# Patient Record
Sex: MALE | Race: BLACK OR AFRICAN AMERICAN | Employment: OTHER | URBAN - METROPOLITAN AREA
[De-identification: names, ages, dates, MRNs, and addresses within clinical notes are randomized per-mention and may not be internally consistent; named-entity substitution may affect disease eponyms.]

---

## 2017-07-10 ENCOUNTER — HOSPITAL ENCOUNTER (EMERGENCY)
Age: 77
Discharge: HOME | End: 2017-07-10
Attending: EMERGENCY MEDICINE
Payer: MEDICARE

## 2017-07-10 ENCOUNTER — APPOINTMENT (OUTPATIENT)
Dept: CT IMAGING | Age: 77
End: 2017-07-10
Attending: PHYSICIAN ASSISTANT
Payer: MEDICARE

## 2017-07-10 VITALS
TEMPERATURE: 98.7 F | HEIGHT: 69 IN | WEIGHT: 270 LBS | SYSTOLIC BLOOD PRESSURE: 150 MMHG | BODY MASS INDEX: 39.99 KG/M2 | DIASTOLIC BLOOD PRESSURE: 67 MMHG | OXYGEN SATURATION: 96 % | HEART RATE: 83 BPM | RESPIRATION RATE: 19 BRPM

## 2017-07-10 DIAGNOSIS — S01.01XA SCALP LACERATION, INITIAL ENCOUNTER: Primary | ICD-10-CM

## 2017-07-10 DIAGNOSIS — Z23 NEED FOR TDAP VACCINATION: ICD-10-CM

## 2017-07-10 PROCEDURE — 75810000293 HC SIMP/SUPERF WND  RPR

## 2017-07-10 PROCEDURE — 99283 EMERGENCY DEPT VISIT LOW MDM: CPT

## 2017-07-10 PROCEDURE — 90715 TDAP VACCINE 7 YRS/> IM: CPT | Performed by: PHYSICIAN ASSISTANT

## 2017-07-10 PROCEDURE — 77030008460 HC STPLR SKN PRECIS 3M -A

## 2017-07-10 PROCEDURE — 70450 CT HEAD/BRAIN W/O DYE: CPT

## 2017-07-10 PROCEDURE — 74011250636 HC RX REV CODE- 250/636: Performed by: PHYSICIAN ASSISTANT

## 2017-07-10 PROCEDURE — 90471 IMMUNIZATION ADMIN: CPT

## 2017-07-10 RX ORDER — GUAIFENESIN 100 MG/5ML
81 LIQUID (ML) ORAL DAILY
COMMUNITY
End: 2020-12-14

## 2017-07-10 RX ORDER — HYDROCHLOROTHIAZIDE 12.5 MG/1
12.5 TABLET ORAL DAILY
COMMUNITY
End: 2020-02-14

## 2017-07-10 RX ORDER — LISINOPRIL 40 MG/1
40 TABLET ORAL DAILY
COMMUNITY
End: 2020-02-14

## 2017-07-10 RX ORDER — METFORMIN HYDROCHLORIDE 1000 MG/1
1000 TABLET ORAL 2 TIMES DAILY WITH MEALS
Status: ON HOLD | COMMUNITY
End: 2021-06-11 | Stop reason: CLARIF

## 2017-07-10 RX ORDER — GABAPENTIN 400 MG/1
800 CAPSULE ORAL 2 TIMES DAILY
Status: ON HOLD | COMMUNITY
End: 2020-12-14 | Stop reason: SDUPTHER

## 2017-07-10 RX ORDER — PRAVASTATIN SODIUM 20 MG/1
20 TABLET ORAL
COMMUNITY
End: 2020-02-14

## 2017-07-10 RX ORDER — PIOGLITAZONEHYDROCHLORIDE 30 MG/1
30 TABLET ORAL DAILY
COMMUNITY

## 2017-07-10 RX ADMIN — TETANUS TOXOID, REDUCED DIPHTHERIA TOXOID AND ACELLULAR PERTUSSIS VACCINE, ADSORBED 0.5 ML: 5; 2.5; 8; 8; 2.5 SUSPENSION INTRAMUSCULAR at 12:15

## 2017-07-10 NOTE — ED PROVIDER NOTES
[address]  EMERGENCY DEPARTMENT HISTORY AND PHYSICAL EXAM       Date: 7/10/2017   Patient Name: Pino Blake   YOB: 1940  Medical Record Number: [id number]    History of Presenting Illness     Chief Complaint   Patient presents with    Laceration    Fall        History Provided By:  patient    Additional History: 11:39 AM  Pino Blake is a 78 y.o. male who presents to the emergency department with daughter C/O head laceration with throbbing head pain at a 1/10 s/p mechanical fall as pt bent over to leash dog and kept falling forward after bending and hit head on corner of wall. Pt takes baby ASA daily, but bleeding controlled on arrival. PMHx of DM, HTN, Neuropathy, Cancer, multiple myeloma, and chemotherapy. PSHx of cholecystectomy. Pt not UTD with last tetanus shot. Pt reports allergy to Iodine. Pt denies LOC, dizziness, neck pain, visual disturbance, other lacerations, arthralgias, syncope, numbness, weakness, jaw pain, and any other associated signs and sx. Primary Care Provider: Annie Vazquez MD   Specialist:    Past History     Past Medical History:   Past Medical History:   Diagnosis Date    [facility] [address]     Diabetes ([facility] [address]     FH: chemotherapy     H/O stem cell transplant ([facility] [address]     Hypertension     Multiple myeloma ([facility] [address]     Neuropathy         Past Surgical History:   Past Surgical History:   Procedure Laterality Date    HX CHOLECYSTECTOMY          Family History:   History reviewed. No pertinent family history. Social History:   Social History   Substance Use Topics    Smoking status: Former Smoker    Smokeless tobacco: Never Used    Alcohol use Yes      Comment: rare        Allergies: Allergies   Allergen Reactions    Iodine Hives        Review of Systems   Review of Systems   Eyes: Negative for visual disturbance. Musculoskeletal: Negative for arthralgias and neck pain.         (+) Scalp pain  (-) Jaw pain   Skin: Positive for wound (head). Neurological: Negative for dizziness, syncope, weakness and numbness. (-) LOC   All other systems reviewed and are negative. Physical Exam  Vitals:    07/10/17 1138 07/10/17 1142   BP: 144/89    Pulse: (!) 107    Resp: 16    Temp:  98.7 °F (37.1 °C)   SpO2: 97%    Weight: 122.5 kg (270 lb)    Height: 5' 9\" (1.753 m)        Physical Exam   Nursing note and vitals reviewed. Vital signs and nursing notes reviewed. CONSTITUTIONAL: Alert. Well-appearing; well-nourished; in no apparent distress. HEAD: Normocephalic; 5cm curved laceration to top right parietal scalp, some bleeding, no FB or pulsatile bleeding, no skull depression; neg deleon sign, no raccoon eyes. EYES: PERRL; EOM's intact. No nystagmus. Conjunctiva clear. ENT: TM's normal. External ear normal. Normal nose; no rhinorrhea. Normal pharynx. Moist mucus membranes. Dentures in place, somewhat loose. NECK: Supple; FROM without difficulty, non-tender; no cervical lymphadenopathy. CV: Normal S1, S2; no murmurs, rubs, or gallops. No chest wall tenderness. RESPIRATORY: Normal chest excursion with respiration; breath sounds clear and equal bilaterally; no wheezes, rhonchi, or rales. EXT: Normal ROM in all four extremities; non-tender to palpation. SKIN: Normal for age and race; warm; dry; good turgor; no apparent lesions or exudate. NEURO: A & O x3. Cranial nerves 2-12 intact. Motor 5/5 bilaterally. Sensation intact. Normal speech other than intermittently slurred due to loose dentures. PSYCH:  Mood and affect appropriate. Diagnostic Study Results     Labs -    No results found for this or any previous visit (from the past 12 hour(s)). Radiologic Studies -  The following have been ordered and reviewed:  CT HEAD WO CONT   Final Result   IMPRESSION:     1. No acute intracranial abnormalities are identified.    As read by the radiologist.         Medical Decision Making   I am the first provider for this patient. I reviewed the vital signs, available nursing notes, past medical history, past surgical history, family history and social history. Vital Signs-Reviewed the patient's vital signs. Patient Vitals for the past 12 hrs:   Temp Pulse Resp BP SpO2   07/10/17 1142 98.7 °F (37.1 °C) - - - -   07/10/17 1138 - (!) 107 16 144/89 97 %       Pulse Oximetry Analysis - Normal 97% on room air     Old Medical Records: Nursing notes. Procedures:   Wound Repair  Date/Time: 7/10/2017 1:10 PM  Performed by: [facility] provider: Antonio Garcia MD  Preparation: skin prepped with Shur-Clens  Location details: scalp  Wound length:2.6 - 7.5 cm  Foreign bodies: no foreign bodies  Skin closure: staples  Number of sutures: 6  Technique: simple and interrupted  Approximation: close  Dressing: 4x4  Patient tolerance: Patient tolerated the procedure well with no immediate complications  My total time at bedside, performing this procedure was 1-15 minutes. ED Course:  11:39 AM  Initial assessment performed. The patients presenting problems have been discussed, and they are in agreement with the care plan formulated and outlined with them. I have encouraged them to ask questions as they arise throughout their visit. Medications Given in the ED:  Medications   diph,Pertuss(AC),Tet Vac-PF (BOOSTRIX) suspension 0.5 mL (0.5 mL IntraMUSCular Given 7/10/17 1215)       Discharge Note:  1:39 PM  Pt has been reexamined. Patient has no new complaints, changes, or physical findings. Care plan outlined and precautions discussed. Results were reviewed with the patient. All medications were reviewed with the patient; will d/c home with no new rx. Tylenol for pain. All of pt's questions and concerns were addressed. Patient was instructed and agrees to follow up with PCP in 7 days for staple removal, as well as to return to the ED upon further deterioration. Patient is ready to go home.         Diagnosis   Clinical Impression: 1. Scalp laceration, initial encounter    2. Need for Tdap vaccination         Follow-up Information     Follow up With Details Comments Contact Brenda LAZARO In 1 week For suture removal [phone]    [facility] EMERGENCY DEPT  As needed, If symptoms worsen [address]  [phone]          Current Discharge Medication List          _______________________________   Attestations: This note is prepared by Alize Anne, acting as a Scribe for Leatha Aleman PA-C on 11:37 AM on 7/10/2017. Leatha Aleman PA-C: The scribe's documentation has been prepared under my direction and personally reviewed by me in its entirety.   _______________________________

## 2017-07-10 NOTE — DISCHARGE INSTRUCTIONS
Tetanus and Diphtheria Booster: Care Instructions  Your Care Instructions  A diphtheria and tetanus (Td) vaccine protects people against diphtheria and tetanus (lockjaw). You need a Td shot every 10 years to help prevent these diseases, which can be fatal.  You may also need a Td booster if you get a puncture wound or dirty cut. A booster is another dose of the vaccine. Young teens get a booster at 6to 15years of age. This booster is called Tdap and includes the vaccine against pertussis (whooping cough). All teens and adults who never had Tdap also need one dose of this vaccine. Common side effects of Td vaccination include soreness in the arm where you got the shot and a mild fever. These usually occur within 3 days of the shot and last a short time. Follow-up care is a key part of your treatment and safety. Be sure to make and go to all appointments, and call your doctor if you are having problems. It's also a good idea to know your test results and keep a list of the medicines you take. How can you care for yourself at home? · Take an over-the-counter pain medicine, such as acetaminophen (Tylenol), ibuprofen (Advil, Motrin), or naproxen (Aleve), if your arm is sore after the shot. Be safe with medicines. Read and follow all instructions on the label. Do not give aspirin to anyone younger than 20. It has been linked to Reye syndrome, a serious illness. · Put ice or a cold pack on the sore area for 10 to 20 minutes at a time. Put a thin cloth between the ice and your skin. When should you call for help? Call 911 anytime you think you may need emergency care. For example, call if:  · You have symptoms of a severe allergic reaction. These may include:  ¨ Sudden raised, red areas (hives) all over your body. ¨ Swelling of the throat, mouth, lips, or tongue. ¨ Trouble breathing. ¨ Passing out (losing consciousness). Or you may feel very lightheaded or suddenly feel weak, confused, or restless.   · You have a seizure. Call your doctor now or seek immediate medical care if:  · You have symptoms of an allergic reaction, such as:  ¨ A rash or hives (raised, red areas on the skin). ¨ Itching. ¨ Swelling. ¨ Belly pain, nausea, or vomiting. · You have a high fever. Watch closely for changes in your health, and be sure to contact your doctor if you have any problems. Where can you learn more? Go to http://patrick-avril.info/. Enter V332 in the search box to learn more about \"Tetanus and Diphtheria Booster: Care Instructions. \"  Current as of: November 10, 2016  Content Version: 11.3  © 3767-3085 [employer]. Care instructions adapted under license by [employer] (which disclaims liability or warranty for this information). If you have questions about a medical condition or this instruction, always ask your healthcare professional. [place] [address] any warranty or liability for your use of this information. Cuts Closed With Staples: Care Instructions  Your Care Instructions  A cut can happen anywhere on your body. The doctor used staples to close the cut. Staples easily and quickly close a cut, which helps the cut heal.  Sometimes a cut can injure tendons, blood vessels, or nerves. If the cut went deep and through the skin, the doctor may have put in a layer of stitches below the staples. The deeper layer of stitches brings the deep part of the cut together. These stitches will dissolve and don't need to be removed. The staples in the upper layer are what you see on the cut. You may have a bandage. You will need to have the staples removed, usually in 7 to 14 days. The doctor has checked you carefully, but problems can develop later. If you notice any problems or new symptoms, get medical treatment right away. Follow-up care is a key part of your treatment and safety.  Be sure to make and go to all appointments, and call your doctor if you are having problems. It's also a good idea to know your test results and keep a list of the medicines you take. How can you care for yourself at home? · Keep the cut dry for the first 24 to 48 hours. After this, you can shower if your doctor okays it. Pat the cut dry. · Don't soak the cut, such as in a bathtub. Your doctor will tell you when it's safe to get the cut wet. · If your doctor told you how to care for your cut, follow your doctor's instructions. If you did not get instructions, follow this general advice:  ¨ After the first 24 to 48 hours, wash around the cut with clean water 2 times a day. Don't use hydrogen peroxide or alcohol, which can slow healing. ¨ You may cover the cut with a thin layer of petroleum jelly, such as Vaseline, and a nonstick bandage. ¨ Apply more petroleum jelly and replace the bandage as needed. · Avoid any activity that could cause your cut to reopen. · Do not remove the staples on your own. Your doctor will tell you when to come back to have the staples removed. · Take pain medicines exactly as directed. ¨ If the doctor gave you a prescription medicine for pain, take it as prescribed. ¨ If you are not taking a prescription pain medicine, ask your doctor if you can take an over-the-counter medicine. When should you call for help? Call your doctor now or seek immediate medical care if:  · You have new pain, or your pain gets worse. · The skin near the cut is cold or pale or changes color. · You have tingling, weakness, or numbness near the cut. · The cut starts to bleed, and blood soaks through the bandage. Oozing small amounts of blood is normal.  · You have trouble moving the area near the cut. · You have symptoms of infection, such as:  ¨ Increased pain, swelling, warmth, or redness around the cut. ¨ Red streaks leading from the cut. ¨ Pus draining from the cut. ¨ A fever.   Watch closely for changes in your health, and be sure to contact your doctor if:  · You do not get better as expected. Where can you learn more? Go to http://patrick-avril.info/. Enter O133 in the search box to learn more about \"Cuts Closed With Staples: Care Instructions. \"  Current as of: March 20, 2017  Content Version: 11.3  © 9510-9159 [email or web address]. Care instructions adapted under license by [employer] (which disclaims liability or warranty for this information). If you have questions about a medical condition or this instruction, always ask your healthcare professional. [address] any warranty or liability for your use of this information.

## 2017-07-10 NOTE — ED TRIAGE NOTES
Pt states just finished mowing grass, went to [employer] dog, bent over and kept going, hit head on corner of wall, denies loc, pt is taking asa everyday. Bleeding controlled on arrival. Denies loc  Sepsis Screening completed    (  )Patient meets SIRS criteria.  x(  )Patient does not meet SIRS criteria.       SIRS Criteria is achieved when two or more of the following are present   Temperature < 96.8°F (36°C) or > 100.9°F (38.3°C)   Heart Rate > 90 beats per minute   Respiratory Rate > 20 breaths per minute   WBC count > 12,000 or <4,000 or > 10% bands

## 2017-07-10 NOTE — LETTER
[facility] [place] 
[facility] 
[address] [id number] [phone] Work/School Note Date: 7/10/2017 To Whom It May concern: 
 
Emile Martinez was seen and treated today in the emergency room by the following provider(s): 
Attending Provider: Lourdes Rubinstein, MD 
Physician Assistant: Lauryn Anderson, [facility]. Trudi Leos was in the Emergency Room today. Please excuse from work.  
 
Sincerely, 
 
 
 
 
Latesha Joyner PA-C

## 2018-12-24 ENCOUNTER — APPOINTMENT (OUTPATIENT)
Dept: GENERAL RADIOLOGY | Age: 78
End: 2018-12-24
Attending: EMERGENCY MEDICINE
Payer: MEDICARE

## 2018-12-24 ENCOUNTER — HOSPITAL ENCOUNTER (EMERGENCY)
Age: 78
Discharge: HOME | End: 2018-12-24
Attending: EMERGENCY MEDICINE | Admitting: EMERGENCY MEDICINE
Payer: MEDICARE

## 2018-12-24 VITALS
DIASTOLIC BLOOD PRESSURE: 58 MMHG | RESPIRATION RATE: 16 BRPM | HEIGHT: 69 IN | SYSTOLIC BLOOD PRESSURE: 149 MMHG | BODY MASS INDEX: 38.51 KG/M2 | TEMPERATURE: 98.5 F | OXYGEN SATURATION: 97 % | HEART RATE: 87 BPM | WEIGHT: 260 LBS

## 2018-12-24 DIAGNOSIS — S60.10XA SUBUNGUAL HEMATOMA OF DIGIT OF HAND, INITIAL ENCOUNTER: ICD-10-CM

## 2018-12-24 DIAGNOSIS — S62.631A DISPLACED FRACTURE OF DISTAL PHALANX OF LEFT INDEX FINGER, INITIAL ENCOUNTER FOR CLOSED FRACTURE: ICD-10-CM

## 2018-12-24 DIAGNOSIS — S61.211A LACERATION OF LEFT INDEX FINGER WITHOUT FOREIGN BODY, NAIL DAMAGE STATUS UNSPECIFIED, INITIAL ENCOUNTER: Primary | ICD-10-CM

## 2018-12-24 PROCEDURE — 77030008304 HC SPLNT FNGR ALUM DERY -A

## 2018-12-24 PROCEDURE — 73140 X-RAY EXAM OF FINGER(S): CPT

## 2018-12-24 PROCEDURE — 77030020851 HC CAUT BTRY HI AARM -A

## 2018-12-24 PROCEDURE — 99283 EMERGENCY DEPT VISIT LOW MDM: CPT

## 2018-12-24 PROCEDURE — 75810000106 HC EVAC SUBUNGUAL HEMATOMA

## 2018-12-24 RX ORDER — AMOXICILLIN AND CLAVULANATE POTASSIUM 875; 125 MG/1; MG/1
1 TABLET, FILM COATED ORAL 2 TIMES DAILY
Qty: 14 TAB | Refills: 0 | Status: SHIPPED | OUTPATIENT
Start: 2018-12-24 | End: 2018-12-31

## 2018-12-24 RX ORDER — AMOXICILLIN AND CLAVULANATE POTASSIUM 875; 125 MG/1; MG/1
1 TABLET, FILM COATED ORAL 2 TIMES DAILY
Qty: 14 TAB | Refills: 0 | Status: SHIPPED | OUTPATIENT
Start: 2018-12-24 | End: 2018-12-24

## 2018-12-24 NOTE — DISCHARGE INSTRUCTIONS
Subungual Hematoma: Care Instructions  Your Care Instructions  A subungual hematoma is blood under a fingernail or toenail. It's caused by hitting the nail with an object such as a hammer. Or it can happen if you pinch it in a door or drawer. The hematoma can cause throbbing pain in the hurt finger or toe. Your doctor may have relieved the pain by making a small hole in the nail. This lets the blood drain out. You may have had a shot to prevent a tetanus infection. Follow-up care is a key part of your treatment and safety. Be sure to make and go to all appointments, and call your doctor if you are having problems. It's also a good idea to know your test results and keep a list of the medicines you take. How can you care for yourself at home? If your doctor told you how to care for your wound, follow your doctor's instructions. If you did not get instructions, follow this general advice:  · Wash the wound with clean water 2 times a day. Keep it clean and dry the rest of the time. Don't use hydrogen peroxide or alcohol, which can slow healing. · You may cover the wound with a nonstick bandage. When should you call for help? Call your doctor now or seek immediate medical care if:    · You have symptoms of infection, such as:  ? Increased pain, swelling, warmth, or redness. ? Red streaks leading from the area. ? Pus draining from the area. ? A fever.    Watch closely for changes in your health, and be sure to contact your doctor if:    · You do not get better as expected. Where can you learn more? Go to http://patrick-avril.info/. Enter [phone] in the search box to learn more about \"Subungual Hematoma: Care Instructions. \"  Current as of: April 18, 2018  Content Version: 11.8  © 5087-0361 [employer]. Care instructions adapted under license by [employer] (which disclaims liability or warranty for this information).  If you have questions about a medical condition or this instruction, always ask your healthcare professional. [address] any warranty or liability for your use of this information. Finger Fracture: Care Instructions  Your Care Instructions    Breaks in the bones of the finger usually heal well in about 3 to 4 weeks. The pain and swelling from a broken finger can last for weeks. But it should steadily improve, starting a few days after you break it. It is very important that you wear and take care of the cast or splint exactly as your doctor tells you to so that your finger heals properly and does not end up crooked. Wearing a splint may interfere with your normal activities. Ask for help with daily tasks if you need it. You heal best when you take good care of yourself. Eat a variety of healthy foods, and don't smoke. Follow-up care is a key part of your treatment and safety. Be sure to make and go to all appointments, and call your doctor if you are having problems. It's also a good idea to know your test results and keep a list of the medicines you take. How can you care for yourself at home? · If your doctor put a splint on your finger, wear the splint exactly as directed. Do not remove it until your doctor says that you can. · Keep your hand raised above the level of your heart as much as you can. This will help reduce swelling. · Put ice or a cold pack on your finger for 10 to 20 minutes at a time. Try to do this every 1 to 2 hours for the next 3 days (when you are awake) or until the swelling goes down. Put a thin cloth between the ice and your skin. Keep the splint dry. · Be safe with medicines. Take pain medicines exactly as directed. ? If the doctor gave you a prescription medicine for pain, take it as prescribed. ? If you are not taking a prescription pain medicine, ask your doctor if you can take an over-the-counter medicine. When should you call for help?   Call 911 anytime you think you may need emergency care. For example, call if:    · Your finger is cool or pale or changes color.    Call your doctor now or seek immediate medical care if:    · Your pain gets much worse.     · You have tingling, weakness, or numbness in your finger.     · You have signs of infection, such as:  ? Increased pain, swelling, warmth, or redness. ? Red streaks leading from the area. ? Pus draining from the area. ? Swollen lymph nodes in your neck, armpits, or groin. ? A fever.    Watch closely for changes in your health, and be sure to contact your doctor if:    · Your finger is not steadily improving. Where can you learn more? Go to http://patrick-avril.info/. Enter T923 in the search box to learn more about \"Finger Fracture: Care Instructions. \"  Current as of: November 29, 2017  Content Version: 11.8  © 5806-9761 [employer]. Care instructions adapted under license by [employer] (which disclaims liability or warranty for this information). If you have questions about a medical condition or this instruction, always ask your healthcare professional. [address] any warranty or liability for your use of this information. Cuts Closed With Adhesives: Care Instructions  Your Care Instructions  A cut can happen anywhere on your body. The doctor used an adhesive to close the cut. When the adhesive dries, it forms a film that holds the edges of the cut together. Skin adhesives are sometimes called liquid stitches. If the cut went deep and through the skin, the doctor may have put in a layer of stitches below the adhesive. The deeper layer of stitches brings the deep part of the cut together. These stitches will dissolve and don't need to be removed. You don't see the stitches, only the adhesive. You may have a bandage. The doctor has checked you carefully, but problems can develop later.  If you notice any problems or new symptoms, get medical treatment right away.   Follow-up care is a key part of your treatment and safety. Be sure to make and go to all appointments, and call your doctor if you are having problems. It's also a good idea to know your test results and keep a list of the medicines you take. How can you care for yourself at home? · Keep the cut dry for the first 24 to 48 hours. After this, you can shower if your doctor okays it. Pat the cut dry. · Don't soak the cut, such as in a bathtub. Your doctor will tell you when it's safe to get the cut wet. · If your doctor told you how to care for your cut, follow your doctor's instructions. If you did not get instructions, follow this general advice:  ? Do not put any kind of ointment, cream, or lotion over the area. This can make the adhesive fall off too soon. ? After the first 24 to 48 hours, wash around the cut with clean water 2 times a day. Do not use hydrogen peroxide or alcohol, which can slow healing. ? If the doctor told you to use a bandage, put on a new bandage after cleaning the cut or if the bandage gets wet or dirty. · Prop up the sore area on a pillow anytime you sit or lie down during the next 3 days. Try to keep it above the level of your heart. This will help reduce swelling. · Leave the skin adhesive on your skin until it falls off on its own. This may take 5 to 10 days. · Do not scratch, rub, or pick at the adhesive. · Do not put the sticky part of a bandage directly on the adhesive. · Avoid any activity that could cause your cut to reopen. · Be safe with medicines. Read and follow all instructions on the label. ? If the doctor gave you a prescription medicine for pain, take it as prescribed. ? If you are not taking a prescription pain medicine, ask your doctor if you can take an over-the-counter medicine. When should you call for help?   Call your doctor now or seek immediate medical care if:    · You have new pain, or your pain gets worse.     · The skin near the cut is cold or pale or changes color.     · You have tingling, weakness, or numbness near the cut.     · The cut starts to bleed.     · You have trouble moving the area near the cut.     · You have symptoms of infection, such as:  ? Increased pain, swelling, warmth, or redness around the cut.  ? Red streaks leading from the cut.  ? Pus draining from the cut.  ? A fever.    Watch closely for changes in your health, and be sure to contact your doctor if:    · The cut reopens.     · You do not get better as expected. Where can you learn more? Go to http://patrick-avril.info/. Enter P174 in the search box to learn more about \"Cuts Closed With Adhesives: Care Instructions. \"  Current as of: November 20, 2017  Content Version: 11.8  © 7295-1662 [employer]. Care instructions adapted under license by [employer] (which disclaims liability or warranty for this information). If you have questions about a medical condition or this instruction, always ask your healthcare professional. [place] [address] any warranty or liability for your use of this information.

## 2018-12-24 NOTE — ED PROVIDER NOTES
EMERGENCY DEPARTMENT HISTORY AND PHYSICAL EXAM    Date: 12/24/2018  Patient Name: Keyona Baig    History of Presenting Illness     Chief Complaint   Patient presents with    Laceration         History Provided By: Patient    Chief Complaint: wound  Duration: ~24 Hours  Timing:  Acute  Location: left index finger  Associated Symptoms: swelling to the left index finger    Additional History (Context):     7:12 AM    Keyona Baig is a 70 y.o. male with pertinent PMHx of DM, HTN, neuropathy, CA (multiple myeloma treated with chemotherapy) presenting ambulatory to the ED c/o laceration to his left index finger x ~24 hours ago. Pt states that he cut it by jamming his finger in a door. Pt notes associated symptom of swelling. He became concerned as the wound did not seem to be improving, and due to his hx of DM. Pt had a tetanus vaccination on 7/10/17, during his last ER visit. Pt specifically denies any fever/chills or N/V/D.    PCP: Anabela Ambrosio  Pt does not smoke tobacco, drink ETOH or do illicit drugs. There are no other complaints, changes, or physical findings at this time. Current Outpatient Medications   Medication Sig Dispense Refill    amoxicillin-clavulanate (AUGMENTIN) 875-125 mg per tablet Take 1 Tab by mouth two (2) times a day for 7 days. 14 Tab 0    aspirin 81 mg chewable tablet Take 81 mg by mouth daily.  metFORMIN (GLUCOPHAGE) 1,000 mg tablet Take 1,000 mg by mouth two (2) times daily (with meals).  FELODIPINE (PLENDIL PO) Take  by mouth daily.  gabapentin (NEURONTIN) 400 mg capsule Take 800 mg by mouth two (2) times a day.  lisinopril (PRINIVIL, ZESTRIL) 40 mg tablet Take 40 mg by mouth daily.  SITagliptin (JANUVIA) 100 mg tablet Take 100 mg by mouth daily.  pravastatin (PRAVACHOL) 20 mg tablet Take 20 mg by mouth nightly.  hydroCHLOROthiazide (HYDRODIURIL) 12.5 mg tablet Take 12.5 mg by mouth daily.       vit B Cmplx 3-FA-Vit C-Biotin (NEPHRO LILIANA RX) 1- mg-mg-mcg tablet Take 1 Tab by mouth daily.  calcium-cholecalciferol, d3, (CALCIUM 600 + D) 600-125 mg-unit tab Take  by mouth daily.  pioglitazone (ACTOS) 30 mg tablet Take 30 mg by mouth daily. Past History     Past Medical History:  Past Medical History:   Diagnosis Date    [facility] ([facility]     Diabetes ([facility]     FH: chemotherapy     H/O stem cell transplant ([facility]     Hypertension     Multiple myeloma ([facility]     Neuropathy        Past Surgical History:  Past Surgical History:   Procedure Laterality Date    HX CHOLECYSTECTOMY         Family History:  History reviewed. No pertinent family history. Social History:  Social History     Tobacco Use    Smoking status: Former Smoker    Smokeless tobacco: Never Used   Substance Use Topics    Alcohol use: Yes     Comment: rare    Drug use: No       Allergies: Allergies   Allergen Reactions    Iodine Hives         Review of Systems   Review of Systems   Constitutional: Negative for chills and fever. Gastrointestinal: Negative for diarrhea, nausea and vomiting. Musculoskeletal: Positive for myalgias (left index finger). Skin: Positive for wound (left index finger). All other systems reviewed and are negative. Physical Exam     Vitals:    12/24/18 0715   BP: 149/58   Pulse: 87   Resp: 16   Temp: 98.5 °F (36.9 °C)   SpO2: 97%   Weight: 117.9 kg (260 lb)   Height: 5' 9\" (1.753 m)     Physical Exam   Nursing note and vitals reviewed. Constitutional: Well appearing, no acute distress  Head: Normocephalic, Atraumatic  Eyes: Pupils are equal, round, and reactive to light, EOMI  Neck: Supple, non-tender  Chest: Normal work of breathing and chest excursion bilaterally  Extremities: No evidence of trauma or deformity, no LE edema  Skin: 2.5 cm gapping laceration over the pulp of the left index finger without active bleeding. Subungual hematoma covering ~75% of the nail bed.   Neuro: Alert and appropriate  Psychiatric: Normal mood and affect      Diagnostic Study Results     Labs -   No results found for this or any previous visit (from the past 12 hour(s)). Radiologic Studies -   XR 2ND FINGER LT MIN 2 V    (Results Pending)     7:47 AM  RADIOLOGY FINDINGS  Left finger X-ray shows displaced fracture to the distal phalanx  Pending review by Radiologist  Recorded by SUNSHINE Cavazos, as dictated by Adryan Rachel MD.       Medical Decision Making   I am the first provider for this patient. I reviewed the vital signs, available nursing notes, past medical history, past surgical history, family history and social history. Vital Signs-Reviewed the patient's vital signs. Pulse Oximetry Analysis - 97% on RA     Records Reviewed: Nursing Notes and Old Medical Records   Pt had a tetanus vaccination on 7/10/17, during his last ER visit. PROCEDURES:  Other Procedure  Date/Time: 12/24/2018 7:48 AM  Performed by: Oksana Wesley MD  Authorized by: Oksana Wesley MD     Consent:     Consent obtained:  Verbal    Consent given by:  Patient    Risks discussed:  Bleeding, infection, pain and incomplete drainage    Alternatives discussed:  No treatment, delayed treatment and alternative treatment  Pre-procedure details:     Skin preparation:  Betadine    Preparation: Patient was prepped and draped in the usual sterile fashion    Anesthesia (see MAR for exact dosages): Anesthesia method:  None  Post-procedure details:     Patient tolerance of procedure: Tolerated well, no immediate complications  Comments:      Nail trephination - allowed the nail to dry completely. Use of electrocautery tool in the middle of the nail. Trephinated with good release of blood. Patient tolerated well. Wound Closure by Adhesive  Date/Time: 12/24/2018 7:59 AM  Performed by: Oksana Wesley MD  Authorized by:  Oksana Wesley MD     Consent:     Consent obtained:  Verbal    Consent given by:  Patient    Risks discussed:  Infection and pain Alternatives discussed:  No treatment  Anesthesia (see MAR for exact dosages): Anesthesia method:  None  Laceration details:     Location:  Finger    Finger location:  L index finger    Length (cm):  2.5  Repair type:     Repair type:  Simple  Pre-procedure details:     Preparation:  Patient was prepped and draped in usual sterile fashion  Exploration:     Wound exploration: wound explored through full range of motion    Treatment:     Area cleansed with:  Betadine    Amount of cleaning:  Standard    Irrigation solution:  Sterile saline    Irrigation method:  Tap  Skin repair:     Repair method:  Steri-Strips    Number of Steri-Strips:  2  Approximation:     Approximation:  Loose  Post-procedure details:     Dressing:  Sterile dressing and splint for protection    Patient tolerance of procedure: Tolerated well, no immediate complications  Comments:      Steri stripped the laceration, using thorough irrigation and exploration without bone exposure        MEDICATIONS GIVEN IN THE ED:  Medications - No data to display     ED COURSE:   7:12 AM   Initial assessment performed. PROGRESS NOTE:  8:00 AM  Pt and/or family have been updated on their results. Pt and/or pt's family are aware of the plan of care and are in agreement. Written by Darline Solorzano, SUNSHINE Scribe, as dictated by Ida Barahona MD.      Diagnosis and Disposition       DISCHARGE NOTE:  8:07 AM  The patient is ready for discharge. The patient's signs, symptoms, diagnosis, and discharge instructions have been discussed and the patient and/or family has conveyed their understanding. The patient and/or family is to follow up as recommended or return to the ER should their symptoms worsen. Plan has been discussed and the patient and/or family is in agreement. Written by Darline Solorzano ED Scribe, as dictated by Ida Barahona MD.     CLINICAL IMPRESSION:  1.  Laceration of left index finger without foreign body, nail damage status unspecified, initial encounter    2. Displaced fracture of distal phalanx of left index finger, initial encounter for closed fracture    3. Subungual hematoma of digit of hand, initial encounter        DISCUSSION: 70 y.o. male with 24 hours s/p laceration. XR reveals underlying fx, but no signs of open fracture on exam. Also has subungual hematoma which was drained. Due to his hx of DM and neuropathy will send home with abx. Due to delayed presentation, wound only gently approximated with steri strips. DC with 24 hour wound check with PCP or in the ED. Pt understands need for this follow up and understands and agrees with this plan. PLAN:  1. D/C Home  2. Current Discharge Medication List      START taking these medications    Details   amoxicillin-clavulanate (AUGMENTIN) 875-125 mg per tablet Take 1 Tab by mouth two (2) times a day for 7 days. Qty: 14 Tab, Refills: 0           3. Follow-up Information     Follow up With Specialties Details Why Contact Info    Caesar Kinney MD Plastic Surgery Schedule an appointment as soon as possible for a visit for hand follow up [address] [place] [address] Drive  [address]  Schedule an appointment as soon as possible for a visit for primary care follow up [address] [place] [address] EMERGENCY DEPT Emergency Medicine  As needed, If symptoms worsen [address] [address]  [phone]        _______________________________    Attestations: This note is prepared by Glenny Erazo, acting as Scribe for Shorty Loco MD.    Shorty Loco MD:  The scribe's documentation has been prepared under my direction and personally reviewed by me in its entirety.   I confirm that the note above accurately reflects all work, treatment, procedures, and medical decision making performed by me.  _______________________________

## 2018-12-26 ENCOUNTER — HOSPITAL ENCOUNTER (EMERGENCY)
Age: 78
Discharge: HOME | End: 2018-12-26
Attending: EMERGENCY MEDICINE
Payer: MEDICARE

## 2018-12-26 VITALS
HEIGHT: 69 IN | HEART RATE: 71 BPM | RESPIRATION RATE: 14 BRPM | OXYGEN SATURATION: 100 % | DIASTOLIC BLOOD PRESSURE: 75 MMHG | TEMPERATURE: 98.1 F | SYSTOLIC BLOOD PRESSURE: 156 MMHG | BODY MASS INDEX: 38.51 KG/M2 | WEIGHT: 260 LBS

## 2018-12-26 DIAGNOSIS — Z51.89 ENCOUNTER FOR WOUND RE-CHECK: Primary | ICD-10-CM

## 2018-12-26 PROCEDURE — 75810000275 HC EMERGENCY DEPT VISIT NO LEVEL OF CARE

## 2018-12-26 NOTE — ED TRIAGE NOTES
Patient here for wound check to Left index finger. Pt states he has been cleansing the wound and applying antibiotic ointment as directed. Denies pain, fever.

## 2018-12-26 NOTE — ED NOTES
Cleansed wound with sterile water. Applied antibiotic ointment and telfa gauze. Placed metal splint over wound and secured with paper tape.

## 2018-12-26 NOTE — ED NOTES
I have reviewed discharge instructions with the patient. The patient verbalized understanding. Patient advised to call wound care in 2-3 days if they have not contacted them prior to then. Patient provided with phone number to wound care. Patient verbalized understanding of these instructions.

## 2018-12-26 NOTE — ED PROVIDER NOTES
EMERGENCY DEPARTMENT HISTORY AND PHYSICAL EXAM    Date: 12/26/2018  Patient Name: Candace Chang    History of Presenting Illness     Chief Complaint   Patient presents with    Wound Check         History Provided By: Patient    Chief Complaint: Wound check  Duration: 4 Days  Timing:  Acute  Location: Left second finger  Severity: 0 out of 10  Associated Symptoms: denies any other associated signs or symptoms    Additional History (Context):   7:33 AM  Candace Chang is a 70 y.o. male with PMHx of diabetes, hypertension, and neuropathy who presents to the emergency department C/O left second finger wound check onset 4 days ago. No associated sxs. Patient was seen at this facility 2 days ago for a laceration to his left index finger. He had an XR at that time showing fracture. A subungual hematoma was drained. The wound was left open and he was placed on Augmentin, which he has been compliant with. Pt denies fever, chills, and any other sxs or complaints. PCP: George, MD Annie    Current Outpatient Medications   Medication Sig Dispense Refill    amoxicillin-clavulanate (AUGMENTIN) 875-125 mg per tablet Take 1 Tab by mouth two (2) times a day for 7 days. 14 Tab 0    aspirin 81 mg chewable tablet Take 81 mg by mouth daily.  metFORMIN (GLUCOPHAGE) 1,000 mg tablet Take 1,000 mg by mouth two (2) times daily (with meals).  FELODIPINE (PLENDIL PO) Take  by mouth daily.  gabapentin (NEURONTIN) 400 mg capsule Take 800 mg by mouth two (2) times a day.  lisinopril (PRINIVIL, ZESTRIL) 40 mg tablet Take 40 mg by mouth daily.  SITagliptin (JANUVIA) 100 mg tablet Take 100 mg by mouth daily.  pravastatin (PRAVACHOL) 20 mg tablet Take 20 mg by mouth nightly.  hydroCHLOROthiazide (HYDRODIURIL) 12.5 mg tablet Take 12.5 mg by mouth daily.  vit B Cmplx 3-FA-Vit C-Biotin (NEPHRO LILIANA RX) 1- mg-mg-mcg tablet Take 1 Tab by mouth daily.       calcium-cholecalciferol, d3, (CALCIUM 600 + D) 600-125 mg-unit tab Take  by mouth daily.  pioglitazone (ACTOS) 30 mg tablet Take 30 mg by mouth daily. Past History     Past Medical History:  Past Medical History:   Diagnosis Date    Cancer ([facility]     Diabetes ([facility]     FH: chemotherapy     H/O stem cell transplant ([facility]     Hypertension     Multiple myeloma ([facility]     Neuropathy        Past Surgical History:  Past Surgical History:   Procedure Laterality Date    HX CHOLECYSTECTOMY         Family History:  History reviewed. No pertinent family history. Social History:  Social History     Tobacco Use    Smoking status: Former Smoker    Smokeless tobacco: Never Used   Substance Use Topics    Alcohol use: Yes     Comment: rare    Drug use: No       Allergies: Allergies   Allergen Reactions    Iodine Hives         Review of Systems   Review of Systems   Constitutional: Negative for chills and fever. Skin: Positive for wound (left second finger). All other systems reviewed and are negative. Physical Exam     Vitals:    12/26/18 0730   BP: 156/75   Pulse: 71   Resp: 14   Temp: 98.1 °F (36.7 °C)   SpO2: 100%   Weight: 117.9 kg (260 lb)   Height: 5' 9\" (1.753 m)     Physical Exam   Nursing note and vitals reviewed. Constitutional: Well appearing, no acute distress  Head: Normocephalic, Atraumatic  Eyes: EOMI  Neck: Supple  Chest: Normal work of breathing and chest excursion bilaterally  Back: No evidence of trauma or deformity  Extremities: wound on left pulp of fingertip is still open, steri strips have fallen off, no signs of erythema, drainage or signs of infection, subungual hematoma drainage site has closed, hematoma has re-accumulated to ~50% of the nailbed, patient denies tenderness  Skin: Warm and dry, normal cap refill  Neuro: Alert and appropriate  Psychiatric: Normal mood and affect    Diagnostic Study Results     Labs -   No results found for this or any previous visit (from the past 12 hour(s)).     Radiologic Studies - No orders to display     CT Results  (Last 48 hours)    None        CXR Results  (Last 48 hours)    None          Medications given in the ED-  Medications - No data to display      Medical Decision Making   I am the first provider for this patient. I reviewed the vital signs, available nursing notes, past medical history, past surgical history, family history and social history. Vital Signs-Reviewed the patient's vital signs. Pulse Oximetry Analysis - 100% on RA     Records Reviewed: Nursing Notes and Old Medical Records    Procedures:  Procedures    ED Course:   7:33 AM Initial assessment performed. The patients presenting problems have been discussed, and they are in agreement with the care plan formulated and outlined with them. I have encouraged them to ask questions as they arise throughout their visit. Diagnosis and Disposition     Discussion:  70 y.o male, with diabetes, presenting for wound check. Wound was left open to heal by secondary intention due to presentation 24 hours after injury. Patient has been taking the antibiotics and keeping the wound clean. It is still open and steri strips have fallen off. No signs of infection. Will continue oral antibiotics. Placed consult for wound care. Discharge with PCP follow up and return precautions. Patient understands and agrees with this plan. DISCHARGE NOTE:  7:40 AM  Madison Draper  results have been reviewed with him. He has been counseled regarding his diagnosis, treatment, and plan. He verbally conveys understanding and agreement of the signs, symptoms, diagnosis, treatment and prognosis and additionally agrees to follow up as discussed. He also agrees with the care-plan and conveys that all of his questions have been answered.   I have also provided discharge instructions for him that include: educational information regarding their diagnosis and treatment, and list of reasons why they would want to return to the ED prior to their follow-up appointment, should his condition change. He has been provided with education for proper emergency department utilization. CLINICAL IMPRESSION:    1. Encounter for wound re-check        PLAN:  1. D/C Home  2. Current Discharge Medication List        3. Follow-up Information     Follow up With Specialties Details Why Contact Info Additional Information    [address] Schedule an appointment as soon as possible for a visit in 3 days For wound care follow up. [address]  [address] [address] [phone] Patients scheduled for OP Wound Care visits should enter the [facility], 2nd floor, [address] for check in. [address]  Schedule an appointment as soon as possible for a visit in 3 days For primary care follow up. [address] [place] [address]     [facility] EMERGENCY DEPT Emergency Medicine Go to As needed, If symptoms worsen [address]  [address] [address]  [phone]         _______________________________    Attestations: This note is prepared by Maria Antonia Cody, acting as Scribe for Gino Coker MD.    Gino Coker MD:  The scribe's documentation has been prepared under my direction and personally reviewed by me in its entirety.   I confirm that the note above accurately reflects all work, treatment, procedures, and medical decision making performed by me.  _______________________________

## 2018-12-26 NOTE — DISCHARGE INSTRUCTIONS
Wound Care: After Your Visit  Your Care Instructions  Taking good care of your wound at home will help it heal quickly and reduce your chance of infection. The doctor has checked you carefully, but problems can develop later. If you notice any problems or new symptoms, get medical treatment right away. Follow-up care is a key part of your treatment and safety. Be sure to make and go to all appointments, and call your doctor if you are having problems. It's also a good idea to know your test results and keep a list of the medicines you take. How can you care for yourself at home? · Clean the area with soap and water 2 times a day unless your doctor gives you different instructions. Don't use hydrogen peroxide or alcohol, which can slow healing. ¨ You may cover the wound with a thin layer of antibiotic ointment, such as bacitracin, and a nonstick bandage. ¨ Apply more ointment and replace the bandage as needed. · Take pain medicines exactly as directed. Some pain is normal with a wound, but do not ignore pain that is getting worse instead of better. You could have an infection. ¨ If the doctor gave you a prescription medicine for pain, take it as prescribed. ¨ If you are not taking a prescription pain medicine, ask your doctor if you can take an over-the-counter medicine. · Your doctor may have closed your wound with stitches (sutures), staples, or skin glue. ¨ If you have stitches, your doctor may remove them after several days to 2 weeks. Or you may have stitches that dissolve on their own. ¨ If you have staples, your doctor may remove them after 7 to 10 days. ¨ If your wound was closed with skin glue, the glue will wear off in a few days to 2 weeks. When should you call for help? Call your doctor now or seek immediate medical care if:  · You have signs of infection, such as:  ¨ Increased pain, swelling, warmth, or redness near the wound. ¨ Red streaks leading from the wound.   ¨ Pus draining from the wound. ¨ A fever. · You bleed so much from your incision that you soak one or more bandages over 2 to 4 hours. Watch closely for changes in your health, and be sure to contact your doctor if:  · The wound is not getting better each day. Where can you learn more? Go to [email or web address]  Enter M973 in the search box to learn more about \"Wound Care: After Your Visit. \"   © 0366-7145 [employer]wise, Incorporated. Care instructions adapted under license by [facility] (which disclaims liability or warranty for this information). This care instruction is for use with your licensed healthcare professional. If you have questions about a medical condition or this instruction, always ask your healthcare professional. [address] any warranty or liability for your use of this information. Content Version: 39.6.488529;  Last Revised: April 23, 2012

## 2019-01-03 ENCOUNTER — HOSPITAL ENCOUNTER (OUTPATIENT)
Dept: WOUND CARE | Age: 79
Discharge: HOME | End: 2019-01-03
Payer: MEDICARE

## 2019-01-03 VITALS
OXYGEN SATURATION: 98 % | TEMPERATURE: 98.1 F | DIASTOLIC BLOOD PRESSURE: 72 MMHG | SYSTOLIC BLOOD PRESSURE: 149 MMHG | HEART RATE: 70 BPM | RESPIRATION RATE: 19 BRPM

## 2019-01-03 PROCEDURE — 99201 HC NEW PT LEVEL I: CPT

## 2019-01-03 NOTE — WOUND CARE
01/03/19 1034   Wound Finger (specify in comments) Left   Date First Assessed/Time First Assessed: 01/03/19 1034   Wound Type: Trauma  Location: Finger (specify in comments)  Wound Description (Optional): 1st left finger  Orientation: Left   DRESSING STATUS Clean, dry, and intact   DRESSING TYPE Absorptive   SPLINT TYPE/MATERIAL (Finger splint)   Non-Pressure Injury Full thickness (subcut/muscle)   Wound Length (cm) 1 cm   Wound Width (cm) 2.2 cm   Wound Depth (cm) 0.1   Wound Surface area (cm^2) 2.2 cm^2   Condition of Base Pink;Granulation   Condition of Edges Open   Tissue Type Red;Pink   Drainage Amount  Scant   Drainage Color Serosanguinous   Wound Odor None   Periwound Skin Condition Edematous; Ecchymosis   Cleansing and Cleansing Agents  Other (comment)  (vashe)   Dressing Type Applied Alginate; Other (Comment)  (Mepitel one, 2x2, Blue Conrad)   Procedure Tolerated Well

## 2019-01-08 ENCOUNTER — HOSPITAL ENCOUNTER (OUTPATIENT)
Dept: WOUND CARE | Age: 79
Discharge: HOME | End: 2019-01-08
Payer: MEDICARE

## 2019-01-08 VITALS
OXYGEN SATURATION: 97 % | WEIGHT: 265 LBS | BODY MASS INDEX: 39.13 KG/M2 | SYSTOLIC BLOOD PRESSURE: 143 MMHG | DIASTOLIC BLOOD PRESSURE: 54 MMHG | RESPIRATION RATE: 18 BRPM | HEART RATE: 81 BPM | TEMPERATURE: 98.8 F

## 2019-01-08 PROCEDURE — 99215 OFFICE O/P EST HI 40 MIN: CPT

## 2019-01-08 NOTE — WOUND CARE
01/08/19 1533   Wound Finger (specify in comments) Left   Date First Assessed/Time First Assessed: 01/03/19 1034   Wound Type: Trauma  Location: Finger (specify in comments)  Wound Description (Optional): 1st left finger  Orientation: Left   DRESSING STATUS Clean, dry, and intact   DRESSING TYPE Absorptive   Non-Pressure Injury Full thickness (subcut/muscle)   Condition of Base Granulation;Pink   Condition of Edges Open   Tissue Type Pink;Red   Drainage Amount  None   Wound Odor None   Periwound Skin Condition Erythema, blanchable   Cleansing and Cleansing Agents  Dermal wound cleanser   Dressing Type Applied Collagens/cell matrix  (jami toussaint.)   Procedure Tolerated Well

## 2019-01-09 PROBLEM — S61.200A OPEN WOUND OF RIGHT INDEX FINGER WITHOUT DAMAGE TO NAIL: Status: ACTIVE | Noted: 2019-01-09

## 2019-01-09 NOTE — WOUND CARE
[address] [facility]  Initial Consult note         Chief Complaint:  Rosita Harp is a 70 y.o.  male who presents with left index finger wound present since December 30, 2018. HPI:   He had injury at [facility], he had his finger jammed in the door. He was seen at ER, X-Ray showed Displaced, comminuted transverse fracture of the distal tuft of the left 2nd digit. He was placed on antibiotics, he completed course of antibiotic. Wound caused by: acute injury   Current wound care: kimberlyn  Offloading wound: yes and n/a  Appetite: good  Wound associated pain: mild  Diabetic: yes  Smoker: No      Past Medical History:   Diagnosis Date    [facility]     Diabetes ([facility]     FH: chemotherapy     [facility]     Hypertension     Multiple myeloma ([facility]     Neuropathy       Past Surgical History:   Procedure Laterality Date    HX CHOLECYSTECTOMY       No family history on file. Social History     Tobacco Use    Smoking status: Former Smoker    Smokeless tobacco: Never Used   Substance Use Topics    Alcohol use: Yes     Comment: rare       Prior to Admission medications    Medication Sig Start Date End Date Taking? Authorizing Provider   aspirin 81 mg chewable tablet Take 81 mg by mouth daily. Annie Vazquez MD   metFORMIN (GLUCOPHAGE) 1,000 mg tablet Take 1,000 mg by mouth two (2) times daily (with meals). Annie Vazquez MD   FELODIPINE (PLENDIL PO) Take  by mouth daily. Annie Vazquez MD   gabapentin (NEURONTIN) 400 mg capsule Take 800 mg by mouth two (2) times a day. Annie Vazquez MD   lisinopril (PRINIVIL, ZESTRIL) 40 mg tablet Take 40 mg by mouth daily. Annie Vazquez MD   SITagliptin (JANUVIA) 100 mg tablet Take 100 mg by mouth daily. Annie Vazquez MD   pravastatin (PRAVACHOL) 20 mg tablet Take 20 mg by mouth nightly. Annie Vazquez MD   hydroCHLOROthiazide (HYDRODIURIL) 12.5 mg tablet Take 12.5 mg by mouth daily.     Annie Vazquez MD   vit B Cmplx 3-FA-Vit C-Biotin (NEPHRO LILIANA RX) 1- mg-mg-mcg tablet Take 1 Tab by mouth daily. Annie Vazquez MD   calcium-cholecalciferol, d3, (CALCIUM 600 + D) 600-125 mg-unit tab Take  by mouth daily. Annie Vazquez MD   pioglitazone (ACTOS) 30 mg tablet Take 30 mg by mouth daily. Annie Vazquez MD     Allergies   Allergen Reactions    Iodine Hives        Review of Systems  Gen: No fever, chills, malaise, weight loss/gain. Heent: No headache, rhinorrhea, epistaxis, ear pain, hearing loss, sinus pain, neck pain/stiffness, sore throat. Heart: No chest pain, palpitations, CHARLES, pnd, or orthopnea. Resp: No cough, hemoptysis, wheezing and shortness of breath. GI: No nausea, vomiting, diarrhea, constipation, melena or hematochezia. : No urinary obstruction, dysuria or hematuria. Derm: see above   Musc/skeletal: no bone or joint complains. Vasc: No edema, cyanosis or claudication. Endo: No heat/cold intolerance, no polyuria,polydipsia or polyphagia. Neuro: No unilateral weakness, numbness, tingling. No seizures. Heme: No easy bruising or bleeding. Objective:     Physical Exam:     Visit Vitals  /54 (BP 1 Location: Left arm, BP Patient Position: Sitting)   Pulse 81   Temp 98.8 °F (37.1 °C)   Resp 18   Wt 120.2 kg (265 lb)   SpO2 97%   BMI 39.13 kg/m²     General: well developed, well nourished, pleasant , NAD. Hygiene good  Psych: cooperative. Pleasant. No anxiety or depression. Normal mood and affect. Neuro: alert and oriented to person/place/situation. Otherwise nonfocal.  Derm: Skin turgor for age, dry skin  HEENT: Normocephalic, atraumatic. EOMI. Conjunctiva clear. No scleral icterus. Neck: Normal range of motion. No masses. Chest: Good air entry bilaterally. Respirations nonlabored  Cardio[de-identified] Normal heart sounds,no rubs, murmurs or gallops  Abdomen: Soft, nontender, nondistended, normoactive bowel sounds  Lower extremities: color normal; temperature normal. Calves are supple, nontender.  Capillary refill <3 sec  Left index finger wound    Wound Description:   Wound Length: 0.5 cm    Wound Width :2.2 cm    Wound Depth :0.1    Etiology: trauma  Ulcer bed: Fibrotic slough  Visable Slough    Periwound: Edematous  Exudate: Scant/small amount Serous exudate    Data Review:   No results found for this or any previous visit (from the past 24 hour(s)). Assessment:     Patient Active Problem List   Diagnosis Code    Open wound of right index finger without damage to nail S61.200A     70 y.o. male with left index finger wound secondary to trauma    Needs :    Good local wound care  Nutrition optimization  Good Diabetic control  Plan:     In wound care clinic today:  Cleanse wound with NS or soap and water or commercial wound cleanser  Apply the following topically to wound bed: mesalt  Apply the following to brenda-wound: NA  Apply the following dressings: Absorptive dressing    For Home Care/Self Care:  Cleanse wound with NS or soap and water or commercial wound cleanser  Keep dressing dry and intact when bathing  Apply the following to wound bed:mesalt  Apply the following to skin around wound: NA  Apply the following dressings: Absorptive dressing    Leave dressings in place until next visit    Patient to return for wound care in:   Days  Follow up with Nurse visit as recommended. PLEASE CONTACT OFFICE AS SOON AS POSSIBLE IF UNABLE TO MAKE THIS APPOINTMENT. Inspect your wounds, looking for signs of infection which may include the following:  Increase in redness  Red \"streaks\" from wound  Increase in swelling  Fever  Unusual odor  Change in the amount of wound drainage. Should you experience any significant changes in your wound(s) or have any questions regarding your home care instructions please contact the wound center or your home health company. If after regular business hours, please call your family doctor or local emergency room.       Signed By: Dewey Rhodes MD     January 9, 2019

## 2019-01-18 ENCOUNTER — HOSPITAL ENCOUNTER (OUTPATIENT)
Dept: WOUND CARE | Age: 79
Discharge: HOME | End: 2019-01-18
Payer: MEDICARE

## 2019-01-18 VITALS
OXYGEN SATURATION: 98 % | HEART RATE: 75 BPM | RESPIRATION RATE: 18 BRPM | SYSTOLIC BLOOD PRESSURE: 152 MMHG | TEMPERATURE: 98.2 F | DIASTOLIC BLOOD PRESSURE: 68 MMHG

## 2019-01-18 PROCEDURE — 99211 OFF/OP EST MAY X REQ PHY/QHP: CPT

## 2019-01-18 NOTE — WOUND CARE
01/18/19 1009   Wound Finger (specify in comments) Left   Date First Assessed/Time First Assessed: 01/03/19 1034   Wound Type: Trauma  Location: Finger (specify in comments)  Wound Description (Optional): 1st left finger  Orientation: Left   DRESSING STATUS Clean, dry, and intact   DRESSING TYPE Unna boot   Wound Length (cm) 0.5 cm  (cut has scabbed over)   Wound Width (cm) 0.5 cm   Wound Surface area (cm^2) 0.25 cm^2   Change in Wound Size % 88.64   Condition of Base (area is scabbed over)   Condition of Edges Closed   Tissue Type (scabb)   Drainage Amount  None   Wound Odor None   Periwound Skin Condition Intact   Cleansing and Cleansing Agents  (foam wash, vashe and barrier wipes)   Dressing Type Applied (mesalt, mepilex border, coban)   Procedure Tolerated Well

## 2019-01-23 ENCOUNTER — HOSPITAL ENCOUNTER (OUTPATIENT)
Dept: WOUND CARE | Age: 79
Discharge: HOME | End: 2019-01-23
Payer: MEDICARE

## 2019-01-23 VITALS
OXYGEN SATURATION: 100 % | DIASTOLIC BLOOD PRESSURE: 71 MMHG | TEMPERATURE: 98.2 F | HEART RATE: 77 BPM | RESPIRATION RATE: 18 BRPM | SYSTOLIC BLOOD PRESSURE: 155 MMHG

## 2019-01-23 PROCEDURE — 99211 OFF/OP EST MAY X REQ PHY/QHP: CPT

## 2019-01-23 NOTE — WOUND CARE
01/23/19 1023   Wound Finger (specify in comments) Left   Date First Assessed/Time First Assessed: 01/03/19 1034   Wound Type: Trauma  Location: Finger (specify in comments)  Wound Description (Optional): 1st left finger  Orientation: Left   DRESSING STATUS Reinforced   DRESSING TYPE Other (Comment)  (silk tape, mepilex border, mesalt)   Non-Pressure Injury Full thickness (subcut/muscle)   Wound Length (cm) 0.2 cm   Wound Width (cm) 0.2 cm   Wound Depth (cm) 0.2   Wound Surface area (cm^2) 0.04 cm^2   Change in Wound Size % 98.18   Condition of Base Granulation   Condition of Edges Closed   Tissue Type Pink   Tissue Type Percent Pink 100   Drainage Amount  Scant   Drainage Color Serosanguinous   Wound Odor None   Periwound Skin Condition Macerated  (pt states the dressing got wet )   Cleansing and Cleansing Agents  Other (comment)  (vashe)   Dressing Type Applied Other (Comment)  (mepilex border, mesalt)   Procedure Tolerated Well

## 2019-01-23 NOTE — DISCHARGE INSTRUCTIONS
Leave dressings in place until next visit    Patient to return for wound care in:   1 week for nurse assessment and dressing change  Follow up with Nurse visit as recommended. PLEASE CONTACT OFFICE AS SOON AS POSSIBLE IF UNABLE TO MAKE THIS APPOINTMENT. Inspect your wounds, looking for signs of infection which may include the following:  Increase in redness  Red \"streaks\" from wound  Increase in swelling  Fever  Unusual odor  Change in the amount of wound drainage. Should you experience any significant changes in your wound(s) or have any questions regarding your home care instructions please contact the wound center or your home health company. If after regular business hours, please call your family doctor or local emergency room.

## 2019-01-30 ENCOUNTER — HOSPITAL ENCOUNTER (OUTPATIENT)
Dept: WOUND CARE | Age: 79
Discharge: HOME | End: 2019-01-30
Payer: MEDICARE

## 2019-01-30 VITALS
HEART RATE: 81 BPM | OXYGEN SATURATION: 100 % | SYSTOLIC BLOOD PRESSURE: 169 MMHG | TEMPERATURE: 98.3 F | DIASTOLIC BLOOD PRESSURE: 111 MMHG

## 2019-01-30 PROCEDURE — 99211 OFF/OP EST MAY X REQ PHY/QHP: CPT

## 2019-01-30 NOTE — DISCHARGE INSTRUCTIONS
WOUND CARE, DRESSING CHANGE WEEKLY, Status: Canceled     Comments: In wound care clinic today:   Cleanse wound with NS or soap and water or commercial wound cleanser   Apply the following topically to wound bed: mesalt   Apply the following to brenda-wound: NA   Apply the following dressings: Absorptive dressing     For Home Care/Self Care:   Cleanse wound with NS or soap and water or commercial wound cleanser   Apply the following to wound bed:mesalt   Apply the following to skin around wound: NA   Apply the following dressings: Absorptive dressing     Leave dressings in place until next visit     Patient to return for wound care in:   1 week for nurse assessment and dressing change   Follow up with Nurse visit as recommended. PLEASE CONTACT OFFICE AS SOON AS POSSIBLE IF UNABLE TO MAKE THIS APPOINTMENT. Inspect your wounds, looking for signs of infection which may include the following:  Increase in redness  Red \"streaks\" from wound  Increase in swelling  Fever  Unusual odor  Change in the amount of wound drainage. Should you experience any significant changes in your wound(s) or have any questions regarding your home care instructions please contact the wound center or your home health company. If after regular business hours, please call your family doctor or local emergency room.

## 2019-02-13 ENCOUNTER — HOSPITAL ENCOUNTER (OUTPATIENT)
Dept: WOUND CARE | Age: 79
Discharge: HOME | End: 2019-02-13
Payer: MEDICARE

## 2019-02-13 VITALS
TEMPERATURE: 98.3 F | HEART RATE: 69 BPM | DIASTOLIC BLOOD PRESSURE: 69 MMHG | RESPIRATION RATE: 17 BRPM | SYSTOLIC BLOOD PRESSURE: 157 MMHG | OXYGEN SATURATION: 100 %

## 2019-02-13 PROCEDURE — 99213 OFFICE O/P EST LOW 20 MIN: CPT

## 2019-02-13 NOTE — WOUND CARE
[facility]  Follow up note. Chief Complaint:  Durga Olivera is a 70 y.o.  male who presents for follow up of left index finger wound present since December 30, 2018. He had injury at [facility], he had his finger jammed in the door. He was seen at ER, X-Ray showed Displaced, comminuted transverse fracture of the distal tuft of the left 2nd digit. He was placed on antibiotics, he completed course of antibiotic. Review of systems  General: No fevers or chills. Cardiovascular: No chest pain or pressure. No palpitations. Pulmonary: No shortness of breath. Gastrointestinal: No nausea, vomiting. Derm: See above                Physical Exam:     Visit Vitals  /69 (BP 1 Location: Left arm, BP Patient Position: Sitting; At rest)   Pulse 69   Temp 98.3 °F (36.8 °C)   Resp 17   SpO2 100%     General: well developed, well nourished, pleasant , NAD. Hygiene good  Psych: cooperative. Pleasant. No anxiety or depression. Normal mood and affect. Neuro: alert and oriented to person/place/situation. Otherwise nonfocal.  Derm: Skin turgor normal for age, dry skin  HEENT: Normocephalic, atraumatic. EOMI. Conjunctiva clear. No scleral icterus. Chest: Good air entry bilaterally. Respirations non labored  Cardio[de-identified] Normal heart sounds,no rubs, murmurs or gallops  Abdomen: Soft, nontender, nondistended, normoactive bowel sounds  Lower extremities: color normal; temperature normal. Calves are supple, nontender. Capillary refill <3 sec      Wound Description:   Wound Length: 0 cm    Wound Width :0 cm    Wound Depth :0      Data Review:   No results found for this or any previous visit (from the past 24 hour(s)). Assessment:     Patient Active Problem List   Diagnosis Code    Open wound of right index finger without damage to nail S61.200A     70 y.o. male with left index finger wound now closed. Plan:     Follow up as needed.     Signed By: Urban Campos MD     February 13, 2019

## 2019-02-13 NOTE — WOUND CARE
02/13/19 1337   Wound Finger (specify in comments) Left   Date First Assessed/Time First Assessed: 01/03/19 1034   Wound Type: Trauma  Location: Finger (specify in comments)  Wound Description (Optional): 1st left finger  Orientation: Left   Dressing Status  Other (comment)  (Changed by patient)   Dressing Type  Other (Comment)  (Band-aid, tape)   Non-Pressure Injury Full thickness (subcut/muscle)   Wound Length (cm) 0 cm   Wound Width (cm) 0 cm   Wound Depth (cm) 0   Wound Surface area (cm^2) 0 cm^2   Change in Wound Size % 100   Condition of Base Pink   Condition of Edges Closed   Epithelialization (%) 100   Tissue Type Pink   Drainage Amount  None   Wound Odor None   Periwound Skin Condition Macerated   Cleansing and Cleansing Agents  Other (comment)  (vashe)

## 2019-12-02 ENCOUNTER — APPOINTMENT (OUTPATIENT)
Dept: GENERAL RADIOLOGY | Age: 79
End: 2019-12-02
Attending: PHYSICIAN ASSISTANT
Payer: MEDICARE

## 2019-12-02 ENCOUNTER — HOSPITAL ENCOUNTER (EMERGENCY)
Age: 79
Discharge: HOME | End: 2019-12-02
Attending: EMERGENCY MEDICINE
Payer: MEDICARE

## 2019-12-02 VITALS
SYSTOLIC BLOOD PRESSURE: 159 MMHG | WEIGHT: 262 LBS | HEART RATE: 87 BPM | RESPIRATION RATE: 18 BRPM | DIASTOLIC BLOOD PRESSURE: 69 MMHG | TEMPERATURE: 98.5 F | HEIGHT: 69 IN | BODY MASS INDEX: 38.8 KG/M2 | OXYGEN SATURATION: 100 %

## 2019-12-02 DIAGNOSIS — S39.012A BACK STRAIN, INITIAL ENCOUNTER: ICD-10-CM

## 2019-12-02 DIAGNOSIS — S22.000A THORACIC COMPRESSION FRACTURE, CLOSED, INITIAL ENCOUNTER (HCC): Primary | ICD-10-CM

## 2019-12-02 PROCEDURE — 72110 X-RAY EXAM L-2 SPINE 4/>VWS: CPT

## 2019-12-02 PROCEDURE — 99282 EMERGENCY DEPT VISIT SF MDM: CPT

## 2019-12-02 RX ORDER — HYDROCODONE BITARTRATE AND ACETAMINOPHEN 5; 325 MG/1; MG/1
1 TABLET ORAL
Qty: 12 TAB | Refills: 0 | Status: SHIPPED | OUTPATIENT
Start: 2019-12-02 | End: 2019-12-05

## 2019-12-02 NOTE — ED TRIAGE NOTES
Pt states \" Wednesday I fell going up the steps in my garage and I hit some containers and fell to the cement floor now I am having severe back pain since Friday started on right side and went to the left side. I have a lot of gas and I also am having a hard time bending. \"

## 2019-12-02 NOTE — DISCHARGE INSTRUCTIONS
Patient Education     Patient Education        Back Strain: Care Instructions  Overview    A back strain happens when you overstretch, or pull, a muscle in your back. You may hurt your back in an accident or when you exercise or lift something. Sometimes you may not know how you hurt your back. Most back pain will get better with rest and time. You can take care of yourself at home to help your back heal.  Follow-up care is a key part of your treatment and safety. Be sure to make and go to all appointments, and call your doctor if you are having problems. It's also a good idea to know your test results and keep a list of the medicines you take. How can you care for yourself at home? · Try to stay as active as you can, but stop or reduce any activity that causes pain. · Put ice or a cold pack on the sore muscle for 10 to 20 minutes at a time to stop swelling. Try this every 1 to 2 hours for 3 days (when you are awake) or until the swelling goes down. Put a thin cloth between the ice pack and your skin. · After 2 or 3 days, apply a heating pad on low or a warm cloth to your back. Some doctors suggest that you go back and forth between hot and cold treatments. · Take pain medicines exactly as directed. ? If the doctor gave you a prescription medicine for pain, take it as prescribed. ? If you are not taking a prescription pain medicine, ask your doctor if you can take an over-the-counter medicine. · Try sleeping on your side with a pillow between your legs. Or put a pillow under your knees when you lie on your back. These measures can ease pain in your lower back. · Return to your usual level of activity slowly. When should you call for help? Call 911 anytime you think you may need emergency care. For example, call if:    · You are unable to move a leg at all.   [facility] your doctor now or seek immediate medical care if:    · You have new or worse symptoms in your legs, belly, or buttocks.  Symptoms may include:  ? Numbness or tingling. ? Weakness. ? Pain.     · You lose bladder or bowel control.    Watch closely for changes in your health, and be sure to contact your doctor if:    · You have a fever, lose weight, or don't feel well.     · You are not getting better as expected. Where can you learn more? Go to http://patrick-avril.info/. Enter A808 in the search box to learn more about \"Back Strain: Care Instructions. \"  Current as of: June 26, 2019  Content Version: 12.2  © 0642-8103 [employer]. Care instructions adapted under license by [employer] (which disclaims liability or warranty for this information). If you have questions about a medical condition or this instruction, always ask your healthcare professional. [address] any warranty or liability for your use of this information. Compression Fracture of the Spine: Care Instructions  Your Care Instructions    A compression fracture happens when the front part of a spinal bone breaks and collapses. A fall or other accident can cause it. A minor injury or moving the wrong way can cause a break if you have thin or brittle bones (osteoporosis). These types of breaks will heal in 8 to 10 weeks. You will need rest and pain medicines. Your doctor may recommend physical therapy. Some doctors recommend that certain people with compression fractures wear braces. Your doctor also may treat thin or brittle bones. You may need surgery if you have lasting pain or if the bone presses on the spinal cord or nerves. You heal best when you take good care of yourself. Eat a variety of healthy foods, and don't smoke. Follow-up care is a key part of your treatment and safety. Be sure to make and go to all appointments, and call your doctor if you are having problems. It's also a good idea to know your test results and keep a list of the medicines you take.   How can you care for yourself at home?  · Be safe with medicines. Read and follow all instructions on the label. ? If the doctor gave you a prescription medicine for pain, take it as prescribed. ? If you are not taking a prescription pain medicine, ask your doctor if you can take an over-the-counter medicine. · Talk to your doctor about how to make your bones stronger. You may need medicines or a change in what you eat. · Be active only as directed by your doctor. When should you call for help? Call 911 anytime you think you may need emergency care. For example, call if:    · You are unable to move an arm or a leg at all.   [facility] your doctor now or seek immediate medical care if:    · You have new or worse symptoms in your arms, legs, belly, or buttocks. Symptoms may include:  ? Numbness or tingling. ? Weakness. ? Pain.     · You lose bladder or bowel control.     · You have belly pain, bloating, vomiting, or nausea.    Watch closely for changes in your health, and be sure to contact your doctor if:    · You do not get better as expected. Where can you learn more? Go to http://patrick-avril.info/. Enter P445 in the search box to learn more about \"Compression Fracture of the Spine: Care Instructions. \"  Current as of: June 26, 2019  Content Version: 12.2  © 6917-7774 [employer]. Care instructions adapted under license by [employer] (which disclaims liability or warranty for this information). If you have questions about a medical condition or this instruction, always ask your healthcare professional. [place] [address] any warranty or liability for your use of this information.

## 2019-12-02 NOTE — ED PROVIDER NOTES
EMERGENCY DEPARTMENT HISTORY AND PHYSICAL EXAM    Date: 12/2/2019  Patient Name: Mor Us    History of Presenting Illness     Chief Complaint   Patient presents with    Fall         History Provided By: Patient    9:44 AM  Mor Us is a 70 y.o. male with PMHX of DM, multiple myeloma in remission who presents to the emergency department C/O progressively worsening lower back pain status post fall 5 days ago. Patient states he was carrying heavy grocery bags when he went to step up onto the steps in his garage when he missed and fell back and onto his left side. He states he felt okay at first but has had progressively worsening bilateral lower back pain but now seems worse on the left today. Took unknown muscle relaxer at home last night but has not taken any medication today. Pt denies prior back problems, head injury, loss of consciousness, neck pain, extremity pain/numbness/weakness, abdominal pain, saddle anesthesia, bowel or bladder incontinence, and any other sxs or complaints. PCP: George, MD Annie    Current Outpatient Medications   Medication Sig Dispense Refill    HYDROcodone-acetaminophen (NORCO) 5-325 mg per tablet Take 1 Tab by mouth every four (4) hours as needed for Pain for up to 3 days. Max Daily Amount: 6 Tabs. 12 Tab 0    aspirin 81 mg chewable tablet Take 81 mg by mouth daily.  metFORMIN (GLUCOPHAGE) 1,000 mg tablet Take 1,000 mg by mouth two (2) times daily (with meals).  FELODIPINE (PLENDIL PO) Take  by mouth daily.  gabapentin (NEURONTIN) 400 mg capsule Take 800 mg by mouth two (2) times a day.  lisinopril (PRINIVIL, ZESTRIL) 40 mg tablet Take 40 mg by mouth daily.  SITagliptin (JANUVIA) 100 mg tablet Take 100 mg by mouth daily.  pravastatin (PRAVACHOL) 20 mg tablet Take 20 mg by mouth nightly.  hydroCHLOROthiazide (HYDRODIURIL) 12.5 mg tablet Take 12.5 mg by mouth daily.       vit B Cmplx 3-FA-Vit C-Biotin (NEPHRO LILIANA RX) 6- mg-mg-mcg tablet Take 1 Tab by mouth daily.  calcium-cholecalciferol, d3, (CALCIUM 600 + D) 600-125 mg-unit tab Take  by mouth daily.  pioglitazone (ACTOS) 30 mg tablet Take 30 mg by mouth daily. Past History     Past Medical History:  Past Medical History:   Diagnosis Date    Cancer ([facility]     Diabetes ([facility]     FH: chemotherapy     H/O stem cell transplant ([facility]     Hypertension     Multiple myeloma ([facility]     Neuropathy        Past Surgical History:  Past Surgical History:   Procedure Laterality Date    HX CHOLECYSTECTOMY         Family History:  History reviewed. No pertinent family history. Social History:  Social History     Tobacco Use    Smoking status: Former Smoker    Smokeless tobacco: Never Used   Substance Use Topics    Alcohol use: Yes     Comment: rare    Drug use: No       Allergies: Allergies   Allergen Reactions    Iodine Hives         Review of Systems   Review of Systems   Gastrointestinal: Negative for abdominal pain. Genitourinary: Negative for difficulty urinating. Musculoskeletal: Positive for back pain. Negative for neck pain. Neurological: Negative for syncope, weakness, numbness and headaches. All other systems reviewed and are negative. Physical Exam     Vitals:    12/02/19 0923   BP: 159/69   Pulse: 87   Resp: 18   Temp: 98.5 °F (36.9 °C)   SpO2: 100%   Weight: 118.8 kg (262 lb)   Height: 5' 9\" (1.753 m)     Physical Exam  Vitals signs and nursing note reviewed. Constitutional:       General: He is not in acute distress. Appearance: Normal appearance. He is normal weight. HENT:      Head: Normocephalic and atraumatic. Neck:      Musculoskeletal: Normal range of motion. No muscular tenderness. Cardiovascular:      Rate and Rhythm: Normal rate and regular rhythm. Pulses: Normal pulses. Heart sounds: Normal heart sounds. Pulmonary:      Effort: Pulmonary effort is normal.      Breath sounds: Normal breath sounds. Chest:      Chest wall: No tenderness. Musculoskeletal:        Back:       Comments: BLE: Distal sensation intact. 2+ DP pulses. No foot drop. Normal gait   Skin:     General: Skin is warm and dry. Neurological:      General: No focal deficit present. Mental Status: He is alert. Psychiatric:         Mood and Affect: Mood normal.               Diagnostic Study Results     Labs -   No results found for this or any previous visit (from the past 12 hour(s)). Radiologic Studies -   XR SPINE LUMB MIN 4 V   Final Result   IMPRESSION:    1. Moderate compression deformity of the T11 vertebral body with predominant   involvement of the superior endplate. 2. Mild and diffuse lumbar spondylosis. CT Results  (Last 48 hours)    None        CXR Results  (Last 48 hours)    None          Medications given in the ED-  Medications - No data to display      Medical Decision Making   I am the first provider for this patient. I reviewed the vital signs, available nursing notes, past medical history, past surgical history, family history and social history. Vital Signs-Reviewed the patient's vital signs. Records Reviewed: Nursing Notes      Procedures:  Procedures    ED Course:  9:44 AM   Initial assessment performed. The patients presenting problems have been discussed, and they are in agreement with the care plan formulated and outlined with them. I have encouraged them to ask questions as they arise throughout their visit. Provider Notes (Medical Decision Making): Duy Abad is a 70 y.o. male presents with gradually worsening lower back pain status post mechanical fall 3 days ago. His vitals are stable. Tender mostly left lumbar paraspinal muscles. Neurovascular intact. No signs or symptoms of cauda equina radiculopathy. X-ray of the lumbar spine shows T11 compression fracture, age-indeterminate but does have some pain in this area.   Pain medication as noted and follow-up with orthospine. Return to ED if symptoms worsen. Diagnosis and Disposition       DISCHARGE NOTE:    Shellie Elise  results have been reviewed with him. He has been counseled regarding his diagnosis, treatment, and plan. He verbally conveys understanding and agreement of the signs, symptoms, diagnosis, treatment and prognosis and additionally agrees to follow up as discussed. He also agrees with the care-plan and conveys that all of his questions have been answered. I have also provided discharge instructions for him that include: educational information regarding their diagnosis and treatment, and list of reasons why they would want to return to the ED prior to their follow-up appointment, should his condition change. He has been provided with education for proper emergency department utilization. CLINICAL IMPRESSION:    1. Thoracic compression fracture, closed, initial encounter ([facility]    2. Back strain, initial encounter        PLAN:  1. D/C Home  2. Discharge Medication List as of 12/2/2019 10:52 AM      START taking these medications    Details   HYDROcodone-acetaminophen (NORCO) 5-325 mg per tablet Take 1 Tab by mouth every four (4) hours as needed for Pain for up to 3 days. Max Daily Amount: 6 Tabs., Print, Disp-12 Tab, R-0         CONTINUE these medications which have NOT CHANGED    Details   aspirin 81 mg chewable tablet Take 81 mg by mouth daily. , Historical Med      metFORMIN (GLUCOPHAGE) 1,000 mg tablet Take 1,000 mg by mouth two (2) times daily (with meals). , Historical Med      FELODIPINE (PLENDIL PO) Take  by mouth daily. , Historical Med      gabapentin (NEURONTIN) 400 mg capsule Take 800 mg by mouth two (2) times a day., Historical Med      lisinopril (PRINIVIL, ZESTRIL) 40 mg tablet Take 40 mg by mouth daily. , Historical Med      SITagliptin (JANUVIA) 100 mg tablet Take 100 mg by mouth daily. , Historical Med      pravastatin (PRAVACHOL) 20 mg tablet Take 20 mg by mouth nightly., Historical Med      hydroCHLOROthiazide (HYDRODIURIL) 12.5 mg tablet Take 12.5 mg by mouth daily. , Historical Med      vit B Cmplx 3-FA-Vit C-Biotin (NEPHRO LILIANA RX) 1- mg-mg-mcg tablet Take 1 Tab by mouth daily. , Historical Med      calcium-cholecalciferol, d3, (CALCIUM 600 + D) 600-125 mg-unit tab Take  by mouth daily. , Historical Med      pioglitazone (ACTOS) 30 mg tablet Take 30 mg by mouth daily. , Historical Med           3. Follow-up Information     Follow up With Specialties Details Why Contact Info    Kay Colorado MD Orthopedic Surgery Schedule an appointment as soon as possible for a visit  [place] [id number] [facility]      [facility] EMERGENCY DEPT Emergency Medicine  As needed, If symptoms worsen [address] [id number] [phone]        _______________________________      Please note that this dictation was completed with Leo, the computer voice recognition software. Quite often unanticipated grammatical, syntax, homophones, and other interpretive errors are inadvertently transcribed by the computer software. Please disregard these errors. Please excuse any errors that have escaped final proofreading.

## 2019-12-13 ENCOUNTER — HOSPITAL ENCOUNTER (OUTPATIENT)
Dept: MRI IMAGING | Age: 79
Discharge: HOME | End: 2019-12-13
Attending: ORTHOPAEDIC SURGERY
Payer: MEDICARE

## 2019-12-13 DIAGNOSIS — M54.50 LOW BACK PAIN: ICD-10-CM

## 2019-12-13 PROCEDURE — 72148 MRI LUMBAR SPINE W/O DYE: CPT

## 2019-12-13 PROCEDURE — 72146 MRI CHEST SPINE W/O DYE: CPT

## 2020-02-12 ENCOUNTER — APPOINTMENT (OUTPATIENT)
Dept: GENERAL RADIOLOGY | Age: 80
DRG: 291 | End: 2020-02-12
Attending: EMERGENCY MEDICINE
Payer: MEDICARE

## 2020-02-12 ENCOUNTER — HOSPITAL ENCOUNTER (INPATIENT)
Age: 80
LOS: 1 days | Discharge: HOME | DRG: 291 | End: 2020-02-14
Attending: EMERGENCY MEDICINE | Admitting: HOSPITALIST
Payer: MEDICARE

## 2020-02-12 DIAGNOSIS — R07.9 CHEST PAIN, UNSPECIFIED TYPE: Primary | ICD-10-CM

## 2020-02-12 PROBLEM — R60.0 LOWER EXTREMITY EDEMA: Status: ACTIVE | Noted: 2020-02-12

## 2020-02-12 PROBLEM — E11.9 DM (DIABETES MELLITUS) (HCC): Status: ACTIVE | Noted: 2020-02-12

## 2020-02-12 LAB
ALBUMIN SERPL-MCNC: 3.7 G/DL (ref 3.4–5)
ALBUMIN/GLOB SERPL: 1 {RATIO} (ref 0.8–1.7)
ALP SERPL-CCNC: 61 U/L (ref 45–117)
ALT SERPL-CCNC: 21 U/L (ref 16–61)
ANION GAP SERPL CALC-SCNC: 5 MMOL/L (ref 3–18)
AST SERPL-CCNC: 13 U/L (ref 10–38)
ATRIAL RATE: 80 BPM
BASOPHILS # BLD: 0.1 K/UL (ref 0–0.1)
BASOPHILS NFR BLD: 1 % (ref 0–2)
BILIRUB SERPL-MCNC: 0.5 MG/DL (ref 0.2–1)
BNP SERPL-MCNC: 100 PG/ML (ref 0–1800)
BUN SERPL-MCNC: 18 MG/DL (ref 7–18)
BUN/CREAT SERPL: 19 (ref 12–20)
CALCIUM SERPL-MCNC: 9.3 MG/DL (ref 8.5–10.1)
CALCULATED P AXIS, ECG09: 61 DEGREES
CALCULATED R AXIS, ECG10: 0 DEGREES
CALCULATED T AXIS, ECG11: 102 DEGREES
CHLORIDE SERPL-SCNC: 103 MMOL/L (ref 100–111)
CK MB CFR SERPL CALC: 3 % (ref 0–4)
CK MB SERPL-MCNC: 6.4 NG/ML (ref 5–25)
CK SERPL-CCNC: 210 U/L (ref 39–308)
CO2 SERPL-SCNC: 30 MMOL/L (ref 21–32)
CREAT SERPL-MCNC: 0.93 MG/DL (ref 0.6–1.3)
D DIMER PPP FEU-MCNC: 0.79 UG/ML(FEU)
DIAGNOSIS, 93000: NORMAL
DIFFERENTIAL METHOD BLD: ABNORMAL
EOSINOPHIL # BLD: 0.2 K/UL (ref 0–0.4)
EOSINOPHIL NFR BLD: 3 % (ref 0–5)
ERYTHROCYTE [DISTWIDTH] IN BLOOD BY AUTOMATED COUNT: 14.9 % (ref 11.6–14.5)
GLOBULIN SER CALC-MCNC: 3.8 G/DL (ref 2–4)
GLUCOSE BLD STRIP.AUTO-MCNC: 115 MG/DL (ref 70–110)
GLUCOSE BLD STRIP.AUTO-MCNC: 81 MG/DL (ref 70–110)
GLUCOSE SERPL-MCNC: 90 MG/DL (ref 74–99)
HCT VFR BLD AUTO: 37.1 % (ref 36–48)
HGB BLD-MCNC: 12.2 G/DL (ref 13–16)
LYMPHOCYTES # BLD: 2.4 K/UL (ref 0.9–3.6)
LYMPHOCYTES NFR BLD: 30 % (ref 21–52)
MAGNESIUM SERPL-MCNC: 1.5 MG/DL (ref 1.6–2.6)
MCH RBC QN AUTO: 28.8 PG (ref 24–34)
MCHC RBC AUTO-ENTMCNC: 32.9 G/DL (ref 31–37)
MCV RBC AUTO: 87.7 FL (ref 74–97)
MONOCYTES # BLD: 0.8 K/UL (ref 0.05–1.2)
MONOCYTES NFR BLD: 10 % (ref 3–10)
NEUTS SEG # BLD: 4.5 K/UL (ref 1.8–8)
NEUTS SEG NFR BLD: 56 % (ref 40–73)
P-R INTERVAL, ECG05: 204 MS
PLATELET # BLD AUTO: 142 K/UL (ref 135–420)
PMV BLD AUTO: 11.3 FL (ref 9.2–11.8)
POTASSIUM SERPL-SCNC: 3.7 MMOL/L (ref 3.5–5.5)
PROT SERPL-MCNC: 7.5 G/DL (ref 6.4–8.2)
Q-T INTERVAL, ECG07: 380 MS
QRS DURATION, ECG06: 122 MS
QTC CALCULATION (BEZET), ECG08: 438 MS
RBC # BLD AUTO: 4.23 M/UL (ref 4.7–5.5)
SODIUM SERPL-SCNC: 138 MMOL/L (ref 136–145)
TROPONIN I SERPL-MCNC: <0.02 NG/ML (ref 0–0.04)
VENTRICULAR RATE, ECG03: 80 BPM
WBC # BLD AUTO: 8 K/UL (ref 4.6–13.2)

## 2020-02-12 PROCEDURE — 82962 GLUCOSE BLOOD TEST: CPT

## 2020-02-12 PROCEDURE — 85379 FIBRIN DEGRADATION QUANT: CPT

## 2020-02-12 PROCEDURE — 74011250636 HC RX REV CODE- 250/636: Performed by: FAMILY MEDICINE

## 2020-02-12 PROCEDURE — 99285 EMERGENCY DEPT VISIT HI MDM: CPT

## 2020-02-12 PROCEDURE — 83735 ASSAY OF MAGNESIUM: CPT

## 2020-02-12 PROCEDURE — 96372 THER/PROPH/DIAG INJ SC/IM: CPT

## 2020-02-12 PROCEDURE — 99218 HC RM OBSERVATION: CPT

## 2020-02-12 PROCEDURE — 83880 ASSAY OF NATRIURETIC PEPTIDE: CPT

## 2020-02-12 PROCEDURE — 80061 LIPID PANEL: CPT

## 2020-02-12 PROCEDURE — 80053 COMPREHEN METABOLIC PANEL: CPT

## 2020-02-12 PROCEDURE — 93005 ELECTROCARDIOGRAM TRACING: CPT

## 2020-02-12 PROCEDURE — 85025 COMPLETE CBC W/AUTO DIFF WBC: CPT

## 2020-02-12 PROCEDURE — 74011250637 HC RX REV CODE- 250/637: Performed by: INTERNAL MEDICINE

## 2020-02-12 PROCEDURE — 82550 ASSAY OF CK (CPK): CPT

## 2020-02-12 PROCEDURE — 71045 X-RAY EXAM CHEST 1 VIEW: CPT

## 2020-02-12 RX ORDER — DEXTROSE MONOHYDRATE 100 MG/ML
125-250 INJECTION, SOLUTION INTRAVENOUS AS NEEDED
Status: DISCONTINUED | OUTPATIENT
Start: 2020-02-12 | End: 2020-02-14 | Stop reason: HOSPADM

## 2020-02-12 RX ORDER — INSULIN LISPRO 100 [IU]/ML
INJECTION, SOLUTION INTRAVENOUS; SUBCUTANEOUS
Status: DISCONTINUED | OUTPATIENT
Start: 2020-02-12 | End: 2020-02-14 | Stop reason: HOSPADM

## 2020-02-12 RX ORDER — NALOXONE HYDROCHLORIDE 0.4 MG/ML
0.4 INJECTION, SOLUTION INTRAMUSCULAR; INTRAVENOUS; SUBCUTANEOUS AS NEEDED
Status: DISCONTINUED | OUTPATIENT
Start: 2020-02-12 | End: 2020-02-14 | Stop reason: HOSPADM

## 2020-02-12 RX ORDER — MORPHINE SULFATE 2 MG/ML
2 INJECTION, SOLUTION INTRAMUSCULAR; INTRAVENOUS
Status: DISCONTINUED | OUTPATIENT
Start: 2020-02-12 | End: 2020-02-14 | Stop reason: HOSPADM

## 2020-02-12 RX ORDER — ACETAMINOPHEN 325 MG/1
650 TABLET ORAL
Status: DISCONTINUED | OUTPATIENT
Start: 2020-02-12 | End: 2020-02-14 | Stop reason: HOSPADM

## 2020-02-12 RX ORDER — PRAVASTATIN SODIUM 20 MG/1
20 TABLET ORAL
Status: DISCONTINUED | OUTPATIENT
Start: 2020-02-12 | End: 2020-02-13

## 2020-02-12 RX ORDER — ASPIRIN 81 MG/1
81 TABLET ORAL DAILY
Status: DISCONTINUED | OUTPATIENT
Start: 2020-02-13 | End: 2020-02-14 | Stop reason: HOSPADM

## 2020-02-12 RX ORDER — ONDANSETRON 2 MG/ML
4 INJECTION INTRAMUSCULAR; INTRAVENOUS
Status: DISCONTINUED | OUTPATIENT
Start: 2020-02-12 | End: 2020-02-14 | Stop reason: HOSPADM

## 2020-02-12 RX ORDER — HEPARIN SODIUM 5000 [USP'U]/ML
5000 INJECTION, SOLUTION INTRAVENOUS; SUBCUTANEOUS EVERY 8 HOURS
Status: DISCONTINUED | OUTPATIENT
Start: 2020-02-12 | End: 2020-02-14 | Stop reason: HOSPADM

## 2020-02-12 RX ORDER — HYDROCODONE BITARTRATE AND ACETAMINOPHEN 5; 325 MG/1; MG/1
1 TABLET ORAL
Status: DISCONTINUED | OUTPATIENT
Start: 2020-02-12 | End: 2020-02-14 | Stop reason: HOSPADM

## 2020-02-12 RX ORDER — MAGNESIUM SULFATE 100 %
16 CRYSTALS MISCELLANEOUS AS NEEDED
Status: DISCONTINUED | OUTPATIENT
Start: 2020-02-12 | End: 2020-02-14 | Stop reason: HOSPADM

## 2020-02-12 RX ADMIN — PRAVASTATIN SODIUM 20 MG: 20 TABLET ORAL at 23:08

## 2020-02-12 RX ADMIN — HEPARIN SODIUM 5000 UNITS: 5000 INJECTION INTRAVENOUS; SUBCUTANEOUS at 17:25

## 2020-02-12 RX ADMIN — HEPARIN SODIUM 5000 UNITS: 5000 INJECTION INTRAVENOUS; SUBCUTANEOUS at 23:09

## 2020-02-12 NOTE — ED NOTES
TRANSFER - OUT REPORT:    Verbal report given to Rosanna Alvino (name) on Logan Singleton  being transferred to Tele (unit) for routine progression of care       Report consisted of patients Situation, Background, Assessment and   Recommendations(SBAR). Information from the following report(s) SBAR, ED Summary, Procedure Summary, Intake/Output, MAR, Recent Results and Cardiac Rhythm NSR was reviewed with the receiving nurse. Lines:   Peripheral IV 02/12/20 Left Antecubital (Active)   Site Assessment Clean, dry, & intact 2/12/2020  1:53 PM   Phlebitis Assessment 0 2/12/2020  1:53 PM   Infiltration Assessment 0 2/12/2020  1:53 PM   Dressing Status Clean, dry, & intact 2/12/2020  1:53 PM   Dressing Type 4 X 4 2/12/2020  1:53 PM   Hub Color/Line Status Pink 2/12/2020  1:53 PM   Alcohol Cap Used Yes 2/12/2020  1:53 PM        Opportunity for questions and clarification was provided.       Patient transported with:   Monitor  Tech

## 2020-02-12 NOTE — H&P
History & Physical    Patient: Renae Reddy MRN: [id number]  CSN: [id number]    YOB: 1940  Age: 70 y.o. Sex: male      DOA: 2020  Primary Care Provider:  George, MD Annie      Assessment/Plan   Chest Pain   Presenting cardiac enzymes are unremarkable, d-dimer age-adjusted is within normal limits  Dr. Berta Ruvalcaba consulted and requested that patient be admitted for observation to telemetry to medical service with serial cardiac enzymes  Trend cardiac enzymes  Echocardiogram in a.m. Family history of early death related to MI, father  at age 64  Defer further cardiac testing such as stress test to Dr. Berta Ruvalcaba    Hypertension   Resume home medications  Follow blood pressure  Daily BMP    DM   ADA diet, sliding scale insulin, fingerstick blood glucose q. before meals and nightly  Check hemoglobin A1c    HLD   Fasting lipid profile  Resume statin    Bilateral LE Edema   Doppler study of BLE to eval for DVT    Obesity   Nutritional support    DVT prophylaxis: Heparin      Patient Active Problem List   Diagnosis Code    Open wound of right index finger without damage to nail S61.200A    Chest pain R07.9       Estimated length of stay: Observation, less than 48 hours    CC:    Chest pain     HPI:     Renae Reddy is a 70 y.o. male  with past medical history of hypertension, hyperlipidemia, diabetes and obesity who presents to the emergency department C/O intermittent left-sided chest pain that radiates up to his left shoulder and left upper extremity for the past 2 weeks. Patient reports associated shortness of breath when the pain comes on. States that he has never had this pain before and is not exacerbated by exertion or activity. Patient states that he is never had an MI and has never seen a cardiologist in the past.  Pt denies abdominal pain, nausea or vomiting, and any other sxs or complaints.      Past Medical History:   Diagnosis Date    Cancer ([facility]     Diabetes ([facility]     FH: chemotherapy     [facility]     Hypertension     Multiple myeloma ([facility]     Neuropathy        Past Surgical History:   Procedure Laterality Date    HX CHOLECYSTECTOMY      HX KYPHOPLASTY         History reviewed. No pertinent family history. Social History     Socioeconomic History    Marital status:      Spouse name: Not on file    Number of children: Not on file    Years of education: Not on file    Highest education level: Not on file   Tobacco Use    Smoking status: Former Smoker    Smokeless tobacco: Never Used   Substance and Sexual Activity    Alcohol use: Never     Frequency: Never     Comment: rare    Drug use: No       Prior to Admission medications    Medication Sig Start Date End Date Taking? Authorizing Provider   aspirin 81 mg chewable tablet Take 81 mg by mouth daily. Annie Vazquez MD   metFORMIN (GLUCOPHAGE) 1,000 mg tablet Take 1,000 mg by mouth two (2) times daily (with meals). Annie Vazquez MD   FELODIPINE (PLENDIL PO) Take  by mouth daily. Annie Vazquez MD   gabapentin (NEURONTIN) 400 mg capsule Take 800 mg by mouth two (2) times a day. Annie Vazquez MD   lisinopril (PRINIVIL, ZESTRIL) 40 mg tablet Take 40 mg by mouth daily. Annie Vazquez MD   SITagliptin (JANUVIA) 100 mg tablet Take 100 mg by mouth daily. Annie Vazquez MD   pravastatin (PRAVACHOL) 20 mg tablet Take 20 mg by mouth nightly. Annie Vazquez MD   hydroCHLOROthiazide (HYDRODIURIL) 12.5 mg tablet Take 12.5 mg by mouth daily. Annie Vazquez MD   vit B Cmplx 3-FA-Vit C-Biotin (NEPHRO LILIANA RX) 1- mg-mg-mcg tablet Take 1 Tab by mouth daily. Annie Vazquez MD   calcium-cholecalciferol, d3, (CALCIUM 600 + D) 600-125 mg-unit tab Take  by mouth daily. Annie Vazquez MD   pioglitazone (ACTOS) 30 mg tablet Take 30 mg by mouth daily. Annie Vazquez MD       Allergies   Allergen Reactions    Iodine Hives       Review of Systems  Gen: No fever, chills, malaise, weight loss/gain. Heent: No headache, rhinorrhea, epistaxis, ear pain, hearing loss, sinus pain, neck pain/stiffness, sore throat. Heart: No chest pain, palpitations, CHARLES, pnd, or orthopnea. Resp: No cough, hemoptysis, wheezing and shortness of breath. GI: No nausea, vomiting, diarrhea, constipation, melena or hematochezia. : No urinary obstruction, dysuria or hematuria. Derm: No rash, new skin lesion or pruritis. Musc/skeletal: no bone or joint complains. Vasc: No edema, cyanosis or claudication. Endo: No heat/cold intolerance, no polyuria,polydipsia or polyphagia. Neuro: No unilateral weakness, numbness, tingling. No seizures. Heme: No easy bruising or bleeding. Physical Exam:     Physical Exam:  Visit Vitals  /66   Pulse 70   Temp 97.7 °F (36.5 °C)   Resp 20   Ht 5' 9\" (1.753 m)   Wt 118.8 kg (262 lb)   SpO2 100%   BMI 38.69 kg/m²      O2 Device: Room air    Temp (24hrs), Av.7 °F (36.5 °C), Min:97.7 °F (36.5 °C), Max:97.7 °F (36.5 °C)    No intake/output data recorded. No intake/output data recorded. General:  Awake, cooperative, no distress. Head:  Normocephalic, without obvious abnormality, atraumatic. Eyes:  Conjunctivae/corneas clear, sclera anicteric, PERRL, EOMs intact. Nose: Nares normal. No drainage or sinus tenderness. Throat: Lips, mucosa, and tongue normal.    Neck: Supple, symmetrical, trachea midline, no adenopathy. Lungs:   Clear to auscultation bilaterally. Heart:  Regular rate and rhythm, S1, S2 normal, no murmur, click, rub or gallop. Abdomen: Soft, non-tender. Bowel sounds normal. No masses,  No organomegaly. Extremities: Extremities normal, atraumatic, no cyanosis, 1+plus pitting edema. Capillary refill normal.   Pulses: 2+ and symmetric all extremities. Skin: Skin color appropriate for ethnicity, turgor normal. No rashes or lesions   Neurologic: CNII-XII intact. No focal motor or sensory deficit.        Labs Reviewed:  Recent Results (from the past 24 hour(s))   EKG, 12 LEAD, INITIAL    Collection Time: 02/12/20  1:30 PM   Result Value Ref Range    Ventricular Rate 80 BPM    Atrial Rate 80 BPM    P-R Interval 204 ms    QRS Duration 122 ms    Q-T Interval 380 ms    QTC Calculation (Bezet) 438 ms    Calculated P Axis 61 degrees    Calculated R Axis 0 degrees    Calculated T Axis 102 degrees    Diagnosis       Sinus rhythm with frequent premature ventricular complexes  Anterior infarct , age undetermined  T wave abnormality, consider lateral ischemia  Abnormal ECG  No previous ECGs available     CBC WITH AUTOMATED DIFF    Collection Time: 02/12/20  1:50 PM   Result Value Ref Range    WBC 8.0 4.6 - 13.2 K/uL    RBC 4.23 (L) 4.70 - 5.50 M/uL    HGB 12.2 (L) 13.0 - 16.0 g/dL    HCT 37.1 36.0 - 48.0 %    MCV 87.7 74.0 - 97.0 FL    MCH 28.8 24.0 - 34.0 PG    MCHC 32.9 31.0 - 37.0 g/dL    RDW 14.9 (H) 11.6 - 14.5 %    PLATELET 339 631 - 124 K/uL    MPV 11.3 9.2 - 11.8 FL    NEUTROPHILS 56 40 - 73 %    LYMPHOCYTES 30 21 - 52 %    MONOCYTES 10 3 - 10 %    EOSINOPHILS 3 0 - 5 %    BASOPHILS 1 0 - 2 %    ABS. NEUTROPHILS 4.5 1.8 - 8.0 K/UL    ABS. LYMPHOCYTES 2.4 0.9 - 3.6 K/UL    ABS. MONOCYTES 0.8 0.05 - 1.2 K/UL    ABS. EOSINOPHILS 0.2 0.0 - 0.4 K/UL    ABS. BASOPHILS 0.1 0.0 - 0.1 K/UL    DF AUTOMATED     METABOLIC PANEL, COMPREHENSIVE    Collection Time: 02/12/20  1:50 PM   Result Value Ref Range    Sodium 138 136 - 145 mmol/L    Potassium 3.7 3.5 - 5.5 mmol/L    Chloride 103 100 - 111 mmol/L    CO2 30 21 - 32 mmol/L    Anion gap 5 3.0 - 18 mmol/L    Glucose 90 74 - 99 mg/dL    BUN 18 7.0 - 18 MG/DL    Creatinine 0.93 0.6 - 1.3 MG/DL    BUN/Creatinine ratio 19 12 - 20      GFR est AA >60 >60 ml/min/1.73m2    GFR est non-AA >60 >60 ml/min/1.73m2    Calcium 9.3 8.5 - 10.1 MG/DL    Bilirubin, total 0.5 0.2 - 1.0 MG/DL    ALT (SGPT) 21 16 - 61 U/L    AST (SGOT) 13 10 - 38 U/L    Alk.  phosphatase 61 45 - 117 U/L    Protein, total 7.5 6.4 - 8.2 g/dL Albumin 3.7 3.4 - 5.0 g/dL    Globulin 3.8 2.0 - 4.0 g/dL    A-G Ratio 1.0 0.8 - 1.7     MAGNESIUM    Collection Time: 02/12/20  1:50 PM   Result Value Ref Range    Magnesium 1.5 (L) 1.6 - 2.6 mg/dL   CARDIAC PANEL,(CK, CKMB & TROPONIN)    Collection Time: 02/12/20  1:50 PM   Result Value Ref Range     39 - 308 U/L    CK - MB 6.4 (H) <3.6 ng/ml    CK-MB Index 3.0 0.0 - 4.0 %    Troponin-I, QT <0.02 0.0 - 0.045 NG/ML   NT-PRO BNP    Collection Time: 02/12/20  1:50 PM   Result Value Ref Range    NT pro- 0 - 1,800 PG/ML   D DIMER    Collection Time: 02/12/20  1:50 PM   Result Value Ref Range    D DIMER 0.79 (H) <0.46 ug/ml(FEU)       Procedures/imaging: see electronic medical records for all procedures/Xrays and details which were not copied into this note but were reviewed prior to creation of Plan      CC: George, MD Annie

## 2020-02-12 NOTE — ED PROVIDER NOTES
EMERGENCY DEPARTMENT HISTORY AND PHYSICAL EXAM    Date: 2/12/2020  Patient Name: Fartun Veronica    History of Presenting Illness     Chief Complaint   Patient presents with    Chest Pain         History Provided By: Patient    Additional History (Context):   Fartun Veronica is a 70 y.o. male with PMHX hypertension, hyperlipidemia, diabetes presents to the emergency department C/O intermittent left-sided chest pain that radiates up to his left shoulder and left upper extremity. Patient reports associated shortness of breath when the pain comes on. States that he has never had this pain before and is not exacerbated by exertion or activity. Patient states that he is never had an MI and has never seen a cardiologist in the past.  Pt denies abdominal pain, nausea or vomiting, and any other sxs or complaints. PCP: Annie Vazquez MD    Current Outpatient Medications   Medication Sig Dispense Refill    aspirin 81 mg chewable tablet Take 81 mg by mouth daily.  metFORMIN (GLUCOPHAGE) 1,000 mg tablet Take 1,000 mg by mouth two (2) times daily (with meals).  FELODIPINE (PLENDIL PO) Take  by mouth daily.  gabapentin (NEURONTIN) 400 mg capsule Take 800 mg by mouth two (2) times a day.  lisinopril (PRINIVIL, ZESTRIL) 40 mg tablet Take 40 mg by mouth daily.  SITagliptin (JANUVIA) 100 mg tablet Take 100 mg by mouth daily.  pravastatin (PRAVACHOL) 20 mg tablet Take 20 mg by mouth nightly.  hydroCHLOROthiazide (HYDRODIURIL) 12.5 mg tablet Take 12.5 mg by mouth daily.  vit B Cmplx 3-FA-Vit C-Biotin (NEPHRO LILIANA RX) 1- mg-mg-mcg tablet Take 1 Tab by mouth daily.  calcium-cholecalciferol, d3, (CALCIUM 600 + D) 600-125 mg-unit tab Take  by mouth daily.  pioglitazone (ACTOS) 30 mg tablet Take 30 mg by mouth daily.          Past History     Past Medical History:  Past Medical History:   Diagnosis Date    [facility]     Diabetes [facility]     FH: chemotherapy     [facility] cell transplant ([facility]     Hypertension     Multiple myeloma ([facility]     Neuropathy        Past Surgical History:  Past Surgical History:   Procedure Laterality Date    HX CHOLECYSTECTOMY      HX KYPHOPLASTY         Family History:  History reviewed. No pertinent family history. Social History:  Social History     Tobacco Use    Smoking status: Former Smoker    Smokeless tobacco: Never Used   Substance Use Topics    Alcohol use: Never     Frequency: Never     Comment: rare    Drug use: No       Allergies: Allergies   Allergen Reactions    Iodine Hives         Review of Systems   Review of Systems   Constitutional: Negative for chills and fever. HENT: Negative for congestion, ear pain, sinus pain and sore throat. Eyes: Negative for pain and visual disturbance. Respiratory: Positive for shortness of breath. Negative for cough. Cardiovascular: Positive for chest pain. Negative for leg swelling. Gastrointestinal: Negative for abdominal pain, constipation, diarrhea, nausea and vomiting. Genitourinary: Negative for dysuria and hematuria. Musculoskeletal: Negative for back pain and neck pain. Skin: Negative for pallor and rash. Neurological: Negative for dizziness, tremors, weakness, light-headedness and headaches. All other systems reviewed and are negative.       Physical Exam     Vitals:    02/12/20 1320   BP: 164/75   Pulse: 86   Resp: 18   Temp: 97.7 °F (36.5 °C)   SpO2: 100%   Weight: 118.8 kg (262 lb)   Height: 5' 9\" (1.753 m)     Physical Exam    Nursing note and vitals reviewed    Constitutional: Elderly -American male, no acute distress  Head: Normocephalic, Atraumatic  Eyes: Pupils are equal, round, and reactive to light, EOMI  Neck: Supple, non-tender  Cardiovascular: Regular rate and rhythm, no murmurs, rubs, or gallops  Chest: Normal work of breathing and chest excursion bilaterally  Lungs: Clear to ausculation bilaterally, no wheezes, no rhonchi  Abdomen: Soft, non tender, non distended, normoactive bowel sounds  Back: No evidence of trauma or deformity  Extremities: No evidence of trauma or deformity, no LE edema. No streaking erythema, vesicular lesions, ulcerations or bulla  Skin: Warm and dry, normal cap refill  Neuro: Alert and appropriate, CN intact, normal speech, moving all 4 extremities freely and symmetrically  Psychiatric: Normal mood and affect       Diagnostic Study Results     Labs -     Recent Results (from the past 12 hour(s))   EKG, 12 LEAD, INITIAL    Collection Time: 02/12/20  1:30 PM   Result Value Ref Range    Ventricular Rate 80 BPM    Atrial Rate 80 BPM    P-R Interval 204 ms    QRS Duration 122 ms    Q-T Interval 380 ms    QTC Calculation (Bezet) 438 ms    Calculated P Axis 61 degrees    Calculated R Axis 0 degrees    Calculated T Axis 102 degrees    Diagnosis       Sinus rhythm with frequent premature ventricular complexes  Anterior infarct , age undetermined  T wave abnormality, consider lateral ischemia  Abnormal ECG  No previous ECGs available     CBC WITH AUTOMATED DIFF    Collection Time: 02/12/20  1:50 PM   Result Value Ref Range    WBC 8.0 4.6 - 13.2 K/uL    RBC 4.23 (L) 4.70 - 5.50 M/uL    HGB 12.2 (L) 13.0 - 16.0 g/dL    HCT 37.1 36.0 - 48.0 %    MCV 87.7 74.0 - 97.0 FL    MCH 28.8 24.0 - 34.0 PG    MCHC 32.9 31.0 - 37.0 g/dL    RDW 14.9 (H) 11.6 - 14.5 %    PLATELET 449 339 - 300 K/uL    MPV 11.3 9.2 - 11.8 FL    NEUTROPHILS 56 40 - 73 %    LYMPHOCYTES 30 21 - 52 %    MONOCYTES 10 3 - 10 %    EOSINOPHILS 3 0 - 5 %    BASOPHILS 1 0 - 2 %    ABS. NEUTROPHILS 4.5 1.8 - 8.0 K/UL    ABS. LYMPHOCYTES 2.4 0.9 - 3.6 K/UL    ABS. MONOCYTES 0.8 0.05 - 1.2 K/UL    ABS. EOSINOPHILS 0.2 0.0 - 0.4 K/UL    ABS.  BASOPHILS 0.1 0.0 - 0.1 K/UL    DF AUTOMATED     METABOLIC PANEL, COMPREHENSIVE    Collection Time: 02/12/20  1:50 PM   Result Value Ref Range    Sodium 138 136 - 145 mmol/L    Potassium 3.7 3.5 - 5.5 mmol/L    Chloride 103 100 - 111 mmol/L    CO2 30 21 - 32 mmol/L    Anion gap 5 3.0 - 18 mmol/L    Glucose 90 74 - 99 mg/dL    BUN 18 7.0 - 18 MG/DL    Creatinine 0.93 0.6 - 1.3 MG/DL    BUN/Creatinine ratio 19 12 - 20      GFR est AA >60 >60 ml/min/1.73m2    GFR est non-AA >60 >60 ml/min/1.73m2    Calcium 9.3 8.5 - 10.1 MG/DL    Bilirubin, total 0.5 0.2 - 1.0 MG/DL    ALT (SGPT) 21 16 - 61 U/L    AST (SGOT) 13 10 - 38 U/L    Alk. phosphatase 61 45 - 117 U/L    Protein, total 7.5 6.4 - 8.2 g/dL    Albumin 3.7 3.4 - 5.0 g/dL    Globulin 3.8 2.0 - 4.0 g/dL    A-G Ratio 1.0 0.8 - 1.7     MAGNESIUM    Collection Time: 02/12/20  1:50 PM   Result Value Ref Range    Magnesium 1.5 (L) 1.6 - 2.6 mg/dL   CARDIAC PANEL,(CK, CKMB & TROPONIN)    Collection Time: 02/12/20  1:50 PM   Result Value Ref Range     39 - 308 U/L    CK - MB 6.4 (H) <3.6 ng/ml    CK-MB Index 3.0 0.0 - 4.0 %    Troponin-I, QT <0.02 0.0 - 0.045 NG/ML   NT-PRO BNP    Collection Time: 02/12/20  1:50 PM   Result Value Ref Range    NT pro- 0 - 1,800 PG/ML   D DIMER    Collection Time: 02/12/20  1:50 PM   Result Value Ref Range    D DIMER 0.79 (H) <0.46 ug/ml(FEU)       Radiologic Studies -   XR CHEST PORT   Final Result   IMPRESSION:         1. Right IJ approach Mediport catheter in appropriate position. 2. No superimposed acute radiographic abnormality. CT Results  (Last 48 hours)    None        CXR Results  (Last 48 hours)               02/12/20 1402  XR CHEST PORT Final result    Impression:  IMPRESSION:           1. Right IJ approach Mediport catheter in appropriate position. 2. No superimposed acute radiographic abnormality. Narrative:  EXAM: XR CHEST PORT       CLINICAL INDICATION/HISTORY: CP   -Additional: None       COMPARISON: None       TECHNIQUE: Portable frontal view of the chest       _______________       FINDINGS:       SUPPORT DEVICES: Right IJ approach Mediport catheter with tip projecting over   the superior cavoatrial junction.        HEART AND MEDIASTINUM: Normal cardiac size and mediastinal contours. LUNGS AND PLEURAL SPACES: No focal pneumonic opacity. No evidence of   pneumothorax or pleural effusion. BONY THORAX AND SOFT TISSUES: Unremarkable.       _______________                   Medical Decision Making   I am the first provider for this patient. I reviewed the vital signs, available nursing notes, past medical history, past surgical history, family history and social history. Vital Signs-Reviewed the patient's vital signs. Pulse Oximetry Analysis -100 % on room air    Cardiac Monitor:  Rate: 86 bpm  Rhythm: Regular    EKG interpretation: (Preliminary)  1:27 PM   86 sinus rhythm with PVCs at 80 bpm.  QTc 438 ms. No acute ST elevation    Records Reviewed: Nursing Notes and Old Medical Records    Provider Notes:   70 y.o. male with a history of hypertension, hyperlipidemia, diabetes presenting with intermittent chest pain over the last 2 weeks. Associated shortness of breath. On exam patient does not appear acutely ill or in acute distress. However noted to be mildly hypertensive. Patient with nonspecific EKG changes, no history of MI. HEART score of at least 5 ( 1 for nonspecific ST changes, 2 for age and 2 for risk factors). Patient's history and the RC out due to age. Will evaluate with a d-dimer. Due to his elevated heart score, will consult cardiology    Procedures:  Procedures    ED Course:   1:27 PM   Initial assessment performed. The patients presenting problems have been discussed, and they are in agreement with the care plan formulated and outlined with them. I have encouraged them to ask questions as they arise throughout their visit. 3:18 PM patient's troponin negative. D-dimer 0.79, however ever otherwise within normal limits when age-adjusted. Discussed patient's history, exam, and available diagnostics results with Zak Ambriz MD, cardiology, who agree with admission.          Diagnosis and Disposition       Core Measures:  For Hospitalized Patients:    1. Hospitalization Decision Time:  The decision to hospitalize the patient was made by Jeff Sharif DO at 3:21 PM on 2/12/2020    2. Aspirin: Aspirin was not given because the patient did not present with a stroke at the time of their Emergency Department evaluation    3:21 PM  Patient is being admitted to the hospital by Dr. Ibeth Saravia. The results of their tests and reasons for their admission have been discussed with them and/or available family. They convey agreement and understanding for the need to be admitted and for their admission diagnosis. CONDITIONS ON ADMISSION:  Sepsis is not present at the time of admission. Deep Vein Thrombosis is not present at the time of admission. Thrombosis is not present at the time of admission. Pneumonia is not present at the time of admission. MRSA is not present at the time of admission. Wound infection is not present at the time of admission. Pressure Ulcer is not present at the time of admission. CLINICAL IMPRESSION:    1. Chest pain, unspecified type        ____________________________________     Please note that this dictation was completed with [employer], the computer voice recognition software. Quite often unanticipated grammatical, syntax, homophones, and other interpretive errors are inadvertently transcribed by the computer software. Please disregard these errors. Please excuse any errors that have escaped final proofreading.

## 2020-02-12 NOTE — ED TRIAGE NOTES
Patient ambulate to ED with left intermittent chest pain x 2 weeks, patient speaking in full sentences, Lynette Horowitz

## 2020-02-13 ENCOUNTER — APPOINTMENT (OUTPATIENT)
Dept: NON INVASIVE DIAGNOSTICS | Age: 80
DRG: 291 | End: 2020-02-13
Attending: INTERNAL MEDICINE
Payer: MEDICARE

## 2020-02-13 ENCOUNTER — APPOINTMENT (OUTPATIENT)
Dept: NUCLEAR MEDICINE | Age: 80
DRG: 291 | End: 2020-02-13
Attending: INTERNAL MEDICINE
Payer: MEDICARE

## 2020-02-13 ENCOUNTER — APPOINTMENT (OUTPATIENT)
Dept: VASCULAR SURGERY | Age: 80
DRG: 291 | End: 2020-02-13
Attending: FAMILY MEDICINE
Payer: MEDICARE

## 2020-02-13 ENCOUNTER — APPOINTMENT (OUTPATIENT)
Dept: NON INVASIVE DIAGNOSTICS | Age: 80
DRG: 291 | End: 2020-02-13
Attending: FAMILY MEDICINE
Payer: MEDICARE

## 2020-02-13 LAB
ANION GAP SERPL CALC-SCNC: 7 MMOL/L (ref 3–18)
BUN SERPL-MCNC: 18 MG/DL (ref 7–18)
BUN/CREAT SERPL: 25 (ref 12–20)
CALCIUM SERPL-MCNC: 9.3 MG/DL (ref 8.5–10.1)
CHLORIDE SERPL-SCNC: 102 MMOL/L (ref 100–111)
CHOLEST SERPL-MCNC: 123 MG/DL
CK MB CFR SERPL CALC: 3.4 % (ref 0–4)
CK MB CFR SERPL CALC: 3.5 % (ref 0–4)
CK MB CFR SERPL CALC: 3.8 % (ref 0–4)
CK MB SERPL-MCNC: 5.3 NG/ML (ref 5–25)
CK MB SERPL-MCNC: 5.9 NG/ML (ref 5–25)
CK MB SERPL-MCNC: 6.6 NG/ML (ref 5–25)
CK SERPL-CCNC: 156 U/L (ref 39–308)
CK SERPL-CCNC: 171 U/L (ref 39–308)
CK SERPL-CCNC: 173 U/L (ref 39–308)
CO2 SERPL-SCNC: 28 MMOL/L (ref 21–32)
CREAT SERPL-MCNC: 0.73 MG/DL (ref 0.6–1.3)
ERYTHROCYTE [DISTWIDTH] IN BLOOD BY AUTOMATED COUNT: 14.9 % (ref 11.6–14.5)
EST. AVERAGE GLUCOSE BLD GHB EST-MCNC: 131 MG/DL
GLUCOSE BLD STRIP.AUTO-MCNC: 115 MG/DL (ref 70–110)
GLUCOSE BLD STRIP.AUTO-MCNC: 118 MG/DL (ref 70–110)
GLUCOSE BLD STRIP.AUTO-MCNC: 120 MG/DL (ref 70–110)
GLUCOSE BLD STRIP.AUTO-MCNC: 187 MG/DL (ref 70–110)
GLUCOSE SERPL-MCNC: 94 MG/DL (ref 74–99)
HBA1C MFR BLD: 6.2 % (ref 4.2–5.6)
HCT VFR BLD AUTO: 36 % (ref 36–48)
HDLC SERPL-MCNC: 69 MG/DL (ref 40–60)
HDLC SERPL: 1.8 {RATIO} (ref 0–5)
HGB BLD-MCNC: 11.9 G/DL (ref 13–16)
LDLC SERPL CALC-MCNC: 42.8 MG/DL (ref 0–100)
LIPID PROFILE,FLP: ABNORMAL
MCH RBC QN AUTO: 29 PG (ref 24–34)
MCHC RBC AUTO-ENTMCNC: 33.1 G/DL (ref 31–37)
MCV RBC AUTO: 87.8 FL (ref 74–97)
PLATELET # BLD AUTO: 146 K/UL (ref 135–420)
PMV BLD AUTO: 11.5 FL (ref 9.2–11.8)
POTASSIUM SERPL-SCNC: 3.8 MMOL/L (ref 3.5–5.5)
RBC # BLD AUTO: 4.1 M/UL (ref 4.7–5.5)
SODIUM SERPL-SCNC: 137 MMOL/L (ref 136–145)
TRIGL SERPL-MCNC: 56 MG/DL (ref ?–150)
TROPONIN I SERPL-MCNC: <0.02 NG/ML (ref 0–0.04)
VLDLC SERPL CALC-MCNC: 11.2 MG/DL
WBC # BLD AUTO: 6.9 K/UL (ref 4.6–13.2)

## 2020-02-13 PROCEDURE — 93017 CV STRESS TEST TRACING ONLY: CPT

## 2020-02-13 PROCEDURE — 83036 HEMOGLOBIN GLYCOSYLATED A1C: CPT

## 2020-02-13 PROCEDURE — A9270 NON-COVERED ITEM OR SERVICE: HCPCS | Performed by: INTERNAL MEDICINE

## 2020-02-13 PROCEDURE — 96372 THER/PROPH/DIAG INJ SC/IM: CPT

## 2020-02-13 PROCEDURE — 80048 BASIC METABOLIC PNL TOTAL CA: CPT

## 2020-02-13 PROCEDURE — 85027 COMPLETE CBC AUTOMATED: CPT

## 2020-02-13 PROCEDURE — 74011636637 HC RX REV CODE- 636/637: Performed by: INTERNAL MEDICINE

## 2020-02-13 PROCEDURE — 36415 COLL VENOUS BLD VENIPUNCTURE: CPT

## 2020-02-13 PROCEDURE — 74011636637 HC RX REV CODE- 636/637: Performed by: FAMILY MEDICINE

## 2020-02-13 PROCEDURE — 74011250637 HC RX REV CODE- 250/637: Performed by: INTERNAL MEDICINE

## 2020-02-13 PROCEDURE — C8929 TTE W OR WO FOL WCON,DOPPLER: HCPCS

## 2020-02-13 PROCEDURE — 99218 HC RM OBSERVATION: CPT

## 2020-02-13 PROCEDURE — 74011250636 HC RX REV CODE- 250/636: Performed by: FAMILY MEDICINE

## 2020-02-13 PROCEDURE — A9500 TC99M SESTAMIBI: HCPCS

## 2020-02-13 PROCEDURE — 93970 EXTREMITY STUDY: CPT

## 2020-02-13 PROCEDURE — 82962 GLUCOSE BLOOD TEST: CPT

## 2020-02-13 PROCEDURE — 82550 ASSAY OF CK (CPK): CPT

## 2020-02-13 RX ORDER — FUROSEMIDE 10 MG/ML
40 INJECTION INTRAMUSCULAR; INTRAVENOUS DAILY
Status: DISCONTINUED | OUTPATIENT
Start: 2020-02-14 | End: 2020-02-14 | Stop reason: HOSPADM

## 2020-02-13 RX ORDER — PRAVASTATIN SODIUM 20 MG/1
40 TABLET ORAL
Status: DISCONTINUED | OUTPATIENT
Start: 2020-02-13 | End: 2020-02-14 | Stop reason: HOSPADM

## 2020-02-13 RX ORDER — FUROSEMIDE 10 MG/ML
20 INJECTION INTRAMUSCULAR; INTRAVENOUS DAILY
Status: DISCONTINUED | OUTPATIENT
Start: 2020-02-14 | End: 2020-02-13

## 2020-02-13 RX ORDER — DIPHENHYDRAMINE HCL 25 MG
50 CAPSULE ORAL
Status: DISCONTINUED | OUTPATIENT
Start: 2020-02-14 | End: 2020-02-14 | Stop reason: HOSPADM

## 2020-02-13 RX ORDER — LISINOPRIL 5 MG/1
5 TABLET ORAL DAILY
Status: DISCONTINUED | OUTPATIENT
Start: 2020-02-14 | End: 2020-02-14 | Stop reason: HOSPADM

## 2020-02-13 RX ORDER — METOPROLOL SUCCINATE 25 MG/1
25 TABLET, EXTENDED RELEASE ORAL DAILY
Status: DISCONTINUED | OUTPATIENT
Start: 2020-02-14 | End: 2020-02-14 | Stop reason: HOSPADM

## 2020-02-13 RX ADMIN — PREDNISONE 50 MG: 20 TABLET ORAL at 22:55

## 2020-02-13 RX ADMIN — PERFLUTREN 1 ML: 6.52 INJECTION, SUSPENSION INTRAVENOUS at 10:33

## 2020-02-13 RX ADMIN — PRAVASTATIN SODIUM 40 MG: 20 TABLET ORAL at 22:54

## 2020-02-13 RX ADMIN — INSULIN LISPRO 2 UNITS: 100 INJECTION, SOLUTION INTRAVENOUS; SUBCUTANEOUS at 18:05

## 2020-02-13 RX ADMIN — ASPIRIN 81 MG: 81 TABLET, COATED ORAL at 14:00

## 2020-02-13 RX ADMIN — HEPARIN SODIUM 5000 UNITS: 5000 INJECTION INTRAVENOUS; SUBCUTANEOUS at 18:05

## 2020-02-13 RX ADMIN — REGADENOSON 0.4 MG: 0.08 INJECTION, SOLUTION INTRAVENOUS at 12:25

## 2020-02-13 NOTE — PROGRESS NOTES
Reason for Admission:   Chino Florez is a 70 y.o. male with PMHX hypertension, hyperlipidemia, diabetes presents to the emergency department C/O intermittent left-sided chest pain that radiates up to his left shoulder and left upper extremity. Patient reports associated shortness of breath when the pain comes on. States that he has never had this pain before and is not exacerbated by exertion or activity. Patient states that he is never had an MI and has never seen a cardiologist in the past.  Pt denies abdominal pain, nausea or vomiting, and any other sxs or complaints.                        RUR Score:     11                Plan for utilizing home health:    tbd                      Current Advanced Directive/Advance Care Plan: not on file. Please consider placing a consult to pastoral or palliative care to address acp                         Transition of Care Plan:       Met with patient at bedside. Patient asking for meal. Informed him would inform RN and cm did and she will address with patient. patient states his daughter lives with him. He verified address on EMR. [facility]  And sees Dr. Jodie Hutchins at [place]. vrifed he has mediare. He reports he is IADL's except uses a cane since he kyphoplasty. He reports he is IADL's  D/c plan to return home  No needs per pt.   .Care Management Interventions  Transition of Care Consult (CM Consult): Discharge Planning

## 2020-02-13 NOTE — PROGRESS NOTES
TRANSFER - IN REPORT:    Verbal report received from Sagrario Allen RN(name) on Wale Vega  being received from ED(unit) for routine progression of care      Report consisted of patients Situation, Background, Assessment and   Recommendations(SBAR). Information from the following report(s) SBAR, Kardex, ED Summary and MAR was reviewed with the receiving nurse. Opportunity for questions and clarification was provided. Assessment completed upon patients arrival to unit and care assumed. Bedside shift change report given to Kaveh Krueger (oncoming nurse) by Devora Flores RN (offgoing nurse). Report included the following information SBAR, Kardex and ED Summary.

## 2020-02-13 NOTE — CONSULTS
[facility] Consult Note      Patient: Akash Guzman MRN: [id number]  SSN: [id number]    YOB: 1940  Age: 70 y.o. Sex: male    Date of Consultation: 02/12/2020  Referring Physician: Jacqueline Miller  Reason for Consultation: Chest pain    Chief complain: Chest pain    HPI:  68-year-old gentleman came to the emergency room with complaining of chest pain and LEFT shoulder pain for past few weeks. He is complaining of LEFT shoulder pain with tingling and numbness of LEFT hand for past few weeks. Shoulder pain is not related to exertion. He is also complaining of left-sided chest pain. Chest pain is sharp, no radiation, with and without exertion. He is also complaining of shortness of breath on exertion. Shortness of breath relieved by rest.  He denies any orthopnea or PND. He denies any dizziness, palpitation, presyncope or syncope. He is ex-smoker and quit smoking long time ago. Cardiology consult called for chest pain evaluation. He is cane gated.      Past Medical History:   Diagnosis Date    [facility]     Diabetes ([facility]     FH: chemotherapy     H/O stem cell transplant ([facility]     Hypertension     Multiple myeloma ([facility]     Neuropathy      Past Surgical History:   Procedure Laterality Date    HX CHOLECYSTECTOMY      HX KYPHOPLASTY       Current Facility-Administered Medications   Medication Dose Route Frequency    acetaminophen (TYLENOL) tablet 650 mg  650 mg Oral Q4H PRN    HYDROcodone-acetaminophen (NORCO) 5-325 mg per tablet 1 Tab  1 Tab Oral Q4H PRN    morphine injection 2 mg  2 mg IntraVENous Q4H PRN    naloxone (NARCAN) injection 0.4 mg  0.4 mg IntraVENous PRN    ondansetron (ZOFRAN) injection 4 mg  4 mg IntraVENous Q4H PRN    heparin (porcine) injection 5,000 Units  5,000 Units SubCUTAneous Q8H    insulin lispro (HUMALOG) injection   SubCUTAneous AC&HS    glucose chewable tablet 16 g  16 g Oral PRN    glucagon (GLUCAGEN) injection 1 mg  1 mg IntraMUSCular PRN    dextrose 10% infusion 125-250 mL  125-250 mL IntraVENous PRN       Allergies and Intolerances: Allergies   Allergen Reactions    Iodine Hives       Family History:   History reviewed. No pertinent family history. Social History:   He  reports that he has quit smoking. He has never used smokeless tobacco.  He  reports no history of alcohol use. Review of Systems:     Gen: No fever, chills, malaise, weight loss/gain. Heent: No headache, rhinorrhea, epistaxis, ear pain, hearing loss, sinus pain, neck pain/stiffness, sore throat. Heart: Positive chest pain, No palpitations, Positive shortness of breath on exertion, pnd, or orthopnea. Resp: No cough, hemoptysis, wheezing and dyspnea   GI: No nausea, vomiting, diarrhea, constipation, melena or hematochezia. : No urinary obstruction, dysuria or hematuria. Derm: No rash, new skin lesion or pruritis. Musc/skeletal: Positive bone or joint complains. Vasc: No edema, cyanosis or claudication. Endo: No heat/cold intolerance, no polyuria,polydipsia or polyphagia. Neuro: No unilateral weakness, numbness, tingling. No seizures. Heme: No easy bruising or bleeding. Physical:   Patient Vitals for the past 6 hrs:   Temp Pulse Resp BP SpO2   02/12/20 1804 97.2 °F (36.2 °C) 64 20 161/72 99 %   02/12/20 1730 -- 67 16 153/69 100 %   02/12/20 1630 -- 70 20 161/70 99 %   02/12/20 1600 -- 70 20 147/66 100 %   02/12/20 1545 -- 65 18 136/56 100 %   02/12/20 1533 -- -- -- -- 100 %         Exam:   General Appearance: Comfortable, not using accessory muscles of respiration. HEENT: SHELLY. HEAD: Atraumatic  NECK: No JVD, no thyroidomeglay. CAROTIDS: No bruit  LUNGS: Clear bilaterally. HEART: S1+S2 audible, no murmur, no pericardial rub. ABD: Non-tender, BS Audible    EXT: No edema, and no cysnosis. VASCULAR EXAM: Pulses are intact. PSYCHIATRIC EXAM: Mood is appropriate. MUSCULOSKELETAL: Grossly no joint deformity.   NEUROLOGICAL: AAO times 3, Motor and sensory sytem intact    Review of Data:   LABS:   Lab Results   Component Value Date/Time    WBC 8.0 02/12/2020 01:50 PM    HGB 12.2 (L) 02/12/2020 01:50 PM    HCT 37.1 02/12/2020 01:50 PM    PLATELET 065 93/39/6191 01:50 PM     Lab Results   Component Value Date/Time    Sodium 138 02/12/2020 01:50 PM    Potassium 3.7 02/12/2020 01:50 PM    Chloride 103 02/12/2020 01:50 PM    CO2 30 02/12/2020 01:50 PM    Glucose 90 02/12/2020 01:50 PM    BUN 18 02/12/2020 01:50 PM    Creatinine 0.93 02/12/2020 01:50 PM     No results found for: CHOL, CHOLX, CHLST, CHOLV, HDL, HDLP, LDL, LDLC, DLDLP, TGLX, TRIGL, TRIGP  No results found for: GPT  No results found for: HBA1C, HGBE8, AMI0AEUQ, SDN2EGIV      Cardiology Procedures:   Results for orders placed or performed during the hospital encounter of 02/12/20   EKG, 12 LEAD, INITIAL   Result Value Ref Range    Ventricular Rate 80 BPM    Atrial Rate 80 BPM    P-R Interval 204 ms    QRS Duration 122 ms    Q-T Interval 380 ms    QTC Calculation (Bezet) 438 ms    Calculated P Axis 61 degrees    Calculated R Axis 0 degrees    Calculated T Axis 102 degrees    Diagnosis       Sinus rhythm with frequent premature ventricular complexes  Anterior infarct , age undetermined  LVH  T wave abnormality, consider lateral ischemia  Abnormal ECG  No previous ECGs available             Impression / Plan:    Patient Active Problem List   Diagnosis Code         Chest pain R07.9     Precordial chest pain  Shortness of breath on exertion  Abnormal EKG  Hypertension  Diabetes mellitus  Dyslipidemia  Ex-smoker    53-year-old gentleman came with left-sided chest pain and shoulder pain. Continue aspirin and statin. Serial EKG with cardiac enzymes every 6-8 hours ×3  Echocardiogram  Lexiscan stress test if cardiac enzymes remains negative  Further cardiac workup as clinically indicated. Continue management as per hospital medicine. Plan discussed with patient.       Signed By: Leila Erwin MD     February 12, 2020

## 2020-02-13 NOTE — ROUTINE PROCESS
8320 assumed care of pt after bedside verbal report was given by off going nurse, pt sitting up on the side of the bed awake, no acute distress noted, pt denies c/o pain, call bell within pt's reach, will monitor     0931 pt off unit to nuclear medicine for stress test    1352 pt back from stress test, no distress noted, pt denies c/o pain, call bell with in pt's reach, will monitor     1619 Bedside and Verbal shift change report given to [facility] (oncoming nurse) by JOHN Ellington (offgoing nurse). Report included the following information SBAR, Kardex and MAR.

## 2020-02-13 NOTE — PROGRESS NOTES
Hospitalist Progress Note    Patient: Alen Rdz MRN: [id number]  CSN: [id number]    YOB: 1940  Age: 70 y.o. Sex: male    DOA: 2020 LOS:  LOS: 0 days          Chief Complaint:  Chest pain     Assessment/Plan   Chest Pain    Trend cardiac enzymes -if neg will get stress test  Echocardiogram in a.m. Family history of early death related to MI, father  at age 64  Defer further cardiac testing such as stress test to Dr. Matthias Escobar     Hypertension   Resume home medications  Follow blood pressure  Daily BMP     DM   ADA diet, sliding scale insulin, fingerstick blood glucose q. before meals and nightly  Check hemoglobin A1c     HLD   Fasting lipid profile  Resume statin     Bilateral LE Edema   Doppler study of BLE to eval for DVT     Obesity   Nutritional support     DVT prophylaxis: Heparin    Disposition :  Patient Active Problem List   Diagnosis Code    Open wound of right index finger without damage to nail S61.200A    Chest pain R07.9    Lower extremity edema R60.0    DM (diabetes mellitus) (HCC) E11.9       Subjective:  Was HILARIA initially   Back from testing   Discussed with Dr. Matthias Escobar cardiac cath now vs medical mgmt for a week, then cath if s/sxs persist       Review of systems:    Constitutional: denies fevers, chills, myalgias  Respiratory: denies SOB, cough  Cardiovascular: denies chest pain, palpitations  Gastrointestinal: denies nausea, vomiting, diarrhea      Vital signs/Intake and Output:  Visit Vitals  /66 (BP 1 Location: Right arm, BP Patient Position: At rest)   Pulse 60   Temp 97.8 °F (36.6 °C)   Resp 20   Ht 5' 9\" (1.753 m)   Wt 118.8 kg (262 lb)   SpO2 100%   BMI 38.69 kg/m²     Current Shift:  No intake/output data recorded. Last three shifts:  No intake/output data recorded.     Exam:    General: Well developed, alert, NAD, OX3  Head/Neck: NCAT, supple, No masses, No lymphadenopathy  CVS:Regular rate and rhythm, no M/R/G, S1/S2 heard, no thrill  Lungs:Clear to auscultation bilaterally, no wheezes, rhonchi, or rales  Abdomen: Soft, Nontender, No distention, Normal Bowel sounds, No hepatomegaly  Extremities: No C/C/E, pulses palpable 2+  Skin:normal texture and turgor, no rashes, no lesions  Neuro:grossly normal , follows commands  Psych:appropriate                Labs: Results:       Chemistry Recent Labs     02/13/20 0328 02/12/20  1350   GLU 94 90    138   K 3.8 3.7    103   CO2 28 30   BUN 18 18   CREA 0.73 0.93   CA 9.3 9.3   AGAP 7 5   BUCR 25* 19   AP  --  61   TP  --  7.5   ALB  --  3.7   GLOB  --  3.8   AGRAT  --  1.0      CBC w/Diff Recent Labs     02/13/20 0328 02/12/20  1350   WBC 6.9 8.0   RBC 4.10* 4.23*   HGB 11.9* 12.2*   HCT 36.0 37.1    142   GRANS  --  56   LYMPH  --  30   EOS  --  3      Cardiac Enzymes Recent Labs     02/13/20 0328 02/12/20  1350    210   CKND1 3.4 3.0      Coagulation No results for input(s): PTP, INR, APTT, INREXT in the last 72 hours. Lipid Panel No results found for: CHOL, CHOLPOCT, CHOLX, CHLST, CHOLV, 645307, HDL, HDLP, LDL, LDLC, DLDLP, 085072, VLDLC, VLDL, TGLX, TRIGL, TRIGP, TGLPOCT, CHHD, CHHDX   BNP No results for input(s): BNPP in the last 72 hours.    Liver Enzymes Recent Labs     02/12/20  1350   TP 7.5   ALB 3.7   AP 61   SGOT 13      Thyroid Studies No results found for: T4, T3U, TSH, TSHEXT     Procedures/imaging: see electronic medical records for all procedures/Xrays and details which were not copied into this note but were reviewed prior to creation of Betty Moore MD

## 2020-02-14 VITALS
OXYGEN SATURATION: 100 % | WEIGHT: 263.5 LBS | BODY MASS INDEX: 39.03 KG/M2 | HEART RATE: 83 BPM | SYSTOLIC BLOOD PRESSURE: 130 MMHG | HEIGHT: 69 IN | RESPIRATION RATE: 14 BRPM | TEMPERATURE: 98 F | DIASTOLIC BLOOD PRESSURE: 86 MMHG

## 2020-02-14 PROBLEM — I50.22 SYSTOLIC CHF, CHRONIC (HCC): Status: ACTIVE | Noted: 2020-02-14

## 2020-02-14 LAB
ALBUMIN SERPL-MCNC: 3.4 G/DL (ref 3.4–5)
ALBUMIN/GLOB SERPL: 0.9 {RATIO} (ref 0.8–1.7)
ALP SERPL-CCNC: 55 U/L (ref 45–117)
ALT SERPL-CCNC: 20 U/L (ref 16–61)
ANION GAP SERPL CALC-SCNC: 5 MMOL/L (ref 3–18)
AST SERPL-CCNC: 14 U/L (ref 10–38)
AV VELOCITY RATIO: 0.72
AV VTI RATIO: 0.7
BASOPHILS # BLD: 0 K/UL (ref 0–0.1)
BASOPHILS NFR BLD: 0 % (ref 0–2)
BILIRUB SERPL-MCNC: 0.4 MG/DL (ref 0.2–1)
BUN SERPL-MCNC: 15 MG/DL (ref 7–18)
BUN/CREAT SERPL: 17 (ref 12–20)
CALCIUM SERPL-MCNC: 9.5 MG/DL (ref 8.5–10.1)
CHLORIDE SERPL-SCNC: 103 MMOL/L (ref 100–111)
CO2 SERPL-SCNC: 28 MMOL/L (ref 21–32)
CREAT SERPL-MCNC: 0.86 MG/DL (ref 0.6–1.3)
DIFFERENTIAL METHOD BLD: ABNORMAL
ECHO AV AREA PEAK VELOCITY: 2.5 CM2
ECHO AV AREA VTI: 2.3 CM2
ECHO AV AREA/BSA PEAK VELOCITY: 1 CM2/M2
ECHO AV AREA/BSA VTI: 1 CM2/M2
ECHO AV MEAN GRADIENT: 2.4 MMHG
ECHO AV MEAN VELOCITY: 0.72 M/S
ECHO AV PEAK GRADIENT: 4.4 MMHG
ECHO AV PEAK VELOCITY: 104.46 CM/S
ECHO AV VTI: 25.52 CM
ECHO IVC PROX: 1.92 CM
ECHO LA AREA 2C: 21.56 CM2
ECHO LA AREA 4C: 28.3 CM2
ECHO LA MAJOR AXIS: 4.79 CM
ECHO LA VOL 2C: 66.31 ML (ref 18–58)
ECHO LA VOL 4C: 92.15 ML (ref 18–58)
ECHO LA VOL BP: 96.79 ML (ref 18–58)
ECHO LA VOLUME INDEX A2C: 28.62 ML/M2 (ref 16–28)
ECHO LA VOLUME INDEX A4C: 39.77 ML/M2 (ref 16–28)
ECHO LV E' LATERAL VELOCITY: 8 CM/S
ECHO LV E' SEPTAL VELOCITY: 5 CM/S
ECHO LV EDV A2C: 152.4 ML
ECHO LV EDV A4C: 225.2 ML
ECHO LV EDV BP: 190.8 ML (ref 67–155)
ECHO LV EDV INDEX A4C: 97.2 ML/M2
ECHO LV EDV INDEX BP: 82.4 ML/M2
ECHO LV EDV NDEX A2C: 65.8 ML/M2
ECHO LV EDV TEICHHOLZ: 0.97 ML
ECHO LV EJECTION FRACTION A2C: 37 %
ECHO LV EJECTION FRACTION A4C: 25 %
ECHO LV EJECTION FRACTION BIPLANE: 33.3 % (ref 55–100)
ECHO LV ESV A2C: 95.9 ML
ECHO LV ESV A4C: 169.2 ML
ECHO LV ESV BP: 127.3 ML (ref 22–58)
ECHO LV ESV INDEX A2C: 41.4 ML/M2
ECHO LV ESV INDEX A4C: 73 ML/M2
ECHO LV ESV INDEX BP: 54.9 ML/M2
ECHO LV ESV TEICHHOLZ: 0.65 ML
ECHO LV INTERNAL DIMENSION DIASTOLIC: 5.84 CM (ref 4.2–5.9)
ECHO LV INTERNAL DIMENSION SYSTOLIC: 4.91 CM
ECHO LV IVSD: 0.97 CM (ref 0.6–1)
ECHO LV MASS 2D: 261.7 G (ref 88–224)
ECHO LV MASS INDEX 2D: 113 G/M2 (ref 49–115)
ECHO LV POSTERIOR WALL DIASTOLIC: 0.93 CM (ref 0.6–1)
ECHO LV POSTERIOR WALL SYSTOLIC: 0 CM
ECHO LVOT DIAM: 2.1 CM
ECHO LVOT PEAK GRADIENT: 2.3 MMHG
ECHO LVOT PEAK VELOCITY: 75.02 CM/S
ECHO LVOT VTI: 16.93 CM
ECHO MV A VELOCITY: 82.27 CM/S
ECHO MV AREA PHT: 2.3 CM2
ECHO MV E DECELERATION TIME (DT): 330.6 MS
ECHO MV E VELOCITY: 61.41 CM/S
ECHO MV E/A RATIO: 0.75
ECHO MV E/E' LATERAL: 7.68
ECHO MV E/E' RATIO (AVERAGED): 9.98
ECHO MV E/E' SEPTAL: 12.28
ECHO MV PRESSURE HALF TIME (PHT): 95.9 MS
ECHO RA AREA 4C: 17.06 CM2
ECHO RA VOLUME: 61.5 ML
ECHO RV INTERNAL DIMENSION: 5.64 CM
ECHO TRICUSPID ANNULAR PEAK SYSTOLIC VELOCITY: 2.1 CM/S
EOSINOPHIL # BLD: 0.1 K/UL (ref 0–0.4)
EOSINOPHIL NFR BLD: 1 % (ref 0–5)
ERYTHROCYTE [DISTWIDTH] IN BLOOD BY AUTOMATED COUNT: 14.6 % (ref 11.6–14.5)
GLOBULIN SER CALC-MCNC: 3.8 G/DL (ref 2–4)
GLUCOSE BLD STRIP.AUTO-MCNC: 209 MG/DL (ref 70–110)
GLUCOSE BLD STRIP.AUTO-MCNC: 211 MG/DL (ref 70–110)
GLUCOSE BLD STRIP.AUTO-MCNC: 218 MG/DL (ref 70–110)
GLUCOSE SERPL-MCNC: 160 MG/DL (ref 74–99)
HCT VFR BLD AUTO: 37.6 % (ref 36–48)
HGB BLD-MCNC: 12.5 G/DL (ref 13–16)
INR PPP: 1.1 (ref 0.8–1.2)
LVFS 2D: 16.01 %
LVOT MG: 1.15 MMHG
LVOT MV: 0.5 CM/S
LVSV (MOD BI): 26.38 ML
LVSV (MOD SINGLE 4C): 23.26 ML
LVSV (MOD SINGLE): 23.48 ML
LVSV (TEICH): 23.37 ML
LYMPHOCYTES # BLD: 1.6 K/UL (ref 0.9–3.6)
LYMPHOCYTES NFR BLD: 20 % (ref 21–52)
MCH RBC QN AUTO: 28.9 PG (ref 24–34)
MCHC RBC AUTO-ENTMCNC: 33.2 G/DL (ref 31–37)
MCV RBC AUTO: 86.8 FL (ref 74–97)
MONOCYTES # BLD: 0.3 K/UL (ref 0.05–1.2)
MONOCYTES NFR BLD: 3 % (ref 3–10)
MV DEC SLOPE: 1.86
NEUTS SEG # BLD: 5.9 K/UL (ref 1.8–8)
NEUTS SEG NFR BLD: 76 % (ref 40–73)
PLATELET # BLD AUTO: 150 K/UL (ref 135–420)
PMV BLD AUTO: 11.8 FL (ref 9.2–11.8)
POTASSIUM SERPL-SCNC: 4.1 MMOL/L (ref 3.5–5.5)
PROT SERPL-MCNC: 7.2 G/DL (ref 6.4–8.2)
PROTHROMBIN TIME: 14.3 SEC (ref 11.5–15.2)
RBC # BLD AUTO: 4.33 M/UL (ref 4.7–5.5)
SODIUM SERPL-SCNC: 136 MMOL/L (ref 136–145)
WBC # BLD AUTO: 7.8 K/UL (ref 4.6–13.2)

## 2020-02-14 PROCEDURE — 96372 THER/PROPH/DIAG INJ SC/IM: CPT

## 2020-02-14 PROCEDURE — 85610 PROTHROMBIN TIME: CPT

## 2020-02-14 PROCEDURE — 82962 GLUCOSE BLOOD TEST: CPT

## 2020-02-14 PROCEDURE — 80053 COMPREHEN METABOLIC PANEL: CPT

## 2020-02-14 PROCEDURE — 85025 COMPLETE CBC W/AUTO DIFF WBC: CPT

## 2020-02-14 PROCEDURE — 74011636637 HC RX REV CODE- 636/637: Performed by: FAMILY MEDICINE

## 2020-02-14 PROCEDURE — 65270000029 HC RM PRIVATE

## 2020-02-14 PROCEDURE — 74011250637 HC RX REV CODE- 250/637: Performed by: INTERNAL MEDICINE

## 2020-02-14 PROCEDURE — 36415 COLL VENOUS BLD VENIPUNCTURE: CPT

## 2020-02-14 PROCEDURE — 99218 HC RM OBSERVATION: CPT

## 2020-02-14 PROCEDURE — 74011250636 HC RX REV CODE- 250/636: Performed by: FAMILY MEDICINE

## 2020-02-14 PROCEDURE — 74011636637 HC RX REV CODE- 636/637: Performed by: INTERNAL MEDICINE

## 2020-02-14 PROCEDURE — 74011250636 HC RX REV CODE- 250/636: Performed by: INTERNAL MEDICINE

## 2020-02-14 PROCEDURE — A9270 NON-COVERED ITEM OR SERVICE: HCPCS | Performed by: INTERNAL MEDICINE

## 2020-02-14 RX ORDER — PRAVASTATIN SODIUM 40 MG/1
40 TABLET ORAL
Qty: 30 TAB | Refills: 0 | Status: SHIPPED | OUTPATIENT
Start: 2020-02-14

## 2020-02-14 RX ORDER — ISOSORBIDE MONONITRATE 30 MG/1
30 TABLET, EXTENDED RELEASE ORAL DAILY
Status: DISCONTINUED | OUTPATIENT
Start: 2020-02-14 | End: 2020-02-14 | Stop reason: HOSPADM

## 2020-02-14 RX ORDER — ISOSORBIDE MONONITRATE 30 MG/1
30 TABLET, EXTENDED RELEASE ORAL DAILY
Qty: 30 TAB | Refills: 0 | Status: SHIPPED | OUTPATIENT
Start: 2020-02-15 | End: 2020-02-14 | Stop reason: SDUPTHER

## 2020-02-14 RX ORDER — ISOSORBIDE MONONITRATE 30 MG/1
30 TABLET, EXTENDED RELEASE ORAL DAILY
Qty: 30 TAB | Refills: 0 | Status: SHIPPED | OUTPATIENT
Start: 2020-02-15

## 2020-02-14 RX ORDER — METOPROLOL SUCCINATE 25 MG/1
25 TABLET, EXTENDED RELEASE ORAL DAILY
Qty: 30 TAB | Refills: 0 | Status: SHIPPED | OUTPATIENT
Start: 2020-02-15

## 2020-02-14 RX ORDER — NITROGLYCERIN 0.4 MG/1
0.4 TABLET SUBLINGUAL AS NEEDED
Status: DISCONTINUED | OUTPATIENT
Start: 2020-02-14 | End: 2020-02-14 | Stop reason: HOSPADM

## 2020-02-14 RX ORDER — PRAVASTATIN SODIUM 40 MG/1
40 TABLET ORAL
Qty: 30 TAB | Refills: 0 | Status: SHIPPED | OUTPATIENT
Start: 2020-02-14 | End: 2020-02-14 | Stop reason: SDUPTHER

## 2020-02-14 RX ORDER — LISINOPRIL 5 MG/1
5 TABLET ORAL DAILY
Qty: 30 TAB | Refills: 0 | Status: SHIPPED | OUTPATIENT
Start: 2020-02-14

## 2020-02-14 RX ORDER — FUROSEMIDE 40 MG/1
40 TABLET ORAL DAILY
Qty: 30 TAB | Refills: 0 | Status: SHIPPED | OUTPATIENT
Start: 2020-02-14 | End: 2020-02-14 | Stop reason: SDUPTHER

## 2020-02-14 RX ORDER — FUROSEMIDE 40 MG/1
40 TABLET ORAL DAILY
Qty: 30 TAB | Refills: 0 | Status: SHIPPED | OUTPATIENT
Start: 2020-02-14 | End: 2020-12-14

## 2020-02-14 RX ORDER — LISINOPRIL 5 MG/1
5 TABLET ORAL DAILY
Qty: 30 TAB | Refills: 0 | Status: SHIPPED | OUTPATIENT
Start: 2020-02-15 | End: 2020-02-14 | Stop reason: SDUPTHER

## 2020-02-14 RX ORDER — LISINOPRIL 5 MG/1
5 TABLET ORAL DAILY
Qty: 30 TAB | Refills: 0 | Status: SHIPPED | OUTPATIENT
Start: 2020-02-15 | End: 2020-02-19

## 2020-02-14 RX ORDER — METOPROLOL SUCCINATE 25 MG/1
25 TABLET, EXTENDED RELEASE ORAL DAILY
Qty: 30 TAB | Refills: 0 | Status: SHIPPED | OUTPATIENT
Start: 2020-02-15 | End: 2020-02-14 | Stop reason: SDUPTHER

## 2020-02-14 RX ADMIN — INSULIN LISPRO 4 UNITS: 100 INJECTION, SOLUTION INTRAVENOUS; SUBCUTANEOUS at 06:40

## 2020-02-14 RX ADMIN — PREDNISONE 50 MG: 20 TABLET ORAL at 06:06

## 2020-02-14 RX ADMIN — HEPARIN SODIUM 5000 UNITS: 5000 INJECTION INTRAVENOUS; SUBCUTANEOUS at 00:44

## 2020-02-14 RX ADMIN — INSULIN LISPRO 4 UNITS: 100 INJECTION, SOLUTION INTRAVENOUS; SUBCUTANEOUS at 11:17

## 2020-02-14 RX ADMIN — FUROSEMIDE 40 MG: 10 INJECTION, SOLUTION INTRAMUSCULAR; INTRAVENOUS at 09:02

## 2020-02-14 RX ADMIN — METOPROLOL SUCCINATE 25 MG: 25 TABLET, EXTENDED RELEASE ORAL at 09:01

## 2020-02-14 RX ADMIN — ISOSORBIDE MONONITRATE 30 MG: 30 TABLET, EXTENDED RELEASE ORAL at 14:32

## 2020-02-14 RX ADMIN — LISINOPRIL 5 MG: 5 TABLET ORAL at 09:01

## 2020-02-14 NOTE — PROGRESS NOTES
Cardiology Progress Note        Patient: Darion Dong        Sex: male          DOA: 2/12/2020  YOB: 1940      Age:  70 y.o.        LOS:  LOS: 0 days    Patient seen and examined, chart reviewed. Assessment/Plan     Patient Active Problem List   Diagnosis Code         Chest pain R07.9    Lower extremity edema R60.0    DM (diabetes mellitus) ([facility] [address] E11.9      Newly diagnosed acute systolic heart failure LVEF 35%  Wall motion abnormality on echocardigram    Plan:    Continue aspirin, statin. Start Metoprolol succinate 25 mg once a day, Lisinopril 5 mg once a day and Lasix 40 mg IV once a day. In view of newly diagnosed systolic heart failure advise about LEFT heart catheterization, coronary angiogram plus or minus PCI. All risks, benefits and alternatives explained to patient and family member at bedside. Patient is not sure about cardiac catheterization. Patient has contrast allergy. He had hives after contrast study was done long time ago. Will premedicate for now. Continue management as per hospital medicine. Subjective:    cc:  Denies any chest pain. C/o shortness of breath on exertion       REVIEW OF SYSTEMS:     General: No fevers or chills. Cardiovascular: No chest pain,No palpitations, No orthopnea, No PND, positive leg swelling, No claudication, Positive shortness of breath on exertion  Pulmonary: No dyspnea  Gastrointestinal: No nausea, vomiting, bleeding  Neurology: No Dizziness    Objective:      Visit Vitals  /59 (BP 1 Location: Right arm, BP Patient Position: At rest)   Pulse 77   Temp 98.1 °F (36.7 °C)   Resp 16   Ht 5' 9\" (1.753 m)   Wt 118.8 kg (262 lb)   SpO2 100%   BMI 38.69 kg/m²     Body mass index is 38.69 kg/m². Physical Exam:  General Appearance: Comfortable, not using accessory muscles of respiration. HEENT: SHELLY. HEAD: Atraumatic  NECK: No JVD, no thyroidomeglay.  CAROTIDS: No bruit  LUNGS: Clear bilaterally. HEART: S1+S2 audible, no murmur, no pericardial rub. ABD: Non-tender, BS Audible    EXT: + lower extremity edema, and no cyanosis. VASCULAR EXAM: Pulses are intact. PSYCHIATRIC EXAM: Mood is appropriate. MUSCULOSKELETAL: Grossly no joint deformity. NEUROLOGICAL: AAO times 3, No motor and sensory deficit.   Medication:  Current Facility-Administered Medications   Medication Dose Route Frequency    [START ON 2/14/2020] metoprolol succinate (TOPROL-XL) XL tablet 25 mg  25 mg Oral DAILY    [START ON 2/14/2020] lisinopril (PRINIVIL, ZESTRIL) tablet 5 mg  5 mg Oral DAILY    pravastatin (PRAVACHOL) tablet 40 mg  40 mg Oral QHS    acetaminophen (TYLENOL) tablet 650 mg  650 mg Oral Q4H PRN    HYDROcodone-acetaminophen (NORCO) 5-325 mg per tablet 1 Tab  1 Tab Oral Q4H PRN    morphine injection 2 mg  2 mg IntraVENous Q4H PRN    naloxone (NARCAN) injection 0.4 mg  0.4 mg IntraVENous PRN    ondansetron (ZOFRAN) injection 4 mg  4 mg IntraVENous Q4H PRN    heparin (porcine) injection 5,000 Units  5,000 Units SubCUTAneous Q8H    insulin lispro (HUMALOG) injection   SubCUTAneous AC&HS    glucose chewable tablet 16 g  16 g Oral PRN    glucagon (GLUCAGEN) injection 1 mg  1 mg IntraMUSCular PRN    dextrose 10% infusion 125-250 mL  125-250 mL IntraVENous PRN    dextrose 10% infusion 125-250 mL  125-250 mL IntraVENous PRN    aspirin delayed-release tablet 81 mg  81 mg Oral DAILY               Lab/Data Reviewed:       Recent Labs     02/13/20  0328 02/12/20  1350   WBC 6.9 8.0   HGB 11.9* 12.2*   HCT 36.0 37.1    142     Recent Labs     02/13/20  0328 02/12/20  1350    138   K 3.8 3.7    103   CO2 28 30   GLU 94 90   BUN 18 18   CREA 0.73 0.93   CA 9.3 9.3       Signed By: Renetta Hogue MD     February 13, 2020

## 2020-02-14 NOTE — PROGRESS NOTES
Cardiology Progress Note        Patient: Vamshi Niño        Sex: male          DOA: 2/12/2020  YOB: 1940      Age:  70 y.o.        LOS:  LOS: 0 days    Patient seen and examined, chart reviewed. Assessment/Plan     Patient Active Problem List   Diagnosis Code         Chest pain R07.9    Lower extremity edema R60.0    DM (diabetes mellitus) ([facility] E11.9      Newly diagnosed acute systolic heart failure LVEF 35%  Wall motion abnormality on echocardigram    Plan:    Continue aspirin, statin. Continue Metoprolol succinate 25 mg once a day, Lisinopril 5 mg once a day and Lasix 40 mg once a day. Start isosorbide mononitrate 30 mg by mouth once a day. In view of newly diagnosed systolic heart failure advise about LEFT heart catheterization, coronary angiogram plus or minus PCI. All risks, benefits and alternatives explained to patient and family member at bedside. Patient is not sure about cardiac catheterization. Patient does not want to proceed for procedure this time. All risk, consisquences and dangers explained to patient and he verbally understood. Advise about salt restriction up to 2 g per day and fluid restriction up to 1.2 L per day. Continue management as per hospital medicine. Follow-up in cardiology clinic in one week. Advised to call 911 and come to emergency room if symptoms gets worse. No strenuous exercise or strenuous activity. Plan discussed with hospital medicine. Subjective:    cc:  Denies any chest pain. C/o shortness of breath on exertion       REVIEW OF SYSTEMS:     General: No fevers or chills.   Cardiovascular: No chest pain,No palpitations, No orthopnea, No PND, positive leg swelling, No claudication, Positive shortness of breath on exertion  Pulmonary: No dyspnea  Gastrointestinal: No nausea, vomiting, bleeding  Neurology: No Dizziness    Objective:      Visit Vitals  /76   Pulse 69   Temp 98 °F (36.7 °C)   Resp 14 Ht 5' 9\" (1.753 m)   Wt 119.5 kg (263 lb 8 oz)   SpO2 99%   BMI 38.91 kg/m²     Body mass index is 38.91 kg/m². Physical Exam:  General Appearance: Comfortable, not using accessory muscles of respiration. HEENT: SHELLY. HEAD: Atraumatic  NECK: No JVD, no thyroidomeglay. CAROTIDS: No bruit  LUNGS: Clear bilaterally. HEART: S1+S2 audible, no murmur, no pericardial rub. ABD: Non-tender, BS Audible    EXT: + lower extremity edema, and no cyanosis. VASCULAR EXAM: Pulses are intact. PSYCHIATRIC EXAM: Mood is appropriate. MUSCULOSKELETAL: Grossly no joint deformity. NEUROLOGICAL: AAO times 3, No motor and sensory deficit.   Medication:  Current Facility-Administered Medications   Medication Dose Route Frequency    isosorbide mononitrate ER (IMDUR) tablet 30 mg  30 mg Oral DAILY    metoprolol succinate (TOPROL-XL) XL tablet 25 mg  25 mg Oral DAILY    lisinopril (PRINIVIL, ZESTRIL) tablet 5 mg  5 mg Oral DAILY    pravastatin (PRAVACHOL) tablet 40 mg  40 mg Oral QHS    diphenhydrAMINE (BENADRYL) capsule 50 mg  50 mg Oral CARD ONCE    furosemide (LASIX) injection 40 mg  40 mg IntraVENous DAILY    acetaminophen (TYLENOL) tablet 650 mg  650 mg Oral Q4H PRN    HYDROcodone-acetaminophen (NORCO) 5-325 mg per tablet 1 Tab  1 Tab Oral Q4H PRN    morphine injection 2 mg  2 mg IntraVENous Q4H PRN    naloxone (NARCAN) injection 0.4 mg  0.4 mg IntraVENous PRN    ondansetron (ZOFRAN) injection 4 mg  4 mg IntraVENous Q4H PRN    heparin (porcine) injection 5,000 Units  5,000 Units SubCUTAneous Q8H    insulin lispro (HUMALOG) injection   SubCUTAneous AC&HS    glucose chewable tablet 16 g  16 g Oral PRN    glucagon (GLUCAGEN) injection 1 mg  1 mg IntraMUSCular PRN    dextrose 10% infusion 125-250 mL  125-250 mL IntraVENous PRN    dextrose 10% infusion 125-250 mL  125-250 mL IntraVENous PRN    aspirin delayed-release tablet 81 mg  81 mg Oral DAILY               Lab/Data Reviewed:       Recent Labs 02/14/20  0245 02/13/20  0328 02/12/20  1350   WBC 7.8 6.9 8.0   HGB 12.5* 11.9* 12.2*   HCT 37.6 36.0 37.1    146 142     Recent Labs     02/14/20  0245 02/13/20  0328 02/12/20  1350    137 138   K 4.1 3.8 3.7    102 103   CO2 28 28 30   * 94 90   BUN 15 18 18   CREA 0.86 0.73 0.93   CA 9.5 9.3 9.3       Signed By: Patti Cotto MD     February 14, 2020

## 2020-02-14 NOTE — DISCHARGE SUMMARY
Discharge Summary    Patient: Vamshi Niño MRN: [id number]  CSN: [id number]    YOB: 1940  Age: 70 y.o. Sex: male    DOA: 2/12/2020 LOS:  LOS: 0 days   Discharge Date:      Primary Care Provider:  Leisa Barthel, MD    Admission Diagnoses: Chest pain [R07.9]  Chest pain [R07.9]    Discharge Diagnoses:    Problem List as of 2/14/2020 Never Reviewed          Codes Class Noted - Resolved    Systolic CHF, chronic ([facility] ICD-10-CM: I50.22  ICD-9-CM: 428.22, 428.0  2/14/2020 - Present        * (Principal) Chest pain ICD-10-CM: R07.9  ICD-9-CM: 786.50  2/12/2020 - Present        Lower extremity edema ICD-10-CM: R60.0  ICD-9-CM: 782.3  2/12/2020 - Present        DM (diabetes mellitus) ([facility] ICD-10-CM: E11.9  ICD-9-CM: 250.00  2/12/2020 - Present        Open wound of right index finger without damage to nail ICD-10-CM: S61.200A  ICD-9-CM: 883.0  1/9/2019 - Present              Discharge Medications:     Current Discharge Medication List      START taking these medications    Details   isosorbide mononitrate ER (IMDUR) 30 mg tablet Take 1 Tab by mouth daily. Qty: 30 Tab, Refills: 0      metoprolol succinate (TOPROL-XL) 25 mg XL tablet Take 1 Tab by mouth daily. Qty: 30 Tab, Refills: 0      furosemide (LASIX) 40 mg tablet Take 1 Tab by mouth daily. Qty: 30 Tab, Refills: 0         CONTINUE these medications which have CHANGED    Details   !! lisinopril (PRINIVIL, ZESTRIL) 5 mg tablet Take 1 Tab by mouth daily. Qty: 30 Tab, Refills: 0      pravastatin (PRAVACHOL) 40 mg tablet Take 1 Tab by mouth nightly. Qty: 30 Tab, Refills: 0      !! lisinopril (PRINIVIL, ZESTRIL) 5 mg tablet Take 1 Tab by mouth daily. Qty: 30 Tab, Refills: 0       !! - Potential duplicate medications found. Please discuss with provider. CONTINUE these medications which have NOT CHANGED    Details   aspirin 81 mg chewable tablet Take 81 mg by mouth daily.       metFORMIN (GLUCOPHAGE) 1,000 mg tablet Take 1,000 mg by mouth two (2) times daily (with meals). gabapentin (NEURONTIN) 400 mg capsule Take 800 mg by mouth two (2) times a day. SITagliptin (JANUVIA) 100 mg tablet Take 100 mg by mouth daily. vit B Cmplx 3-FA-Vit C-Biotin (NEPHRO LILIANA RX) 1- mg-mg-mcg tablet Take 1 Tab by mouth daily. calcium-cholecalciferol, d3, (CALCIUM 600 + D) 600-125 mg-unit tab Take  by mouth daily. pioglitazone (ACTOS) 30 mg tablet Take 30 mg by mouth daily. STOP taking these medications       FELODIPINE (PLENDIL PO) Comments:   Reason for Stopping:         hydroCHLOROthiazide (HYDRODIURIL) 12.5 mg tablet Comments:   Reason for Stopping:               Discharge Condition: Good    Procedures : none    Consults: Cardiology      PHYSICAL EXAM   Visit Vitals  /86   Pulse 83   Temp 98 °F (36.7 °C)   Resp 14   Ht 5' 9\" (1.753 m)   Wt 119.5 kg (263 lb 8 oz)   SpO2 100%   BMI 38.91 kg/m²     General: Awake, cooperative, no acute distress    HEENT: NC, Atraumatic. PERRLA, EOMI. Anicteric sclerae. Lungs:  CTA Bilaterally. No Wheezing/Rhonchi/Rales. Heart:  Regular  rhythm,  No murmur, No Rubs, No Gallops  Abdomen: Soft, Non distended, Non tender. +Bowel sounds,   Extremities: No c/c/e  Psych:   Not anxious or agitated. Neurologic:  No acute neurological deficits. Admission HPI :   Doroteo Shahnaz is a 70 y.o. male  with past medical history of hypertension, hyperlipidemia, diabetes and obesity who presents to the emergency department C/O intermittent left-sided chest pain that radiates up to his left shoulder and left upper extremity for the past 2 weeks.  Patient reports associated shortness of breath when the pain comes on.  States that he has never had this pain before and is not exacerbated by exertion or activity.  Patient states that he is never had an MI and has never seen a cardiologist in the past.  Pt denies abdominal pain, nausea or vomiting, and any other sxs or complaints. Hospital Course :   Mr. [facility] was admitted to telemetry, he was seen and followed by cardiology. He was started on aspirin. His cardiac enzymes in normal range x3. His echocardiogram showed left ventricular ejection fraction of 35% with wall motion abnormality. Cardiology recommended left heart catheterization and coronary angiogram.  Risks and benefits discussed with the patient. Patient does not want to proceed with cardiac catheterization at this time. Cardiology recommended medical management for now. He will be discharged on metoprolol succinate, lisinopril, Lasix. He will follow-up with cardiology in 1 week. Also advised salt restriction up to 2 g/day and fluid restriction 1.2 L/day.     Activity: Activity as tolerated    Diet: Cardiac Diet    Follow-up: PCP, cardiology    Disposition: home    Minutes spent on discharge: 45       Labs: Results:       Chemistry Recent Labs     02/14/20 0245 02/13/20 0328 02/12/20  1350   * 94 90    137 138   K 4.1 3.8 3.7    102 103   CO2 28 28 30   BUN 15 18 18   CREA 0.86 0.73 0.93   CA 9.5 9.3 9.3   AGAP 5 7 5   BUCR 17 25* 19   AP 55  --  61   TP 7.2  --  7.5   ALB 3.4  --  3.7   GLOB 3.8  --  3.8   AGRAT 0.9  --  1.0      CBC w/Diff Recent Labs     02/14/20 0245 02/13/20 0328 02/12/20  1350   WBC 7.8 6.9 8.0   RBC 4.33* 4.10* 4.23*   HGB 12.5* 11.9* 12.2*   HCT 37.6 36.0 37.1    146 142   GRANS 76*  --  56   LYMPH 20*  --  30   EOS 1  --  3      Cardiac Enzymes Recent Labs     02/13/20  2253 02/13/20  1621    171   CKND1 3.8 3.5      Coagulation Recent Labs     02/14/20 0245   PTP 14.3   INR 1.1       Lipid Panel Lab Results   Component Value Date/Time    Cholesterol, total 123 02/12/2020 01:50 PM    HDL Cholesterol 69 (H) 02/12/2020 01:50 PM    LDL, calculated 42.8 02/12/2020 01:50 PM    VLDL, calculated 11.2 02/12/2020 01:50 PM    Triglyceride 56 02/12/2020 01:50 PM    CHOL/HDL Ratio 1.8 02/12/2020 01:50 PM      BNP No results for input(s): BNPP in the last 72 hours. Liver Enzymes Recent Labs     02/14/20  0245   TP 7.2   ALB 3.4   AP 55   SGOT 14      Thyroid Studies No results found for: T4, T3U, TSH, TSHEXT, TSHEXT         Significant Diagnostic Studies: Xr Chest Port    Result Date: 2/12/2020  EXAM: XR CHEST PORT CLINICAL INDICATION/HISTORY: CP -Additional: None COMPARISON: None TECHNIQUE: Portable frontal view of the chest _______________ FINDINGS: SUPPORT DEVICES: Right IJ approach Mediport catheter with tip projecting over the superior cavoatrial junction. HEART AND MEDIASTINUM: Normal cardiac size and mediastinal contours. LUNGS AND PLEURAL SPACES: No focal pneumonic opacity. No evidence of pneumothorax or pleural effusion. BONY THORAX AND SOFT TISSUES: Unremarkable. _______________     IMPRESSION: 1. Right IJ approach Mediport catheter in appropriate position. 2. No superimposed acute radiographic abnormality. No results found for this or any previous visit. Please note that this dictation was completed with [email or web address], the computer voice recognition software. Quite often unanticipated grammatical, syntax, homophones, and other interpretive errors are inadvertently transcribed by the computer software. Please disregard these errors. Please excuse any errors that have escaped final proofreading.      CC: Priyank Wright MD

## 2020-02-14 NOTE — PHYSICIAN ADVISORY
Letter of Admission Status Determination: Upgrade to Inpatient       Taylor Caputo was hospitalized as observation status on 2/12/2020. His hospital stay has already crossed 2 medically necessary midnights; ongoing hospitalization was warranted for this patient with chest pain and new CHF. Since Taylor Caputo required medically necessary hospital care spanning at least two midnights, he has met the benchmark for Inpatient Admission status. Based on the documented clinical condition and care plan, we recommend upgrading patient's hospitalization status to INPATIENT. The final decision regarding the patient's hospitalization status depends on the attending physician's clinical judgment.        Cameron Blevins MD, [place], [address], [address] [place] [address]  [phone]

## 2020-02-14 NOTE — DISCHARGE INSTRUCTIONS
Patient Education        Musculoskeletal Chest Pain: Care Instructions  Your Care Instructions    Chest pain is not always a sign that something is wrong with your heart or that you have another serious problem. The doctor thinks your chest pain is caused by strained muscles or ligaments, inflamed chest cartilage, or another problem in your chest, rather than by your heart. You may need more tests to find the cause of your chest pain. Follow-up care is a key part of your treatment and safety. Be sure to make and go to all appointments, and call your doctor if you are having problems. It's also a good idea to know your test results and keep a list of the medicines you take. How can you care for yourself at home? · Take pain medicines exactly as directed. ? If the doctor gave you a prescription medicine for pain, take it as prescribed. ? If you are not taking a prescription pain medicine, ask your doctor if you can take an over-the-counter medicine. · Rest and protect the sore area. · Stop, change, or take a break from any activity that may be causing your pain or soreness. · Put ice or a cold pack on the sore area for 10 to 20 minutes at a time. Try to do this every 1 to 2 hours for the next 3 days (when you are awake) or until the swelling goes down. Put a thin cloth between the ice and your skin. · After 2 or 3 days, apply a heating pad set on low or a warm cloth to the area that hurts. Some doctors suggest that you go back and forth between hot and cold. · Do not wrap or tape your ribs for support. This may cause you to take smaller breaths, which could increase your risk of lung problems. · Mentholated creams such as Bengay or Icy Hot may soothe sore muscles. Follow the instructions on the package. · Follow your doctor's instructions for exercising. · Gentle stretching and massage may help you get better faster.  Stretch slowly to the point just before pain begins, and hold the stretch for at least 15 to 30 seconds. Do this 3 or 4 times a day. Stretch just after you have applied heat. · As your pain gets better, slowly return to your normal activities. Any increased pain may be a sign that you need to rest a while longer. When should you call for help? Call 911 anytime you think you may need emergency care. For example, call if:    · You have chest pain or pressure. This may occur with:  ? Sweating. ? Shortness of breath. ? Nausea or vomiting. ? Pain that spreads from the chest to the neck, jaw, or one or both shoulders or arms. ? Dizziness or lightheadedness. ? A fast or uneven pulse. After calling 911, chew 1 adult-strength aspirin. Wait for an ambulance. Do not try to drive yourself.     · You have sudden chest pain and shortness of breath, or you cough up blood.    Call your doctor now or seek immediate medical care if:    · You have any trouble breathing.     · Your chest pain gets worse.     · Your chest pain occurs consistently with exercise and is relieved by rest.    Watch closely for changes in your health, and be sure to contact your doctor if:    · Your chest pain does not get better after 1 week. Where can you learn more? Go to http://patrick-avril.info/. Enter V293 in the search box to learn more about \"Musculoskeletal Chest Pain: Care Instructions. \"  Current as of: June 26, 2019  Content Version: 12.2  © 3573-8596 [email or web address]. Care instructions adapted under license by [employer] (which disclaims liability or warranty for this information). If you have questions about a medical condition or this instruction, always ask your healthcare professional. [place] [address] any warranty or liability for your use of this information. Patient armband removed and shredded  [employer] Activation    Thank you for requesting access to [employer].  Please follow the instructions below to securely access and download your online medical record. [employer] allows you to send messages to your doctor, view your test results, renew your prescriptions, schedule appointments, and more. How Do I Sign Up? 1. In your internet browser, go to [email or web address]  2. Click on the First Time User? Click Here link in the Sign In box. You will be redirect to the New Member Sign Up page. 3. Enter your [employer] Access Code exactly as it appears below. You will not need to use this code after youve completed the sign-up process. If you do not sign up before the expiration date, you must request a new code. [employer] Access Code: [email or web address]  Expires: 3/28/2020  1:21 PM (This is the date your [employer] access code will )    4. Enter the last four digits of your Social Security Number (xxxx) and Date of Birth (mm/dd/yyyy) as indicated and click Submit. You will be taken to the next sign-up page. 5. Create a [employer] ID. This will be your [employer] login ID and cannot be changed, so think of one that is secure and easy to remember. 6. Create a [employer] password. You can change your password at any time. 7. Enter your Password Reset Question and Answer. This can be used at a later time if you forget your password. 8. Enter your e-mail address. You will receive e-mail notification when new information is available in [address] 9. Click Sign Up. You can now view and download portions of your medical record. 10. Click the Download Summary menu link to download a portable copy of your medical information. Additional Information    If you have questions, please visit the Frequently Asked Questions section of the [employer] website at [email or web address] Remember, [employer] is NOT to be used for urgent needs. For medical emergencies, dial 911. Dual AVS reviewed with Shlomo Verma RN. All medications reviewed individually with patient. Opportunities for questions and concerns provided.   Patient discharged via (mode of transport ie. Car, ambulance or air transport) car  Patient's arm band appropriately discarded. DISCHARGE SUMMARY from Nurse    PATIENT INSTRUCTIONS:    After general anesthesia or intravenous sedation, for 24 hours or while taking prescription Narcotics:  · Limit your activities  · Do not drive and operate hazardous machinery  · Do not make important personal or business decisions  · Do  not drink alcoholic beverages  · If you have not urinated within 8 hours after discharge, please contact your surgeon on call. Report the following to your surgeon:  · Excessive pain, swelling, redness or odor of or around the surgical area  · Temperature over 100.5  · Nausea and vomiting lasting longer than 4 hours or if unable to take medications  · Any signs of decreased circulation or nerve impairment to extremity: change in color, persistent  numbness, tingling, coldness or increase pain  · Any questions    What to do at Home:  Recommended activity: Activity as tolerated    If you experience any of the following symptoms chest pain or shortness of breath, please follow up with cardiologist or ED    *  Please give a list of your current medications to your Primary Care Provider. *  Please update this list whenever your medications are discontinued, doses are      changed, or new medications (including over-the-counter products) are added. *  Please carry medication information at all times in case of emergency situations. These are general instructions for a healthy lifestyle:    No smoking/ No tobacco products/ Avoid exposure to second hand smoke  Surgeon General's Warning:  Quitting smoking now greatly reduces serious risk to your health.     Obesity, smoking, and sedentary lifestyle greatly increases your risk for illness    A healthy diet, regular physical exercise & weight monitoring are important for maintaining a healthy lifestyle    You may be retaining fluid if you have a history of heart failure or if you experience any of the following symptoms:  Weight gain of 3 pounds or more overnight or 5 pounds in a week, increased swelling in our hands or feet or shortness of breath while lying flat in bed. Please call your doctor as soon as you notice any of these symptoms; do not wait until your next office visit. The discharge information has been reviewed with the patient. The patient verbalized understanding. Discharge medications reviewed with the patient and appropriate educational materials and side effects teaching were provided.   ___________________________________________________________________________________________________________________________________

## 2020-02-14 NOTE — ROUTINE PROCESS
Bedside and Verbal shift change report given to S. Loras Rinne, RN (oncoming nurse) by LORELEI Barrios RN (offgoing nurse). Report included the following information SBAR, Kardex, Intake/Output, MAR and Recent Results.

## 2020-02-14 NOTE — PROGRESS NOTES
Transition of care: home with family assistance when medically cleared  Met with patient he lives with his daughter  He has medicare for insurance. He has pcp at [address]  He reports he is IADL  patien address mary on chart. Reports his d/c plan is to return home when medically cleared. He denies other needs. Reports he goes to out patient for Pt and uses a cane  Care Management Interventions  PCP Verified by CM:  Yes  Transition of Care Consult (CM Consult): Discharge Planning  Confirm Follow Up Transport: Family  The Plan for Transition of Care is Related to the Following Treatment Goals : home with family  The Patient and/or Patient Representative was Provided with a Choice of Provider and Agrees with the Discharge Plan?: Yes  Freedom of Choice List was Provided with Basic Dialogue that Supports the Patient's Individualized Plan of Care/Goals, Treatment Preferences and Shares the Quality Data Associated with the Providers?: Yes  Discharge Location  Discharge Placement: Home with family assistance

## 2020-02-14 NOTE — PROGRESS NOTES
2330 -  Head to toe assessment performed at this time. Pt denies any unusual chest pain or SOB. Pt denies any numbness or tingling to extremities. Pt educated on the side effects of medications taken. Pt left with call light within reach and bed in low position. Will continue to monitor.

## 2020-02-14 NOTE — PROGRESS NOTES
Send lasix 40 , isosorbide 30  , lisinopirl  5, metoprolol 25 , provastain 40 sent to [facility] pharm

## 2020-02-14 NOTE — ROUTINE PROCESS
Bedside and Verbal shift change report given to JOHN Liriano (oncoming nurse) by Cheli Whitfield (offgoing nurse). Report included the following information SBAR, Kardex and MAR.

## 2020-02-16 ENCOUNTER — APPOINTMENT (OUTPATIENT)
Dept: GENERAL RADIOLOGY | Age: 80
DRG: 511 | End: 2020-02-16
Attending: EMERGENCY MEDICINE
Payer: MEDICARE

## 2020-02-16 ENCOUNTER — APPOINTMENT (OUTPATIENT)
Dept: CT IMAGING | Age: 80
DRG: 511 | End: 2020-02-16
Attending: EMERGENCY MEDICINE
Payer: MEDICARE

## 2020-02-16 ENCOUNTER — HOSPITAL ENCOUNTER (INPATIENT)
Age: 80
LOS: 3 days | Discharge: NURSING, REHAB OR LONG-TERM CARE | DRG: 511 | End: 2020-02-19
Attending: EMERGENCY MEDICINE | Admitting: HOSPITALIST
Payer: MEDICARE

## 2020-02-16 ENCOUNTER — APPOINTMENT (OUTPATIENT)
Dept: MRI IMAGING | Age: 80
DRG: 511 | End: 2020-02-16
Attending: EMERGENCY MEDICINE
Payer: MEDICARE

## 2020-02-16 DIAGNOSIS — N17.9 AKI (ACUTE KIDNEY INJURY) (HCC): ICD-10-CM

## 2020-02-16 DIAGNOSIS — S52.021A OLECRANON FRACTURE, RIGHT, CLOSED, INITIAL ENCOUNTER: ICD-10-CM

## 2020-02-16 DIAGNOSIS — E86.0 DEHYDRATION: ICD-10-CM

## 2020-02-16 DIAGNOSIS — L03.113 CELLULITIS OF RIGHT UPPER EXTREMITY: ICD-10-CM

## 2020-02-16 DIAGNOSIS — G93.41 METABOLIC ENCEPHALOPATHY: ICD-10-CM

## 2020-02-16 DIAGNOSIS — R47.1 DYSARTHRIA: Primary | ICD-10-CM

## 2020-02-16 DIAGNOSIS — R27.0 ATAXIA: ICD-10-CM

## 2020-02-16 PROBLEM — I10 HYPERTENSION: Status: ACTIVE | Noted: 2020-02-16

## 2020-02-16 PROBLEM — L03.90 CELLULITIS: Status: ACTIVE | Noted: 2020-02-16

## 2020-02-16 PROBLEM — S52.023A OLECRANON FRACTURE: Status: ACTIVE | Noted: 2020-02-16

## 2020-02-16 PROBLEM — C90.00 MULTIPLE MYELOMA (HCC): Status: ACTIVE | Noted: 2020-02-16

## 2020-02-16 LAB
ALBUMIN SERPL-MCNC: 3.4 G/DL (ref 3.4–5)
ALBUMIN/GLOB SERPL: 1 {RATIO} (ref 0.8–1.7)
ALP SERPL-CCNC: 49 U/L (ref 45–117)
ALT SERPL-CCNC: 19 U/L (ref 16–61)
ANION GAP SERPL CALC-SCNC: 7 MMOL/L (ref 3–18)
ANION GAP SERPL CALC-SCNC: 9 MMOL/L (ref 3–18)
APPEARANCE UR: CLEAR
APTT PPP: 21.2 SEC (ref 23–36.4)
AST SERPL-CCNC: 15 U/L (ref 10–38)
ATRIAL RATE: 84 BPM
BASOPHILS # BLD: 0 K/UL (ref 0–0.1)
BASOPHILS NFR BLD: 0 % (ref 0–2)
BILIRUB SERPL-MCNC: 0.6 MG/DL (ref 0.2–1)
BILIRUB UR QL: NEGATIVE
BNP SERPL-MCNC: 36 PG/ML (ref 0–1800)
BUN SERPL-MCNC: 36 MG/DL (ref 7–18)
BUN SERPL-MCNC: 44 MG/DL (ref 7–18)
BUN/CREAT SERPL: 24 (ref 12–20)
BUN/CREAT SERPL: 28 (ref 12–20)
CALCIUM SERPL-MCNC: 9.1 MG/DL (ref 8.5–10.1)
CALCIUM SERPL-MCNC: 9.1 MG/DL (ref 8.5–10.1)
CALCULATED P AXIS, ECG09: 67 DEGREES
CALCULATED R AXIS, ECG10: -14 DEGREES
CALCULATED T AXIS, ECG11: 131 DEGREES
CHLORIDE SERPL-SCNC: 103 MMOL/L (ref 100–111)
CHLORIDE SERPL-SCNC: 105 MMOL/L (ref 100–111)
CK MB CFR SERPL CALC: 2.2 % (ref 0–4)
CK MB SERPL-MCNC: 3.3 NG/ML (ref 5–25)
CK SERPL-CCNC: 149 U/L (ref 39–308)
CO2 SERPL-SCNC: 25 MMOL/L (ref 21–32)
CO2 SERPL-SCNC: 27 MMOL/L (ref 21–32)
COLOR UR: YELLOW
CREAT SERPL-MCNC: 1.27 MG/DL (ref 0.6–1.3)
CREAT SERPL-MCNC: 1.82 MG/DL (ref 0.6–1.3)
DIAGNOSIS, 93000: NORMAL
DIFFERENTIAL METHOD BLD: ABNORMAL
EOSINOPHIL # BLD: 0.1 K/UL (ref 0–0.4)
EOSINOPHIL NFR BLD: 1 % (ref 0–5)
ERYTHROCYTE [DISTWIDTH] IN BLOOD BY AUTOMATED COUNT: 15.1 % (ref 11.6–14.5)
GLOBULIN SER CALC-MCNC: 3.5 G/DL (ref 2–4)
GLUCOSE BLD STRIP.AUTO-MCNC: 141 MG/DL (ref 70–110)
GLUCOSE SERPL-MCNC: 110 MG/DL (ref 74–99)
GLUCOSE SERPL-MCNC: 163 MG/DL (ref 74–99)
GLUCOSE UR STRIP.AUTO-MCNC: NEGATIVE MG/DL
HCT VFR BLD AUTO: 36.3 % (ref 36–48)
HGB BLD-MCNC: 12.3 G/DL (ref 13–16)
HGB UR QL STRIP: NEGATIVE
INR PPP: 1.1 (ref 0.8–1.2)
KETONES UR QL STRIP.AUTO: NEGATIVE MG/DL
LEUKOCYTE ESTERASE UR QL STRIP.AUTO: NEGATIVE
LYMPHOCYTES # BLD: 2.9 K/UL (ref 0.9–3.6)
LYMPHOCYTES NFR BLD: 24 % (ref 21–52)
MAGNESIUM SERPL-MCNC: 1.6 MG/DL (ref 1.6–2.6)
MCH RBC QN AUTO: 29.3 PG (ref 24–34)
MCHC RBC AUTO-ENTMCNC: 33.9 G/DL (ref 31–37)
MCV RBC AUTO: 86.4 FL (ref 74–97)
MONOCYTES # BLD: 1.6 K/UL (ref 0.05–1.2)
MONOCYTES NFR BLD: 13 % (ref 3–10)
NEUTS SEG # BLD: 7.8 K/UL (ref 1.8–8)
NEUTS SEG NFR BLD: 62 % (ref 40–73)
NITRITE UR QL STRIP.AUTO: NEGATIVE
P-R INTERVAL, ECG05: 196 MS
PH UR STRIP: 5 [PH] (ref 5–8)
PLATELET # BLD AUTO: 147 K/UL (ref 135–420)
PMV BLD AUTO: 11.5 FL (ref 9.2–11.8)
POTASSIUM SERPL-SCNC: 3.7 MMOL/L (ref 3.5–5.5)
POTASSIUM SERPL-SCNC: 3.8 MMOL/L (ref 3.5–5.5)
PROT SERPL-MCNC: 6.9 G/DL (ref 6.4–8.2)
PROT UR STRIP-MCNC: NEGATIVE MG/DL
PROTHROMBIN TIME: 14.2 SEC (ref 11.5–15.2)
Q-T INTERVAL, ECG07: 370 MS
QRS DURATION, ECG06: 114 MS
QTC CALCULATION (BEZET), ECG08: 437 MS
RBC # BLD AUTO: 4.2 M/UL (ref 4.7–5.5)
SODIUM SERPL-SCNC: 137 MMOL/L (ref 136–145)
SODIUM SERPL-SCNC: 139 MMOL/L (ref 136–145)
SP GR UR REFRACTOMETRY: 1.02 (ref 1–1.03)
TROPONIN I SERPL-MCNC: <0.02 NG/ML (ref 0–0.04)
UROBILINOGEN UR QL STRIP.AUTO: 0.2 EU/DL (ref 0.2–1)
VENTRICULAR RATE, ECG03: 84 BPM
WBC # BLD AUTO: 12.4 K/UL (ref 4.6–13.2)

## 2020-02-16 PROCEDURE — 83880 ASSAY OF NATRIURETIC PEPTIDE: CPT

## 2020-02-16 PROCEDURE — 82550 ASSAY OF CK (CPK): CPT

## 2020-02-16 PROCEDURE — 85025 COMPLETE CBC W/AUTO DIFF WBC: CPT

## 2020-02-16 PROCEDURE — 93005 ELECTROCARDIOGRAM TRACING: CPT

## 2020-02-16 PROCEDURE — 73070 X-RAY EXAM OF ELBOW: CPT

## 2020-02-16 PROCEDURE — 70551 MRI BRAIN STEM W/O DYE: CPT

## 2020-02-16 PROCEDURE — 80053 COMPREHEN METABOLIC PANEL: CPT

## 2020-02-16 PROCEDURE — 70450 CT HEAD/BRAIN W/O DYE: CPT

## 2020-02-16 PROCEDURE — 36415 COLL VENOUS BLD VENIPUNCTURE: CPT

## 2020-02-16 PROCEDURE — 65270000029 HC RM PRIVATE

## 2020-02-16 PROCEDURE — 85730 THROMBOPLASTIN TIME PARTIAL: CPT

## 2020-02-16 PROCEDURE — 99285 EMERGENCY DEPT VISIT HI MDM: CPT

## 2020-02-16 PROCEDURE — 83735 ASSAY OF MAGNESIUM: CPT

## 2020-02-16 PROCEDURE — 71045 X-RAY EXAM CHEST 1 VIEW: CPT

## 2020-02-16 PROCEDURE — 96361 HYDRATE IV INFUSION ADD-ON: CPT

## 2020-02-16 PROCEDURE — 74011250636 HC RX REV CODE- 250/636: Performed by: HOSPITALIST

## 2020-02-16 PROCEDURE — 85610 PROTHROMBIN TIME: CPT

## 2020-02-16 PROCEDURE — 82962 GLUCOSE BLOOD TEST: CPT

## 2020-02-16 PROCEDURE — 74011636637 HC RX REV CODE- 636/637: Performed by: HOSPITALIST

## 2020-02-16 PROCEDURE — 74011250636 HC RX REV CODE- 250/636: Performed by: EMERGENCY MEDICINE

## 2020-02-16 PROCEDURE — 75810000053 HC SPLINT APPLICATION

## 2020-02-16 PROCEDURE — 81003 URINALYSIS AUTO W/O SCOPE: CPT

## 2020-02-16 PROCEDURE — 96374 THER/PROPH/DIAG INJ IV PUSH: CPT

## 2020-02-16 RX ORDER — CLINDAMYCIN PHOSPHATE 900 MG/50ML
900 INJECTION, SOLUTION INTRAVENOUS
Status: COMPLETED | OUTPATIENT
Start: 2020-02-16 | End: 2020-02-16

## 2020-02-16 RX ORDER — SODIUM CHLORIDE 9 MG/ML
50 INJECTION, SOLUTION INTRAVENOUS CONTINUOUS
Status: DISCONTINUED | OUTPATIENT
Start: 2020-02-16 | End: 2020-02-16

## 2020-02-16 RX ORDER — SODIUM CHLORIDE 9 MG/ML
50 INJECTION, SOLUTION INTRAVENOUS CONTINUOUS
Status: DISPENSED | OUTPATIENT
Start: 2020-02-16 | End: 2020-02-17

## 2020-02-16 RX ORDER — DIPHENHYDRAMINE HYDROCHLORIDE 50 MG/ML
12.5 INJECTION, SOLUTION INTRAMUSCULAR; INTRAVENOUS
Status: DISCONTINUED | OUTPATIENT
Start: 2020-02-16 | End: 2020-02-19 | Stop reason: HOSPADM

## 2020-02-16 RX ORDER — MAGNESIUM SULFATE 100 %
4 CRYSTALS MISCELLANEOUS AS NEEDED
Status: DISCONTINUED | OUTPATIENT
Start: 2020-02-16 | End: 2020-02-19 | Stop reason: HOSPADM

## 2020-02-16 RX ORDER — NALOXONE HYDROCHLORIDE 0.4 MG/ML
0.4 INJECTION, SOLUTION INTRAMUSCULAR; INTRAVENOUS; SUBCUTANEOUS AS NEEDED
Status: DISCONTINUED | OUTPATIENT
Start: 2020-02-16 | End: 2020-02-19 | Stop reason: HOSPADM

## 2020-02-16 RX ORDER — METOPROLOL SUCCINATE 25 MG/1
25 TABLET, EXTENDED RELEASE ORAL DAILY
Status: DISCONTINUED | OUTPATIENT
Start: 2020-02-17 | End: 2020-02-19 | Stop reason: HOSPADM

## 2020-02-16 RX ORDER — ONDANSETRON 2 MG/ML
4 INJECTION INTRAMUSCULAR; INTRAVENOUS
Status: DISCONTINUED | OUTPATIENT
Start: 2020-02-16 | End: 2020-02-19 | Stop reason: HOSPADM

## 2020-02-16 RX ORDER — DEXTROSE MONOHYDRATE AND SODIUM CHLORIDE 5; .9 G/100ML; G/100ML
175 INJECTION, SOLUTION INTRAVENOUS CONTINUOUS
Status: DISCONTINUED | OUTPATIENT
Start: 2020-02-16 | End: 2020-02-16

## 2020-02-16 RX ORDER — ISOSORBIDE MONONITRATE 30 MG/1
30 TABLET, EXTENDED RELEASE ORAL DAILY
Status: DISCONTINUED | OUTPATIENT
Start: 2020-02-17 | End: 2020-02-19 | Stop reason: HOSPADM

## 2020-02-16 RX ORDER — INSULIN GLARGINE 100 [IU]/ML
5 INJECTION, SOLUTION SUBCUTANEOUS
Status: DISCONTINUED | OUTPATIENT
Start: 2020-02-16 | End: 2020-02-19 | Stop reason: HOSPADM

## 2020-02-16 RX ORDER — ACETAMINOPHEN 325 MG/1
650 TABLET ORAL
Status: DISCONTINUED | OUTPATIENT
Start: 2020-02-16 | End: 2020-02-19 | Stop reason: HOSPADM

## 2020-02-16 RX ORDER — DOCUSATE SODIUM 100 MG/1
100 CAPSULE, LIQUID FILLED ORAL 2 TIMES DAILY
Status: DISCONTINUED | OUTPATIENT
Start: 2020-02-16 | End: 2020-02-19 | Stop reason: HOSPADM

## 2020-02-16 RX ORDER — SODIUM CHLORIDE 9 MG/ML
175 INJECTION, SOLUTION INTRAVENOUS CONTINUOUS
Status: DISCONTINUED | OUTPATIENT
Start: 2020-02-16 | End: 2020-02-16

## 2020-02-16 RX ORDER — INSULIN LISPRO 100 [IU]/ML
INJECTION, SOLUTION INTRAVENOUS; SUBCUTANEOUS
Status: DISCONTINUED | OUTPATIENT
Start: 2020-02-16 | End: 2020-02-19 | Stop reason: HOSPADM

## 2020-02-16 RX ORDER — GUAIFENESIN 100 MG/5ML
81 LIQUID (ML) ORAL DAILY
Status: DISCONTINUED | OUTPATIENT
Start: 2020-02-17 | End: 2020-02-19 | Stop reason: HOSPADM

## 2020-02-16 RX ORDER — HEPARIN SODIUM 5000 [USP'U]/ML
5000 INJECTION, SOLUTION INTRAVENOUS; SUBCUTANEOUS EVERY 8 HOURS
Status: DISCONTINUED | OUTPATIENT
Start: 2020-02-16 | End: 2020-02-19 | Stop reason: HOSPADM

## 2020-02-16 RX ORDER — OXYCODONE AND ACETAMINOPHEN 5; 325 MG/1; MG/1
1 TABLET ORAL
Status: DISCONTINUED | OUTPATIENT
Start: 2020-02-16 | End: 2020-02-19 | Stop reason: HOSPADM

## 2020-02-16 RX ADMIN — CLINDAMYCIN PHOSPHATE 900 MG: 900 INJECTION, SOLUTION INTRAVENOUS at 07:43

## 2020-02-16 RX ADMIN — INSULIN GLARGINE 5 UNITS: 100 INJECTION, SOLUTION SUBCUTANEOUS at 22:44

## 2020-02-16 RX ADMIN — SODIUM CHLORIDE 175 ML/HR: 900 INJECTION, SOLUTION INTRAVENOUS at 08:17

## 2020-02-16 RX ADMIN — HEPARIN SODIUM 5000 UNITS: 5000 INJECTION INTRAVENOUS; SUBCUTANEOUS at 22:44

## 2020-02-16 RX ADMIN — SODIUM CHLORIDE 50 ML/HR: 900 INJECTION, SOLUTION INTRAVENOUS at 14:23

## 2020-02-16 RX ADMIN — SODIUM CHLORIDE 500 ML: 900 INJECTION, SOLUTION INTRAVENOUS at 07:31

## 2020-02-16 NOTE — ED TRIAGE NOTES
Arrives via ems. Pt had spine surgery at this facility last week and once he got home \"just hasnt felt right. \"  Pt reports having had multiple falls since going home. Pt presents today with slurred speech which the pt states is not his norm.   Pt right arm also swollen and warm

## 2020-02-16 NOTE — PROGRESS NOTES
1155 - Patient arrives to unit at this time. Admission completed at this time. Patient is A/O x 4. IV to left AC intact and patent. Sling to right arm due to fracture. Patient denies numbness/tingling. Radial pulses palpable. Lungs clear. Bowel sounds active. Pain 2/10. Patient was oriented to the room to include use of call bell, meal ordering, and use of incentive spirometer patient able to get up to 1500 on IS. Patient was given explanation of \" up for dinner\" program and has verbalized understanding. Phone and call bell left within reach. Plan of care for the day addressed with patient. Educated on pain medication availability and possible side effects. 1200-Patient reports he was an inpatient on [facility] last week from Wednesday-Friday due to chest pain, went home Friday on new medication Friday. 1215-Cardiac monitor applied. Box [address].     1220-Per monitor tech, patient NSR, HR 77.    1300-Per Evelyne in OR, please call when patient is cleared for surgery so she can update Dr. Cheron Cowden. 1530-OR called, surgery moved to 6 PM, patient to leave floor around 5 PM.    1620-Called patient's daughter Loren Grace, daughter of time of surgery. Shift Summary: Patient's pain well controlled this shift. IV remains intact. Do Not Roll form and preop checklist mostly complete, given to oncoming nurse to complete. 1633-Informed daughter that surgery is cancelled at this time due to abnormal lab values. Surgery will be reconsidered tomorrow after reviewing labs again tomorrow.

## 2020-02-16 NOTE — PROGRESS NOTES
Speech Pathology:     Pt currently NPO for surgery. Will complete evaluation at a later date/time or as medical condition permits.      Thank you for this referral.    Cresencio Mackey M.S. CCC-SLP/L  Speech-Language Pathologist

## 2020-02-16 NOTE — ROUTINE PROCESS
TRANSFER - IN REPORT:    Verbal report received from [facility] on Renae Reddy  being received from PACU for routine post - op. Report consisted of patients Situation, Background, Assessment and   Recommendations(SBAR). Information from the following report(s) SBAR, Kardex, OR Summary, Intake/Output and MAR was reviewed with the receiving nurse. Opportunity for questions and clarification was provided. Assessment to be completed upon patients arrival to unit and care assumed.

## 2020-02-16 NOTE — ED NOTES
EMS gave pt ASA dose PTA for CP complaints;   Pt able to swallow and hold secretions; no coughing or symptoms of aspiration noted

## 2020-02-16 NOTE — PROGRESS NOTES
Patient has a history of LV dysfunction with an EF of 35-40%. His nuclear stress test done recently shows no ischemia, a dilated LV and reduced EF (34%). He is not having angina, CHF symptoms or arrhythmia. Surgery is at increased risk but in my opinion the risk is not prohibitive. Full consult note to follow.      Simba Butt MD

## 2020-02-16 NOTE — ED NOTES
TRANSFER - OUT REPORT:    Verbal report given to [facility] RN on Mar Tanisha  being transferred to [facility] for routine progression of care       Report consisted of patients Situation, Background, Assessment and   Recommendations(SBAR). Information from the following report(s) SBAR and ED Summary was reviewed with the receiving nurse. Lines:   Peripheral IV 02/16/20 Left Antecubital (Active)   Site Assessment Clean, dry, & intact 2/16/2020 10:51 AM   Phlebitis Assessment 0 2/16/2020 10:51 AM   Infiltration Assessment 0 2/16/2020 10:51 AM   Dressing Status Clean, dry, & intact 2/16/2020 10:51 AM   Dressing Type Transparent 2/16/2020 10:51 AM   Hub Color/Line Status Flushed;Patent 2/16/2020 10:51 AM   Action Taken Blood drawn 2/16/2020 10:51 AM   Alcohol Cap Used Yes 2/16/2020 10:51 AM        Opportunity for questions and clarification was provided.       Patient transported with:   [email or web address]

## 2020-02-16 NOTE — PROGRESS NOTES
7730 report received from [facility]. Patient in bed resting. No distress noted. Dentures removed and placed in denture cup at side of bed. Patient informed that he is scheduled to be picked up at 5pm for 6pm surgery. RN [place] called daughter to inform her of scheduled time for patient  and sx    [phone]- Per Charge nurse, surgeon has cancelled surgery due to renal labs not being stable. Will re-evaluate for sx in morning. Patient will be NPO after midnight. Informed patient of changes and JOHN Gill called daughter back to inform her. Patient educated to call and order dinner.  Patient states understanding and agrees to plan    0000 report given to [address] [place] [address]   Patient in bed resting no distress noted Patient updated on plan of care. Will have patient order dinner and if tolerates well can be discharged home in evening. Patient understands plan of care. HEME/LYMPH No palpable cervical lymphadenopathy and no hepatosplenomegaly. No petechiae or ecchymoses. MSK Spontaneous movement of all extremities. No gross joint deformities. SKIN Normal coloration, warm, dry. NEURO Cranial nerves appear grossly intact, normal speech, no lateralizing weakness. PSYCH Awake, alert, oriented x 4. Affect appropriate. INTAKE: In: 676.3 [I.V.:676.3]  Out: -   OUTPUT: In: 676.3   Out: -     LABS  Recent Labs     01/12/20  1305 01/13/20  0412   WBC 4.7 4.4   HGB 10.9* 9.0*   HCT 36.1* 30.4*    194      Recent Labs     01/12/20  1305 01/13/20  0412    145   K 3.5 3.6    107   CO2 27 28   BUN 19 16   CREATININE 1.0 1.1     Recent Labs     01/12/20  1305 01/13/20  0412   AST 18 16   ALT 10 8*   BILITOT 0.5 0.4   ALKPHOS 148* 112     No results for input(s): INR in the last 72 hours. No results for input(s): CKTOTAL, CKMB, CKMBINDEX, TROPONINT in the last 72 hours. Abnormal labs for today noted      Imaging:  CT and US ab- noted    ECHO:    Microbiology:  Blood culture:    Urine culture:    Sputum culture:    Procedures done this admission:    MEDS  Scheduled Meds:   sodium chloride flush  10 mL Intravenous 2 times per day    famotidine (PEPCID) injection  20 mg Intravenous BID    diltiazem  120 mg Oral Daily    apixaban  2.5 mg Oral BID    sertraline  50 mg Oral Daily    tamsulosin  0.4 mg Oral Daily     Continuous Infusions:  PRN Meds:sodium chloride flush, magnesium hydroxide, ondansetron, acetaminophen, promethazine, potassium chloride **OR** potassium alternative oral replacement **OR** potassium chloride, magnesium sulfate        ASSESSMENT and PLAN  Hospital Day: 2    1-Probable gastroenteritis- symptomatic with N/V - improved.  CT non-acute but with cholelithiasis- also has recent abdominal wall seroma for which still has drain in place- advance diet as tolerated and consult surgery for input.  -stop IVF given hx of chronic systolic HF    Other

## 2020-02-16 NOTE — CONSULTS
Cardiolology  Inpatient Consult      Patient: Logan Singleton               Sex: male          DOA: 2/16/2020       YOB: 1940      Age:  70 y.o.        LOS:  LOS: 0 days      Logan Singleton is a 70 y.o. male admitted for Olecranon fracture [S52.023A]  LEANDRA (acute kidney injury) ([facility] [G61.0]  Metabolic encephalopathy [O43.61]  Cellulitis [L03.90]     Recommendations:  · Surgery is judged not to be at extraordinary risk from the cardiac standpoint though risk is increased. Would proceed with surgery as indicated. Impression:  · Fracture olecranon  · Reduced systolic LV function presumably secondary to cardiomyopathy  · History of multiple myeloma so amyloid cardiomyopathy is possible  · Other problems as enumerated below    Patient Active Problem List    Diagnosis Date Noted    Metabolic encephalopathy 34/51/3011    Cellulitis 02/16/2020    LEANDRA (acute kidney injury) ([facility] 02/16/2020    Olecranon fracture 02/16/2020    Multiple myeloma ([facility] 02/16/2020    Hypertension 27/26/1991    Systolic CHF, chronic ([address] 02/14/2020    Chest pain 02/12/2020    Lower extremity edema 02/12/2020    Type 2 diabetes mellitus ([address] 02/12/2020    Open wound of right index finger without damage to nail 01/09/2019      Donnell Ruiz MD  Past Medical History:   Diagnosis Date    Cancer ([facility]     Diabetes ([facility]     FH: chemotherapy     H/O stem cell transplant ([facility]     Hypertension     Multiple myeloma ([facility]     Neuropathy       Past Surgical History:   Procedure Laterality Date    HX CHOLECYSTECTOMY      HX KYPHOPLASTY       Allergies   Allergen Reactions    Iodine Hives      History reviewed. No pertinent family history.    Current Facility-Administered Medications   Medication Dose Route Frequency    acetaminophen (TYLENOL) tablet 650 mg  650 mg Oral Q4H PRN    naloxone (NARCAN) injection 0.4 mg  0.4 mg IntraVENous PRN    diphenhydrAMINE (BENADRYL) injection 12.5 mg  12.5 mg IntraVENous Q4H PRN    ondansetron (ZOFRAN) injection 4 mg  4 mg IntraVENous Q4H PRN    docusate sodium (COLACE) capsule 100 mg  100 mg Oral BID    heparin (porcine) injection 5,000 Units  5,000 Units SubCUTAneous Q8H    [START ON 2/17/2020] isosorbide mononitrate ER (IMDUR) tablet 30 mg  30 mg Oral DAILY    [START ON 2/17/2020] aspirin chewable tablet 81 mg  81 mg Oral DAILY    [START ON 2/17/2020] metoprolol succinate (TOPROL-XL) XL tablet 25 mg  25 mg Oral DAILY    0.9% sodium chloride infusion  50 mL/hr IntraVENous CONTINUOUS    oxyCODONE-acetaminophen (PERCOCET) 5-325 mg per tablet 1 Tab  1 Tab Oral Q4H PRN    insulin glargine (LANTUS) injection 5 Units  5 Units SubCUTAneous QHS    insulin lispro (HUMALOG) injection   SubCUTAneous AC&HS    glucose chewable tablet 16 g  4 Tab Oral PRN    glucagon (GLUCAGEN) injection 1 mg  1 mg IntraMUSCular PRN         Review of Symptoms:    Review of Systems  Gen: No fever, chills, malaise, weight loss/gain. Heent: No headache, rhinorrhea, epistaxis, ear pain, hearing loss, sinus pain, neck pain/stiffness, sore throat. Heart: No chest pain, palpitations, CHARLES, pnd, or orthopnea. Resp: No cough, hemoptysis, wheezing and shortness of breath. GI: No nausea, vomiting, diarrhea, constipation, melena or hematochezia. : No urinary obstruction, dysuria or hematuria. Derm: No rash, new skin lesion or pruritis. Musc/skeletal: pan in rt elbow, and swelling, left thumb contracted-not new  Vasc: No edema, cyanosis or claudication. Endo: No heat/cold intolerance, no polyuria,polydipsia or polyphagia. Neuro: No unilateral weakness, numbness, tingling. No seizures. +dizziness  Heme: No easy bruising or bleeding. Subjective:    Hyacinth Simental is a 70 y.o. male with PMHX of diabetes, hypertension, CHF, recent d/c home due chest pain came to ER due to weakness and fell. Patient reported that he had some dizziness with slurred speech on Saturday and resolved.  He had kyphoplasty in Jan, 2014, he is received physical therapist at home. He was d/c on Feb 14,2019 due to chest pain. He refused to have card cath and was dc home with medical treatment. When he stood up from a chair, he fell at home after arrived home. Did not lost his consciousness after the fall, but he felt pain in the elbow. MRI brain in ER which is negative for stroke and he was cleared to be d/c per Dr. Nick Gunter. However,   X-ray of his elbow indicated: displayed Olecrona  fracture . Dr. Gerald Solano, orthopedics was called recommended to admit to medicinal team for medical clearance. Patient has recent hospitalization for CHF exacerbation. He denies any shortness of breath and chest pain during her interview. He had a stress test done last week which qas abnormal but negative for ischemia changes. He also was found to habe creatinine elevated at this point. Recent echo showed LV dysfunction with an EF of 35-40%. He has not had angina or current heart failure symptoms. His BNP is not elevated. .  Cardiac risk factors: extant.       Physical Exam    Visit Vitals  /74 (BP 1 Location: Left arm)   Pulse 70   Temp 98.1 °F (36.7 °C)   Resp 18   Wt 137.3 kg (302 lb 9.6 oz)   SpO2 100%   BMI 44.69 kg/m²                                       Cardiographics    Telemetry: normal sinus rhythm  ECG: LVH  Echocardiogram: done recently, see report    Recent radiology, intake/output and wt reviewed    Labs:   Recent Results (from the past 48 hour(s))   CBC WITH AUTOMATED DIFF    Collection Time: 02/16/20  6:12 AM   Result Value Ref Range    WBC 12.4 4.6 - 13.2 K/uL    RBC 4.20 (L) 4.70 - 5.50 M/uL    HGB 12.3 (L) 13.0 - 16.0 g/dL    HCT 36.3 36.0 - 48.0 %    MCV 86.4 74.0 - 97.0 FL    MCH 29.3 24.0 - 34.0 PG    MCHC 33.9 31.0 - 37.0 g/dL    RDW 15.1 (H) 11.6 - 14.5 %    PLATELET 686 135 - 947 K/uL    MPV 11.5 9.2 - 11.8 FL    NEUTROPHILS 62 40 - 73 %    LYMPHOCYTES 24 21 - 52 %    MONOCYTES 13 (H) 3 - 10 % EOSINOPHILS 1 0 - 5 %    BASOPHILS 0 0 - 2 %    ABS. NEUTROPHILS 7.8 1.8 - 8.0 K/UL    ABS. LYMPHOCYTES 2.9 0.9 - 3.6 K/UL    ABS. MONOCYTES 1.6 (H) 0.05 - 1.2 K/UL    ABS. EOSINOPHILS 0.1 0.0 - 0.4 K/UL    ABS. BASOPHILS 0.0 0.0 - 0.1 K/UL    DF AUTOMATED     CARDIAC PANEL,(CK, CKMB & TROPONIN)    Collection Time: 02/16/20  6:12 AM   Result Value Ref Range     39 - 308 U/L    CK - MB 3.3 <3.6 ng/ml    CK-MB Index 2.2 0.0 - 4.0 %    Troponin-I, QT <0.02 0.0 - 6.508 NG/ML   METABOLIC PANEL, COMPREHENSIVE    Collection Time: 02/16/20  6:12 AM   Result Value Ref Range    Sodium 137 136 - 145 mmol/L    Potassium 3.7 3.5 - 5.5 mmol/L    Chloride 103 100 - 111 mmol/L    CO2 25 21 - 32 mmol/L    Anion gap 9 3.0 - 18 mmol/L    Glucose 163 (H) 74 - 99 mg/dL    BUN 44 (H) 7.0 - 18 MG/DL    Creatinine 1.82 (H) 0.6 - 1.3 MG/DL    BUN/Creatinine ratio 24 (H) 12 - 20      GFR est AA 44 (L) >60 ml/min/1.73m2    GFR est non-AA 36 (L) >60 ml/min/1.73m2    Calcium 9.1 8.5 - 10.1 MG/DL    Bilirubin, total 0.6 0.2 - 1.0 MG/DL    ALT (SGPT) 19 16 - 61 U/L    AST (SGOT) 15 10 - 38 U/L    Alk.  phosphatase 49 45 - 117 U/L    Protein, total 6.9 6.4 - 8.2 g/dL    Albumin 3.4 3.4 - 5.0 g/dL    Globulin 3.5 2.0 - 4.0 g/dL    A-G Ratio 1.0 0.8 - 1.7     MAGNESIUM    Collection Time: 02/16/20  6:12 AM   Result Value Ref Range    Magnesium 1.6 1.6 - 2.6 mg/dL   NT-PRO BNP    Collection Time: 02/16/20  6:12 AM   Result Value Ref Range    NT pro-BNP 36 0 - 1,800 PG/ML   EKG, 12 LEAD, INITIAL    Collection Time: 02/16/20  6:15 AM   Result Value Ref Range    Ventricular Rate 84 BPM    Atrial Rate 84 BPM    P-R Interval 196 ms    QRS Duration 114 ms    Q-T Interval 370 ms    QTC Calculation (Bezet) 437 ms    Calculated P Axis 67 degrees    Calculated R Axis -14 degrees    Calculated T Axis 131 degrees    Diagnosis       Normal sinus rhythm  Left ventricular hypertrophy with repolarization abnormality  Cannot rule out Septal infarct (cited on or before 12-FEB-2020)  Abnormal ECG  When compared with ECG of 12-FEB-2020 13:30,  premature ventricular complexes are no longer present  Serial changes of Septal infarct present  Confirmed by Talia Amezquita MD, -- ([id number]) on 2/16/2020 8:59:11 AM     URINALYSIS W/ RFLX MICROSCOPIC    Collection Time: 02/16/20 10:40 AM   Result Value Ref Range    Color YELLOW      Appearance CLEAR      Specific gravity 1.017 1.005 - 1.030      pH (UA) 5.0 5.0 - 8.0      Protein NEGATIVE  NEG mg/dL    Glucose NEGATIVE  NEG mg/dL    Ketone NEGATIVE  NEG mg/dL    Bilirubin NEGATIVE  NEG      Blood NEGATIVE  NEG      Urobilinogen 0.2 0.2 - 1.0 EU/dL    Nitrites NEGATIVE  NEG      Leukocyte Esterase NEGATIVE  NEG     PTT    Collection Time: 02/16/20  4:45 PM   Result Value Ref Range    aPTT 21.2 (L) 23.0 - 36.4 SEC   PROTHROMBIN TIME + INR    Collection Time: 02/16/20  4:45 PM   Result Value Ref Range    Prothrombin time 14.2 11.5 - 15.2 sec    INR 1.1 0.8 - 1.2             Anthony Hlae MD

## 2020-02-16 NOTE — H&P
History & Physical    Patient: Fartun Veronica MRN: [id number]  CSN: [id number]    YOB: 1940  Age: 70 y.o. Sex: male      DOA: 2/16/2020  Primary Care Provider:  Christiano Márquez MD      Assessment/Plan     Hospital Problems  Never Reviewed          Codes Class Noted POA    Metabolic encephalopathy JFM-39-IS: G93.41  ICD-9-CM: 348.31  2/16/2020 Unknown        LEANDRA (acute kidney injury) ([facility] ICD-10-CM: N17.9  ICD-9-CM: 584.9  2/16/2020 Unknown        Olecranon fracture ICD-10-CM: Q85.074B  ICD-9-CM: 813.01  2/16/2020 Unknown        Multiple myeloma ([facility] ICD-10-CM: C90.00  ICD-9-CM: 203.00  2/16/2020 Unknown        Hypertension ICD-10-CM: I10  ICD-9-CM: 401.9  2/16/2020 Unknown        Type 2 diabetes mellitus ([facility] ICD-10-CM: E11.9  ICD-9-CM: 250.00  2/12/2020 Yes              Admit to remote tele     Metabolic encephalopathy   Resolved, mri no acute stroke       Olecranon fracture   Continue pain control  Immobilized per er  Pt stated, elbow is swelling after the fall   No broken  Dr. Chana Doss postpone the surgery, to make sure optimize medical condition   Case discussed with Dr. Robert Mehta for cardiac clearance   he f/u with dr. Rodriguez Covert  -working on medical treatment -he refused to have cath last admission. DM type II , with complication,  -hold po meds   -on lantus, ssi, diabetic diet , hypoglycemia protocol       HTN, accelerated  Continue home medication. LEANDRA  Low ns infusion overnight     Mm   F/u oncologist as out-pt. Estimate  length of stay : 2-3 day    DVT : heparin ppi proph  CC: fall       HPI:     Fartun Veronica is a 70 y.o. male with PMHX of diabetes, hypertension, CHF, recent d/c home due chest pain came to ER due to weakness and fell. Patient reported that he had some dizziness with slurred speech on Saturday and resolved. He had kyphoplasty in Jan, 2014, he is received physical therapist at home. He was d/c on Feb 14,2019 due to chest pain.  He refused to have card cath and was dc home with medical treatment. When he stood up from a chair, he fell at home after arrived home. Did not lost his consciousness after the fall, but he felt pain in the elbow. MRI brain in ER which is negative for stroke and he was cleared to be d/c per Dr. Abigail Mcclendon. However,   X-ray of his elbow indicated: displayed Olecrona  fracture . Dr. Arya Francis, orthopedics was called recommended to admit to medicinal team for medical clearance. Patient has recent hospitalization for CHF exacerbation. He denies any shortness of breath and chest pain during her interview. He had a stress test done last week which qas abnormal but negative for ischemia changes. He also was found creatinine elevated at this point. He denies any chest pain/sob now.    Admission and treatment discussed with patient and family, all agree and indicated a verbal understanding   Visit Vitals  /74 (BP 1 Location: Left arm)   Pulse 70   Temp 98.1 °F (36.7 °C)   Resp 18   Wt 137.3 kg (302 lb 9.6 oz)   SpO2 100%   BMI 44.69 kg/m²      O2 Device: Room air      Past Medical History:   Diagnosis Date    [facility]     Diabetes ([facility]     FH: chemotherapy     H/O stem cell transplant ([facility]     Hypertension     Multiple myeloma ([facility]     Neuropathy        Past Surgical History:   Procedure Laterality Date    HX CHOLECYSTECTOMY      HX KYPHOPLASTY       Family History    Family Member Diagnosis Age At Onset     Family history of Cardiovascular disease       Family history of Diabetes mellitus type 2          Social History     Socioeconomic History    Marital status:      Spouse name: Not on file    Number of children: Not on file    Years of education: Not on file    Highest education level: Not on file   Tobacco Use    Smoking status: Former Smoker    Smokeless tobacco: Never Used   Substance and Sexual Activity    Alcohol use: Never     Frequency: Never     Comment: rare    Drug use: No       Prior to Admission medications    Medication Sig Start Date End Date Taking? Authorizing Provider   lisinopril (PRINIVIL, ZESTRIL) 5 mg tablet Take 1 Tab by mouth daily. 2/14/20   Florin Blum MD   furosemide (LASIX) 40 mg tablet Take 1 Tab by mouth daily. 2/14/20   Alyssia Avendano MD   lisinopril (PRINIVIL, ZESTRIL) 5 mg tablet Take 1 Tab by mouth daily. 2/15/20   Alyssia Avendano MD   isosorbide mononitrate ER (IMDUR) 30 mg tablet Take 1 Tab by mouth daily. 2/15/20   Alyssia Avendano MD   metoprolol succinate (TOPROL-XL) 25 mg XL tablet Take 1 Tab by mouth daily. 2/15/20   Alyssia Avendano MD   pravastatin (PRAVACHOL) 40 mg tablet Take 1 Tab by mouth nightly. 2/14/20   Alyssia Avendano MD   aspirin 81 mg chewable tablet Take 81 mg by mouth daily. Annie Vazquez MD   metFORMIN (GLUCOPHAGE) 1,000 mg tablet Take 1,000 mg by mouth two (2) times daily (with meals). Annie Vazquez MD   gabapentin (NEURONTIN) 400 mg capsule Take 800 mg by mouth two (2) times a day. Annie Vazquez MD   SITagliptin (JANUVIA) 100 mg tablet Take 100 mg by mouth daily. Annie Vazquez MD   vit B Cmplx 3-FA-Vit C-Biotin (NEPHRO LILIANA RX) 1- mg-mg-mcg tablet Take 1 Tab by mouth daily. Annie Vazquez MD   calcium-cholecalciferol, d3, (CALCIUM 600 + D) 600-125 mg-unit tab Take  by mouth daily. Annie Vazquez MD   pioglitazone (ACTOS) 30 mg tablet Take 30 mg by mouth daily. Annie Vazquez MD       Allergies   Allergen Reactions    Iodine Hives       Review of Systems  Gen: No fever, chills, malaise, weight loss/gain. Heent: No headache, rhinorrhea, epistaxis, ear pain, hearing loss, sinus pain, neck pain/stiffness, sore throat. Heart: No chest pain, palpitations, CHARLES, pnd, or orthopnea. Resp: No cough, hemoptysis, wheezing and shortness of breath. GI: No nausea, vomiting, diarrhea, constipation, melena or hematochezia. : No urinary obstruction, dysuria or hematuria. Derm: No rash, new skin lesion or pruritis.    Musc/skeletal: pan in rt elbow, and swelling, left thumb contracted-not new  Vasc: No edema, cyanosis or claudication. Endo: No heat/cold intolerance, no polyuria,polydipsia or polyphagia. Neuro: No unilateral weakness, numbness, tingling. No seizures. +dizziness  Heme: No easy bruising or bleeding. Physical Exam:     Physical Exam:  Visit Vitals  /74 (BP 1 Location: Left arm)   Pulse 70   Temp 98.1 °F (36.7 °C)   Resp 18   Wt 137.3 kg (302 lb 9.6 oz)   SpO2 100%   BMI 44.69 kg/m²      O2 Device: Room air    Temp (24hrs), Av.2 °F (36.8 °C), Min:97.9 °F (36.6 °C), Max:98.7 °F (37.1 °C)    701 -  1900  In: 500 [I.V.:500]  Out: -    No intake/output data recorded. General:  Awake, cooperative, no distress. Head:  Normocephalic, without obvious abnormality, atraumatic. Eyes:  Conjunctivae/corneas clear, sclera anicteric, PERRL, EOMs intact. Nose: Nares normal. No drainage or sinus tenderness. Throat: Lips, mucosa, and tongue normal. .   Neck: Supple, symmetrical, trachea midline, no adenopathy. Lungs:   Clear to auscultation bilaterally. Heart:  Regular rate and rhythm, S1, S2 normal, + murmur, click, rub or gallop. Abdomen: Soft, non-tender. Bowel sounds normal. No masses,  No organomegaly. Extremities: Extremities normal,rt arm immobilized , sling noted , left thumb contracted   Pulses: 2+ and symmetric all extremities. Skin: Skin color-pink, texture, turgor normal. No rashes or lesions. Capillary refill normal    Neurologic: CNII-XII intact. No focal motor or sensory deficit.        Labs Reviewed:    BMP:   Lab Results   Component Value Date/Time     2020 06:12 AM    K 3.7 2020 06:12 AM     2020 06:12 AM    CO2 25 2020 06:12 AM    AGAP 9 2020 06:12 AM     (H) 2020 06:12 AM    BUN 44 (H) 2020 06:12 AM    CREA 1.82 (H) 2020 06:12 AM    GFRAA 44 (L) 2020 06:12 AM    GFRNA 36 (L) 2020 06:12 AM     CMP:   Lab Results   Component Value Date/Time     02/16/2020 06:12 AM    K 3.7 02/16/2020 06:12 AM     02/16/2020 06:12 AM    CO2 25 02/16/2020 06:12 AM    AGAP 9 02/16/2020 06:12 AM     (H) 02/16/2020 06:12 AM    BUN 44 (H) 02/16/2020 06:12 AM    CREA 1.82 (H) 02/16/2020 06:12 AM    GFRAA 44 (L) 02/16/2020 06:12 AM    GFRNA 36 (L) 02/16/2020 06:12 AM    CA 9.1 02/16/2020 06:12 AM    MG 1.6 02/16/2020 06:12 AM    ALB 3.4 02/16/2020 06:12 AM    TP 6.9 02/16/2020 06:12 AM    GLOB 3.5 02/16/2020 06:12 AM    AGRAT 1.0 02/16/2020 06:12 AM    SGOT 15 02/16/2020 06:12 AM    ALT 19 02/16/2020 06:12 AM     CBC:   Lab Results   Component Value Date/Time    WBC 12.4 02/16/2020 06:12 AM    HGB 12.3 (L) 02/16/2020 06:12 AM    HCT 36.3 02/16/2020 06:12 AM     02/16/2020 06:12 AM     All Cardiac Markers in the last 24 hours:   Lab Results   Component Value Date/Time     02/16/2020 06:12 AM    CKMB 3.3 02/16/2020 06:12 AM    CKND1 2.2 02/16/2020 06:12 AM    TROIQ <0.02 02/16/2020 06:12 AM     Recent Glucose Results:   Lab Results   Component Value Date/Time     (H) 02/16/2020 06:12 AM     ABG: No results found for: PH, PHI, PCO2, PCO2I, PO2, PO2I, HCO3, HCO3I, FIO2, FIO2I  COAGS: No results found for: APTT, PTP, INR, INREXT, INREXT  Liver Panel:   Lab Results   Component Value Date/Time    ALB 3.4 02/16/2020 06:12 AM    TP 6.9 02/16/2020 06:12 AM    GLOB 3.5 02/16/2020 06:12 AM    AGRAT 1.0 02/16/2020 06:12 AM    SGOT 15 02/16/2020 06:12 AM    ALT 19 02/16/2020 06:12 AM    AP 49 02/16/2020 06:12 AM     Pancreatic Markers: No results found for: AMYLPOCT, AML, LIPPOCT, LPSE    Procedures/imaging: see electronic medical records for all procedures/Xrays and details which were not copied into this note but were reviewed prior to creation of Haroon Lockhart MD, Internal Medicine     CC: Sukhdeep Curtis MD

## 2020-02-16 NOTE — PROGRESS NOTES
Radiology  Note      Preliminary report for MR Brain: No acute CVA or other acute changes.     Joyce Steele MD  [address] [id number]  Vascular & Interventional Radiology  2/16/2020

## 2020-02-16 NOTE — ED PROVIDER NOTES
EMERGENCY DEPARTMENT HISTORY AND PHYSICAL EXAM    Date: 2/16/2020  Patient Name: Ira Zepeda    History of Presenting Illness     Chief Complaint   Patient presents with    Extremity Weakness    Dizziness         History Provided By: Patient and EMS    Additional History (Context):   6:10 AM   Ira Zepeda is a 70 y.o. male with PMHX of DM, HTN, who presents to the emergency department via EMS C/O extremity weakness x 2/14/2020. Pt has associated sxs of slurred speech, dizziness and chest pain. EMS gave pt Aspirin en route for CP and noted a RACE assessment of 0. Per EMS, pt had kyphoplasty last week performed at this facility and has not \"felt right\" since being discharged on 2/14/2020 and experienced a fall on his R arm as well. Pt's arm is swollen and warm. Pt also notes blurriness. The pt's slurred speech and dizziness developed Saturday evening. He was last seen normal on Friday. Pt denies loss of vision and headache. Reports drug allergy to Iodine. Social History  No tobacco use. Social ETOH use; wine on 1st Sundays for navigayaion. No illicit drug use. Family History  No pertinent history    PCP: Romana Nieves MD    Current Facility-Administered Medications   Medication Dose Route Frequency Provider Last Rate Last Dose    0.9% sodium chloride infusion  175 mL/hr IntraVENous CONTINUOUS Sergey Mendez  mL/hr at 02/16/20 0817 175 mL/hr at 02/16/20 0817     Current Outpatient Medications   Medication Sig Dispense Refill    lisinopril (PRINIVIL, ZESTRIL) 5 mg tablet Take 1 Tab by mouth daily. 30 Tab 0    furosemide (LASIX) 40 mg tablet Take 1 Tab by mouth daily. 30 Tab 0    lisinopril (PRINIVIL, ZESTRIL) 5 mg tablet Take 1 Tab by mouth daily. 30 Tab 0    isosorbide mononitrate ER (IMDUR) 30 mg tablet Take 1 Tab by mouth daily. 30 Tab 0    metoprolol succinate (TOPROL-XL) 25 mg XL tablet Take 1 Tab by mouth daily.  30 Tab 0    pravastatin (PRAVACHOL) 40 mg tablet Take 1 Tab by mouth nightly. 30 Tab 0    aspirin 81 mg chewable tablet Take 81 mg by mouth daily.  metFORMIN (GLUCOPHAGE) 1,000 mg tablet Take 1,000 mg by mouth two (2) times daily (with meals).  gabapentin (NEURONTIN) 400 mg capsule Take 800 mg by mouth two (2) times a day.  SITagliptin (JANUVIA) 100 mg tablet Take 100 mg by mouth daily.  vit B Cmplx 3-FA-Vit C-Biotin (NEPHRO LILIANA RX) 1- mg-mg-mcg tablet Take 1 Tab by mouth daily.  calcium-cholecalciferol, d3, (CALCIUM 600 + D) 600-125 mg-unit tab Take  by mouth daily.  pioglitazone (ACTOS) 30 mg tablet Take 30 mg by mouth daily. Past History     Past Medical History:  Past Medical History:   Diagnosis Date    [facility] ([facility]     Diabetes ([facility]     FH: chemotherapy     [facility]     Hypertension     [facility] myeloma [facility]     Neuropathy        Past Surgical History:  Past Surgical History:   Procedure Laterality Date    HX CHOLECYSTECTOMY      HX KYPHOPLASTY         Family History:  History reviewed. No pertinent family history. Social History:  Social History     Tobacco Use    Smoking status: Former Smoker    Smokeless tobacco: Never Used   Substance Use Topics    Alcohol use: Never     Frequency: Never     Comment: rare    Drug use: No       Allergies: Allergies   Allergen Reactions    Iodine Hives         Review of Systems   Review of Systems   Eyes: Negative for visual disturbance (loss of vision). Cardiovascular: Positive for chest pain. Musculoskeletal:        (+) R arm swelling   Skin: Positive for color change (R arm erythema). Neurological: Positive for dizziness, speech difficulty (slurred speech) and weakness. Negative for headaches.      Physical Exam     Vitals:    02/16/20 0800 02/16/20 0911 02/16/20 0930 02/16/20 1000   BP: 141/65 131/65 141/66 152/69   Pulse: 69 68 67 71   Resp: 17 15 18 18   Temp:       SpO2: 96% 96% 98% 98%   Weight:         Physical Exam  Vitals signs and nursing note reviewed. Constitutional:       General: He is not in acute distress. Appearance: He is well-developed. He is not diaphoretic. HENT:      Head: Normocephalic and atraumatic. Right Ear: External ear normal.      Left Ear: External ear normal.      Mouth/Throat:      Pharynx: No oropharyngeal exudate. Eyes:      General: No scleral icterus. Conjunctiva/sclera: Conjunctivae normal.      Pupils: Pupils are equal, round, and reactive to light. Comments: No pallor   Neck:      Musculoskeletal: Normal range of motion and neck supple. Thyroid: No thyromegaly. Vascular: No JVD. Trachea: No tracheal deviation. Cardiovascular:      Rate and Rhythm: Normal rate and regular rhythm. Heart sounds: Normal heart sounds. Pulmonary:      Effort: Pulmonary effort is normal. No respiratory distress. Breath sounds: Normal breath sounds. No stridor. Abdominal:      General: Bowel sounds are normal. There is no distension. Palpations: Abdomen is soft. Tenderness: There is no abdominal tenderness. There is no guarding or rebound. Musculoskeletal: Normal range of motion. General: No tenderness. Comments: No soft tissue injuries   Lymphadenopathy:      Cervical: No cervical adenopathy. Skin:     General: Skin is warm and dry. Findings: No erythema or rash. Neurological:      Mental Status: He is alert and oriented to person, place, and time. Cranial Nerves: No cranial nerve deficit. Coordination: Coordination normal.      Deep Tendon Reflexes: Reflexes are normal and symmetric. Reflexes normal.   Psychiatric:         Behavior: Behavior normal.         Thought Content:  Thought content normal.         Judgment: Judgment normal.           Diagnostic Study Results     Labs -     Recent Results (from the past 12 hour(s))   CBC WITH AUTOMATED DIFF    Collection Time: 02/16/20  6:12 AM   Result Value Ref Range    WBC 12.4 4.6 - 13.2 K/uL    RBC 4.20 (L) 4.70 - 5.50 M/uL    HGB 12.3 (L) 13.0 - 16.0 g/dL    HCT 36.3 36.0 - 48.0 %    MCV 86.4 74.0 - 97.0 FL    MCH 29.3 24.0 - 34.0 PG    MCHC 33.9 31.0 - 37.0 g/dL    RDW 15.1 (H) 11.6 - 14.5 %    PLATELET 905 318 - 002 K/uL    MPV 11.5 9.2 - 11.8 FL    NEUTROPHILS 62 40 - 73 %    LYMPHOCYTES 24 21 - 52 %    MONOCYTES 13 (H) 3 - 10 %    EOSINOPHILS 1 0 - 5 %    BASOPHILS 0 0 - 2 %    ABS. NEUTROPHILS 7.8 1.8 - 8.0 K/UL    ABS. LYMPHOCYTES 2.9 0.9 - 3.6 K/UL    ABS. MONOCYTES 1.6 (H) 0.05 - 1.2 K/UL    ABS. EOSINOPHILS 0.1 0.0 - 0.4 K/UL    ABS. BASOPHILS 0.0 0.0 - 0.1 K/UL    DF AUTOMATED     CARDIAC PANEL,(CK, CKMB & TROPONIN)    Collection Time: 02/16/20  6:12 AM   Result Value Ref Range     39 - 308 U/L    CK - MB 3.3 <3.6 ng/ml    CK-MB Index 2.2 0.0 - 4.0 %    Troponin-I, QT <0.02 0.0 - 0.454 NG/ML   METABOLIC PANEL, COMPREHENSIVE    Collection Time: 02/16/20  6:12 AM   Result Value Ref Range    Sodium 137 136 - 145 mmol/L    Potassium 3.7 3.5 - 5.5 mmol/L    Chloride 103 100 - 111 mmol/L    CO2 25 21 - 32 mmol/L    Anion gap 9 3.0 - 18 mmol/L    Glucose 163 (H) 74 - 99 mg/dL    BUN 44 (H) 7.0 - 18 MG/DL    Creatinine 1.82 (H) 0.6 - 1.3 MG/DL    BUN/Creatinine ratio 24 (H) 12 - 20      GFR est AA 44 (L) >60 ml/min/1.73m2    GFR est non-AA 36 (L) >60 ml/min/1.73m2    Calcium 9.1 8.5 - 10.1 MG/DL    Bilirubin, total 0.6 0.2 - 1.0 MG/DL    ALT (SGPT) 19 16 - 61 U/L    AST (SGOT) 15 10 - 38 U/L    Alk.  phosphatase 49 45 - 117 U/L    Protein, total 6.9 6.4 - 8.2 g/dL    Albumin 3.4 3.4 - 5.0 g/dL    Globulin 3.5 2.0 - 4.0 g/dL    A-G Ratio 1.0 0.8 - 1.7     MAGNESIUM    Collection Time: 02/16/20  6:12 AM   Result Value Ref Range    Magnesium 1.6 1.6 - 2.6 mg/dL   NT-PRO BNP    Collection Time: 02/16/20  6:12 AM   Result Value Ref Range    NT pro-BNP 36 0 - 1,800 PG/ML   EKG, 12 LEAD, INITIAL    Collection Time: 02/16/20  6:15 AM   Result Value Ref Range    Ventricular Rate 84 BPM    Atrial Rate 84 BPM P-R Interval 196 ms    QRS Duration 114 ms    Q-T Interval 370 ms    QTC Calculation (Bezet) 437 ms    Calculated P Axis 67 degrees    Calculated R Axis -14 degrees    Calculated T Axis 131 degrees    Diagnosis       Normal sinus rhythm  Left ventricular hypertrophy with repolarization abnormality  Cannot rule out Septal infarct (cited on or before 12-FEB-2020)  Abnormal ECG  When compared with ECG of 12-FEB-2020 13:30,  premature ventricular complexes are no longer present  Serial changes of Septal infarct present  Confirmed by Nir Ashraf MD, -- ([id number]) on 2/16/2020 8:59:11 AM         Radiologic Studies -  XR CHEST PORT   Final Result   IMPRESSION:      No active cardiopulmonary disease. CT HEAD WO CONT   Final Result   IMPRESSION:      Negative CT scan of the head without intracranial hemorrhage, infarct or mass   effect. CT Results  (Last 48 hours)               02/16/20 0650  CT HEAD WO CONT Final result    Impression:  IMPRESSION:       Negative CT scan of the head without intracranial hemorrhage, infarct or mass   effect. Narrative:  EXAM: CT head       INDICATION: Multiple falls since last week. Slurred speech. COMPARISON: 7/10/2017       TECHNIQUE: Axial scanning was performed through the head without intravenous   contrast.  Sagittal and coronal reconstructions were created from the axial   data. One or more dose reduction techniques were used on this CT: automated   exposure control, adjustment of the mAs and/or kVp according to patient size,   and iterative reconstruction techniques. The specific techniques used on this   CT exam have been documented in the patient's electronic medical record. Digital Imaging and Communications in Medicine (DICOM) format image data are   available to nonaffiliated external healthcare facilities or entities on a   secure, media free, reciprocally searchable basis with patient authorization for   at least a 12-month period after this study. _______________       FINDINGS:       BRAIN AND POSTERIOR FOSSA: Ventricles, cisterns and sulci are within normal   range. No intracranial hemorrhage, mass effect, or midline shift. No areas of   abnormal parenchymal attenuation. EXTRA-AXIAL SPACES AND MENINGES: No extracerebral collections. CALVARIUM: Intact. SINUSES: Visualized portions are clear. OTHER: None. No change.       _______________               CXR Results  (Last 48 hours)               02/16/20 0708  XR CHEST PORT Final result    Impression:  IMPRESSION:       No active cardiopulmonary disease. Narrative:  EXAM: CHEST RADIOGRAPH, SINGLE VIEW       CLINICAL INDICATION/HISTORY: stroke eval        <Additional:  Multiple falls since last week. Slurred speech. COMPARISON: 2/12/2020       TECHNIQUE: Portable frontal view of the chest was obtained.        _______________       FINDINGS:       SUPPORT DEVICES: Right jugular Mediport catheter is present, unchanged, with its   tip projecting at the SVC. HEART AND MEDIASTINUM: Cardiac silhouette is within normal range in size. Thoracic aorta is mildly tortuous. LUNGS AND PLEURAL SPACES: Pulmonary vessels are normal.  Lungs are clear. Costophrenic angles are sharp. No pneumothorax. BONY THORAX AND SOFT TISSUES: No acute osseous abnormality. No acute change.       _______________                 Medications given in the ED-  Medications   0.9% sodium chloride infusion (175 mL/hr IntraVENous New Bag 2/16/20 0817)   sodium chloride 0.9 % bolus infusion 500 mL (0 mL IntraVENous IV Completed 2/16/20 0817)   clindamycin (CLEOCIN) 900mg NS 50mL IVPB (premix) (900 mg IntraVENous Given 2/16/20 0743)         Medical Decision Making   I am the first provider for this patient. I reviewed the vital signs, available nursing notes, past medical history, past surgical history, family history and social history.     Vital Signs-Reviewed the patient's vital signs. Pulse Oximetry Analysis - 93% on RA     Records Reviewed: NURSING NOTES AND PREVIOUS MEDICAL RECORDS    Provider Notes (Medical Decision Making):     Procedures:  Procedures    ED Course:   6:58 AM: Initial assessment performed. The patients presenting problems have been discussed, and they are in agreement with the care plan formulated and outlined with them. I have encouraged them to ask questions as they arise throughout their visit. 7:19 AM Discussed patient's history, exam, and available diagnostics results with Sylvester Hogan MD, neurology, who states to obtain a MRI and hydrate the pt. If the MRI is positive for stroke the pt will be admitted but if the MRI is negative for stroke the pt will be dispo'd home. 7:27 AM  Patient's presentation, labs/imaging and plan of care was reviewed with Andie Claros MD as part of sign out. They will review the pt's MRI and dispo as part of the plan discussed with the patient. Andie Claros MD's assistance in completion of this plan is greatly appreciated but it should be noted that I will be the provider of record for this patient. Written by Jt Martin ED Scribe, as dictated by Sara Estrella MD.     9:35 AM discussed with Radiology on call, no acute infarction on MRI. 9:47 AM discussed with Dr. Jaime Fraser, no acute infarction seen on MRI, pt may be discharged from the ED.    9:58 AM  RADIOLOGY FINDINGS  Right Elbow X-ray shows displaced olecranon fracture. Pending review by Radiologist  Recorded by Andie Claros MD.    10:19 AM Discussed with Dr. Jaime Fraser who understands that patient needs medical admission, he will sign off. 10:44 AM Discussed with justo Almonte on call who requests medical admission and pre-op clearance. He can do the operation tonight if pt is medically cleared.      10:53 AM  Discussed with Dr. Robbie Cummings for admission, she requests cardiology consult for clearance fiven recent CHF exacerbation. 11:00 AM Discussed with Dr. Narcisa Mason, who will evaluate pt today. Diagnosis and Disposition     Critical Care Time: 0 minutes    Core Measures:  For Hospitalized Patients:    1. Hospitalization Decision Time:  The decision to hospitalize the patient was made by Tenisha Talbot MD, MD at 9:58 AM on 2/16/2020    2. Aspirin: Aspirin was given by EMS    10:53 AM  Patient is being admitted to the hospital by Dr. Samson Medina. The results of their tests and reasons for their admission have been discussed with them and/or available family. They convey agreement and understanding for the need to be admitted and for their admission diagnosis. CONDITIONS ON ADMISSION:  Sepsis is not present at the time of admission. Deep Vein Thrombosis is not present at the time of admission. Thrombosis is not present at the time of admission. Urinary Tract Infection is not present at the time of admission. Pneumonia is not present at the time of admission. MRSA is not present at the time of admission. Wound infection is not present at the time of admission. Pressure Ulcer is not present at the time of admission. CLINICAL IMPRESSION:    1. Dysarthria    2. Dehydration    3. Metabolic encephalopathy    4. Ataxia    5. Olecranon fracture, right, closed, initial encounter    6. LEANDRA (acute kidney injury) ([facility]    7. Cellulitis of right upper extremity      _______________________________    This note was partially transcribed via voice recognition software. Although efforts have been made to catch any discrepancies, it may contain sound alike words, grammatical errors, or nonsensical words. Attestations: This note is prepared by Amisha Rosales and [address] [place] [address], acting as Scribe for Annette Love MD .    Vinay. Maria Del Carmen Love MD :  The scribe's documentation has been prepared under my direction and personally reviewed by me in its entirety.   I confirm that the note above accurately reflects all work, treatment, procedures, and medical decision making performed by me.

## 2020-02-16 NOTE — PROGRESS NOTES
Problem: Falls - Risk of  Goal: *Absence of Falls  Description  Document Adriana Latin Fall Risk and appropriate interventions in the flowsheet.   Outcome: Progressing Towards Goal  Note: Fall Risk Interventions:  Mobility Interventions: Patient to call before getting OOB, PT Consult for assist device competence, PT Consult for mobility concerns    Mentation Interventions: Adequate sleep, hydration, pain control         Elimination Interventions: Call light in reach, Stay With Me (per policy), Patient to call for help with toileting needs, Toilet paper/wipes in reach, Toileting schedule/hourly rounds, Urinal in reach              Problem: Pain  Goal: *Control of Pain  Outcome: Progressing Towards Goal

## 2020-02-17 ENCOUNTER — ANESTHESIA (OUTPATIENT)
Dept: SURGERY | Age: 80
DRG: 511 | End: 2020-02-17
Payer: MEDICARE

## 2020-02-17 ENCOUNTER — ANESTHESIA EVENT (OUTPATIENT)
Dept: SURGERY | Age: 80
DRG: 511 | End: 2020-02-17
Payer: MEDICARE

## 2020-02-17 ENCOUNTER — APPOINTMENT (OUTPATIENT)
Dept: GENERAL RADIOLOGY | Age: 80
DRG: 511 | End: 2020-02-17
Attending: ORTHOPAEDIC SURGERY
Payer: MEDICARE

## 2020-02-17 PROBLEM — E66.01 MORBID OBESITY (HCC): Status: ACTIVE | Noted: 2020-02-17

## 2020-02-17 LAB
ANION GAP SERPL CALC-SCNC: 7 MMOL/L (ref 3–18)
BASOPHILS # BLD: 0 K/UL (ref 0–0.1)
BASOPHILS NFR BLD: 0 % (ref 0–2)
BUN SERPL-MCNC: 30 MG/DL (ref 7–18)
BUN/CREAT SERPL: 34 (ref 12–20)
CALCIUM SERPL-MCNC: 8.5 MG/DL (ref 8.5–10.1)
CHLORIDE SERPL-SCNC: 104 MMOL/L (ref 100–111)
CO2 SERPL-SCNC: 26 MMOL/L (ref 21–32)
CREAT SERPL-MCNC: 0.89 MG/DL (ref 0.6–1.3)
DIFFERENTIAL METHOD BLD: ABNORMAL
EOSINOPHIL # BLD: 0.2 K/UL (ref 0–0.4)
EOSINOPHIL NFR BLD: 2 % (ref 0–5)
ERYTHROCYTE [DISTWIDTH] IN BLOOD BY AUTOMATED COUNT: 15.2 % (ref 11.6–14.5)
GLUCOSE BLD STRIP.AUTO-MCNC: 115 MG/DL (ref 70–110)
GLUCOSE BLD STRIP.AUTO-MCNC: 126 MG/DL (ref 70–110)
GLUCOSE BLD STRIP.AUTO-MCNC: 144 MG/DL (ref 70–110)
GLUCOSE BLD STRIP.AUTO-MCNC: 147 MG/DL (ref 70–110)
GLUCOSE BLD STRIP.AUTO-MCNC: 98 MG/DL (ref 70–110)
GLUCOSE SERPL-MCNC: 129 MG/DL (ref 74–99)
HCT VFR BLD AUTO: 32.6 % (ref 36–48)
HGB BLD-MCNC: 10.7 G/DL (ref 13–16)
LYMPHOCYTES # BLD: 2.1 K/UL (ref 0.9–3.6)
LYMPHOCYTES NFR BLD: 27 % (ref 21–52)
MAGNESIUM SERPL-MCNC: 1.6 MG/DL (ref 1.6–2.6)
MCH RBC QN AUTO: 28.5 PG (ref 24–34)
MCHC RBC AUTO-ENTMCNC: 32.8 G/DL (ref 31–37)
MCV RBC AUTO: 86.9 FL (ref 74–97)
MONOCYTES # BLD: 1 K/UL (ref 0.05–1.2)
MONOCYTES NFR BLD: 13 % (ref 3–10)
NEUTS SEG # BLD: 4.5 K/UL (ref 1.8–8)
NEUTS SEG NFR BLD: 58 % (ref 40–73)
PLATELET # BLD AUTO: 139 K/UL (ref 135–420)
PMV BLD AUTO: 11.4 FL (ref 9.2–11.8)
POTASSIUM SERPL-SCNC: 3.6 MMOL/L (ref 3.5–5.5)
RBC # BLD AUTO: 3.75 M/UL (ref 4.7–5.5)
SODIUM SERPL-SCNC: 137 MMOL/L (ref 136–145)
WBC # BLD AUTO: 7.9 K/UL (ref 4.6–13.2)

## 2020-02-17 PROCEDURE — 74011250636 HC RX REV CODE- 250/636: Performed by: ORTHOPAEDIC SURGERY

## 2020-02-17 PROCEDURE — 77030031140 HC SUT VCRL3 J&J -A: Performed by: ORTHOPAEDIC SURGERY

## 2020-02-17 PROCEDURE — 85025 COMPLETE CBC W/AUTO DIFF WBC: CPT

## 2020-02-17 PROCEDURE — 77030013480 HC CUF TRNQT J&J -B: Performed by: ORTHOPAEDIC SURGERY

## 2020-02-17 PROCEDURE — C1713 ANCHOR/SCREW BN/BN,TIS/BN: HCPCS | Performed by: ORTHOPAEDIC SURGERY

## 2020-02-17 PROCEDURE — 0PSK04Z REPOSITION RIGHT ULNA WITH INTERNAL FIXATION DEVICE, OPEN APPROACH: ICD-10-PCS | Performed by: ORTHOPAEDIC SURGERY

## 2020-02-17 PROCEDURE — 65270000029 HC RM PRIVATE

## 2020-02-17 PROCEDURE — 74011250636 HC RX REV CODE- 250/636: Performed by: NURSE ANESTHETIST, CERTIFIED REGISTERED

## 2020-02-17 PROCEDURE — 76210000016 HC OR PH I REC 1 TO 1.5 HR: Performed by: ORTHOPAEDIC SURGERY

## 2020-02-17 PROCEDURE — 74011250636 HC RX REV CODE- 250/636: Performed by: HOSPITALIST

## 2020-02-17 PROCEDURE — 76010000149 HC OR TIME 1 TO 1.5 HR: Performed by: ORTHOPAEDIC SURGERY

## 2020-02-17 PROCEDURE — 80048 BASIC METABOLIC PNL TOTAL CA: CPT

## 2020-02-17 PROCEDURE — 77030020268 HC MISC GENERAL SUPPLY: Performed by: ORTHOPAEDIC SURGERY

## 2020-02-17 PROCEDURE — 77030031139 HC SUT VCRL2 J&J -A: Performed by: ORTHOPAEDIC SURGERY

## 2020-02-17 PROCEDURE — 74011636637 HC RX REV CODE- 636/637: Performed by: HOSPITALIST

## 2020-02-17 PROCEDURE — 74011250636 HC RX REV CODE- 250/636: Performed by: ANESTHESIOLOGY

## 2020-02-17 PROCEDURE — 77030002916 HC SUT ETHLN J&J -A: Performed by: ORTHOPAEDIC SURGERY

## 2020-02-17 PROCEDURE — 82962 GLUCOSE BLOOD TEST: CPT

## 2020-02-17 PROCEDURE — 74011250637 HC RX REV CODE- 250/637: Performed by: HOSPITALIST

## 2020-02-17 PROCEDURE — 77030008462 HC STPLR SKN PROX J&J -A: Performed by: ORTHOPAEDIC SURGERY

## 2020-02-17 PROCEDURE — 36415 COLL VENOUS BLD VENIPUNCTURE: CPT

## 2020-02-17 PROCEDURE — 77030018673: Performed by: ORTHOPAEDIC SURGERY

## 2020-02-17 PROCEDURE — 77030013708 HC HNDPC SUC IRR PULS STRY –B: Performed by: ORTHOPAEDIC SURGERY

## 2020-02-17 PROCEDURE — 74011000250 HC RX REV CODE- 250: Performed by: ORTHOPAEDIC SURGERY

## 2020-02-17 PROCEDURE — 77030023002: Performed by: ORTHOPAEDIC SURGERY

## 2020-02-17 PROCEDURE — 77030040361 HC SLV COMPR DVT MDII -B: Performed by: ORTHOPAEDIC SURGERY

## 2020-02-17 PROCEDURE — 74011000250 HC RX REV CODE- 250: Performed by: NURSE ANESTHETIST, CERTIFIED REGISTERED

## 2020-02-17 PROCEDURE — 76060000033 HC ANESTHESIA 1 TO 1.5 HR: Performed by: ORTHOPAEDIC SURGERY

## 2020-02-17 PROCEDURE — 83735 ASSAY OF MAGNESIUM: CPT

## 2020-02-17 DEVICE — IMPLANTABLE DEVICE: Type: IMPLANTABLE DEVICE | Site: OLECRANON | Status: FUNCTIONAL

## 2020-02-17 DEVICE — SCREW BNE L10MM DIA3.2MM CORT EL FA NONLOCKING: Type: IMPLANTABLE DEVICE | Site: OLECRANON | Status: FUNCTIONAL

## 2020-02-17 RX ORDER — SODIUM CHLORIDE 0.9 % (FLUSH) 0.9 %
5-40 SYRINGE (ML) INJECTION EVERY 8 HOURS
Status: DISCONTINUED | OUTPATIENT
Start: 2020-02-17 | End: 2020-02-17 | Stop reason: HOSPADM

## 2020-02-17 RX ORDER — LIDOCAINE HYDROCHLORIDE 20 MG/ML
INJECTION, SOLUTION EPIDURAL; INFILTRATION; INTRACAUDAL; PERINEURAL AS NEEDED
Status: DISCONTINUED | OUTPATIENT
Start: 2020-02-17 | End: 2020-02-17 | Stop reason: HOSPADM

## 2020-02-17 RX ORDER — MIDAZOLAM HYDROCHLORIDE 1 MG/ML
INJECTION, SOLUTION INTRAMUSCULAR; INTRAVENOUS
Status: COMPLETED | OUTPATIENT
Start: 2020-02-17 | End: 2020-02-17

## 2020-02-17 RX ORDER — PRAVASTATIN SODIUM 20 MG/1
40 TABLET ORAL
Status: DISCONTINUED | OUTPATIENT
Start: 2020-02-17 | End: 2020-02-19 | Stop reason: HOSPADM

## 2020-02-17 RX ORDER — SODIUM CHLORIDE, SODIUM LACTATE, POTASSIUM CHLORIDE, CALCIUM CHLORIDE 600; 310; 30; 20 MG/100ML; MG/100ML; MG/100ML; MG/100ML
125 INJECTION, SOLUTION INTRAVENOUS CONTINUOUS
Status: DISCONTINUED | OUTPATIENT
Start: 2020-02-17 | End: 2020-02-18

## 2020-02-17 RX ORDER — ROPIVACAINE HYDROCHLORIDE 5 MG/ML
INJECTION, SOLUTION EPIDURAL; INFILTRATION; PERINEURAL
Status: COMPLETED | OUTPATIENT
Start: 2020-02-17 | End: 2020-02-17

## 2020-02-17 RX ORDER — HYDROMORPHONE HYDROCHLORIDE 2 MG/ML
0.5 INJECTION, SOLUTION INTRAMUSCULAR; INTRAVENOUS; SUBCUTANEOUS
Status: DISCONTINUED | OUTPATIENT
Start: 2020-02-17 | End: 2020-02-17 | Stop reason: HOSPADM

## 2020-02-17 RX ORDER — FLUMAZENIL 0.1 MG/ML
0.2 INJECTION INTRAVENOUS
Status: DISCONTINUED | OUTPATIENT
Start: 2020-02-17 | End: 2020-02-17 | Stop reason: HOSPADM

## 2020-02-17 RX ORDER — PROPOFOL 10 MG/ML
INJECTION, EMULSION INTRAVENOUS
Status: DISCONTINUED | OUTPATIENT
Start: 2020-02-17 | End: 2020-02-17 | Stop reason: HOSPADM

## 2020-02-17 RX ORDER — NALOXONE HYDROCHLORIDE 0.4 MG/ML
0.1 INJECTION, SOLUTION INTRAMUSCULAR; INTRAVENOUS; SUBCUTANEOUS AS NEEDED
Status: DISCONTINUED | OUTPATIENT
Start: 2020-02-17 | End: 2020-02-17 | Stop reason: HOSPADM

## 2020-02-17 RX ORDER — MAGNESIUM SULFATE HEPTAHYDRATE 40 MG/ML
2 INJECTION, SOLUTION INTRAVENOUS
Status: COMPLETED | OUTPATIENT
Start: 2020-02-17 | End: 2020-02-17

## 2020-02-17 RX ORDER — POTASSIUM CHLORIDE 20 MEQ/1
40 TABLET, EXTENDED RELEASE ORAL
Status: COMPLETED | OUTPATIENT
Start: 2020-02-17 | End: 2020-02-17

## 2020-02-17 RX ORDER — CEFAZOLIN SODIUM 2 G/50ML
2 SOLUTION INTRAVENOUS ONCE
Status: DISCONTINUED | OUTPATIENT
Start: 2020-02-17 | End: 2020-02-17 | Stop reason: DRUGHIGH

## 2020-02-17 RX ORDER — FUROSEMIDE 40 MG/1
40 TABLET ORAL DAILY
Status: DISCONTINUED | OUTPATIENT
Start: 2020-02-18 | End: 2020-02-19 | Stop reason: HOSPADM

## 2020-02-17 RX ORDER — MAGNESIUM SULFATE 100 %
4 CRYSTALS MISCELLANEOUS AS NEEDED
Status: DISCONTINUED | OUTPATIENT
Start: 2020-02-17 | End: 2020-02-17 | Stop reason: HOSPADM

## 2020-02-17 RX ORDER — FENTANYL CITRATE 50 UG/ML
50 INJECTION, SOLUTION INTRAMUSCULAR; INTRAVENOUS AS NEEDED
Status: DISCONTINUED | OUTPATIENT
Start: 2020-02-17 | End: 2020-02-17 | Stop reason: HOSPADM

## 2020-02-17 RX ORDER — LISINOPRIL 5 MG/1
5 TABLET ORAL DAILY
Status: DISCONTINUED | OUTPATIENT
Start: 2020-02-17 | End: 2020-02-19 | Stop reason: HOSPADM

## 2020-02-17 RX ORDER — PROCHLORPERAZINE EDISYLATE 5 MG/ML
5 INJECTION INTRAMUSCULAR; INTRAVENOUS ONCE
Status: DISCONTINUED | OUTPATIENT
Start: 2020-02-17 | End: 2020-02-17 | Stop reason: HOSPADM

## 2020-02-17 RX ORDER — DEXTROSE MONOHYDRATE 100 MG/ML
125-250 INJECTION, SOLUTION INTRAVENOUS AS NEEDED
Status: DISCONTINUED | OUTPATIENT
Start: 2020-02-17 | End: 2020-02-17 | Stop reason: HOSPADM

## 2020-02-17 RX ORDER — MIDAZOLAM HYDROCHLORIDE 1 MG/ML
INJECTION, SOLUTION INTRAMUSCULAR; INTRAVENOUS
Status: DISPENSED
Start: 2020-02-17 | End: 2020-02-18

## 2020-02-17 RX ORDER — SODIUM CHLORIDE 0.9 % (FLUSH) 0.9 %
5-40 SYRINGE (ML) INJECTION AS NEEDED
Status: DISCONTINUED | OUTPATIENT
Start: 2020-02-17 | End: 2020-02-17 | Stop reason: HOSPADM

## 2020-02-17 RX ADMIN — LIDOCAINE HYDROCHLORIDE 60 MG: 20 INJECTION, SOLUTION EPIDURAL; INFILTRATION; INTRACAUDAL; PERINEURAL at 16:24

## 2020-02-17 RX ADMIN — SODIUM CHLORIDE, SODIUM LACTATE, POTASSIUM CHLORIDE, AND CALCIUM CHLORIDE: 600; 310; 30; 20 INJECTION, SOLUTION INTRAVENOUS at 16:17

## 2020-02-17 RX ADMIN — MIDAZOLAM 2 MG: 1 INJECTION INTRAMUSCULAR; INTRAVENOUS at 16:05

## 2020-02-17 RX ADMIN — LISINOPRIL 5 MG: 5 TABLET ORAL at 11:12

## 2020-02-17 RX ADMIN — SODIUM CHLORIDE, SODIUM LACTATE, POTASSIUM CHLORIDE, AND CALCIUM CHLORIDE 125 ML/HR: 600; 310; 30; 20 INJECTION, SOLUTION INTRAVENOUS at 21:35

## 2020-02-17 RX ADMIN — POTASSIUM CHLORIDE 40 MEQ: 1500 TABLET, EXTENDED RELEASE ORAL at 11:12

## 2020-02-17 RX ADMIN — ROPIVACAINE HYDROCHLORIDE 20 ML: 5 INJECTION, SOLUTION EPIDURAL; INFILTRATION; PERINEURAL at 16:05

## 2020-02-17 RX ADMIN — HEPARIN SODIUM 5000 UNITS: 5000 INJECTION INTRAVENOUS; SUBCUTANEOUS at 21:12

## 2020-02-17 RX ADMIN — PROPOFOL 100 MCG/KG/MIN: 10 INJECTION, EMULSION INTRAVENOUS at 16:24

## 2020-02-17 RX ADMIN — MEPIVACAINE HYDROCHLORIDE 10 ML: 20 INJECTION, SOLUTION EPIDURAL; INFILTRATION at 16:05

## 2020-02-17 RX ADMIN — CEFAZOLIN SODIUM 3 G: 10 INJECTION, POWDER, FOR SOLUTION INTRAVENOUS at 16:24

## 2020-02-17 RX ADMIN — PRAVASTATIN SODIUM 40 MG: 20 TABLET ORAL at 21:12

## 2020-02-17 RX ADMIN — DOCUSATE SODIUM 100 MG: 100 CAPSULE, LIQUID FILLED ORAL at 21:12

## 2020-02-17 RX ADMIN — OXYCODONE HYDROCHLORIDE AND ACETAMINOPHEN 1 TABLET: 5; 325 TABLET ORAL at 23:18

## 2020-02-17 RX ADMIN — MAGNESIUM SULFATE HEPTAHYDRATE 2 G: 40 INJECTION, SOLUTION INTRAVENOUS at 11:12

## 2020-02-17 RX ADMIN — METOPROLOL SUCCINATE 25 MG: 25 TABLET, EXTENDED RELEASE ORAL at 08:42

## 2020-02-17 RX ADMIN — MAGNESIUM SULFATE HEPTAHYDRATE 2 G: 40 INJECTION, SOLUTION INTRAVENOUS at 12:22

## 2020-02-17 RX ADMIN — INSULIN GLARGINE 5 UNITS: 100 INJECTION, SOLUTION SUBCUTANEOUS at 21:26

## 2020-02-17 RX ADMIN — DOCUSATE SODIUM 100 MG: 100 CAPSULE, LIQUID FILLED ORAL at 08:42

## 2020-02-17 RX ADMIN — ISOSORBIDE MONONITRATE 30 MG: 30 TABLET, EXTENDED RELEASE ORAL at 08:42

## 2020-02-17 RX ADMIN — ASPIRIN 81 MG 81 MG: 81 TABLET ORAL at 08:42

## 2020-02-17 NOTE — CDMP QUERY
Patient admitted with BMI 44.69. If possible, please document in progress notes and discharge summary if you are evaluating and /or treating any of the following: 
 
? Obesity ? Morbid obesity ? Overweight 
? Other, please specify ? BMI not clinically significant The medical record reflects the following: 
  Risk Factors: BMI Clinical Indicators:  Height 5'9, weight 302 LB's, BMI 44.69 Treatment: one person assist 
 
REFERENCE: 
The [address] [place] [address] has issued a statement indicating that, \"Individuals who are obese or morbidly obese are at an increased risk for certain medical conditions when compared to persons of normal weight. Therefore, these conditions are always clinically significant and reportable when documented by the provider. \" Morbidly Obese:  BMI >/=40 or BMI >35 with obesity-related health condition  
      (e.g. Type 2 DM, HTN, OTILIO, GERD, depression, OA weight bearing joints, urinary  
              stress incontinence, etc.) Obese/Obesity:  BMI 30 - 39.9 Overweight:  BMI 25 - 29. Thank- you Rosalia Corrales RN [address] [place] [address] [phone]

## 2020-02-17 NOTE — PROGRESS NOTES
Pt transported to room 315. Tennille RN is at bedside. Pt skin assessment performed and nothing new noted at this time. Pt dressings CDI at this time. Pt NAD at this time. No further questions from receiving nurse. Current vital signs noted below. Pt family is at bedside.        Visit Vitals  BP (P) 161/69   Pulse (P) 72   Temp (P) 98.4 °F (36.9 °C)   Resp (P) 17   Wt 137.3 kg (302 lb 9.6 oz)   SpO2 (P) 97%   BMI 44.69 kg/m²     Date 02/16/20 0700 - 02/17/20 0659 02/17/20 0700 - 02/18/20 0659   Shift 4579-2552 7902-6747 24 Hour Total 9569-7587 6175-0811 24 Hour Total   INTAKE   I.V.(mL/kg/hr) 500(0.3) 400(0.2) 900(0.3) 650  650     Volume (sodium chloride 0.9 % bolus infusion 500 mL) 500  500        Volume (0.9% sodium chloride infusion)  400 400        Volume (lactated Ringers infusion)    650  650   Shift Total(mL/kg) 500(3.6) 400(2.9) 900(6.6) 650(4.7)  650(4.7)   OUTPUT   Urine(mL/kg/hr) 350(0.2) 800(0.5) 1150(0.3) 400  400     Urine Voided  400  400     Urine Occurrence(s) 3 x  3 x      Stool           Stool Occurrence(s)    1 x  1 x   Shift Total(mL/kg) 350(2.5) 800(5.8) 1150(8.4) 400(2.9)  400(2.9)    -400 -250 250  250   Weight (kg) 137.3 137.3 137.3 137.3 137.3 137.3

## 2020-02-17 NOTE — PROGRESS NOTES
Reason for Readmission:     Fall         RUR Score/Risk Level:  21%       Is a Care Conference indicated:   Not at this time      Did you attend your follow up appointment (s): If not, why not:         Resources/supports as identified by patient/family:          Top Challenges facing patient (as identified by patient/family and CM): Finances/Medication cost?       Transportation        Support system or lack thereof? Living arrangements? Self-care/ADLs/Cognition? Current Advanced Directive/Advance Care Plan:  Not on file           Plan for utilizing home health:   [facility] vs SNF             Transition of Care Plan:    Based on readmission, the patient's previous Plan of Care   has been evaluated and/or modified. The current Transition of Care Plan is:      [facility] and physician follow up vs SNf     Chart reviewed. Per H&P Lizandro Killian is a 70 y.o. male with PMHX of diabetes, hypertension, CHF, recent d/c home due chest pain came to ER due to weakness and fell. Patient reported that he had some dizziness with slurred speech on Saturday and resolved. He had kyphoplasty in Jan, 2014, he is received physical therapist at home. He was d/c on Feb 14,2019 due to chest pain. He refused to have card cath and was dc home with medical treatment. When he stood up from a chair, he fell at home after arrived home. Did not lost his consciousness after the fall, but he felt pain in the elbow. MRI brain in ER which is negative for stroke and he was cleared to be d/c per Dr. Jailene Campbell. However,   X-ray of his elbow indicated: displayed Olecrona  fracture . Dr. Maddie Chamorro, orthopedics was called recommended to admit to medicinal team for medical clearance. Patient has recent hospitalization for CHF exacerbation. He denies any shortness of breath and chest pain during her interview. He had a stress test done last week which qas abnormal but negative for ischemia changes.   He also was found creatinine elevated at this point. \"      1315:  CM met with pt to discuss transition of care. Pt's daughter lives with him and he has a cane that he uses as needed. CM discuss transition of care options. Pt indicated he prefers to return home with [facility] services if possible. CM provided pt with a list of area [facility] agencies to review with his daughter. Pt is open to rehab if needed but would like to have that discussion with his daughter present. Noted plan for surgical intervention. Please order  PT and OT w/ weight bearing status s/p surgery when appropriate to assist with identifying a transition of care.        CM to continue to follow and assist with transition of car needs. Care Management Interventions  PCP Verified by CM:  Yes  Transition of Care Consult (CM Consult): Discharge Planning  Health Maintenance Reviewed: Yes  Physical Therapy Consult: Yes  Speech Therapy Consult: Yes  Current Support Network: Relative's Home  Confirm Follow Up Transport: Family  Discharge Location  Discharge Placement: Home with home health(vs SNF)

## 2020-02-17 NOTE — PROGRESS NOTES
Occupational Therapy Screening:  Services are indicated. Evaluate and Treat Order is requested. An InBasket screening referral was triggered for occupational therapy based on results obtained during the nursing admission assessment. The patients chart was reviewed and the patient is appropriate for a skilled therapy evaluation. Patient was admitted with olecranon fx and metabolic encephalopathy. MD/Stephanie planning for surgical fixation. Please order OT w/ weight bearing status s/p surgery. Thank you.   Vicki Melchor, OTR/L, CSRS

## 2020-02-17 NOTE — PROGRESS NOTES
Hospitalist Progress Note-critical care note     Patient: Darryle Harrison MRN: [id number]  CSN: [id number]    YOB: 1940  Age: 70 y.o. Sex: male    DOA: 2/16/2020 LOS:  LOS: 1 day            Chief complaint:LEANDRA, olecranial fracture, hypertension diabetes    Assessment/Plan         Hospital Problems  Never Reviewed          Codes Class Noted POA    Morbid obesity ([facility] ICD-10-CM: E66.01  ICD-9-CM: 278.01  2/17/2020 Unknown        Metabolic encephalopathy JLG-[id number]: G93.41  ICD-9-CM: 348.31  2/16/2020 Unknown        LEANDRA (acute kidney injury) ([facility] ICD-10-CM: N17.9  ICD-9-CM: 584.9  2/16/2020 Unknown        * (Principal) Olecranon fracture ICD-10-CM: S52.023A  ICD-9-CM: 813.01  2/16/2020 Unknown        Multiple myeloma ([facility] ICD-10-CM: C90.00  ICD-9-CM: 203.00  2/16/2020 Unknown        Hypertension ICD-10-CM: I10  ICD-9-CM: 401.9  2/16/2020 Unknown        Type 2 diabetes mellitus ([facility] ICD-10-CM: E11.9  ICD-9-CM: 250.00  2/12/2020 Yes              Metabolic encephalopathy   Resolved, mri no acute stroke         Olecranon fracture   Continue pain control  Immobilized per er  He is cleared per cardiologist, will give K and mg to optimize electrolytes for surgery  The risk discussed with pt, he would like to take the risk         DM type II , with complication,  -hold po meds   -on lantus, ssi, diabetic diet , hypoglycemia protocol        HTN, accelerated  Continue home medication.      LEANDRA  Resolved      Mm   F/u oncologist as out-pt. Morbid obesity     Subjective: I would like to have my elbow fixed, I understand the risk    Patient will have a scheduled procedure today. Please do not give too much fluid after surgery to prevent fluid overloaded. Disposition :tbd,   Review of systems:    General: No fevers or chills. Cardiovascular: No chest pain or pressure. No palpitations. Pulmonary: No shortness of breath. Gastrointestinal: No nausea, vomiting.      Vital signs/Intake and Output:  Visit Vitals  /56 (BP 1 Location: Left arm, BP Patient Position: Sitting)   Pulse 80   Temp 98.3 °F (36.8 °C)   Resp 18   Wt 137.3 kg (302 lb 9.6 oz)   SpO2 98%   BMI 44.69 kg/m²     Current Shift:  02/17 0701 - 02/17 1900  In: -   Out: 400 [Urine:400]  Last three shifts:  02/15 1901 - 02/17 0700  In: 900 [I.V.:900]  Out: 1150 [Urine:1150]    Physical Exam:  General: WD, WN. Alert, cooperative, no acute distress    HEENT: NC, Atraumatic. PERRLA, anicteric sclerae. Lungs: CTA Bilaterally. No Wheezing/Rhonchi/Rales. Heart:  Regular  rhythm,  + murmur, No Rubs, No Gallops  Abdomen: Soft, Non distended, Non tender.  +Bowel sounds,   Extremities: No c/c. Rt arm wrapped arm sling noted   Psych:   Not anxious or agitated. Neurologic:  No acute neurological deficit. Labs: Results:       Chemistry Recent Labs     02/17/20  0350 02/16/20  1645 02/16/20  0612   * 110* 163*    139 137   K 3.6 3.8 3.7    105 103   CO2 26 27 25   BUN 30* 36* 44*   CREA 0.89 1.27 1.82*   CA 8.5 9.1 9.1   AGAP 7 7 9   BUCR 34* 28* 24*   AP  --   --  49   TP  --   --  6.9   ALB  --   --  3.4   GLOB  --   --  3.5   AGRAT  --   --  1.0      CBC w/Diff Recent Labs     02/17/20  0350 02/16/20  0612   WBC 7.9 12.4   RBC 3.75* 4.20*   HGB 10.7* 12.3*   HCT 32.6* 36.3    147   GRANS 58 62   LYMPH 27 24   EOS 2 1      Cardiac Enzymes Recent Labs     02/16/20  0612      CKND1 2.2      Coagulation Recent Labs     02/16/20  1645   PTP 14.2   INR 1.1   APTT 21.2*       Lipid Panel Lab Results   Component Value Date/Time    Cholesterol, total 123 02/12/2020 01:50 PM    HDL Cholesterol 69 (H) 02/12/2020 01:50 PM    LDL, calculated 42.8 02/12/2020 01:50 PM    VLDL, calculated 11.2 02/12/2020 01:50 PM    Triglyceride 56 02/12/2020 01:50 PM    CHOL/HDL Ratio 1.8 02/12/2020 01:50 PM      BNP No results for input(s): BNPP in the last 72 hours.    Liver Enzymes Recent Labs     02/16/20  0612   TP 6.9   ALB 3.4   AP 49 SGOT 15      Thyroid Studies No results found for: T4, T3U, TSH, TSHEXT, TSHEXT     Procedures/imaging: see electronic medical records for all procedures/Xrays and details which were not copied into this note but were reviewed prior to creation of Elis Martin MD

## 2020-02-17 NOTE — ANESTHESIA POSTPROCEDURE EVALUATION
Post-Anesthesia Evaluation and Assessment    Patient: Rivka Conroy MRN: [id number]  SSN: [id number]   YOB: 1940  Age: 70 y.o. Sex: male      Cardiovascular Function/Vital Signs  /60   Pulse 65   Temp 36.9 °C (98.5 °F)   Resp 21   Wt 137.3 kg (302 lb 9.6 oz)   SpO2 100%   BMI 44.69 kg/m²     Patient is status post Procedure(s):  RIGHT OLECRANON OPEN REDUCTION INTERNAL FIXATION WITH C-ARM. Nausea/Vomiting: Controlled. Postoperative hydration reviewed and adequate. Pain:  Pain Scale 1: Numeric (0 - 10) (02/17/20 1739)  Pain Intensity 1: 0 (02/17/20 1739)   Managed. Neurological Status:   Neuro (WDL): Exceptions to WDL (02/17/20 1739)   At baseline. Mental Status and Level of Consciousness: Arousable. Pulmonary Status:   O2 Device: (P) Nasal cannula (02/17/20 1745)   Adequate oxygenation and airway patent. Complications related to anesthesia: None    Post-anesthesia assessment completed. No concerns. Patient has met all discharge requirements. Signed By: Wendall Phalen, CRNA    February 17, 2020             Procedure(s):  RIGHT OLECRANON OPEN REDUCTION INTERNAL FIXATION WITH C-ARM. regional    <BSHSIANPOST>    Vitals Value Taken Time   /65 2/17/2020  6:05 PM   Temp 36.9 °C (98.5 °F) 2/17/2020  5:39 PM   Pulse 65 2/17/2020  6:07 PM   Resp 19 2/17/2020  6:07 PM   SpO2 100 % 2/17/2020  6:07 PM   Vitals shown include unvalidated device data.

## 2020-02-17 NOTE — PROGRESS NOTES
Speech Therapy Note:    SLP orders received and attempted however, patient:      []  Lethargic, unable to be alerted enough for safe PO intake  []  Refused participation  []  Off the unit  [x]  NPO for procedure  []  Other:     SLP will f/u later this day or as medically indicated.  Thank you for this referral.     Jose E Blackwell M.S., [id number] [facility]  Speech Language Pathologist

## 2020-02-17 NOTE — PROGRESS NOTES
Bedside and Verbal shift change report given to Ever Mixon RN (oncoming nurse) by Jonathan Butterfield RN (offgoing nurse). Report included the following information SBAR, Kardex, Intake/Output and MAR. Patient A/OX4. Patient NPO for possible surgery today, unaware of time. Patient in bed resting quietly, No complaints of pain or nausea at this time. OR called to say they will be picking the patient up around 1630.     1430-Second set of CHG wipes and MRSA swabs completed by this RN. Clean sheets and gown. 1530-Report called to Cristi Clark RN. Patient taken down to OR. 7309- Verbal shift change report given to Ever Mixon RN (oncoming nurse) by Allison Cortez RN (offgoing nurse). Report included the following information SBAR, Kardex, Intake/Output and MAR.   1848-Patient arrived back on the unit. No complaints of pain or nausea at this time. Gave patient TV dinner. SHIFT SUMMARY:  No complaints of pain or nausea throughout shift. Surgery on right elbow preformed. Bedside and Verbal shift change report given to Delfin Emmanuel RN (oncoming nurse) by Ever Mixon RN (offgoing nurse). Report included the following information SBAR, Kardex, Intake/Output and MAR.

## 2020-02-17 NOTE — PERIOP NOTES
TRANSFER - OUT REPORT:    Verbal report given to Wilber Holguin on Lara Barry  being transferred to [address] [place] [address] for routine post - op       Report consisted of patients Situation, Background, Assessment and   Recommendations(SBAR). Information from the following report(s) SBAR and MAR was reviewed with the receiving nurse. Opportunity for questions and clarification was provided.       Patient transported with:   O2 @ 2 liters  Registered Nurse  [facility]

## 2020-02-17 NOTE — ROUTINE PROCESS
TRANSFER - OUT REPORT:    Verbal report given to Ramy Dunbar RN(name) on Rivka Conroy  being transferred to OR(unit) for ordered procedure       Report consisted of patients Situation, Background, Assessment and   Recommendations(SBAR). Information from the following report(s) SBAR, Kardex, Intake/Output and MAR was reviewed with the receiving nurse. Lines:   Peripheral IV 02/16/20 Left Antecubital (Active)   Site Assessment Clean, dry, & intact 2/17/2020  8:30 AM   Phlebitis Assessment 0 2/17/2020  8:30 AM   Infiltration Assessment 0 2/17/2020  8:30 AM   Dressing Status Clean, dry, & intact 2/17/2020  8:30 AM   Dressing Type Transparent;Tape 2/17/2020  8:30 AM   Hub Color/Line Status Patent; Flushed 2/17/2020  8:30 AM   Action Taken Open ports on tubing capped 2/17/2020  8:30 AM   Alcohol Cap Used Yes 2/17/2020  8:30 AM        Opportunity for questions and clarification was provided.       Patient transported with:   [employer]

## 2020-02-17 NOTE — ANESTHESIA PREPROCEDURE EVALUATION
Relevant Problems   No relevant active problems       Anesthetic History   No history of anesthetic complications            Review of Systems / Medical History  Patient summary reviewed, nursing notes reviewed and pertinent labs reviewed    Pulmonary  Within defined limits                 Neuro/Psych   Within defined limits           Cardiovascular    Hypertension              Exercise tolerance: <4 METS     GI/Hepatic/Renal  Within defined limits              Endo/Other    Diabetes    Morbid obesity     Other Findings              Physical Exam    Airway  Mallampati: II  TM Distance: 4 - 6 cm  Neck ROM: normal range of motion   Mouth opening: Normal     Cardiovascular               Dental    Dentition: Poor dentition  Comments: Very few remaining teeth, none loose.    Pulmonary                 Abdominal  GI exam deferred       Other Findings            Anesthetic Plan    ASA: 3  Anesthesia type: regional - interscalene block and supraclavicular block      Post-op pain plan if not by surgeon: peripheral nerve block single    Induction: Intravenous  Anesthetic plan and risks discussed with: Patient

## 2020-02-17 NOTE — PROGRESS NOTES
Assisted to bathroom to void. CHG wipes done, bed linens, gown and cardiac monitor electrodes changed. NPO  For possible surgery in am, noted cleared by Cardiologist for surgery. 0720 Bedside and Verbal shift change report given to Darlene Zaman RN (oncoming nurse) by Zoe Manzo RN   (offgoing nurse). Report included the following information SBAR, Kardex, Intake/Output, MAR and Recent Results.

## 2020-02-17 NOTE — CONSULTS
JOINT  CONSULT    Subjective:     Date of Consultation:  February 17, 2020    Referring Physician:  Owens Merlin is a 70 y.o.  male who is being seen for right olecrenon fracture. Workup has revealed right olecrenon fracture. Patient Active Problem List    Diagnosis Date Noted    Morbid obesity ([facility] 02/94/4919    Metabolic encephalopathy 98/19/4979    Cellulitis 02/16/2020    LEANDRA (acute kidney injury) ([facility] 02/16/2020    Olecranon fracture 02/16/2020    Multiple myeloma ([facility] 02/16/2020    Hypertension 16/92/3543    Systolic CHF, chronic ([facility] 02/14/2020    Chest pain 02/12/2020    Lower extremity edema 02/12/2020    Type 2 diabetes mellitus ([facility] 02/12/2020    Open wound of right index finger without damage to nail 01/09/2019     History reviewed. No pertinent family history. Social History     Tobacco Use    Smoking status: Former Smoker    Smokeless tobacco: Never Used   Substance Use Topics    Alcohol use: Never     Frequency: Never     Comment: rare     Past Medical History:   Diagnosis Date    Cancer ([facility]     Diabetes ([facility]     FH: chemotherapy     H/O stem cell transplant ([facility]     Hypertension     Multiple myeloma ([facility]     Neuropathy       Past Surgical History:   Procedure Laterality Date    HX CHOLECYSTECTOMY      HX KYPHOPLASTY        Prior to Admission medications    Medication Sig Start Date End Date Taking? Authorizing Provider   furosemide (LASIX) 40 mg tablet Take 1 Tab by mouth daily. 2/14/20  Yes Russ Esparza MD   lisinopril (PRINIVIL, ZESTRIL) 5 mg tablet Take 1 Tab by mouth daily. 2/15/20  Yes Russ Esparza MD   isosorbide mononitrate ER (IMDUR) 30 mg tablet Take 1 Tab by mouth daily. 2/15/20  Yes Russ Esparza MD   metoprolol succinate (TOPROL-XL) 25 mg XL tablet Take 1 Tab by mouth daily. 2/15/20  Yes Russ Esparza MD   pravastatin (PRAVACHOL) 40 mg tablet Take 1 Tab by mouth nightly.  2/14/20  Yes Russ Esparza MD   aspirin 81 mg chewable tablet Take 81 mg by mouth daily. Yes George, MD Annie   metFORMIN (GLUCOPHAGE) 1,000 mg tablet Take 1,000 mg by mouth two (2) times daily (with meals). Yes George, MD Annie   gabapentin (NEURONTIN) 400 mg capsule Take 800 mg by mouth two (2) times a day. Yes George, MD Annie   SITagliptin (JANUVIA) 100 mg tablet Take 100 mg by mouth daily. Yes George, MD Annie   vit B Cmplx 3-FA-Vit C-Biotin (NEPHRO LILIANA RX) 1- mg-mg-mcg tablet Take 1 Tab by mouth daily. Yes George, MD Annie   pioglitazone (ACTOS) 30 mg tablet Take 30 mg by mouth daily. Yes Annie Vazquez MD   lisinopril (PRINIVIL, ZESTRIL) 5 mg tablet Take 1 Tab by mouth daily. 2/14/20   Best Bernardo MD   calcium-cholecalciferol, d3, (CALCIUM 600 + D) 600-125 mg-unit tab Take  by mouth daily.     George, MD Annie     Current Facility-Administered Medications   Medication Dose Route Frequency    lisinopril (PRINIVIL, ZESTRIL) tablet 5 mg  5 mg Oral DAILY    [START ON 2/18/2020] furosemide (LASIX) tablet 40 mg  40 mg Oral DAILY    pravastatin (PRAVACHOL) tablet 40 mg  40 mg Oral QHS    lactated Ringers infusion  125 mL/hr IntraVENous CONTINUOUS    ceFAZolin (ANCEF) 2g IVPB in 50 mL D5W  2 g IntraVENous ONCE    acetaminophen (TYLENOL) tablet 650 mg  650 mg Oral Q4H PRN    naloxone (NARCAN) injection 0.4 mg  0.4 mg IntraVENous PRN    diphenhydrAMINE (BENADRYL) injection 12.5 mg  12.5 mg IntraVENous Q4H PRN    ondansetron (ZOFRAN) injection 4 mg  4 mg IntraVENous Q4H PRN    docusate sodium (COLACE) capsule 100 mg  100 mg Oral BID    heparin (porcine) injection 5,000 Units  5,000 Units SubCUTAneous Q8H    isosorbide mononitrate ER (IMDUR) tablet 30 mg  30 mg Oral DAILY    aspirin chewable tablet 81 mg  81 mg Oral DAILY    metoprolol succinate (TOPROL-XL) XL tablet 25 mg  25 mg Oral DAILY    oxyCODONE-acetaminophen (PERCOCET) 5-325 mg per tablet 1 Tab  1 Tab Oral Q4H PRN    insulin glargine (LANTUS) injection 5 Units  5 Units SubCUTAneous QHS    insulin lispro (HUMALOG) injection   SubCUTAneous AC&HS    glucose chewable tablet 16 g  4 Tab Oral PRN    glucagon (GLUCAGEN) injection 1 mg  1 mg IntraMUSCular PRN     Allergies   Allergen Reactions    Iodine Hives        Review of Systems:  A comprehensive review of systems was negative except for that written in the HPI. Objective:     Patient Vitals for the past 8 hrs:   BP Temp Pulse Resp SpO2   20 1200 -- -- 80 -- --   20 1119 113/56 98.3 °F (36.8 °C) 73 18 98 %     Temp (24hrs), Av.5 °F (36.9 °C), Min:97.8 °F (36.6 °C), Max:99.3 °F (37.4 °C)        EXAM:   General:  Alert, oriented and mood affect appropriate   Lungs:   Clear to auscultation bilaterally. Heart:  Regular rate and rhythm, S1, S2 stable, no murmur, click, rub or gallop. Abdomen:   Soft, non-tender. Bowel sounds present. No masses,  No organomegaly. Genitourinary: Continent and PVR is stable   Neuro Muscular: Stable. Right elbow with tenderness and swelling, pain at olcrenon   Skin:  No rashes, lesions, or signs/symptoms or infection.        Data Review   Recent Results (from the past 24 hour(s))   METABOLIC PANEL, BASIC    Collection Time: 20  4:45 PM   Result Value Ref Range    Sodium 139 136 - 145 mmol/L    Potassium 3.8 3.5 - 5.5 mmol/L    Chloride 105 100 - 111 mmol/L    CO2 27 21 - 32 mmol/L    Anion gap 7 3.0 - 18 mmol/L    Glucose 110 (H) 74 - 99 mg/dL    BUN 36 (H) 7.0 - 18 MG/DL    Creatinine 1.27 0.6 - 1.3 MG/DL    BUN/Creatinine ratio 28 (H) 12 - 20      GFR est AA >60 >60 ml/min/1.73m2    GFR est non-AA 55 (L) >60 ml/min/1.73m2    Calcium 9.1 8.5 - 10.1 MG/DL   PTT    Collection Time: 20  4:45 PM   Result Value Ref Range    aPTT 21.2 (L) 23.0 - 36.4 SEC   PROTHROMBIN TIME + INR    Collection Time: 20  4:45 PM   Result Value Ref Range    Prothrombin time 14.2 11.5 - 15.2 sec    INR 1.1 0.8 - 1.2     GLUCOSE, POC    Collection Time: 20 10:39 PM   Result Value Ref Range    Glucose (POC) 141 (H) 70 - 907 mg/dL   METABOLIC PANEL, BASIC    Collection Time: 02/17/20  3:50 AM   Result Value Ref Range    Sodium 137 136 - 145 mmol/L    Potassium 3.6 3.5 - 5.5 mmol/L    Chloride 104 100 - 111 mmol/L    CO2 26 21 - 32 mmol/L    Anion gap 7 3.0 - 18 mmol/L    Glucose 129 (H) 74 - 99 mg/dL    BUN 30 (H) 7.0 - 18 MG/DL    Creatinine 0.89 0.6 - 1.3 MG/DL    BUN/Creatinine ratio 34 (H) 12 - 20      GFR est AA >60 >60 ml/min/1.73m2    GFR est non-AA >60 >60 ml/min/1.73m2    Calcium 8.5 8.5 - 10.1 MG/DL   MAGNESIUM    Collection Time: 02/17/20  3:50 AM   Result Value Ref Range    Magnesium 1.6 1.6 - 2.6 mg/dL   CBC WITH AUTOMATED DIFF    Collection Time: 02/17/20  3:50 AM   Result Value Ref Range    WBC 7.9 4.6 - 13.2 K/uL    RBC 3.75 (L) 4.70 - 5.50 M/uL    HGB 10.7 (L) 13.0 - 16.0 g/dL    HCT 32.6 (L) 36.0 - 48.0 %    MCV 86.9 74.0 - 97.0 FL    MCH 28.5 24.0 - 34.0 PG    MCHC 32.8 31.0 - 37.0 g/dL    RDW 15.2 (H) 11.6 - 14.5 %    PLATELET 937 704 - 042 K/uL    MPV 11.4 9.2 - 11.8 FL    NEUTROPHILS 58 40 - 73 %    LYMPHOCYTES 27 21 - 52 %    MONOCYTES 13 (H) 3 - 10 %    EOSINOPHILS 2 0 - 5 %    BASOPHILS 0 0 - 2 %    ABS. NEUTROPHILS 4.5 1.8 - 8.0 K/UL    ABS. LYMPHOCYTES 2.1 0.9 - 3.6 K/UL    ABS. MONOCYTES 1.0 0.05 - 1.2 K/UL    ABS. EOSINOPHILS 0.2 0.0 - 0.4 K/UL    ABS.  BASOPHILS 0.0 0.0 - 0.1 K/UL    DF AUTOMATED     GLUCOSE, POC    Collection Time: 02/17/20  7:14 AM   Result Value Ref Range    Glucose (POC) 147 (H) 70 - 110 mg/dL   GLUCOSE, POC    Collection Time: 02/17/20 11:24 AM   Result Value Ref Range    Glucose (POC) 144 (H) 70 - 110 mg/dL   GLUCOSE, POC    Collection Time: 02/17/20  3:28 PM   Result Value Ref Range    Glucose (POC) 115 (H) 70 - 110 mg/dL         Assessment/Plan:     Right olecrenon fracture- will set up for ORIF once cleared      Veena Portillo MD

## 2020-02-17 NOTE — PROGRESS NOTES
Orders received. Chart reviewed. PT on hold until schedule surgery. Will need WB and PT clearance from ortho. Will f/u when pt's schedule allows and pt is medically appropriate to participate. Candance Huger

## 2020-02-17 NOTE — PROGRESS NOTES
Problem: Falls - Risk of  Goal: *Absence of Falls  Description  Document Chloe Luong Fall Risk and appropriate interventions in the flowsheet.   Outcome: Progressing Towards Goal  Note: Fall Risk Interventions:  Mobility Interventions: Communicate number of staff needed for ambulation/transfer, Patient to call before getting OOB    Mentation Interventions: Adequate sleep, hydration, pain control, Room close to nurse's station         Elimination Interventions: Call light in reach    History of Falls Interventions: Consult care management for discharge planning, Investigate reason for fall, Room close to nurse's station

## 2020-02-17 NOTE — ROUTINE PROCESS
TRANSFER - IN REPORT:    Verbal report received from STEPAN Alicea RN(name) on Krystle Martinez  being received from [employer] [facility]) for routine post - op      Report consisted of patients Situation, Background, Assessment and   Recommendations(SBAR). Information from the following report(s) SBAR, Kardex, Intake/Output and MAR was reviewed with the receiving nurse. Opportunity for questions and clarification was provided. Assessment completed upon patients arrival to unit and care assumed.

## 2020-02-17 NOTE — BRIEF OP NOTE
BRIEF OPERATIVE NOTE    Date of Procedure: 2/17/2020   Preoperative Diagnosis: OLECRANON FRATURE, RIGHT  Postoperative Diagnosis: OLECRANON FRATURE, RIGHT    Procedure(s):  RIGHT OLECRANON OPEN REDUCTION INTERNAL FIXATION WITH C-ARM  Surgeon(s) and Role:     Frederick De Jesus MD - Primary         Surgical Assistant: elisa    Surgical Staff:  Circ-1: Angela Dougherty RN  Circ-Relief: Desiree Pichardo  Radiology Technician: Antonia Tariq  Scrub Tech-1: Rita Rivera  Surg Asst-1: Christopher Shah  Surg Asst-Relief: Mónica FERNÁNDEZ  Event Time In Time Out   Incision Start 02/17/2020 1650    Incision Close       Anesthesia: General   Estimated Blood Loss: minimal  Specimens: * No specimens in log *   Findings: fracture   Complications: none  Implants: * No implants in log *

## 2020-02-18 PROBLEM — E66.01 MORBID OBESITY WITH BMI OF 40.0-44.9, ADULT (HCC): Status: ACTIVE | Noted: 2020-02-18

## 2020-02-18 LAB
ANION GAP SERPL CALC-SCNC: 6 MMOL/L (ref 3–18)
BASOPHILS # BLD: 0 K/UL (ref 0–0.1)
BASOPHILS NFR BLD: 0 % (ref 0–2)
BUN SERPL-MCNC: 18 MG/DL (ref 7–18)
BUN/CREAT SERPL: 26 (ref 12–20)
CALCIUM SERPL-MCNC: 8.6 MG/DL (ref 8.5–10.1)
CHLORIDE SERPL-SCNC: 106 MMOL/L (ref 100–111)
CO2 SERPL-SCNC: 26 MMOL/L (ref 21–32)
CREAT SERPL-MCNC: 0.7 MG/DL (ref 0.6–1.3)
DIFFERENTIAL METHOD BLD: ABNORMAL
EOSINOPHIL # BLD: 0.2 K/UL (ref 0–0.4)
EOSINOPHIL NFR BLD: 3 % (ref 0–5)
ERYTHROCYTE [DISTWIDTH] IN BLOOD BY AUTOMATED COUNT: 15 % (ref 11.6–14.5)
GLUCOSE BLD STRIP.AUTO-MCNC: 151 MG/DL (ref 70–110)
GLUCOSE BLD STRIP.AUTO-MCNC: 162 MG/DL (ref 70–110)
GLUCOSE BLD STRIP.AUTO-MCNC: 169 MG/DL (ref 70–110)
GLUCOSE BLD STRIP.AUTO-MCNC: 170 MG/DL (ref 70–110)
GLUCOSE SERPL-MCNC: 112 MG/DL (ref 74–99)
HCT VFR BLD AUTO: 30.2 % (ref 36–48)
HGB BLD-MCNC: 10 G/DL (ref 13–16)
LYMPHOCYTES # BLD: 2.1 K/UL (ref 0.9–3.6)
LYMPHOCYTES NFR BLD: 28 % (ref 21–52)
MAGNESIUM SERPL-MCNC: 1.9 MG/DL (ref 1.6–2.6)
MCH RBC QN AUTO: 28.9 PG (ref 24–34)
MCHC RBC AUTO-ENTMCNC: 33.1 G/DL (ref 31–37)
MCV RBC AUTO: 87.3 FL (ref 74–97)
MONOCYTES # BLD: 1 K/UL (ref 0.05–1.2)
MONOCYTES NFR BLD: 14 % (ref 3–10)
NEUTS SEG # BLD: 4.2 K/UL (ref 1.8–8)
NEUTS SEG NFR BLD: 55 % (ref 40–73)
PLATELET # BLD AUTO: 128 K/UL (ref 135–420)
PMV BLD AUTO: 10.9 FL (ref 9.2–11.8)
POTASSIUM SERPL-SCNC: 3.9 MMOL/L (ref 3.5–5.5)
RBC # BLD AUTO: 3.46 M/UL (ref 4.7–5.5)
SODIUM SERPL-SCNC: 138 MMOL/L (ref 136–145)
WBC # BLD AUTO: 7.5 K/UL (ref 4.6–13.2)

## 2020-02-18 PROCEDURE — 97116 GAIT TRAINING THERAPY: CPT

## 2020-02-18 PROCEDURE — 77030027138 HC INCENT SPIROMETER -A

## 2020-02-18 PROCEDURE — 97161 PT EVAL LOW COMPLEX 20 MIN: CPT

## 2020-02-18 PROCEDURE — 74011636637 HC RX REV CODE- 636/637: Performed by: HOSPITALIST

## 2020-02-18 PROCEDURE — 97535 SELF CARE MNGMENT TRAINING: CPT

## 2020-02-18 PROCEDURE — 97165 OT EVAL LOW COMPLEX 30 MIN: CPT

## 2020-02-18 PROCEDURE — 82962 GLUCOSE BLOOD TEST: CPT

## 2020-02-18 PROCEDURE — 36415 COLL VENOUS BLD VENIPUNCTURE: CPT

## 2020-02-18 PROCEDURE — 74011250637 HC RX REV CODE- 250/637: Performed by: HOSPITALIST

## 2020-02-18 PROCEDURE — 85025 COMPLETE CBC W/AUTO DIFF WBC: CPT

## 2020-02-18 PROCEDURE — 74011250636 HC RX REV CODE- 250/636: Performed by: HOSPITALIST

## 2020-02-18 PROCEDURE — 92610 EVALUATE SWALLOWING FUNCTION: CPT

## 2020-02-18 PROCEDURE — 77010033678 HC OXYGEN DAILY

## 2020-02-18 PROCEDURE — 65270000029 HC RM PRIVATE

## 2020-02-18 PROCEDURE — 80048 BASIC METABOLIC PNL TOTAL CA: CPT

## 2020-02-18 PROCEDURE — 83735 ASSAY OF MAGNESIUM: CPT

## 2020-02-18 RX ORDER — GABAPENTIN 400 MG/1
800 CAPSULE ORAL 2 TIMES DAILY
Status: DISCONTINUED | OUTPATIENT
Start: 2020-02-18 | End: 2020-02-19 | Stop reason: HOSPADM

## 2020-02-18 RX ORDER — LISINOPRIL 5 MG/1
5 TABLET ORAL DAILY
Status: DISCONTINUED | OUTPATIENT
Start: 2020-02-19 | End: 2020-02-18

## 2020-02-18 RX ADMIN — LISINOPRIL 5 MG: 5 TABLET ORAL at 08:49

## 2020-02-18 RX ADMIN — INSULIN LISPRO 2 UNITS: 100 INJECTION, SOLUTION INTRAVENOUS; SUBCUTANEOUS at 22:26

## 2020-02-18 RX ADMIN — FUROSEMIDE 40 MG: 40 TABLET ORAL at 08:53

## 2020-02-18 RX ADMIN — ISOSORBIDE MONONITRATE 30 MG: 30 TABLET, EXTENDED RELEASE ORAL at 08:50

## 2020-02-18 RX ADMIN — ASPIRIN 81 MG 81 MG: 81 TABLET ORAL at 08:49

## 2020-02-18 RX ADMIN — GABAPENTIN 800 MG: 400 CAPSULE ORAL at 20:38

## 2020-02-18 RX ADMIN — PRAVASTATIN SODIUM 40 MG: 20 TABLET ORAL at 22:25

## 2020-02-18 RX ADMIN — DOCUSATE SODIUM 100 MG: 100 CAPSULE, LIQUID FILLED ORAL at 20:38

## 2020-02-18 RX ADMIN — INSULIN GLARGINE 5 UNITS: 100 INJECTION, SOLUTION SUBCUTANEOUS at 22:25

## 2020-02-18 RX ADMIN — HEPARIN SODIUM 5000 UNITS: 5000 INJECTION INTRAVENOUS; SUBCUTANEOUS at 20:38

## 2020-02-18 RX ADMIN — OXYCODONE HYDROCHLORIDE AND ACETAMINOPHEN 1 TABLET: 5; 325 TABLET ORAL at 06:44

## 2020-02-18 RX ADMIN — INSULIN LISPRO 2 UNITS: 100 INJECTION, SOLUTION INTRAVENOUS; SUBCUTANEOUS at 13:03

## 2020-02-18 RX ADMIN — ACETAMINOPHEN 650 MG: 325 TABLET ORAL at 17:14

## 2020-02-18 RX ADMIN — OXYCODONE HYDROCHLORIDE AND ACETAMINOPHEN 1 TABLET: 5; 325 TABLET ORAL at 03:16

## 2020-02-18 RX ADMIN — HEPARIN SODIUM 5000 UNITS: 5000 INJECTION INTRAVENOUS; SUBCUTANEOUS at 05:40

## 2020-02-18 RX ADMIN — HEPARIN SODIUM 5000 UNITS: 5000 INJECTION INTRAVENOUS; SUBCUTANEOUS at 13:04

## 2020-02-18 RX ADMIN — METOPROLOL SUCCINATE 25 MG: 25 TABLET, EXTENDED RELEASE ORAL at 08:50

## 2020-02-18 RX ADMIN — DOCUSATE SODIUM 100 MG: 100 CAPSULE, LIQUID FILLED ORAL at 08:50

## 2020-02-18 RX ADMIN — INSULIN LISPRO 2 UNITS: 100 INJECTION, SOLUTION INTRAVENOUS; SUBCUTANEOUS at 17:19

## 2020-02-18 NOTE — OP NOTES
[facility]    Name:  Sebastian Bowie  MR#:   [id number]  :  1940  ACCOUNT #:  [de-identified]  DATE OF SERVICE:  2020    PREOPERATIVE DIAGNOSIS:  Right olecranon fracture. POSTOPERATIVE DIAGNOSIS:  Right olecranon fracture. PROCEDURE PERFORMED:  Open reduction and internal fixation of right olecranon fracture. SURGEON:  Stephanie    ASSISTANT: n/a    ANESTHESIA:  Regional.    COMPLICATIONS:  None. SPECIMENS REMOVED:  None. IMPLANTS:  TriMed sled olecranon type short two 20 mm screws and 10 mm screws. ESTIMATED BLOOD LOSS:  Minimal.    TOURNIQUET TIME:  Approximately 30 minutes at 270 mmHg. INDICATIONS:  The patient is a 79-year-old gentleman with right olecranon fracture, opted for open reduction and internal fixation after risks and benefits were explained to him. PROCEDURE:  The patient in the OR, given regional anesthetic, preoperative antibiotics, positioned, prepped, and draped in the usual sterile fashion with the right lower extremity draped free, lateral position, well padded. Surgical time-out was performed confirming the right side as the proper side. After all team members agreed, tourniquet was inflated up to 270 mmHg for approximately 30 minutes. Incision was made posteriorly. Dissection was carried down to the fracture. The fracture was identified. Periosteum was elevated above the fracture and both proximal and distal fragments were comminuted. We were able to reduce anatomically and held with two K-wires. We then used the guide for the sled, drilled the  holes down the proximal and distal olecranon. We then used the guidewires to pass the sled over. We removed the guidewires. We sled past this down the olecranon and the ulna. We then secured it with the corresponding screws within the washer getting a good compression across the wire.     Images, AP and lateral, showed anatomic position and good reduction of the fracture. Once satisfied with this, the would was irrigated with antibiotic ointment. Skin was closed with 2-0 Monocryl deep dermal and staples on the skin. Dry sterile dressing, placed in posterior splint. Tourniquet was let down. The patient was then taken to recovery room without incident. Postoperative plan is ice and elevation, follow up in the office in 10 days.       Mercy Mann MD      [email or web address]  D:  02/17/2020 17:09  T:  02/17/2020 23:33  JOB #:  [id number]

## 2020-02-18 NOTE — PROGRESS NOTES
19:35 Assessment completed. O2 is @ 2 LPM per NC. Lungs are clear bilat but are slightly decreased in the bases. Splint & ace wrap on RUE remains C/D/I. + CMS. Able to wiggle fingers  Denies pain or discomfort. Resting quietly in bed with TV on. Family is @ bedside. Voiding per urinal with assist of 1.    22:45 Shift assessment completed. See [facility] flow sheet for details. 02:45 Reassessed with 0 changes noted. Splint & ace wrap on RUE remains C/D/I with CMS intact. Hand grasp remains = & strong. Resting quietly in bed with eyes closed between cares x for voiding per urinal w/o difficulty. 07:25 Bedside and Verbal shift change report given to Tadeo Sommers RN (oncoming nurse) by Julián Whitfield RN (offgoing nurse). Report included the following information SBAR.

## 2020-02-18 NOTE — PROGRESS NOTES
Hospitalist Progress Note-critical care note     Patient: Hyacinth Simental MRN: [id number]  CSN: [id number]    YOB: 1940  Age: 70 y.o. Sex: male    DOA: 2/16/2020 LOS:  LOS: 2 days            Chief complaint:LEANDRA, olecranial fracture, hypertension diabetes    Assessment/Plan         Hospital Problems  Date Reviewed: 2/17/2020          Codes Class Noted POA    Morbid obesity with BMI of 40.0-44.9, adult ([facility] ICD-10-CM: E66.01, Z68.41  ICD-9-CM: 278.01, V85.41  2/18/2020 Unknown        Morbid obesity ([facility] ICD-10-CM: E66.01  ICD-9-CM: 278.01  2/17/2020 Unknown        Metabolic encephalopathy QMZ-03-HO: G93.41  ICD-9-CM: 348.31  2/16/2020 Unknown        LEANDRA (acute kidney injury) ([facility] ICD-10-CM: N17.9  ICD-9-CM: 584.9  2/16/2020 Unknown        * (Principal) Olecranon fracture ICD-10-CM: G05.530T  ICD-9-CM: 813.01  2/16/2020 Unknown        Multiple myeloma ([facility] ICD-10-CM: C90.00  ICD-9-CM: 203.00  2/16/2020 Unknown        Hypertension ICD-10-CM: I10  ICD-9-CM: 401.9  2/16/2020 Unknown        Type 2 diabetes mellitus ([facility] ICD-10-CM: E11.9  ICD-9-CM: 250.00  2/12/2020 Yes              Metabolic encephalopathy   Resolved, mri no acute stroke         Olecranon fracture   Continue pain control  Open reduction and internal fixation of right olecranon fracture-post day 1   So far tolerate well   Will d.c fluid       DM type II , with complication,  -hold po meds   -on lantus, ssi, diabetic diet , hypoglycemia protocol    Continue current regimen       HTN, accelerated  Continue home medication.      LEANDRA  Resolved      Mm   F/u oncologist as out-pt. Morbid obesity     Subjective: feel fine to go to rehab , no chest pain, no sob     Will work with pt/ot. D./c planning, likely need rehab     Disposition :1-2 days   Review of systems:    General: No fevers or chills. Cardiovascular: No chest pain or pressure. No palpitations. Pulmonary: No shortness of breath. Gastrointestinal: No nausea, vomiting. Vital signs/Intake and Output:  Visit Vitals  /70   Pulse 81   Temp 99.6 °F (37.6 °C)   Resp 16   Wt 127 kg (279 lb 14.4 oz)   SpO2 96%   BMI 41.33 kg/m²     Current Shift:  02/18 0701 - 02/18 1900  In: -   Out: 4387 [TITTX:0065]  Last three shifts:  02/16 1901 - 02/18 0700  In: 1550 [I.V.:1550]  Out: 1800 [Urine:1800]    Physical Exam:  General: WD, WN. Alert, cooperative, no acute distress    HEENT: NC, Atraumatic. PERRLA, anicteric sclerae. Lungs: CTA Bilaterally. No Wheezing/Rhonchi/Rales. Heart:  Regular  rhythm,  + murmur, No Rubs, No Gallops  Abdomen: Soft, Non distended, Non tender.  +Bowel sounds,   Extremities: No c/c. Rt arm wrapped with ace bandage, arm sling noted   Psych:   Not anxious or agitated. Neurologic:  No acute neurological deficit.              Labs: Results:       Chemistry Recent Labs     02/18/20 0235 02/17/20 0350 02/16/20  1645 02/16/20  0612   * 129* 110* 163*    137 139 137   K 3.9 3.6 3.8 3.7    104 105 103   CO2 26 26 27 25   BUN 18 30* 36* 44*   CREA 0.70 0.89 1.27 1.82*   CA 8.6 8.5 9.1 9.1   AGAP 6 7 7 9   BUCR 26* 34* 28* 24*   AP  --   --   --  49   TP  --   --   --  6.9   ALB  --   --   --  3.4   GLOB  --   --   --  3.5   AGRAT  --   --   --  1.0      CBC w/Diff Recent Labs     02/18/20 0235 02/17/20  0350 02/16/20  0612   WBC 7.5 7.9 12.4   RBC 3.46* 3.75* 4.20*   HGB 10.0* 10.7* 12.3*   HCT 30.2* 32.6* 36.3   * 139 147   GRANS 55 58 62   LYMPH 28 27 24   EOS 3 2 1      Cardiac Enzymes Recent Labs     02/16/20  0612      CKND1 2.2      Coagulation Recent Labs     02/16/20  1645   PTP 14.2   INR 1.1   APTT 21.2*       Lipid Panel Lab Results   Component Value Date/Time    Cholesterol, total 123 02/12/2020 01:50 PM    HDL Cholesterol 69 (H) 02/12/2020 01:50 PM    LDL, calculated 42.8 02/12/2020 01:50 PM    VLDL, calculated 11.2 02/12/2020 01:50 PM    Triglyceride 56 02/12/2020 01:50 PM    CHOL/HDL Ratio 1.8 02/12/2020 01:50 PM BNP No results for input(s): BNPP in the last 72 hours.    Liver Enzymes Recent Labs     02/16/20  0612   TP 6.9   ALB 3.4   AP 49   SGOT 15      Thyroid Studies No results found for: T4, T3U, TSH, TSHEXT, TSHEXT     Procedures/imaging: see electronic medical records for all procedures/Xrays and details which were not copied into this note but were reviewed prior to creation of Amber Fernandez MD

## 2020-02-18 NOTE — PROGRESS NOTES
D/C Plan: SNF    CM met with pt at bedside to discuss transition of care. Pt has requested CM contact his daughter, Monique Waddell ([phone]), to discuss transition of care. CM contacted pt's daughter and she would like SNF placement. CM offered [facility] and pt/family would like to have clinical information sent to [facility] and [facility]. CMS has been notified to assist.  Anticipate pt will transition to SNF with in the next 24-48 hours. CM discussed transport to this facility. Pt/family would like to have medical transport arranged. CM notified pt's daughter that insurance coverage is not guranteed. Pt's daughter verbalized an understanding of this and would like to move forward with medical transport. CM discussed/offered a LTSS/UAI screening. Pt/family would like to have a screening completed. CM to continue to follow and assist.    Care Management Interventions  PCP Verified by CM:  Yes  Transition of Care Consult (CM Consult): Discharge Planning, SNF  Health Maintenance Reviewed: Yes  Physical Therapy Consult: Yes  Occupational Therapy Consult: Yes  Speech Therapy Consult: Yes  Current Support Network: Relative's Home  Confirm Follow Up Transport: Family  The Plan for Transition of Care is Related to the Following Treatment Goals : SNF  The Patient and/or Patient Representative was Provided with a Choice of Provider and Agrees with the Discharge Plan?: Yes  Name of the Patient Representative Who was Provided with a Choice of Provider and Agrees with the Discharge Plan: Amy Bower/simran  Freedom of Choice List was Provided with Basic Dialogue that Supports the Patient's Individualized Plan of Care/Goals, Treatment Preferences and Shares the Quality Data Associated with the Providers?: Yes  Discharge Location  Discharge Placement: Skilled nursing facility

## 2020-02-18 NOTE — PROGRESS NOTES
Problem: Self Care Deficits Care Plan (Adult)  Goal: *Acute Goals and Plan of Care (Insert Text)  Description  Acute Goals and Plan of Care   Initial OT STGs (2/18/2020) Within 7 days:    1. Patient will perform toilet transfer with mod I in preparation for bowel and bladder management. 2. Patient will perform bowel and bladder management with mod I for increased independence with ADLs. 3. Patient will perform UB dressing with supervision while utilizing jori-techniques for increased independence with ADLs. 4. Patient will perform LB dressing with SBA while utilizing jori-techniques for increased independence with ADLs. 5. Patient will perform bathing w/ SBA w/ A/E for increased independence with ADLs. 6. Patient will utilize energy conservation techniques with 1 verbal cue for increased independence with ADLs. Outcome: Progressing Towards Goal  OCCUPATIONAL THERAPY EVALUATION    Patient: Renae Reddy (25 y.o. male)  Date: 2/18/2020  Primary Diagnosis: Olecranon fracture [S52.023A]  LEANDRA (acute kidney injury) ([facility] [Y80.6]  Metabolic encephalopathy [K07.56]  Cellulitis [L03.90]  Procedure(s) (LRB):  RIGHT OLECRANON OPEN REDUCTION INTERNAL FIXATION WITH C-ARM (Right) 1 Day Post-Op   Precautions: Fall, NWB(R UE)  PLOF: Pt was grossly mod I for ADLs    ASSESSMENT AND RECOMMENDATIONS:  Based on the objective data described below, the patient presents with RUE decreased ROM and strength affecting UE ADLs. Pt seen with PT for additional set of skilled hands. Patient requiring assist for B sock donning; pt able to use bending forward method to doff B socks. Pt able to manage bowel hygiene with LUE, but has some difficulty and requires additional time. Pt able to complete grooming tasks with non-dominant LUE with increased time/effort. Pt completed bathroom mobility with SPC and CGA/min A for steadying. Patient requires mod A to daniel/doff sling in seated position.  Patient reports daughter will be available to assist with ADLs and sling management. Recommend rehab at discharge to maximize independence/safety with ADLs before returning home. Pt left seated in chair, call bell/phone within reach. Education: Reviewed home safety, body mechanics, importance of moving every hour to prevent joint stiffness,roll of ice for edema/pain control, one-handed techniques, proper positioning of sling, and adaptive dressing techniques with patient verbalizing understanding at this time     Patient will benefit from skilled intervention to address the above impairments. Patient's rehabilitation potential is considered to be Good  Factors which may influence rehabilitation potential include:   []             None noted  []             Mental ability/status  [x]             Medical condition  []             Home/family situation and support systems  []             Safety awareness  []             Pain tolerance/management  []             Other:      PLAN :  Recommendations and Planned Interventions:   [x]               Self Care Training                  [x]      Therapeutic Activities  [x]               Functional Mobility Training   []      Cognitive Retraining  [x]               Therapeutic Exercises           [x]      Endurance Activities  [x]               Balance Training                    [x]      Neuromuscular Re-Education  []               Visual/Perceptual Training     [x]      Home Safety Training  [x]               Patient Education                   [x]      Family Training/Education  []               Other (comment):    Frequency/Duration: Patient will be followed by Occupational Therapy 1-2 times per day/4-7 days per week to address goals. Discharge Recommendations: Rehab  Further Equipment Recommendations for Discharge: To Be Determined (TBD) at next level of care     SUBJECTIVE:   Patient stated It's just one thing after another.     OBJECTIVE DATA SUMMARY:     Past Medical History:   Diagnosis Date    Cancer ([facility]  Diabetes ([facility]     FH: chemotherapy     [facility]     Hypertension     Multiple myeloma ([facility]     Neuropathy      Past Surgical History:   Procedure Laterality Date    HX CHOLECYSTECTOMY      HX KYPHOPLASTY       Barriers to Learning/Limitations: yes;  physical  Compensate with: visual, verbal, tactile, kinesthetic cues/model    Home Situation/Prior Level of Function: pt was grossly mod I for ADLs; pt home alone for most parts of the day, PTA using RW for ambulation  Home Situation  Home Environment: Private residence  # Steps to Enter: 2  One/Two Story Residence: Two story  # of Interior Steps: 15  Interior Rails: Left  Living Alone: No  Support Systems: Child(desiree), Family member(s)  Patient Expects to be Discharged to[de-identified] Private residence  Current DME Used/Available at Home: Cane, straight, Walker, rolling  Tub or Shower Type: Tub/Shower combination  [x]  Right hand dominant   []  Left hand dominant    Cognitive/Behavioral Status:  Neurologic State: Alert  Orientation Level: Oriented X4  Cognition: Appropriate decision making; Appropriate for age attention/concentration; Appropriate safety awareness; Follows commands  Safety/Judgement: Awareness of environment; Fall prevention;Good awareness of safety precautions; Insight into deficits    Coordination: BUE  Coordination: Within functional limits  Fine Motor Skills-Upper: Left Impaired;Right Intact    Gross Motor Skills-Upper: Left Impaired;Right Intact    Balance:  Sitting: Intact  Standing: Intact; With support    Strength: BUE  RUE NWB  Strength: Generally decreased, functional     Tone & Sensation:BUE  Tone: Normal  Sensation: Intact     Range of Motion: BUE  RUE   AROM: Generally decreased, functional    Functional Mobility and Transfers for ADLs:  Bed Mobility:  Scooting: Contact guard assistance     Transfers:  Sit to Stand: Contact guard assistance;Minimum assistance    Toilet Transfer : Contact guard assistance;Minimum assistance(grab bar)   Bathroom Mobility: Contact guard assistance;Minimum assistance    ADL Assessment:  Feeding: Minimum assistance    Oral Facial Hygiene/Grooming: Minimum assistance    Bathing: Moderate assistance    Upper Body Dressing: Moderate assistance    Lower Body Dressing: Moderate assistance    Toileting: Contact guard assistance     ADL Intervention:  Upper Body Dressing Assistance  Dressing Assistance: Moderate assistance  Orthotics(Brace): Moderate assistance  Hospital Gown: Moderate assistance    Lower Body Dressing Assistance  Dressing Assistance: Moderate assistance  Pants With Elastic Waist: Moderate assistance  Socks: Moderate assistance  Position Performed: Seated in chair    Cognitive Retraining  Orientation Retraining: Awareness of environment  Problem Solving: Awareness of environment;Deductive reason;General alternative solution  Executive Functions: Executing cognitive plans  Organizing/Sequencing: Breaking task down;Prioritizing; Two step sequence  Attention to Task: Single task  Maintains Attention For (Time): Greater than 10 minutes  Following Commands: Follows one step commands/directions  Safety/Judgement: Awareness of environment; Fall prevention;Good awareness of safety precautions; Insight into deficits    Pain:  Pain level pre-treatment: 2/10  Pain level post-treatment: 2/10  Pain Intervention(s): Medication administer by Nursing (see MAR); Rest, Repositioning   Response to intervention: Nurse notified, see doc flow sheet    Activity Tolerance:   Fair-. Patient able to stand ~5 minute(s). Patient able to complete ADLs with intermittent rest breaks. Patient limited by ROM, strength, balance. Patient unsteady. Please refer to the flowsheet for vital signs taken during this treatment.   After treatment:   [x]  Patient left in no apparent distress sitting up in chair  []  Patient sitting on EOB  []  Patient left in no apparent distress in bed  [x]  Call bell left within reach  [x] Nursing notified  []  Caregiver present  [x]  Ice applied  []  SCD's on while back in bed  [] Bed alarm activated    COMMUNICATION/EDUCATION:   Communication/Collaboration:  [x]       Role of Occupational Therapy in the acute care setting. [x]      Home safety education was provided and the patient/caregiver indicated understanding. [x]      Patient/family have participated as able in goal setting and plan of care. [x]      Patient/family agree to work toward stated goals and plan of care. []      Patient understands intent and goals of therapy, but is neutral about his/her participation. []      Patient is unable to participate in plan of care at this time. Thank you for this referral.  Tone Ryan, OTR/L  Time Calculation: 24 mins    Eval Complexity: History: MEDIUM Complexity : Expanded review of history including physical, cognitive and psychosocial  history ; Examination: LOW Complexity : 1-3 performance deficits relating to physical, cognitive , or psychosocial skils that result in activity limitations and / or participation restrictions ;    Decision Making:LOW Complexity : No comorbidities that affect functional and no verbal or physical assistance needed to complete eval tasks

## 2020-02-18 NOTE — PROGRESS NOTES
[address] Medicine    Orthopaedics  POD #1  Patient without new complaints status post Right elbow olecranon fracture ORIF. No new changes. His pain is well controlled. Feels well, eating breakfast. Will need HH care arranged if d/c home and discuss with his daughter since he does not have anyone at home. Denies chest pain, calf pain, or SOB. Visit Vitals  /82   Pulse 90   Temp 99.7 °F (37.6 °C)   Resp 16   Wt 127 kg (279 lb 14.4 oz)   SpO2 96%   BMI 41.33 kg/m²        Hemogram  Lab Results   Component Value Date/Time    WBC 7.5 02/18/2020 02:35 AM    RBC 3.46 (L) 02/18/2020 02:35 AM    HCT 30.2 (L) 02/18/2020 02:35 AM      Basic Metabolic Profile  Lab Results   Component Value Date     02/18/2020    CO2 26 02/18/2020    BUN 18 02/18/2020       PT/INR  Lab Results   Component Value Date    INR 1.1 02/16/2020       Physical exam:   Splint intact and clean, no signs of excessive drainage  Sling in appropriate position  Sensation to light touch intact BUE  NVI Bilateral upper Extremities. Calves soft and nontender. Assessment:  Status post Right elbow olecranon fracture ORIF,  progressing. PLAN:    Mobilize with P.T. - no use of right arm  Pain mgmt  Discharge Planning: per primary team, ok for d/c from surgical standpoint - Pt should f/u in our office outpatient in approx 2 weeks from DOS for post-op follow-up with Dr Priscila Lawrence; splint should remain on until post-op f/u; Patient can call our office at [phone] to schedule follow-up appt. We have offices located in [facility].

## 2020-02-18 NOTE — PROGRESS NOTES
Problem: Falls - Risk of  Goal: *Absence of Falls  Description  Document Tia Manus Fall Risk and appropriate interventions in the flowsheet.   Outcome: Progressing Towards Goal  Note: Fall Risk Interventions:  Mobility Interventions: Communicate number of staff needed for ambulation/transfer, Patient to call before getting OOB    Mentation Interventions: Adequate sleep, hydration, pain control    Medication Interventions: Assess postural VS orthostatic hypotension, Patient to call before getting OOB, Teach patient to arise slowly    Elimination Interventions: Call light in reach, Patient to call for help with toileting needs, Urinal in reach    History of Falls Interventions: Consult care management for discharge planning, Investigate reason for fall, Room close to nurse's station

## 2020-02-18 NOTE — PROGRESS NOTES
Problem: Falls - Risk of  Goal: *Absence of Falls  Description  Document Chloe Luong Fall Risk and appropriate interventions in the flowsheet. Outcome: Progressing Towards Goal  Note: Fall Risk Interventions:  Mobility Interventions: Assess mobility with egress test    Mentation Interventions: Adequate sleep, hydration, pain control    Medication Interventions: Teach patient to arise slowly    Elimination Interventions: Call light in reach    History of Falls Interventions:  Investigate reason for fall

## 2020-02-18 NOTE — PROGRESS NOTES
8461 -  Bedside and verbal shift change report given to Nan Ann RN (on coming nurse) by MADISON Lopez RN (off going nurse). Report included the following information SBAR, Kardex, OR Summary, Intake/Output and MAR.    1915-  Bedside and verbal shift change report given by JOHN Patrick (off going nurse) to KAYLA Bond RN(on coming nurse). Report included the following information SBAR, Kardex, OR Summary, Intake/Output and MAR.

## 2020-02-18 NOTE — PROGRESS NOTES
Problem: Mobility Impaired (Adult and Pediatric)  Goal: *Acute Goals and Plan of Care (Insert Text)  Description  Physical Therapy Goals   Initiated 2/18/2020 and to be accomplished within 5-7 day(s)  1. Patient will move from supine <> sit with S in prep for out of bed activity and change of position. 2.  Patient will perform sit<> stand with S with LRAD in prep for transfers/ambulation. 3.  Patient will transfer from bed <> chair with S with LRAD for time up in chair for completion of ADL activity. 4.  Patient will ambulate 150 feet with LRAD/S for improved functional mobility/safe discharge. 5.  Patient will ascend/descend 3-5 stairs with handrail with minimal assistance/contact guard assist for home re-entry as needed. Outcome: Progressing Towards Goal   PHYSICAL THERAPY EVALUATION    Patient: Alen Rdz (84 y.o. male)  Date: 2/18/2020  Primary Diagnosis: Olecranon fracture [S52.023A]  LEANDRA (acute kidney injury) ([facility] [I28.2]  Metabolic encephalopathy [S55.79]  Cellulitis [L03.90]  Procedure(s) (LRB):  RIGHT OLECRANON OPEN REDUCTION INTERNAL FIXATION WITH C-ARM (Right) 1 Day Post-Op   Precautions:  Fall, NWB(R UE)    ASSESSMENT :  Based on the objective data described below, the patient presents with decrease independence w/ bed mobility, transfers, gait, and step negotiation. Pt seen w/ nursing staff prior to session about to transfer into a chair. Pt has on a R arm splint. Pt reports PTA that he was using a RW to assist w/ mobility. Pt reports he is mostly at home alone as pt's dtr works during the day and granddaughter goes to school. Pt reports he is R hand dominant. Pt seen w/ OT for an additional set of skilled hands. Pt able to ambulate in the [facility] C[facility] for stability. Pt able to ambulate in the [facility] w/o any LOB noted, pt given VCs to stay midline in the hallway as pt tends to shift to the R side.  Pt transferred back to room where pt was left sitting in upright chair after session, OT present upon exiting, call bell and tray in reach, nurse notified after session. Patient will benefit from skilled intervention to address the above impairments. Patients rehabilitation potential is considered to be Good  Factors which may influence rehabilitation potential include:   []         None noted  []         Mental ability/status  []         Medical condition  []         Home/family situation and support systems  []         Safety awareness  []         Pain tolerance/management  []         Other:      PLAN :  Recommendations and Planned Interventions:  []           Bed Mobility Training             []    Neuromuscular Re-Education  []           Transfer Training                   []    Orthotic/Prosthetic Training  []           Gait Training                          []    Modalities  []           Therapeutic Exercises          []    Edema Management/Control  []           Therapeutic Activities            []    Patient and Family Training/Education  []           Other (comment):    Frequency/Duration: Patient will be followed by physical therapy 1-2 times per day to address goals. Discharge Recommendations: Rehab  Further Equipment Recommendations for Discharge: N/A     SUBJECTIVE:   Patient stated I feel pretty okay, not too much pain in the arm.     OBJECTIVE DATA SUMMARY:     Past Medical History:   Diagnosis Date    Cancer ([facility]     Diabetes ([facility]     FH: chemotherapy     H/O stem cell transplant ([facility]     Hypertension     Multiple myeloma ([facility]     Neuropathy      Past Surgical History:   Procedure Laterality Date    HX CHOLECYSTECTOMY      HX KYPHOPLASTY       Barriers to Learning/Limitations: yes;  physical  Compensate with: Verbal Cues and Tactile Cues  Prior Level of Function/Home Situation:   Home Situation  Home Environment: Private residence  # Steps to Enter: 2  One/Two Story Residence: Two story  # of Interior Steps: 14  Interior Rails: Left  Living Alone: No  Support Systems: Child(desiree), Family member(s)  Patient Expects to be Discharged to[de-identified] Private residence  Current DME Used/Available at Home: Maurice Highman, straight, Walker, rolling  Critical Behavior:  Neurologic State: Alert  Orientation Level: Oriented X4  Cognition: Appropriate decision making; Appropriate for age attention/concentration; Appropriate safety awareness; Follows commands  Safety/Judgement: Awareness of environment;Good awareness of safety precautions  Strength:    Strength: Generally decreased, functional  Tone & Sensation:   Tone: Normal  Sensation: Intact  Range Of Motion:  AROM: Generally decreased, functional  Functional Mobility:  Bed Mobility:  Scooting: Contact guard assistance  Transfers:  Sit to Stand: Contact guard assistance;Minimum assistance  Stand to Sit: Contact guard assistance;Minimum assistance  Balance:   Sitting: Intact  Standing: Intact; With support  Ambulation/Gait Training:  Distance (ft): 80 Feet (ft)  Assistive Device: Brace/Splint;Cane, straight;Gait belt  Ambulation - Level of Assistance: Contact guard assistance  Gait Description (WDL): Exceptions to WDL  Gait Abnormalities: Altered arm swing;Decreased step clearance; Step to gait  Right Side Weight Bearing: Non-weight bearing(R UE)  Base of Support: Widened  Speed/[place]: Slow  Step Length: Left shortened;Right shortened  Pain:  Pain Scale 1: Numeric (0 - 10)  Pain Intensity 1: 3  Pain Location 1: Arm  Pain Orientation 1: Right  Pain Description 1: Aching  Pain Intervention(s) 1: Rest  Activity Tolerance:   Fair  Please refer to the flowsheet for vital signs taken during this treatment.   After treatment:   [x]         Patient left in no apparent distress sitting up in chair  []         Patient left in no apparent distress in bed  [x]         Call bell left within reach  [x]         Nursing notified  []         Caregiver present  []         Bed alarm activated    COMMUNICATION/EDUCATION:   [x]         Fall prevention education was provided and the patient/caregiver indicated understanding. [x]         Patient/family have participated as able in goal setting and plan of care. [x]         Patient/family agree to work toward stated goals and plan of care. []         Patient understands intent and goals of therapy, but is neutral about his/her participation. []         Patient is unable to participate in goal setting and plan of care.     Thank you for this referral.  Roque Plaza, PT   Time Calculation: 23 mins   Eval Complexity: History: HIGH Complexity :3+ comorbidities / personal factors will impact the outcome/ POC Exam:LOW Complexity : 1-2 Standardized tests and measures addressing body structure, function, activity limitation and / or participation in recreation  Presentation: LOW Complexity : Stable, uncomplicated  Clinical Decision Making:Low Complexity ambulate >30ft  Overall Complexity:LOW

## 2020-02-18 NOTE — PROGRESS NOTES
Problem: Dysphagia (Adult)  Goal: *Acute Goals and Plan of Care (Insert Text)  Description  Dysphagia Present:   No    Recommendations:  Diet: Regular/thin  Meds: Per patient preference    Patient will:  1. Participate in training and education related to continued aspiration risk, diet recs and compensatory strategies (goal met). Outcome: Resolved/Met    SPEECH LANGUAGE PATHOLOGY BEDSIDE SWALLOW EVALUATION AND DISCHARGE    Patient: Rena Zheng (73 y.o. male)  Date: 2/18/2020  Primary Diagnosis: Olecranon fracture [S52.023A]  LEANDRA (acute kidney injury) ([facility] [E74.0]  Metabolic encephalopathy [V05.69]  Cellulitis [L03.90]  Procedure(s) (LRB):  RIGHT OLECRANON OPEN REDUCTION INTERNAL FIXATION WITH C-ARM (Right) 1 Day Post-Op   Precautions: None     PLOF: Independent    ASSESSMENT :  Clinical beside swallow eval completed per MD orders. Pt A&Ox4. Functional communication. Intelligibility >90%. Cognitive-linguistic function appears intact. Oral-motor exam revealed pt with incomplete dentition; however, all other structures grossly intact for mastication and deglutition. Presented with thin liquid, puree, and solid trials. Exhibited + bolus cohesion, manipulation and A-P transit. Further exhibited + swallow timing/reflex and hyolaryngeal excursion. Pt able to manipulate and clear with 0 clinical s/s aspiration and/or oropharyngeal dysphagia. Pt safe to continue regular solid, thin liquid diet. 0 formal ST needs for dysphagia indicated at this time. SLP educated pt on role of speech therapist in current setting with re: speech/swallow; verbalized comprehension. SLP available for re-evaluation if indicated by MD.     Thank you for this referral.   Emmy Shaw, SLP     PLAN :  Recommendations and Planned Interventions:  No formal ST needs ID'd for dysphagia. Eval only. Discharge Recommendations: None     SUBJECTIVE:   Patient stated I don't have any trouble eating.     OBJECTIVE:     Past Medical History: Diagnosis Date    Cancer ([facility]     Diabetes ([facility]     FH: chemotherapy     H/O stem cell transplant ([facility]     Hypertension     Multiple myeloma ([facility]     Neuropathy      Past Surgical History:   Procedure Laterality Date    HX CHOLECYSTECTOMY      HX KYPHOPLASTY       Home Situation:   Home Situation  Home Environment: Private residence  One/Two Story Residence: Two story  Living Alone: No  Support Systems: Family member(s)  Patient Expects to be Discharged to[de-identified] Private residence  Current DME Used/Available at Home: Glucometer, Durwood Mends, straight, Walker    Diet prior to admission: Regular/thin  Current Diet:  Regular/thin     Cognitive and Communication Status:  Neurologic State: Alert  Orientation Level: Oriented X4  Cognition: Appropriate decision making, Appropriate for age attention/concentration, Appropriate safety awareness, Follows commands  Perception: Appears intact  Perseveration: No perseveration noted  Safety/Judgement: Awareness of environment, Good awareness of safety precautions  Oral Assessment:  Oral Assessment  Labial: No impairment  Dentition: Natural;Extractions;Partials (comment)  Oral Hygiene: Fair  Lingual: No impairment  Velum: No impairment  Mandible: No impairment  P.O. Trials:  Patient Position: [facility]  Vocal quality prior to P.O.: No impairment  Consistency Presented: Thin liquid;Puree; Solid  How Presented: Self-fed/presented;Straw;Successive swallows;Spoon     Bolus Acceptance: No impairment  Bolus Formation/Control: No impairment     Propulsion: No impairment  Oral Residue: None  Initiation of Swallow: No impairment  Laryngeal Elevation: Functional  Aspiration Signs/Symptoms: None  Pharyngeal Phase Characteristics: No impairment, issues, or problems   Effective Modifications: Small sips and bites  Cues for Modifications: None       Oral Phase Severity: No impairment  Pharyngeal Phase Severity : No impairment    PAIN:  Pain level pre-treatment: 0/10   Pain level post-treatment: 0/10 After evaluation:   []            Patient left in no apparent distress sitting up in chair  [x]            Patient left in no apparent distress in bed  [x]            Call bell left within reach  []            Nursing notified  []            Family present  []            Caregiver present  []            Bed alarm activated      COMMUNICATION/EDUCATION:   [x]            Aspiration precautions; swallow safety; compensatory techniques. [x]            Patient/family have participated as able in goal setting and plan of care. [x]            Patient/family agree to work toward stated goals and plan of care. []            Patient understands intent and goals of therapy; neutral about participation. []            Patient unable to participate in goal setting/plan of care; educ ongoing with interdisciplinary staff  []         Posted safety precautions in patient's room.     Thank you for this referral.  Dru Ernst, SLP  Time Calculation: 10 mins

## 2020-02-19 VITALS
BODY MASS INDEX: 41.33 KG/M2 | HEART RATE: 79 BPM | WEIGHT: 279.9 LBS | OXYGEN SATURATION: 100 % | DIASTOLIC BLOOD PRESSURE: 54 MMHG | SYSTOLIC BLOOD PRESSURE: 101 MMHG | TEMPERATURE: 99.6 F | RESPIRATION RATE: 16 BRPM

## 2020-02-19 LAB
ANION GAP SERPL CALC-SCNC: 7 MMOL/L (ref 3–18)
BASOPHILS # BLD: 0 K/UL (ref 0–0.1)
BASOPHILS NFR BLD: 0 % (ref 0–2)
BUN SERPL-MCNC: 16 MG/DL (ref 7–18)
BUN/CREAT SERPL: 21 (ref 12–20)
CALCIUM SERPL-MCNC: 8.5 MG/DL (ref 8.5–10.1)
CHLORIDE SERPL-SCNC: 102 MMOL/L (ref 100–111)
CO2 SERPL-SCNC: 28 MMOL/L (ref 21–32)
CREAT SERPL-MCNC: 0.75 MG/DL (ref 0.6–1.3)
DIFFERENTIAL METHOD BLD: ABNORMAL
EOSINOPHIL # BLD: 0.3 K/UL (ref 0–0.4)
EOSINOPHIL NFR BLD: 3 % (ref 0–5)
ERYTHROCYTE [DISTWIDTH] IN BLOOD BY AUTOMATED COUNT: 14.9 % (ref 11.6–14.5)
GLUCOSE BLD STRIP.AUTO-MCNC: 165 MG/DL (ref 70–110)
GLUCOSE BLD STRIP.AUTO-MCNC: 171 MG/DL (ref 70–110)
GLUCOSE SERPL-MCNC: 124 MG/DL (ref 74–99)
HCT VFR BLD AUTO: 30.1 % (ref 36–48)
HGB BLD-MCNC: 10 G/DL (ref 13–16)
LYMPHOCYTES # BLD: 2.2 K/UL (ref 0.9–3.6)
LYMPHOCYTES NFR BLD: 28 % (ref 21–52)
MAGNESIUM SERPL-MCNC: 1.5 MG/DL (ref 1.6–2.6)
MCH RBC QN AUTO: 28.9 PG (ref 24–34)
MCHC RBC AUTO-ENTMCNC: 33.2 G/DL (ref 31–37)
MCV RBC AUTO: 87 FL (ref 74–97)
MONOCYTES # BLD: 1.2 K/UL (ref 0.05–1.2)
MONOCYTES NFR BLD: 15 % (ref 3–10)
NEUTS SEG # BLD: 4.2 K/UL (ref 1.8–8)
NEUTS SEG NFR BLD: 54 % (ref 40–73)
PLATELET # BLD AUTO: 139 K/UL (ref 135–420)
PMV BLD AUTO: 11 FL (ref 9.2–11.8)
POTASSIUM SERPL-SCNC: 3.7 MMOL/L (ref 3.5–5.5)
RBC # BLD AUTO: 3.46 M/UL (ref 4.7–5.5)
SODIUM SERPL-SCNC: 137 MMOL/L (ref 136–145)
WBC # BLD AUTO: 7.9 K/UL (ref 4.6–13.2)

## 2020-02-19 PROCEDURE — 82962 GLUCOSE BLOOD TEST: CPT

## 2020-02-19 PROCEDURE — 74011636637 HC RX REV CODE- 636/637: Performed by: HOSPITALIST

## 2020-02-19 PROCEDURE — 97116 GAIT TRAINING THERAPY: CPT

## 2020-02-19 PROCEDURE — 85025 COMPLETE CBC W/AUTO DIFF WBC: CPT

## 2020-02-19 PROCEDURE — 83735 ASSAY OF MAGNESIUM: CPT

## 2020-02-19 PROCEDURE — 36415 COLL VENOUS BLD VENIPUNCTURE: CPT

## 2020-02-19 PROCEDURE — 74011250636 HC RX REV CODE- 250/636: Performed by: HOSPITALIST

## 2020-02-19 PROCEDURE — 74011250637 HC RX REV CODE- 250/637: Performed by: HOSPITALIST

## 2020-02-19 PROCEDURE — 80048 BASIC METABOLIC PNL TOTAL CA: CPT

## 2020-02-19 PROCEDURE — 97110 THERAPEUTIC EXERCISES: CPT

## 2020-02-19 RX ORDER — OXYCODONE AND ACETAMINOPHEN 5; 325 MG/1; MG/1
1 TABLET ORAL
Qty: 6 TAB | Refills: 0 | Status: SHIPPED | OUTPATIENT
Start: 2020-02-19 | End: 2020-02-20

## 2020-02-19 RX ADMIN — GABAPENTIN 800 MG: 400 CAPSULE ORAL at 10:18

## 2020-02-19 RX ADMIN — LISINOPRIL 5 MG: 5 TABLET ORAL at 10:18

## 2020-02-19 RX ADMIN — ISOSORBIDE MONONITRATE 30 MG: 30 TABLET, EXTENDED RELEASE ORAL at 10:18

## 2020-02-19 RX ADMIN — HEPARIN SODIUM 5000 UNITS: 5000 INJECTION INTRAVENOUS; SUBCUTANEOUS at 05:11

## 2020-02-19 RX ADMIN — DOCUSATE SODIUM 100 MG: 100 CAPSULE, LIQUID FILLED ORAL at 10:18

## 2020-02-19 RX ADMIN — ASPIRIN 81 MG 81 MG: 81 TABLET ORAL at 10:18

## 2020-02-19 RX ADMIN — METOPROLOL SUCCINATE 25 MG: 25 TABLET, EXTENDED RELEASE ORAL at 10:18

## 2020-02-19 RX ADMIN — INSULIN LISPRO 2 UNITS: 100 INJECTION, SOLUTION INTRAVENOUS; SUBCUTANEOUS at 10:19

## 2020-02-19 RX ADMIN — FUROSEMIDE 40 MG: 40 TABLET ORAL at 10:18

## 2020-02-19 NOTE — PROGRESS NOTES
Received bedside report from [address] [place] [address]. Pt Aox4, Tele box #42, RA, Ace bandage/sling to Rt. arm, pt resting in bed/bed in low position, wheels locked/white board updated.

## 2020-02-19 NOTE — DISCHARGE INSTRUCTIONS
Patient Education        Broken Arm: Care Instructions  Your Care Instructions  Fractures can range from a small, hairline crack, to a bone or bones broken into two or more pieces. Your treatment depends on how bad the break is. Your doctor may have put your arm in a splint or cast to allow it to heal or to keep it stable until you see another doctor. It may take weeks or months for your arm to heal. You can help your arm heal with some care at home. You heal best when you take good care of yourself. Eat a variety of healthy foods, and don't smoke. You may have had a sedative to help you relax. You may be unsteady after having sedation. It can take a few hours for the medicine's effects to wear off. Common side effects of sedation include nausea, vomiting, and feeling sleepy or tired. The doctor has checked you carefully, but problems can develop later. If you notice any problems or new symptoms, get medical treatment right away. Follow-up care is a key part of your treatment and safety. Be sure to make and go to all appointments, and call your doctor if you are having problems. It's also a good idea to know your test results and keep a list of the medicines you take. How can you care for yourself at home? · If the doctor gave you a sedative:  ? For 24 hours, don't do anything that requires attention to detail, such as going to work, making important decisions, or signing any legal documents. It takes time for the medicine's effects to completely wear off.  ? For your safety, do not drive or operate any machinery that could be dangerous. Wait until the medicine wears off and you can think clearly and react easily. · Put ice or a cold pack on your arm for 10 to 20 minutes at a time. Try to do this every 1 to 2 hours for the next 3 days (when you are awake). Put a thin cloth between the ice and your cast or splint. Keep the cast or splint dry. · Follow the cast care instructions your doctor gives you.  If you have a splint, do not take it off unless your doctor tells you to. · Be safe with medicines. Take pain medicines exactly as directed. ? If the doctor gave you a prescription medicine for pain, take it as prescribed. ? If you are not taking a prescription pain medicine, ask your doctor if you can take an over-the-counter medicine. · Prop up your arm on pillows when you sit or lie down in the first few days after the injury. Keep the arm higher than the level of your heart. This will help reduce swelling. · Follow instructions for exercises to keep your arm strong. · Wiggle your fingers and wrist often to reduce swelling and stiffness. When should you call for help? Call 911 anytime you think you may need emergency care. For example, call if:    · You are very sleepy and you have trouble waking up.    Call your doctor now or seek immediate medical care if:    · You have new or worse nausea or vomiting.     · You have new or worse pain.     · Your hand or fingers are cool or pale or change color.     · Your cast or splint feels too tight.     · You have tingling, weakness, or numbness in your hand or fingers.    Watch closely for changes in your health, and be sure to contact your doctor if:    · You do not get better as expected.     · You have problems with your cast or splint. Where can you learn more? Go to http://patrick-avril.info/. Enter U678 in the search box to learn more about \"Broken Arm: Care Instructions. \"  Current as of: June 26, 2019  Content Version: 12.2  © 4998-8596 [employer]. Care instructions adapted under license by [employer] (which disclaims liability or warranty for this information). If you have questions about a medical condition or this instruction, always ask your healthcare professional. [address] any warranty or liability for your use of this information.

## 2020-02-19 NOTE — PROGRESS NOTES
1915 - Assumed care at this time. 2037 - Patient A&Ox4, RA. Denies chest pain and SOB. SCD compression device bilaterally. Denies numbness/tingling/calf pain. Pain 0/10. ACE dressing to right arm CDI, sling in place. Pt educated on IS use, and pain management. Pt verbalized understanding, no concerns voiced. Call bell within reach, bed in lowest position. Pt encouraged to call for assistance.

## 2020-02-19 NOTE — PROGRESS NOTES
Pt has been accepted to [facility] for admission today. CM contacted pt's daughter, Leatha Mercado ([phone]), to discuss transition of care and she is aware and in agreement. Pt's daughter has requested medical transport and is aware of potential bill. An LTSS/UAI screen has been completed and a copy has been given to the pt. [facility] care to SNF: [facility]     Communication to Patient/Family:  Met with patient and family, and they are agreeable to the transition plan. The Plan for Transition of Care is related to the following treatment goals: SNF    The Patient and/or patient representative Leatha Mercado was provided with a choice of provider and agrees   with the discharge plan. Yes [x] No []    Freedom of choice list was provided with basic dialogue that supports the patient's individualized plan of care/goals and shares the quality data associated with the providers. Yes [x] No []    SNF/Rehab Transition:  Patient has been accepted to [address] [place] [address],[address]. and meets criteria for admission. Patient will transported by medical transport and expected to leave at 2:00pm.    Communication to SNF/Rehab:  Bedside RN,  has been notified to update the transition plan to the facility and call report [phone]    Discharge information has been updated on the AVS and sent via [facility] and/or CC link. Discharge instructions to be fax'd to facility at [phone]     Please include all hard scripts for controlled substances, med rec and dc summary, and AVS in packet. Please medicate for pain prior to dc if possible and needed to help offset delay when patient first arrives to facility.     Reviewed and confirmed with facility, [facility], can manage the patient care needs for the following:     Shea Lunch with (X) only those applicable:  Medication:  []Medications are available at the facility  []IV Antibiotics    []Controlled Substance - hard copies available sent. []Weekly Labs    Equipment:  []CPAP/BiPAP  []Wound Vacuum  []Roberts or Urinary Device  []PICC/Central Line  []Nebulizer  []Ventilator    Treatment:  []Isolation (for MRSA, VRE, etc.)  []Surgical Drain Management  []Tracheostomy Care  []Dressing Changes  []Dialysis with transportation  []PEG Care  []Oxygen  []Daily Weights for Heart Failure    Dietary:  []Any diet limitations  []Tube Feedings   []Total Parenteral Management (TPN)    Financial Resources:  []Medicaid Application Completed    [x]UAI Completed and copy given to pt/family. []A screening has previously been completed. []Level II Completed    [] Private pay individual who will not become financially eligible for Medicaid within 6 months from admission to a [address] [place] [address]. [] Individual refused to have screening conducted. []Medicaid Application Completed    []The screening denied because it was determined individual did not need/did not qualify for nursing facility level of care. [] Out of state residents seeking direct admission to a [address] facility. [] Individuals who are inpatients of an out of state hospital, or in state or out of state veterans/ hospital and seek direct admission to a [address] facility  [] Individuals who are pateints or residents of a state owned/operated facility that is licensed by Department of [employer] ([employer]) and seek direct admission to [address] [place] [address]  [] A screening not required for enrollment in [address] [address] [address] services as set out in [address] [place] [address] 54-  [] [facility]) staff shall perform screenings of the [facility] clients. Advanced Care Plan:  []Surrogate Decision Maker of Care  []POA  []Communicated Code Status and copy sent. Other:         Care Management Interventions  PCP Verified by CM:  Yes  Transition of Care Consult (CM Consult): Discharge Planning, SNF  Partner SNF: Yes  Health Maintenance Reviewed: Yes  Physical Therapy Consult: Yes  Occupational Therapy Consult: Yes  Speech Therapy Consult: Yes  Current Support Network: Relative's Home  Confirm Follow Up Transport: Family  The Plan for Transition of Care is Related to the Following Treatment Goals :  [facility]  The Patient and/or Patient Representative was Provided with a Choice of Provider and Agrees with the Discharge Plan?: Yes  Name of the Patient Representative Who was Provided with a Choice of Provider and Agrees with the Discharge Plan: Cristian Light  [place] of Choice List was Provided with Basic Dialogue that Supports the Patient's Individualized Plan of Care/Goals, Treatment Preferences and Shares the Quality Data Associated with the Providers?: Yes  Discharge Location  Discharge Placement: Skilled nursing facility([facility])

## 2020-02-19 NOTE — DISCHARGE SUMMARY
Discharge Summary    Patient: Iraida Finn MRN: [id number]  CSN: [id number]    YOB: 1940  Age: 70 y.o. Sex: male    DOA: 2/16/2020 LOS:  LOS: 3 days   Discharge Date:      Primary Care Provider:  Issa Mahan MD    Admission Diagnoses: Olecranon fracture [S52.023A]  LEANDRA (acute kidney injury) ([facility] [E47.4]  Metabolic encephalopathy [D99.12]  Cellulitis [O36.31]    Discharge Diagnoses:    Hospital Problems  Date Reviewed: 2/17/2020          Codes Class Noted POA    Morbid obesity with BMI of 40.0-44.9, adult ([facility] ICD-10-CM: E66.01, Z68.41  ICD-9-CM: 278.01, V85.41  2/18/2020 Unknown        Morbid obesity ([facility] ICD-10-CM: E66.01  ICD-9-CM: 278.01  2/17/2020 Unknown        Metabolic encephalopathy OJ-67-GP: G93.41  ICD-9-CM: 348.31  2/16/2020 Unknown        LEANDRA (acute kidney injury) ([facility] ICD-10-CM: N17.9  ICD-9-CM: 584.9  2/16/2020 Unknown        * (Principal) Olecranon fracture ICD-10-CM: L25.330Q  ICD-9-CM: 813.01  2/16/2020 Unknown        Multiple myeloma ([facility] ICD-10-CM: C90.00  ICD-9-CM: 203.00  2/16/2020 Unknown        Hypertension ICD-10-CM: I10  ICD-9-CM: 401.9  2/16/2020 Unknown        Type 2 diabetes mellitus ([facility] ICD-10-CM: E11.9  ICD-9-CM: 250.00  2/12/2020 Yes              Discharge Condition: stable     Discharge Medications:     Current Discharge Medication List      START taking these medications    Details   oxyCODONE-acetaminophen (PERCOCET) 5-325 mg per tablet Take 1 Tab by mouth every four (4) hours as needed for Pain for up to 1 day. Max Daily Amount: 6 Tabs. Qty: 6 Tab, Refills: 0    Associated Diagnoses: Olecranon fracture, right, closed, initial encounter         CONTINUE these medications which have NOT CHANGED    Details   furosemide (LASIX) 40 mg tablet Take 1 Tab by mouth daily. Qty: 30 Tab, Refills: 0      isosorbide mononitrate ER (IMDUR) 30 mg tablet Take 1 Tab by mouth daily.   Qty: 30 Tab, Refills: 0      metoprolol succinate (TOPROL-XL) 25 mg XL tablet Take 1 Tab by mouth daily. Qty: 30 Tab, Refills: 0      pravastatin (PRAVACHOL) 40 mg tablet Take 1 Tab by mouth nightly. Qty: 30 Tab, Refills: 0      aspirin 81 mg chewable tablet Take 81 mg by mouth daily. metFORMIN (GLUCOPHAGE) 1,000 mg tablet Take 1,000 mg by mouth two (2) times daily (with meals). gabapentin (NEURONTIN) 400 mg capsule Take 800 mg by mouth two (2) times a day. SITagliptin (JANUVIA) 100 mg tablet Take 100 mg by mouth daily. vit B Cmplx 3-FA-Vit C-Biotin (NEPHRO LILIANA RX) 1- mg-mg-mcg tablet Take 1 Tab by mouth daily. pioglitazone (ACTOS) 30 mg tablet Take 30 mg by mouth daily. lisinopril (PRINIVIL, ZESTRIL) 5 mg tablet Take 1 Tab by mouth daily. Qty: 30 Tab, Refills: 0      calcium-cholecalciferol, d3, (CALCIUM 600 + D) 600-125 mg-unit tab Take  by mouth daily. Procedures : Open reduction and internal fixation of right olecranon     Consults: Cardiology and Orthopedics      PHYSICAL EXAM   Visit Vitals  /58 (BP 1 Location: Left arm, BP Patient Position: At rest;Sitting)   Pulse 73   Temp 100 °F (37.8 °C)   Resp 17   Wt 127 kg (279 lb 14.4 oz)   SpO2 98%   BMI 41.33 kg/m²     General: Awake, cooperative, no acute distress    HEENT: NC, Atraumatic. PERRLA, EOMI. Anicteric sclerae. Lungs:  CTA Bilaterally. No Wheezing/Rhonchi/Rales. Heart:  Regular  rhythm,  No murmur, No Rubs, No Gallops  Abdomen: Soft, Non distended, Non tender. +Bowel sounds,   Extremities: No c/c.rt arm wrapped with gauze, sling noted   Psych:   Not anxious or agitated. Neurologic:  No acute neurological deficits. Admission HPI :  Tirso Dominguez is a 70 y.o. male with PMHX of diabetes, hypertension, CHF, recent d/c home due chest pain came to ER due to weakness and fell. Patient reported that he had some dizziness with slurred speech on Saturday and resolved. He had kyphoplasty in Jan, 2014, he is received physical therapist at home.  He was d/c on Feb 14,2019 due to chest pain. He refused to have card cath and was dc home with medical treatment. When he stood up from a chair, he fell at home after arrived home. Did not lost his consciousness after the fall, but he felt pain in the elbow. MRI brain in ER which is negative for stroke and he was cleared to be d/c per Dr. Lauren Baptiste. However,   X-ray of his elbow indicated: displayed Olecrona  fracture . Dr. Darcy Daugherty, orthopedics was called recommended to admit to medicinal team for medical clearance. Patient has recent hospitalization for CHF exacerbation. He denies any shortness of breath and chest pain during her interview. He had a stress test done last week which qas abnormal but negative for ischemia changes. He also was found creatinine elevated at this point. Hospital Course :   Vamshi Niño is a 70 y.o. male with PMHX of diabetes, hypertension, CHF was admitted due to [facility]  fracture and jimy. Orthopedics was on board. Cardiologist was on board for clearance. Open reduction and internal fixation of right olecranon per Dr. Cheron Cowden and he tolerated the procedure very well. Ortho recommended to keep arm sling till f/u appointment, not using left arm. He also received iv hydration for his jimy and jimy resolved before surgery. His chf is stable during his stay. His diabetics was controlled per insulin     Discharge planning discussed with patient and family, agree with the plan and no questions and concerns at this point. Activity: Activity as tolerated    Diet: Cardiac Diet and Diabetic Diet    Follow-up: PCP .  Dr. Cheron Cowden     Disposition: rehab     Minutes spent on discharge: 45 min       Labs: Results:       Chemistry Recent Labs     02/19/20  0306 02/18/20  0235 02/17/20  0350   * 112* 129*    138 137   K 3.7 3.9 3.6    106 104   CO2 28 26 26   BUN 16 18 30*   CREA 0.75 0.70 0.89   CA 8.5 8.6 8.5   AGAP 7 6 7   BUCR 21* 26* 34*      CBC w/Diff Recent Labs 02/19/20  0306 02/18/20  0235 02/17/20  0350   WBC 7.9 7.5 7.9   RBC 3.46* 3.46* 3.75*   HGB 10.0* 10.0* 10.7*   HCT 30.1* 30.2* 32.6*    128* 139   GRANS 54 55 58   LYMPH 28 28 27   EOS 3 3 2      Cardiac Enzymes No results for input(s): CPK, CKND1, YEFRI in the last 72 hours. No lab exists for component: CKRMB, TROIP   Coagulation Recent Labs     02/16/20  1645   PTP 14.2   INR 1.1   APTT 21.2*       Lipid Panel Lab Results   Component Value Date/Time    Cholesterol, total 123 02/12/2020 01:50 PM    HDL Cholesterol 69 (H) 02/12/2020 01:50 PM    LDL, calculated 42.8 02/12/2020 01:50 PM    VLDL, calculated 11.2 02/12/2020 01:50 PM    Triglyceride 56 02/12/2020 01:50 PM    CHOL/HDL Ratio 1.8 02/12/2020 01:50 PM      BNP No results for input(s): BNPP in the last 72 hours. Liver Enzymes No results for input(s): TP, ALB, TBIL, AP, SGOT, GPT in the last 72 hours. No lab exists for component: DBIL   Thyroid Studies No results found for: T4, T3U, TSH, TSHEXT         Significant Diagnostic Studies: Xr Elbow Rt Ap/lat    Result Date: 2/16/2020  EXAM: Right elbow INDICATION: Fall with right elbow pain COMPARISON: None. _______________ FINDINGS: AP and lateral views were performed. There is a comminuted widely displaced fracture of the olecranon process of the ulna. No other definite fractures are seen on this limited exam. Prominent soft tissue swelling and displaced fat pads. _______________     IMPRESSION: 1. Comminuted widely displaced fracture of the olecranon process of the ulna. No other definite fractures. Mri Brain Wo Cont    Result Date: 2/16/2020  EXAM: MRI BRAIN W/O CONTRAST INDICATION: Slurred speech COMPARISON: No prior study TECHNIQUE: Multiplanar multi sequence MR imaging of the brain was performed utilizing axial T2, FLAIR, diffusion, T2 gradient echo, and multiplanar T1 pre contrast imaging.   No gadolinium was administered due to specific clinician request. _______________________ FINDINGS: Motion artifact is present which limits evaluation. VENTRICLES/EXTRA-AXIAL SPACES: The ventricles and sulci are within normal limits for patient's stated age in their size and configuration. BRAIN PARENCHYMA: Very mild amount of T2/FLAIR hyperintense white matter lesions are seen within the periventricular and subcortical white matter , which are nonspecific, but likely represent chronic small vessel changes. No evidence of intracranial mass effect, midline shift, or herniation. No abnormal restricted diffusion to suggest acute infarct. No susceptibility artifact to suggest intraparenchymal hemorrhage. VASCULATURE:  The major intracranial vascular flow voids are grossly normal. ORBITS: The bilateral lenses are absent, likely due to prior lens replacement. PARANASAL SINUSES: Visualized paranasal sinuses are clear. MASTOID AIR CELLS: Visualized mastoid air cells are clear. OSSEOUS STRUCTURES: Degenerative changes are seen in visualized upper cervical spine. Small T1 hypointense lesion is present in the C2 vertebral body measuring approximately 7 mm in diameter. This finding may abut the inferior endplate. Finding is not currently seen on coronal T2 imaging and is not in field-of-view on recent CT head study. No similar marrow lesions are seen elsewhere in the calvarium or skull base. OTHER: Metallic artifact is present in the axial fascial region, likely from dental hardware.  ________________________     IMPRESSION: Mildly motion degraded study. 1.  No acute infarct, hemorrhage, mass effect, or herniation. 2.  Very mild nonspecific white matter disease likely representing chronic small vessel changes. 3. Small subcentimeter C2 vertebral body lesion, which is nonspecific. Finding could represent a Schmorl's node given possible continuity with the inferior endplate. Atypical vertebral hemangioma is possible.  Metastatic lesion cannot excluded if there is known primary carcinoma (such history is not provided). If there is known primary carcinoma, consider whole body bone scan to evaluate for potential additional lesions. Preliminary report provided by the radiologist, Dr. Wilda Gracia, 9:05 AM 2/16/2020. Ct Head Wo Cont    Result Date: 2/16/2020  EXAM: CT head INDICATION: Multiple falls since last week. Slurred speech. COMPARISON: 7/10/2017 TECHNIQUE: Axial scanning was performed through the head without intravenous contrast.  Sagittal and coronal reconstructions were created from the axial data. One or more dose reduction techniques were used on this CT: automated exposure control, adjustment of the mAs and/or kVp according to patient size, and iterative reconstruction techniques. The specific techniques used on this CT exam have been documented in the patient's electronic medical record. Digital Imaging and Communications in Medicine (DICOM) format image data are available to nonaffiliated external healthcare facilities or entities on a secure, media free, reciprocally searchable basis with patient authorization for at least a 12-month period after this study. _______________ FINDINGS: BRAIN AND POSTERIOR FOSSA: Ventricles, cisterns and sulci are within normal range. No intracranial hemorrhage, mass effect, or midline shift. No areas of abnormal parenchymal attenuation. EXTRA-AXIAL SPACES AND MENINGES: No extracerebral collections. CALVARIUM: Intact. SINUSES: Visualized portions are clear. OTHER: None. No change. _______________     IMPRESSION: Negative CT scan of the head without intracranial hemorrhage, infarct or mass effect. Xr Chest Port    Result Date: 2/16/2020  EXAM: CHEST RADIOGRAPH, SINGLE VIEW CLINICAL INDICATION/HISTORY: stroke eval      <Additional:  Multiple falls since last week. Slurred speech.  COMPARISON: 2/12/2020 TECHNIQUE: Portable frontal view of the chest was obtained. _______________ FINDINGS: SUPPORT DEVICES: Right jugular Mediport catheter is present, unchanged, with its tip projecting at the [facility]. HEART AND MEDIASTINUM: Cardiac silhouette is within normal range in size. Thoracic aorta is mildly tortuous. LUNGS AND PLEURAL SPACES: Pulmonary vessels are normal.  Lungs are clear. Costophrenic angles are sharp. No pneumothorax. BONY THORAX AND SOFT TISSUES: No acute osseous abnormality. No acute change. _______________     IMPRESSION: No active cardiopulmonary disease. Xr Chest Port    Result Date: 2/12/2020  EXAM: XR CHEST PORT CLINICAL INDICATION/HISTORY: CP -Additional: None COMPARISON: None TECHNIQUE: Portable frontal view of the chest _______________ FINDINGS: SUPPORT DEVICES: Right IJ approach Mediport catheter with tip projecting over the superior cavoatrial junction. HEART AND MEDIASTINUM: Normal cardiac size and mediastinal contours. LUNGS AND PLEURAL SPACES: No focal pneumonic opacity. No evidence of pneumothorax or pleural effusion. BONY THORAX AND SOFT TISSUES: Unremarkable. _______________     IMPRESSION: 1. Right IJ approach Mediport catheter in appropriate position. 2. No superimposed acute radiographic abnormality. Alessandra Broch[employer] Technologist Service    Result Date: 2/18/2020  See impression. Impression:  Fluoroscopy was provided for this procedure under the supervision and/or direction of the attending provider. ? For further information regarding this procedure, see patient medical record.              [facility]     CC: Royer Foreman MD

## 2020-02-19 NOTE — PROGRESS NOTES
TRANSFER - OUT REPORT:    Verbal report given to Gerald Howard LPN(name) on Nubia Mcbride  being transferred to [facility](unit) for routine progression of care       Report consisted of patients Situation, Background, Assessment and   Recommendations(SBAR). Information from the following report(s) SBAR, Kardex, [facility] and Cardiac Rhythm SR was reviewed with the receiving nurse. Lines:   Peripheral IV 02/16/20 Left Antecubital (Active)   Site Assessment Clean, dry, & intact 2/18/2020  8:37 PM   Phlebitis Assessment 0 2/18/2020  8:37 PM   Infiltration Assessment 0 2/18/2020  8:37 PM   Dressing Status Clean, dry, & intact 2/18/2020  8:37 PM   Dressing Type Tape;Transparent 2/18/2020  8:37 PM   Hub Color/Line Status Pink;Capped 2/18/2020  8:37 PM   Action Taken Open ports on tubing capped 2/18/2020  8:37 PM   Alcohol Cap Used Yes 2/18/2020  8:37 PM        Opportunity for questions and clarification was provided.

## 2020-02-19 NOTE — ROUTINE PROCESS
Bedside and Verbal shift change report given to KAYLA Cisse RN by Guera Hernandez RN. Report included the following information SBAR, Kardex, OR Summary, Intake/Output and MAR.

## 2020-02-19 NOTE — PROGRESS NOTES
Problem: Mobility Impaired (Adult and Pediatric)  Goal: *Acute Goals and Plan of Care (Insert Text)  Description  Physical Therapy Goals   Initiated 2/18/2020 and to be accomplished within 5-7 day(s)  1. Patient will move from supine <> sit with S in prep for out of bed activity and change of position. 2.  Patient will perform sit<> stand with S with LRAD in prep for transfers/ambulation. 3.  Patient will transfer from bed <> chair with S with LRAD for time up in chair for completion of ADL activity. 4.  Patient will ambulate 150 feet with LRAD/S for improved functional mobility/safe discharge. 5.  Patient will ascend/descend 3-5 stairs with handrail with minimal assistance/contact guard assist for home re-entry as needed. Outcome: Progressing Towards Goal   PHYSICAL THERAPY TREATMENT    Patient: Chino Florez (46 y.o. male)  Date: 2/19/2020  Diagnosis: Olecranon fracture [S52.023A]  LEANDRA (acute kidney injury) ([facility] [B52.1]  Metabolic encephalopathy [G67.05]  Cellulitis [L03.90]   Olecranon fracture  Procedure(s) (LRB):  RIGHT OLECRANON OPEN REDUCTION INTERNAL FIXATION WITH C-ARM (Right) 2 Days Post-Op  Precautions: Fall, NWB(R UE)  Chart, physical therapy assessment, plan of care and goals were reviewed. ASSESSMENT:  Pt seen sitting in upright chair prior to session w/ R shoulder sling donned. Pt reported no pain at this time as pt has already received pain meds. Pt able to increase in gait distance w/ SPC, pt reported today his L knee has been buckling while up with nursing but no knee buckling noted during session. Pt transferred back to room where pt was able to perform therex of the BL LEs, min VCs needed. Pt left in upright chair after session, call bell and tray in reach, notified after session.    Progression toward goals:  [x]      Improving appropriately and progressing toward goals  []      Improving slowly and progressing toward goals  []      Not making progress toward goals and plan of care will be adjusted     PLAN:  Patient continues to benefit from skilled intervention to address the above impairments. Continue treatment per established plan of care. Discharge Recommendations:  Rehab  Further Equipment Recommendations for Discharge:  N/A     SUBJECTIVE:   Patient stated I might be a little dizzy when I get up cause I just took some meds.     OBJECTIVE DATA SUMMARY:   Critical Behavior:  Neurologic State: Alert, Appropriate for age  Orientation Level: Oriented X4  Cognition: Appropriate decision making  Safety/Judgement: Awareness of environment, Fall prevention, Good awareness of safety precautions, Insight into deficits  Functional Mobility Training:  Transfers:  Sit to Stand: Minimum assistance  Stand to Sit: Contact guard assistance  Balance:  Sitting: Intact  Standing: Intact; With support  Ambulation/Gait Training:  Distance (ft): 120 Feet (ft)  Assistive Device: Brace/Splint;Cane, straight;Gait belt  Ambulation - Level of Assistance: Contact guard assistance  Gait Abnormalities: Altered arm swing;Decreased step clearance; Step to gait  Right Side Weight Bearing: Non-weight bearing(R UE)  Base of Support: Widened  Speed/[place]: Slow  Step Length: Left shortened;Right shortened  Therapeutic Exercises:       EXERCISE   Sets   Reps   Active Active Assist   Passive Self ROM   Comments   Ankle Pumps    [] [] [] []    Quad Sets/Glut Sets   [] [] [] []    Hamstring Sets   [] [] [] []    Short Arc Quads   [] [] [] []    Heel Slides   [] [] [] []    Straight Leg Raises   [] [] [] []    Hip Abd/Add 2 10 [x] [] [] []    Long Arc Quads 2 10 [x] [] [] []    Seated Marching 1 20 [x] [] [] []    Standing Marching   [] [] [] []       [] [] [] []       Pain:  Pain Scale 1: Numeric (0 - 10)  Pain Intensity 1: 0  Activity Tolerance:   Fair+  Please refer to the flowsheet for vital signs taken during this treatment.   After treatment:   [x] Patient left in no apparent distress sitting up in chair  [] Patient left in no apparent distress in bed  [x] Call bell left within reach  [x] Nursing notified  [] Caregiver present  [] Bed alarm activated      Darren Og, PT   Time Calculation: 25 mins

## 2020-02-24 NOTE — CDMP QUERY
Patient admitted with Olecranon fracture. Noted documentation of metabolic encephalopathy  in H&P and discharge summary . Please provide clinical indicators/treatment to support this diagnosis. The medical record reflects the following:       Risk Factors: surgery     Clinical Indicators: The patient is noted to  be alert and oriented x3 on all exams during this admission and GCS scores are  all 15. Treatment: none.        Thank- You   Brooklyn Barrientos RN [address] [place], [place] [id number]

## 2020-02-26 ENCOUNTER — PATIENT OUTREACH (OUTPATIENT)
Dept: CASE MANAGEMENT | Age: 80
End: 2020-02-26

## 2020-02-26 NOTE — PROGRESS NOTES
Community Care Team Documentation for Patient in [facility]     Patient discharged from [address] to [facility], on 2/19/20. Hospital Discharge diagnosis:       Morbid Obesity   Metabolic Encephalopathy   LEANDRA   Olecranon Fracture   Multiple Myeloma   HTN   Type II DM    SNF Attending Provider:      Anticipated discharge date from SNF:  TBD    PCP : Zoila Bruno MD    [facility] Team rounds completed, updates provided by facility. Brief Summary of Care:    Patient receiving PT/OT. NWB on Right Upper Extremity. Progress slow due to this. Dispo:  Patient's daughter lives with patient. Goal is for him to return home. RRAT:  Low Risk            12       Total Score        4 IP Visits Last 12 Months (1-3=4, 4=9, >4=11)    8 Charlson Comorbidity Score (Age + Comorbid Conditions)        Criteria that do not apply:    Has Seen PCP in Last 6 Months (Yes=3, No=0)    . Living with Significant Other. Assisted Living. LTAC. SNF. or   Rehab    Patient Length of Stay (>5 days = 3)    Pt.  Coverage (Medicare=5 , Medicaid, or Self-Pay=4)        Active Ambulatory Problems     Diagnosis Date Noted    Open wound of right index finger without damage to nail 01/09/2019    Chest pain 02/12/2020    Lower extremity edema 02/12/2020    Type 2 diabetes mellitus (HCC) 57/18/6762    Systolic CHF, chronic ([address] 46/22/4454    Metabolic encephalopathy 51/90/3594    Cellulitis 02/16/2020    LEANDRA (acute kidney injury) ([address] 02/16/2020    Olecranon fracture 02/16/2020    Multiple myeloma ([address] 02/16/2020    Hypertension 02/16/2020    Morbid obesity ([address] 02/17/2020    Morbid obesity with BMI of 40.0-44.9, adult ([address] 02/18/2020     Resolved Ambulatory Problems     Diagnosis Date Noted    No Resolved Ambulatory Problems     Past Medical History:   Diagnosis Date    Cancer ([address]     Diabetes ([address]     FH: chemotherapy  H/O stem cell transplant ([facility]     Neuropathy

## 2020-03-04 ENCOUNTER — PATIENT OUTREACH (OUTPATIENT)
Dept: CASE MANAGEMENT | Age: 80
End: 2020-03-04

## 2020-03-04 NOTE — PROGRESS NOTES
March 4, 2020      Brandi Hubbard      Dear Patient:     The [facility] [address] [place] [address] requires that any individual seeking admission to a nursing facility, assisted living facility, or a home- and community-based waiver be evaluated to determine whether the individual requires that level of services, if the individual is financially Medicaid eligible, or expects to become Medicaid eligible within 180 days of the beginning date of services. If the individual is Medicaid eligible at the time of application or expects to become Medicaid eligible within 180 days of the beginning date of services, the screening must be completed. Medicaid contracts with several different agencies to perform pre-admission screening using the level-of-care criteria, assessment tool, and procedures established by Medicaid. The Pre-Admission Screening Team, in accordance with Medicaid policy and procedures, has determined that you do meet the criteria requirements for Medicaid-funded long-term care. This determination is based on the screening teams assessment of your functioning abilities, medical needs, and overall risk of needing institutional care. If you have been denied services, you may appeal this decision by writing to the Recipient Appeals Unit, [address] [place] [address], [address], [address] [address], [facility], [address] [place] [address], of your desire to appeal within thirty (30) days of receipt of this decision letter.  You have the right to represent yourself or use , a relative, a friend, or other spokesperson in the appeal. If you choose to appeal, please include a copy of the letter with your appeal request.     Sincerely,     Yusra Camacho, MSN, RN  [address]

## 2020-03-04 NOTE — PROGRESS NOTES
Community Care Team Documentation for Patient in [facility]     Patient discharged from [facility]  to [address], on 2/19/20. Hospital Discharge diagnosis:       Morbid Obesity   Metabolic Encephalopathy   LEANDRA   Olecranon Fracture   Multiple Myeloma   HTN   Type II DM    SNF Attending Provider:      Anticipated discharge date from SNF:  TBD    PCP : Eric Kinsey MD    [facility] Team rounds completed, updates provided by facility. Brief Summary of Care:    Patient receiving PT/OT. NWB on Right Upper Extremity. Progress slow due to this. Able to complete transfers using quad cane. Dispo:  Patient's daughter lives with patient. Goal is for him to return home. RRAT:  Low Risk            12       Total Score        4 IP Visits Last 12 Months (1-3=4, 4=9, >4=11)    8 Charlson Comorbidity Score (Age + Comorbid Conditions)        Criteria that do not apply:    Has Seen PCP in Last 6 Months (Yes=3, No=0)    . Living with Significant Other. Assisted Living. LTAC. SNF. or   Rehab    Patient Length of Stay (>5 days = 3)    Pt.  Coverage (Medicare=5 , Medicaid, or Self-Pay=4)        Active Ambulatory Problems     Diagnosis Date Noted    Open wound of right index finger without damage to nail 01/09/2019    Chest pain 02/12/2020    Lower extremity edema 02/12/2020    Type 2 diabetes mellitus ([address] 91/79/5903    Systolic CHF, chronic ([address] 90/06/6826    Metabolic encephalopathy 98/25/0511    Cellulitis 02/16/2020    LEANDRA (acute kidney injury) ([address] 02/16/2020    Olecranon fracture 02/16/2020    Multiple myeloma ([address] 02/16/2020    Hypertension 02/16/2020    Morbid obesity ([address] 02/17/2020    Morbid obesity with BMI of 40.0-44.9, adult ([address] 02/18/2020     Resolved Ambulatory Problems     Diagnosis Date Noted    No Resolved Ambulatory Problems     Past Medical History:   Diagnosis Date    Cancer ([address]  Diabetes ([facility]     FH: chemotherapy     H/O stem cell transplant ([facility]     Neuropathy

## 2020-03-11 ENCOUNTER — PATIENT OUTREACH (OUTPATIENT)
Dept: CASE MANAGEMENT | Age: 80
End: 2020-03-11

## 2020-03-11 NOTE — PROGRESS NOTES
Community Care Team Documentation for Patient in [facility]     Patient discharged from [facility] to [address], on 2/19/20. Hospital Discharge diagnosis:       Morbid Obesity   Metabolic Encephalopathy   LEANDRA   Olecranon Fracture   Multiple Myeloma   HTN   Type II DM    SNF Attending Provider:      Anticipated discharge date from SNF: 3/20/20    PCP : Oliverio Islas MD    [facility] Team rounds completed, updates provided by facility. Brief Summary of Care:    Patient receiving PT/OT. NWB on Right Upper Extremity. Progress slow due to this. Able to complete transfers using quad cane. Dispo:  Patient's daughter lives with patient. Goal is for him to return home. RRAT:  Low Risk            12       Total Score        4 IP Visits Last 12 Months (1-3=4, 4=9, >4=11)    8 Charlson Comorbidity Score (Age + Comorbid Conditions)        Criteria that do not apply:    Has Seen PCP in Last 6 Months (Yes=3, No=0)    . Living with Significant Other. Assisted Living. LTAC. SNF. or   Rehab    Patient Length of Stay (>5 days = 3)    Pt.  Coverage (Medicare=5 , Medicaid, or Self-Pay=4)        Active Ambulatory Problems     Diagnosis Date Noted    Open wound of right index finger without damage to nail 01/09/2019    Chest pain 02/12/2020    Lower extremity edema 02/12/2020    Type 2 diabetes mellitus ([address] 10/92/7836    Systolic CHF, chronic ([address] 83/48/0882    Metabolic encephalopathy 79/49/6420    Cellulitis 02/16/2020    LEANDRA (acute kidney injury) ([address] 02/16/2020    Olecranon fracture 02/16/2020    Multiple myeloma ([address] 02/16/2020    Hypertension 02/16/2020    Morbid obesity ([address] 02/17/2020    Morbid obesity with BMI of 40.0-44.9, adult ([address] 02/18/2020     Resolved Ambulatory Problems     Diagnosis Date Noted    No Resolved Ambulatory Problems     Past Medical History:   Diagnosis Date    Cancer ([facility]     Diabetes ([facility]     FH: chemotherapy     H/O stem cell transplant ([facility]     Neuropathy

## 2020-03-18 ENCOUNTER — PATIENT OUTREACH (OUTPATIENT)
Dept: CASE MANAGEMENT | Age: 80
End: 2020-03-18

## 2020-03-18 NOTE — PROGRESS NOTES
Community Care Team Documentation for Patient in [facility]     Patient discharged from [address] to [address], on 2/19/20. Hospital Discharge diagnosis:       Morbid Obesity   Metabolic Encephalopathy   LEANDRA   Olecranon Fracture   Multiple Myeloma   HTN   Type II DM    SNF Attending Provider:      Anticipated discharge date from SNF: 3/20/20    PCP : Jc Myers MD    [facility] Team rounds completed, updates provided by facility. Brief Summary of Care:    Patient receiving PT/OT. NWB on Right Upper Extremity. Progress slow due to this. Able to complete transfers using quad cane. Dispo: Will be d/c home on 3/20 with [facility]. Going home with daughter. RRAT:  Low Risk            12       Total Score        4 IP Visits Last 12 Months (1-3=4, 4=9, >4=11)    8 Charlson Comorbidity Score (Age + Comorbid Conditions)        Criteria that do not apply:    Has Seen PCP in Last 6 Months (Yes=3, No=0)    . Living with Significant Other. Assisted Living. LTAC. SNF. or   Rehab    Patient Length of Stay (>5 days = 3)    Pt.  Coverage (Medicare=5 , Medicaid, or Self-Pay=4)        Active Ambulatory Problems     Diagnosis Date Noted    Open wound of right index finger without damage to nail 01/09/2019    Chest pain 02/12/2020    Lower extremity edema 02/12/2020    Type 2 diabetes mellitus ([address] 94/00/7374    Systolic CHF, chronic ([address] 91/16/9950    Metabolic encephalopathy 68/10/7785    Cellulitis 02/16/2020    LEANDRA (acute kidney injury) ([address] 02/16/2020    Olecranon fracture 02/16/2020    Multiple myeloma ([address] 02/16/2020    Hypertension 02/16/2020    Morbid obesity ([address] 02/17/2020    Morbid obesity with BMI of 40.0-44.9, adult ([address] 02/18/2020     Resolved Ambulatory Problems     Diagnosis Date Noted    No Resolved Ambulatory Problems     Past Medical History:   Diagnosis Date    Cancer ([address]     Diabetes ([facility]     FH: chemotherapy     H/O stem cell transplant ([facility]     Neuropathy

## 2020-03-21 ENCOUNTER — HOME HEALTH ADMISSION (OUTPATIENT)
Dept: HOME HEALTH SERVICES | Facility: HOME HEALTH | Age: 80
End: 2020-03-21
Payer: MEDICARE

## 2020-03-22 ENCOUNTER — HOME CARE VISIT (OUTPATIENT)
Dept: SCHEDULING | Facility: HOME HEALTH | Age: 80
End: 2020-03-22
Payer: MEDICARE

## 2020-03-22 PROCEDURE — 3331090001 HH PPS REVENUE CREDIT

## 2020-03-22 PROCEDURE — 400013 HH SOC

## 2020-03-22 PROCEDURE — G0151 HHCP-SERV OF PT,EA 15 MIN: HCPCS

## 2020-03-22 PROCEDURE — 3331090002 HH PPS REVENUE DEBIT

## 2020-03-23 PROCEDURE — 3331090001 HH PPS REVENUE CREDIT

## 2020-03-23 PROCEDURE — 3331090002 HH PPS REVENUE DEBIT

## 2020-03-24 ENCOUNTER — HOME CARE VISIT (OUTPATIENT)
Dept: SCHEDULING | Facility: HOME HEALTH | Age: 80
End: 2020-03-24
Payer: MEDICARE

## 2020-03-24 ENCOUNTER — HOME CARE VISIT (OUTPATIENT)
Dept: HOME HEALTH SERVICES | Facility: HOME HEALTH | Age: 80
End: 2020-03-24
Payer: MEDICARE

## 2020-03-24 PROCEDURE — 3331090002 HH PPS REVENUE DEBIT

## 2020-03-24 PROCEDURE — 3331090001 HH PPS REVENUE CREDIT

## 2020-03-24 PROCEDURE — G0157 HHC PT ASSISTANT EA 15: HCPCS

## 2020-03-25 ENCOUNTER — HOME CARE VISIT (OUTPATIENT)
Dept: SCHEDULING | Facility: HOME HEALTH | Age: 80
End: 2020-03-25
Payer: MEDICARE

## 2020-03-25 VITALS
DIASTOLIC BLOOD PRESSURE: 69 MMHG | TEMPERATURE: 98.4 F | SYSTOLIC BLOOD PRESSURE: 146 MMHG | OXYGEN SATURATION: 97 % | HEART RATE: 61 BPM

## 2020-03-25 PROCEDURE — 3331090001 HH PPS REVENUE CREDIT

## 2020-03-25 PROCEDURE — 3331090002 HH PPS REVENUE DEBIT

## 2020-03-25 PROCEDURE — G0152 HHCP-SERV OF OT,EA 15 MIN: HCPCS

## 2020-03-26 ENCOUNTER — HOME CARE VISIT (OUTPATIENT)
Dept: SCHEDULING | Facility: HOME HEALTH | Age: 80
End: 2020-03-26
Payer: MEDICARE

## 2020-03-26 VITALS
OXYGEN SATURATION: 97 % | DIASTOLIC BLOOD PRESSURE: 82 MMHG | SYSTOLIC BLOOD PRESSURE: 144 MMHG | TEMPERATURE: 97.8 F | HEART RATE: 55 BPM

## 2020-03-26 PROCEDURE — G0157 HHC PT ASSISTANT EA 15: HCPCS

## 2020-03-26 PROCEDURE — 3331090002 HH PPS REVENUE DEBIT

## 2020-03-26 PROCEDURE — 3331090001 HH PPS REVENUE CREDIT

## 2020-03-27 ENCOUNTER — HOME CARE VISIT (OUTPATIENT)
Dept: SCHEDULING | Facility: HOME HEALTH | Age: 80
End: 2020-03-27
Payer: MEDICARE

## 2020-03-27 VITALS
SYSTOLIC BLOOD PRESSURE: 140 MMHG | DIASTOLIC BLOOD PRESSURE: 87 MMHG | HEART RATE: 56 BPM | OXYGEN SATURATION: 96 % | TEMPERATURE: 98.2 F

## 2020-03-27 PROCEDURE — G0152 HHCP-SERV OF OT,EA 15 MIN: HCPCS

## 2020-03-27 PROCEDURE — 3331090001 HH PPS REVENUE CREDIT

## 2020-03-27 PROCEDURE — 3331090002 HH PPS REVENUE DEBIT

## 2020-03-28 VITALS
HEART RATE: 68 BPM | DIASTOLIC BLOOD PRESSURE: 75 MMHG | TEMPERATURE: 97.9 F | OXYGEN SATURATION: 98 % | SYSTOLIC BLOOD PRESSURE: 152 MMHG

## 2020-03-28 PROCEDURE — 3331090002 HH PPS REVENUE DEBIT

## 2020-03-28 PROCEDURE — 3331090001 HH PPS REVENUE CREDIT

## 2020-03-29 PROCEDURE — 3331090001 HH PPS REVENUE CREDIT

## 2020-03-29 PROCEDURE — 3331090002 HH PPS REVENUE DEBIT

## 2020-03-30 ENCOUNTER — HOME CARE VISIT (OUTPATIENT)
Dept: SCHEDULING | Facility: HOME HEALTH | Age: 80
End: 2020-03-30
Payer: MEDICARE

## 2020-03-30 PROCEDURE — 3331090002 HH PPS REVENUE DEBIT

## 2020-03-30 PROCEDURE — 3331090001 HH PPS REVENUE CREDIT

## 2020-03-30 PROCEDURE — G0152 HHCP-SERV OF OT,EA 15 MIN: HCPCS

## 2020-03-31 ENCOUNTER — HOME CARE VISIT (OUTPATIENT)
Dept: SCHEDULING | Facility: HOME HEALTH | Age: 80
End: 2020-03-31
Payer: MEDICARE

## 2020-03-31 VITALS
DIASTOLIC BLOOD PRESSURE: 82 MMHG | SYSTOLIC BLOOD PRESSURE: 168 MMHG | OXYGEN SATURATION: 98 % | HEART RATE: 64 BPM | TEMPERATURE: 98.2 F

## 2020-03-31 PROCEDURE — 3331090002 HH PPS REVENUE DEBIT

## 2020-03-31 PROCEDURE — G0157 HHC PT ASSISTANT EA 15: HCPCS

## 2020-03-31 PROCEDURE — 3331090001 HH PPS REVENUE CREDIT

## 2020-04-01 ENCOUNTER — HOME CARE VISIT (OUTPATIENT)
Dept: SCHEDULING | Facility: HOME HEALTH | Age: 80
End: 2020-04-01
Payer: MEDICARE

## 2020-04-01 PROCEDURE — 3331090001 HH PPS REVENUE CREDIT

## 2020-04-01 PROCEDURE — G0158 HHC OT ASSISTANT EA 15: HCPCS

## 2020-04-01 PROCEDURE — 3331090002 HH PPS REVENUE DEBIT

## 2020-04-02 ENCOUNTER — HOME CARE VISIT (OUTPATIENT)
Dept: SCHEDULING | Facility: HOME HEALTH | Age: 80
End: 2020-04-02
Payer: MEDICARE

## 2020-04-02 VITALS
HEART RATE: 61 BPM | SYSTOLIC BLOOD PRESSURE: 156 MMHG | OXYGEN SATURATION: 98 % | TEMPERATURE: 97.9 F | DIASTOLIC BLOOD PRESSURE: 82 MMHG

## 2020-04-02 PROCEDURE — G0157 HHC PT ASSISTANT EA 15: HCPCS

## 2020-04-02 PROCEDURE — 3331090001 HH PPS REVENUE CREDIT

## 2020-04-02 PROCEDURE — 3331090002 HH PPS REVENUE DEBIT

## 2020-04-03 ENCOUNTER — HOME CARE VISIT (OUTPATIENT)
Dept: SCHEDULING | Facility: HOME HEALTH | Age: 80
End: 2020-04-03
Payer: MEDICARE

## 2020-04-03 PROCEDURE — 3331090001 HH PPS REVENUE CREDIT

## 2020-04-03 PROCEDURE — 3331090002 HH PPS REVENUE DEBIT

## 2020-04-03 PROCEDURE — G0158 HHC OT ASSISTANT EA 15: HCPCS

## 2020-04-04 PROCEDURE — 3331090002 HH PPS REVENUE DEBIT

## 2020-04-04 PROCEDURE — 3331090001 HH PPS REVENUE CREDIT

## 2020-04-05 VITALS
TEMPERATURE: 99.1 F | HEART RATE: 72 BPM | SYSTOLIC BLOOD PRESSURE: 189 MMHG | DIASTOLIC BLOOD PRESSURE: 113 MMHG | OXYGEN SATURATION: 97 %

## 2020-04-05 PROCEDURE — 3331090001 HH PPS REVENUE CREDIT

## 2020-04-05 PROCEDURE — 3331090002 HH PPS REVENUE DEBIT

## 2020-04-06 VITALS
HEART RATE: 75 BPM | SYSTOLIC BLOOD PRESSURE: 155 MMHG | TEMPERATURE: 97 F | SYSTOLIC BLOOD PRESSURE: 154 MMHG | DIASTOLIC BLOOD PRESSURE: 86 MMHG | DIASTOLIC BLOOD PRESSURE: 78 MMHG | HEART RATE: 61 BPM | TEMPERATURE: 97.3 F | OXYGEN SATURATION: 99 %

## 2020-04-06 PROCEDURE — 3331090001 HH PPS REVENUE CREDIT

## 2020-04-06 PROCEDURE — 3331090002 HH PPS REVENUE DEBIT

## 2020-04-07 PROCEDURE — 3331090002 HH PPS REVENUE DEBIT

## 2020-04-07 PROCEDURE — 3331090001 HH PPS REVENUE CREDIT

## 2020-04-08 ENCOUNTER — HOME CARE VISIT (OUTPATIENT)
Dept: SCHEDULING | Facility: HOME HEALTH | Age: 80
End: 2020-04-08
Payer: MEDICARE

## 2020-04-08 VITALS
DIASTOLIC BLOOD PRESSURE: 80 MMHG | HEART RATE: 84 BPM | SYSTOLIC BLOOD PRESSURE: 142 MMHG | TEMPERATURE: 97.6 F | OXYGEN SATURATION: 97 %

## 2020-04-08 VITALS
DIASTOLIC BLOOD PRESSURE: 67 MMHG | HEART RATE: 86 BPM | RESPIRATION RATE: 16 BRPM | TEMPERATURE: 98 F | SYSTOLIC BLOOD PRESSURE: 175 MMHG | OXYGEN SATURATION: 98 %

## 2020-04-08 PROCEDURE — G0151 HHCP-SERV OF PT,EA 15 MIN: HCPCS

## 2020-04-08 PROCEDURE — G0158 HHC OT ASSISTANT EA 15: HCPCS

## 2020-04-08 PROCEDURE — 3331090002 HH PPS REVENUE DEBIT

## 2020-04-08 PROCEDURE — 3331090001 HH PPS REVENUE CREDIT

## 2020-04-09 ENCOUNTER — HOME CARE VISIT (OUTPATIENT)
Dept: SCHEDULING | Facility: HOME HEALTH | Age: 80
End: 2020-04-09
Payer: MEDICARE

## 2020-04-09 PROCEDURE — 3331090002 HH PPS REVENUE DEBIT

## 2020-04-09 PROCEDURE — 3331090001 HH PPS REVENUE CREDIT

## 2020-04-09 PROCEDURE — G0152 HHCP-SERV OF OT,EA 15 MIN: HCPCS

## 2020-04-10 ENCOUNTER — HOME CARE VISIT (OUTPATIENT)
Dept: SCHEDULING | Facility: HOME HEALTH | Age: 80
End: 2020-04-10
Payer: MEDICARE

## 2020-04-10 VITALS
HEART RATE: 82 BPM | TEMPERATURE: 98.2 F | DIASTOLIC BLOOD PRESSURE: 78 MMHG | OXYGEN SATURATION: 98 % | SYSTOLIC BLOOD PRESSURE: 178 MMHG

## 2020-04-10 PROCEDURE — G0157 HHC PT ASSISTANT EA 15: HCPCS

## 2020-04-10 PROCEDURE — 3331090002 HH PPS REVENUE DEBIT

## 2020-04-10 PROCEDURE — 3331090001 HH PPS REVENUE CREDIT

## 2020-04-10 PROCEDURE — G0152 HHCP-SERV OF OT,EA 15 MIN: HCPCS

## 2020-04-11 VITALS
HEART RATE: 68 BPM | TEMPERATURE: 98 F | SYSTOLIC BLOOD PRESSURE: 178 MMHG | DIASTOLIC BLOOD PRESSURE: 85 MMHG | OXYGEN SATURATION: 98 %

## 2020-04-11 PROCEDURE — 3331090001 HH PPS REVENUE CREDIT

## 2020-04-11 PROCEDURE — 3331090002 HH PPS REVENUE DEBIT

## 2020-04-12 PROCEDURE — 3331090002 HH PPS REVENUE DEBIT

## 2020-04-12 PROCEDURE — 3331090001 HH PPS REVENUE CREDIT

## 2020-04-13 ENCOUNTER — HOME CARE VISIT (OUTPATIENT)
Dept: SCHEDULING | Facility: HOME HEALTH | Age: 80
End: 2020-04-13
Payer: MEDICARE

## 2020-04-13 PROCEDURE — G0152 HHCP-SERV OF OT,EA 15 MIN: HCPCS

## 2020-04-13 PROCEDURE — 3331090001 HH PPS REVENUE CREDIT

## 2020-04-13 PROCEDURE — 3331090002 HH PPS REVENUE DEBIT

## 2020-04-14 ENCOUNTER — HOME CARE VISIT (OUTPATIENT)
Dept: SCHEDULING | Facility: HOME HEALTH | Age: 80
End: 2020-04-14
Payer: MEDICARE

## 2020-04-14 VITALS
DIASTOLIC BLOOD PRESSURE: 86 MMHG | OXYGEN SATURATION: 98 % | SYSTOLIC BLOOD PRESSURE: 165 MMHG | TEMPERATURE: 97.4 F | HEART RATE: 74 BPM

## 2020-04-14 VITALS
TEMPERATURE: 98.1 F | DIASTOLIC BLOOD PRESSURE: 80 MMHG | HEART RATE: 68 BPM | OXYGEN SATURATION: 98 % | SYSTOLIC BLOOD PRESSURE: 172 MMHG

## 2020-04-14 VITALS
DIASTOLIC BLOOD PRESSURE: 85 MMHG | OXYGEN SATURATION: 98 % | HEART RATE: 70 BPM | SYSTOLIC BLOOD PRESSURE: 164 MMHG | TEMPERATURE: 97.8 F

## 2020-04-14 PROCEDURE — 3331090002 HH PPS REVENUE DEBIT

## 2020-04-14 PROCEDURE — G0157 HHC PT ASSISTANT EA 15: HCPCS

## 2020-04-14 PROCEDURE — 3331090001 HH PPS REVENUE CREDIT

## 2020-04-15 ENCOUNTER — HOME CARE VISIT (OUTPATIENT)
Dept: SCHEDULING | Facility: HOME HEALTH | Age: 80
End: 2020-04-15
Payer: MEDICARE

## 2020-04-15 VITALS
HEART RATE: 68 BPM | OXYGEN SATURATION: 99 % | TEMPERATURE: 97.8 F | SYSTOLIC BLOOD PRESSURE: 174 MMHG | DIASTOLIC BLOOD PRESSURE: 88 MMHG

## 2020-04-15 PROCEDURE — 3331090002 HH PPS REVENUE DEBIT

## 2020-04-15 PROCEDURE — G0152 HHCP-SERV OF OT,EA 15 MIN: HCPCS

## 2020-04-15 PROCEDURE — 3331090001 HH PPS REVENUE CREDIT

## 2020-04-16 ENCOUNTER — HOME CARE VISIT (OUTPATIENT)
Dept: SCHEDULING | Facility: HOME HEALTH | Age: 80
End: 2020-04-16
Payer: MEDICARE

## 2020-04-16 PROCEDURE — 3331090001 HH PPS REVENUE CREDIT

## 2020-04-16 PROCEDURE — G0157 HHC PT ASSISTANT EA 15: HCPCS

## 2020-04-16 PROCEDURE — 3331090002 HH PPS REVENUE DEBIT

## 2020-04-17 ENCOUNTER — HOME CARE VISIT (OUTPATIENT)
Dept: SCHEDULING | Facility: HOME HEALTH | Age: 80
End: 2020-04-17
Payer: MEDICARE

## 2020-04-17 VITALS
HEART RATE: 79 BPM | OXYGEN SATURATION: 98 % | DIASTOLIC BLOOD PRESSURE: 82 MMHG | TEMPERATURE: 98.3 F | SYSTOLIC BLOOD PRESSURE: 151 MMHG

## 2020-04-17 PROCEDURE — G0152 HHCP-SERV OF OT,EA 15 MIN: HCPCS

## 2020-04-17 PROCEDURE — 3331090002 HH PPS REVENUE DEBIT

## 2020-04-17 PROCEDURE — 3331090001 HH PPS REVENUE CREDIT

## 2020-04-18 VITALS
HEART RATE: 64 BPM | DIASTOLIC BLOOD PRESSURE: 75 MMHG | TEMPERATURE: 97.9 F | OXYGEN SATURATION: 99 % | SYSTOLIC BLOOD PRESSURE: 164 MMHG

## 2020-04-18 PROCEDURE — 3331090002 HH PPS REVENUE DEBIT

## 2020-04-18 PROCEDURE — 3331090001 HH PPS REVENUE CREDIT

## 2020-04-19 PROCEDURE — 3331090001 HH PPS REVENUE CREDIT

## 2020-04-19 PROCEDURE — 3331090002 HH PPS REVENUE DEBIT

## 2020-04-20 ENCOUNTER — HOME CARE VISIT (OUTPATIENT)
Dept: SCHEDULING | Facility: HOME HEALTH | Age: 80
End: 2020-04-20
Payer: MEDICARE

## 2020-04-20 PROCEDURE — 3331090001 HH PPS REVENUE CREDIT

## 2020-04-20 PROCEDURE — G0152 HHCP-SERV OF OT,EA 15 MIN: HCPCS

## 2020-04-20 PROCEDURE — 3331090002 HH PPS REVENUE DEBIT

## 2020-04-21 ENCOUNTER — HOME CARE VISIT (OUTPATIENT)
Dept: SCHEDULING | Facility: HOME HEALTH | Age: 80
End: 2020-04-21
Payer: MEDICARE

## 2020-04-21 VITALS
TEMPERATURE: 97.3 F | RESPIRATION RATE: 16 BRPM | SYSTOLIC BLOOD PRESSURE: 150 MMHG | HEART RATE: 69 BPM | DIASTOLIC BLOOD PRESSURE: 85 MMHG | OXYGEN SATURATION: 98 %

## 2020-04-21 VITALS
TEMPERATURE: 98.1 F | HEART RATE: 64 BPM | SYSTOLIC BLOOD PRESSURE: 168 MMHG | OXYGEN SATURATION: 98 % | DIASTOLIC BLOOD PRESSURE: 78 MMHG

## 2020-04-21 PROCEDURE — 400013 HH SOC

## 2020-04-21 PROCEDURE — 3331090001 HH PPS REVENUE CREDIT

## 2020-04-21 PROCEDURE — G0151 HHCP-SERV OF PT,EA 15 MIN: HCPCS

## 2020-04-21 PROCEDURE — 3331090002 HH PPS REVENUE DEBIT

## 2020-04-22 ENCOUNTER — HOME CARE VISIT (OUTPATIENT)
Dept: SCHEDULING | Facility: HOME HEALTH | Age: 80
End: 2020-04-22
Payer: MEDICARE

## 2020-04-22 PROCEDURE — 3331090002 HH PPS REVENUE DEBIT

## 2020-04-22 PROCEDURE — G0152 HHCP-SERV OF OT,EA 15 MIN: HCPCS

## 2020-04-22 PROCEDURE — 3331090001 HH PPS REVENUE CREDIT

## 2020-04-23 VITALS
TEMPERATURE: 97.8 F | HEART RATE: 85 BPM | DIASTOLIC BLOOD PRESSURE: 74 MMHG | SYSTOLIC BLOOD PRESSURE: 155 MMHG | OXYGEN SATURATION: 98 %

## 2020-04-23 PROCEDURE — 3331090001 HH PPS REVENUE CREDIT

## 2020-04-23 PROCEDURE — 3331090002 HH PPS REVENUE DEBIT

## 2020-04-24 ENCOUNTER — HOME CARE VISIT (OUTPATIENT)
Dept: SCHEDULING | Facility: HOME HEALTH | Age: 80
End: 2020-04-24
Payer: MEDICARE

## 2020-04-24 PROCEDURE — G0152 HHCP-SERV OF OT,EA 15 MIN: HCPCS

## 2020-04-24 PROCEDURE — 3331090002 HH PPS REVENUE DEBIT

## 2020-04-24 PROCEDURE — 3331090001 HH PPS REVENUE CREDIT

## 2020-04-25 VITALS
DIASTOLIC BLOOD PRESSURE: 89 MMHG | TEMPERATURE: 98.6 F | OXYGEN SATURATION: 97 % | SYSTOLIC BLOOD PRESSURE: 174 MMHG | HEART RATE: 74 BPM

## 2020-06-01 LAB
STRESS BASELINE HR: 56 BPM
STRESS ESTIMATED WORKLOAD: 1 METS
STRESS EXERCISE DUR MIN: NORMAL
STRESS PEAK DIAS BP: 85 MMHG
STRESS PEAK SYS BP: 171 MMHG
STRESS PERCENT HR ACHIEVED: 60 %
STRESS POST PEAK HR: 86 BPM
STRESS RATE PRESSURE PRODUCT: NORMAL BPM*MMHG
STRESS ST DEPRESSION: 0 MM
STRESS ST ELEVATION: 0 MM
STRESS TARGET HR: 143 BPM

## 2020-07-15 ENCOUNTER — HOSPITAL ENCOUNTER (OUTPATIENT)
Dept: LAB | Age: 80
Discharge: HOME | End: 2020-07-15
Payer: MEDICARE

## 2020-07-15 LAB
ANION GAP SERPL CALC-SCNC: 5 MMOL/L (ref 3–18)
BUN SERPL-MCNC: 23 MG/DL (ref 7–18)
BUN/CREAT SERPL: 21 (ref 12–20)
CALCIUM SERPL-MCNC: 10.2 MG/DL (ref 8.5–10.1)
CHLORIDE SERPL-SCNC: 101 MMOL/L (ref 100–111)
CO2 SERPL-SCNC: 32 MMOL/L (ref 21–32)
CREAT SERPL-MCNC: 1.09 MG/DL (ref 0.6–1.3)
FAX TO INFO,FAXT: NORMAL
FAX TO NUMBER,FAXN: NORMAL
GLUCOSE SERPL-MCNC: 141 MG/DL (ref 74–99)
MAGNESIUM SERPL-MCNC: 1.3 MG/DL (ref 1.6–2.6)
POTASSIUM SERPL-SCNC: 3.9 MMOL/L (ref 3.5–5.5)
SODIUM SERPL-SCNC: 138 MMOL/L (ref 136–145)

## 2020-07-15 PROCEDURE — 83735 ASSAY OF MAGNESIUM: CPT

## 2020-07-15 PROCEDURE — 80048 BASIC METABOLIC PNL TOTAL CA: CPT

## 2020-07-15 PROCEDURE — 36415 COLL VENOUS BLD VENIPUNCTURE: CPT

## 2020-07-23 ENCOUNTER — HOSPITAL ENCOUNTER (OUTPATIENT)
Dept: LAB | Age: 80
Discharge: HOME | End: 2020-07-23
Attending: INTERNAL MEDICINE
Payer: MEDICARE

## 2020-07-23 LAB
ANION GAP SERPL CALC-SCNC: 5 MMOL/L (ref 3–18)
BUN SERPL-MCNC: 16 MG/DL (ref 7–18)
BUN/CREAT SERPL: 16 (ref 12–20)
CALCIUM SERPL-MCNC: 9.2 MG/DL (ref 8.5–10.1)
CHLORIDE SERPL-SCNC: 103 MMOL/L (ref 100–111)
CO2 SERPL-SCNC: 29 MMOL/L (ref 21–32)
CREAT SERPL-MCNC: 0.98 MG/DL (ref 0.6–1.3)
FAX TO INFO,FAXT: NORMAL
FAX TO NUMBER,FAXN: NORMAL
GLUCOSE SERPL-MCNC: 126 MG/DL (ref 74–99)
MAGNESIUM SERPL-MCNC: 1.5 MG/DL (ref 1.6–2.6)
POTASSIUM SERPL-SCNC: 4.1 MMOL/L (ref 3.5–5.5)
SODIUM SERPL-SCNC: 137 MMOL/L (ref 136–145)

## 2020-07-23 PROCEDURE — 83735 ASSAY OF MAGNESIUM: CPT

## 2020-07-23 PROCEDURE — 36415 COLL VENOUS BLD VENIPUNCTURE: CPT

## 2020-07-23 PROCEDURE — 80048 BASIC METABOLIC PNL TOTAL CA: CPT

## 2020-08-12 ENCOUNTER — HOSPITAL ENCOUNTER (OUTPATIENT)
Dept: LAB | Age: 80
Discharge: HOME | End: 2020-08-12
Payer: MEDICARE

## 2020-08-12 DIAGNOSIS — I50.22 CHRONIC SYSTOLIC HEART FAILURE (HCC): ICD-10-CM

## 2020-08-12 LAB
ANION GAP SERPL CALC-SCNC: 6 MMOL/L (ref 3–18)
BUN SERPL-MCNC: 18 MG/DL (ref 7–18)
BUN/CREAT SERPL: 19 (ref 12–20)
CALCIUM SERPL-MCNC: 9.2 MG/DL (ref 8.5–10.1)
CHLORIDE SERPL-SCNC: 103 MMOL/L (ref 100–111)
CO2 SERPL-SCNC: 28 MMOL/L (ref 21–32)
CREAT SERPL-MCNC: 0.94 MG/DL (ref 0.6–1.3)
GLUCOSE SERPL-MCNC: 148 MG/DL (ref 74–99)
MAGNESIUM SERPL-MCNC: 1.6 MG/DL (ref 1.6–2.6)
POTASSIUM SERPL-SCNC: 3.9 MMOL/L (ref 3.5–5.5)
SODIUM SERPL-SCNC: 137 MMOL/L (ref 136–145)

## 2020-08-12 PROCEDURE — 80048 BASIC METABOLIC PNL TOTAL CA: CPT

## 2020-08-12 PROCEDURE — 83735 ASSAY OF MAGNESIUM: CPT

## 2020-08-12 PROCEDURE — 36415 COLL VENOUS BLD VENIPUNCTURE: CPT

## 2020-08-17 LAB
FAX TO INFO,FAXT: NORMAL
FAX TO NUMBER,FAXN: NORMAL

## 2020-10-15 ENCOUNTER — HOSPITAL ENCOUNTER (OUTPATIENT)
Dept: LAB | Age: 80
Discharge: HOME | End: 2020-10-15
Payer: MEDICARE

## 2020-10-15 LAB
ANION GAP SERPL CALC-SCNC: 6 MMOL/L (ref 3–18)
BUN SERPL-MCNC: 12 MG/DL (ref 7–18)
BUN/CREAT SERPL: 14 (ref 12–20)
CALCIUM SERPL-MCNC: 9.5 MG/DL (ref 8.5–10.1)
CHLORIDE SERPL-SCNC: 102 MMOL/L (ref 100–111)
CO2 SERPL-SCNC: 28 MMOL/L (ref 21–32)
CREAT SERPL-MCNC: 0.83 MG/DL (ref 0.6–1.3)
GLUCOSE SERPL-MCNC: 101 MG/DL (ref 74–99)
MAGNESIUM SERPL-MCNC: 1.5 MG/DL (ref 1.6–2.6)
POTASSIUM SERPL-SCNC: 3.6 MMOL/L (ref 3.5–5.5)
SODIUM SERPL-SCNC: 136 MMOL/L (ref 136–145)

## 2020-10-15 PROCEDURE — 80048 BASIC METABOLIC PNL TOTAL CA: CPT

## 2020-10-15 PROCEDURE — 83735 ASSAY OF MAGNESIUM: CPT

## 2020-10-15 PROCEDURE — 36415 COLL VENOUS BLD VENIPUNCTURE: CPT

## 2020-10-20 LAB
FAX TO INFO,FAXT: NORMAL
FAX TO NUMBER,FAXN: NORMAL

## 2020-12-07 ENCOUNTER — HOSPITAL ENCOUNTER (INPATIENT)
Age: 80
LOS: 7 days | Discharge: HOME HEALTH | DRG: 472 | End: 2020-12-14
Attending: EMERGENCY MEDICINE | Admitting: HOSPITALIST
Payer: MEDICARE

## 2020-12-07 ENCOUNTER — APPOINTMENT (OUTPATIENT)
Dept: GENERAL RADIOLOGY | Age: 80
DRG: 472 | End: 2020-12-07
Attending: EMERGENCY MEDICINE
Payer: MEDICARE

## 2020-12-07 ENCOUNTER — APPOINTMENT (OUTPATIENT)
Dept: NON INVASIVE DIAGNOSTICS | Age: 80
DRG: 472 | End: 2020-12-07
Attending: EMERGENCY MEDICINE
Payer: MEDICARE

## 2020-12-07 ENCOUNTER — APPOINTMENT (OUTPATIENT)
Dept: CT IMAGING | Age: 80
DRG: 472 | End: 2020-12-07
Attending: EMERGENCY MEDICINE
Payer: MEDICARE

## 2020-12-07 ENCOUNTER — APPOINTMENT (OUTPATIENT)
Dept: MRI IMAGING | Age: 80
DRG: 472 | End: 2020-12-07
Attending: HOSPITALIST
Payer: MEDICARE

## 2020-12-07 DIAGNOSIS — E83.42 HYPOMAGNESEMIA: ICD-10-CM

## 2020-12-07 DIAGNOSIS — E87.6 HYPOKALEMIA: Primary | ICD-10-CM

## 2020-12-07 DIAGNOSIS — G81.94 LEFT HEMIPARESIS (HCC): ICD-10-CM

## 2020-12-07 DIAGNOSIS — R53.1 LEFT-SIDED WEAKNESS: ICD-10-CM

## 2020-12-07 DIAGNOSIS — R77.8 ELEVATED TROPONIN: ICD-10-CM

## 2020-12-07 DIAGNOSIS — R94.31 ABNORMAL EKG: ICD-10-CM

## 2020-12-07 LAB
ALBUMIN SERPL-MCNC: 3.2 G/DL (ref 3.4–5)
ALBUMIN/GLOB SERPL: 1 {RATIO} (ref 0.8–1.7)
ALP SERPL-CCNC: 74 U/L (ref 45–117)
ALT SERPL-CCNC: 39 U/L (ref 16–61)
ANION GAP SERPL CALC-SCNC: 3 MMOL/L (ref 3–18)
ANION GAP SERPL CALC-SCNC: 7 MMOL/L (ref 3–18)
APPEARANCE UR: CLEAR
APTT PPP: 27.8 SEC (ref 23–36.4)
AST SERPL-CCNC: 22 U/L (ref 10–38)
ATRIAL RATE: 69 BPM
ATRIAL RATE: 78 BPM
ATRIAL RATE: 83 BPM
BACTERIA URNS QL MICRO: ABNORMAL /HPF
BASOPHILS # BLD: 0 K/UL (ref 0–0.1)
BASOPHILS NFR BLD: 0 % (ref 0–2)
BILIRUB SERPL-MCNC: 1 MG/DL (ref 0.2–1)
BILIRUB UR QL: NEGATIVE
BUN SERPL-MCNC: 10 MG/DL (ref 7–18)
BUN SERPL-MCNC: 11 MG/DL (ref 7–18)
BUN/CREAT SERPL: 11 (ref 12–20)
BUN/CREAT SERPL: 12 (ref 12–20)
CALCIUM SERPL-MCNC: 8.7 MG/DL (ref 8.5–10.1)
CALCIUM SERPL-MCNC: 8.9 MG/DL (ref 8.5–10.1)
CALCULATED P AXIS, ECG09: 20 DEGREES
CALCULATED P AXIS, ECG09: 51 DEGREES
CALCULATED P AXIS, ECG09: 53 DEGREES
CALCULATED R AXIS, ECG10: 18 DEGREES
CALCULATED R AXIS, ECG10: 23 DEGREES
CALCULATED R AXIS, ECG10: 5 DEGREES
CALCULATED T AXIS, ECG11: 174 DEGREES
CALCULATED T AXIS, ECG11: 73 DEGREES
CALCULATED T AXIS, ECG11: 89 DEGREES
CHLORIDE SERPL-SCNC: 101 MMOL/L (ref 100–111)
CHLORIDE SERPL-SCNC: 101 MMOL/L (ref 100–111)
CK MB CFR SERPL CALC: 3.3 % (ref 0–4)
CK MB CFR SERPL CALC: 3.8 % (ref 0–4)
CK MB SERPL-MCNC: 6.1 NG/ML (ref 5–25)
CK MB SERPL-MCNC: 6.2 NG/ML (ref 5–25)
CK SERPL-CCNC: 164 U/L (ref 39–308)
CK SERPL-CCNC: 185 U/L (ref 39–308)
CO2 SERPL-SCNC: 30 MMOL/L (ref 21–32)
CO2 SERPL-SCNC: 33 MMOL/L (ref 21–32)
COLOR UR: YELLOW
CREAT SERPL-MCNC: 0.83 MG/DL (ref 0.6–1.3)
CREAT SERPL-MCNC: 0.98 MG/DL (ref 0.6–1.3)
DIAGNOSIS, 93000: NORMAL
DIFFERENTIAL METHOD BLD: ABNORMAL
ECHO IVC PROX: 1.6 CM
EOSINOPHIL # BLD: 0.2 K/UL (ref 0–0.4)
EOSINOPHIL NFR BLD: 3 % (ref 0–5)
EPITH CASTS URNS QL MICRO: ABNORMAL /LPF (ref 0–5)
ERYTHROCYTE [DISTWIDTH] IN BLOOD BY AUTOMATED COUNT: 15.5 % (ref 11.6–14.5)
GLOBULIN SER CALC-MCNC: 3.3 G/DL (ref 2–4)
GLUCOSE BLD STRIP.AUTO-MCNC: 135 MG/DL (ref 70–110)
GLUCOSE SERPL-MCNC: 141 MG/DL (ref 74–99)
GLUCOSE SERPL-MCNC: 163 MG/DL (ref 74–99)
GLUCOSE UR STRIP.AUTO-MCNC: NEGATIVE MG/DL
HCT VFR BLD AUTO: 31.1 % (ref 36–48)
HGB BLD-MCNC: 10.7 G/DL (ref 13–16)
HGB UR QL STRIP: ABNORMAL
INR PPP: 1.3 (ref 0.8–1.2)
KETONES UR QL STRIP.AUTO: ABNORMAL MG/DL
LEUKOCYTE ESTERASE UR QL STRIP.AUTO: ABNORMAL
LYMPHOCYTES # BLD: 1.7 K/UL (ref 0.9–3.6)
LYMPHOCYTES NFR BLD: 28 % (ref 21–52)
MAGNESIUM SERPL-MCNC: 1.2 MG/DL (ref 1.6–2.6)
MCH RBC QN AUTO: 28.9 PG (ref 24–34)
MCHC RBC AUTO-ENTMCNC: 34.4 G/DL (ref 31–37)
MCV RBC AUTO: 84.1 FL (ref 74–97)
MONOCYTES # BLD: 0.9 K/UL (ref 0.05–1.2)
MONOCYTES NFR BLD: 15 % (ref 3–10)
NEUTS SEG # BLD: 3.3 K/UL (ref 1.8–8)
NEUTS SEG NFR BLD: 54 % (ref 40–73)
NITRITE UR QL STRIP.AUTO: NEGATIVE
P-R INTERVAL, ECG05: 180 MS
P-R INTERVAL, ECG05: 184 MS
P-R INTERVAL, ECG05: 184 MS
PH UR STRIP: 6 [PH] (ref 5–8)
PLATELET # BLD AUTO: 183 K/UL (ref 135–420)
PMV BLD AUTO: 10.9 FL (ref 9.2–11.8)
POTASSIUM SERPL-SCNC: 2.9 MMOL/L (ref 3.5–5.5)
POTASSIUM SERPL-SCNC: 3.1 MMOL/L (ref 3.5–5.5)
PROT SERPL-MCNC: 6.5 G/DL (ref 6.4–8.2)
PROT UR STRIP-MCNC: 30 MG/DL
PROTHROMBIN TIME: 16 SEC (ref 11.5–15.2)
Q-T INTERVAL, ECG07: 378 MS
Q-T INTERVAL, ECG07: 412 MS
Q-T INTERVAL, ECG07: 446 MS
QRS DURATION, ECG06: 116 MS
QRS DURATION, ECG06: 126 MS
QRS DURATION, ECG06: 158 MS
QTC CALCULATION (BEZET), ECG08: 441 MS
QTC CALCULATION (BEZET), ECG08: 444 MS
QTC CALCULATION (BEZET), ECG08: 508 MS
RBC # BLD AUTO: 3.7 M/UL (ref 4.7–5.5)
RBC #/AREA URNS HPF: ABNORMAL /HPF (ref 0–5)
SODIUM SERPL-SCNC: 137 MMOL/L (ref 136–145)
SODIUM SERPL-SCNC: 138 MMOL/L (ref 136–145)
SP GR UR REFRACTOMETRY: 1.01 (ref 1–1.03)
TROPONIN I SERPL-MCNC: 0.07 NG/ML (ref 0–0.04)
TROPONIN I SERPL-MCNC: 0.08 NG/ML (ref 0–0.04)
TSH SERPL DL<=0.05 MIU/L-ACNC: 0.74 UIU/ML (ref 0.36–3.74)
TSH SERPL DL<=0.05 MIU/L-ACNC: 1.16 UIU/ML (ref 0.36–3.74)
UROBILINOGEN UR QL STRIP.AUTO: 1 EU/DL (ref 0.2–1)
VENTRICULAR RATE, ECG03: 69 BPM
VENTRICULAR RATE, ECG03: 78 BPM
VENTRICULAR RATE, ECG03: 83 BPM
WBC # BLD AUTO: 6 K/UL (ref 4.6–13.2)
WBC URNS QL MICRO: NEGATIVE /HPF (ref 0–5)

## 2020-12-07 PROCEDURE — 85025 COMPLETE CBC W/AUTO DIFF WBC: CPT

## 2020-12-07 PROCEDURE — 74011250637 HC RX REV CODE- 250/637: Performed by: HOSPITALIST

## 2020-12-07 PROCEDURE — 96365 THER/PROPH/DIAG IV INF INIT: CPT

## 2020-12-07 PROCEDURE — 84443 ASSAY THYROID STIM HORMONE: CPT

## 2020-12-07 PROCEDURE — 82962 GLUCOSE BLOOD TEST: CPT

## 2020-12-07 PROCEDURE — 84207 ASSAY OF VITAMIN B-6: CPT

## 2020-12-07 PROCEDURE — 99285 EMERGENCY DEPT VISIT HI MDM: CPT

## 2020-12-07 PROCEDURE — 85730 THROMBOPLASTIN TIME PARTIAL: CPT

## 2020-12-07 PROCEDURE — 80053 COMPREHEN METABOLIC PANEL: CPT

## 2020-12-07 PROCEDURE — 65660000000 HC RM CCU STEPDOWN

## 2020-12-07 PROCEDURE — 82550 ASSAY OF CK (CPK): CPT

## 2020-12-07 PROCEDURE — 94762 N-INVAS EAR/PLS OXIMTRY CONT: CPT

## 2020-12-07 PROCEDURE — 74011250637 HC RX REV CODE- 250/637: Performed by: EMERGENCY MEDICINE

## 2020-12-07 PROCEDURE — 73030 X-RAY EXAM OF SHOULDER: CPT

## 2020-12-07 PROCEDURE — 70551 MRI BRAIN STEM W/O DYE: CPT

## 2020-12-07 PROCEDURE — 83735 ASSAY OF MAGNESIUM: CPT

## 2020-12-07 PROCEDURE — 82607 VITAMIN B-12: CPT

## 2020-12-07 PROCEDURE — 85610 PROTHROMBIN TIME: CPT

## 2020-12-07 PROCEDURE — 81001 URINALYSIS AUTO W/SCOPE: CPT

## 2020-12-07 PROCEDURE — 74011250636 HC RX REV CODE- 250/636: Performed by: EMERGENCY MEDICINE

## 2020-12-07 PROCEDURE — 93005 ELECTROCARDIOGRAM TRACING: CPT

## 2020-12-07 PROCEDURE — 71045 X-RAY EXAM CHEST 1 VIEW: CPT

## 2020-12-07 PROCEDURE — C8929 TTE W OR WO FOL WCON,DOPPLER: HCPCS

## 2020-12-07 PROCEDURE — 70450 CT HEAD/BRAIN W/O DYE: CPT

## 2020-12-07 PROCEDURE — 83036 HEMOGLOBIN GLYCOSYLATED A1C: CPT

## 2020-12-07 PROCEDURE — 96366 THER/PROPH/DIAG IV INF ADDON: CPT

## 2020-12-07 RX ORDER — SPIRONOLACTONE 25 MG/1
TABLET ORAL DAILY
COMMUNITY

## 2020-12-07 RX ORDER — OMEPRAZOLE 20 MG/1
20 CAPSULE, DELAYED RELEASE ORAL 2 TIMES DAILY
COMMUNITY

## 2020-12-07 RX ORDER — MAGNESIUM SULFATE HEPTAHYDRATE 40 MG/ML
2 INJECTION, SOLUTION INTRAVENOUS
Status: DISPENSED | OUTPATIENT
Start: 2020-12-07 | End: 2020-12-07

## 2020-12-07 RX ORDER — INSULIN LISPRO 100 [IU]/ML
INJECTION, SOLUTION INTRAVENOUS; SUBCUTANEOUS
Status: DISCONTINUED | OUTPATIENT
Start: 2020-12-07 | End: 2020-12-10

## 2020-12-07 RX ORDER — MAGNESIUM SULFATE 100 %
4 CRYSTALS MISCELLANEOUS AS NEEDED
Status: DISCONTINUED | OUTPATIENT
Start: 2020-12-07 | End: 2020-12-14 | Stop reason: HOSPADM

## 2020-12-07 RX ORDER — ISOSORBIDE MONONITRATE 30 MG/1
30 TABLET, EXTENDED RELEASE ORAL DAILY
Status: DISCONTINUED | OUTPATIENT
Start: 2020-12-08 | End: 2020-12-14 | Stop reason: HOSPADM

## 2020-12-07 RX ORDER — METOLAZONE 10 MG/1
5 TABLET ORAL DAILY
Status: ON HOLD | COMMUNITY
End: 2021-06-11 | Stop reason: CLARIF

## 2020-12-07 RX ORDER — LANOLIN ALCOHOL/MO/W.PET/CERES
50 CREAM (GRAM) TOPICAL DAILY
COMMUNITY

## 2020-12-07 RX ORDER — CALCIUM GLUCONATE 94 MG/ML
1 INJECTION, SOLUTION INTRAVENOUS
Status: DISPENSED | OUTPATIENT
Start: 2020-12-07 | End: 2020-12-07

## 2020-12-07 RX ORDER — LENALIDOMIDE 25 MG/1
25 CAPSULE ORAL DAILY
Status: DISCONTINUED | OUTPATIENT
Start: 2020-12-08 | End: 2020-12-14 | Stop reason: HOSPADM

## 2020-12-07 RX ORDER — POTASSIUM CHLORIDE 7.45 MG/ML
10 INJECTION INTRAVENOUS
Status: COMPLETED | OUTPATIENT
Start: 2020-12-07 | End: 2020-12-07

## 2020-12-07 RX ORDER — GABAPENTIN 400 MG/1
800 CAPSULE ORAL 2 TIMES DAILY
Status: DISCONTINUED | OUTPATIENT
Start: 2020-12-07 | End: 2020-12-14 | Stop reason: HOSPADM

## 2020-12-07 RX ORDER — VALACYCLOVIR HYDROCHLORIDE 500 MG/1
500 TABLET, FILM COATED ORAL 2 TIMES DAILY
Status: DISCONTINUED | OUTPATIENT
Start: 2020-12-07 | End: 2020-12-14 | Stop reason: HOSPADM

## 2020-12-07 RX ORDER — LANOLIN ALCOHOL/MO/W.PET/CERES
1000 CREAM (GRAM) TOPICAL DAILY
Status: DISCONTINUED | OUTPATIENT
Start: 2020-12-08 | End: 2020-12-14 | Stop reason: HOSPADM

## 2020-12-07 RX ORDER — MAGNESIUM SULFATE HEPTAHYDRATE 40 MG/ML
2 INJECTION, SOLUTION INTRAVENOUS ONCE
Status: COMPLETED | OUTPATIENT
Start: 2020-12-07 | End: 2020-12-07

## 2020-12-07 RX ORDER — PRAVASTATIN SODIUM 20 MG/1
40 TABLET ORAL
Status: DISCONTINUED | OUTPATIENT
Start: 2020-12-07 | End: 2020-12-14 | Stop reason: HOSPADM

## 2020-12-07 RX ORDER — METOPROLOL SUCCINATE 25 MG/1
25 TABLET, EXTENDED RELEASE ORAL DAILY
Status: DISCONTINUED | OUTPATIENT
Start: 2020-12-08 | End: 2020-12-14 | Stop reason: HOSPADM

## 2020-12-07 RX ORDER — FELODIPINE 10 MG/1
10 TABLET, EXTENDED RELEASE ORAL DAILY
COMMUNITY

## 2020-12-07 RX ORDER — LANOLIN ALCOHOL/MO/W.PET/CERES
50 CREAM (GRAM) TOPICAL DAILY
Status: DISCONTINUED | OUTPATIENT
Start: 2020-12-08 | End: 2020-12-14 | Stop reason: HOSPADM

## 2020-12-07 RX ORDER — GUAIFENESIN 100 MG/5ML
81 LIQUID (ML) ORAL DAILY
Status: DISCONTINUED | OUTPATIENT
Start: 2020-12-08 | End: 2020-12-10

## 2020-12-07 RX ORDER — POTASSIUM CHLORIDE 20 MEQ/1
40 TABLET, EXTENDED RELEASE ORAL 2 TIMES DAILY
Status: COMPLETED | OUTPATIENT
Start: 2020-12-07 | End: 2020-12-08

## 2020-12-07 RX ADMIN — VALACYCLOVIR HYDROCHLORIDE 500 MG: 500 TABLET, FILM COATED ORAL at 21:45

## 2020-12-07 RX ADMIN — GABAPENTIN 800 MG: 400 CAPSULE ORAL at 21:45

## 2020-12-07 RX ADMIN — MAGNESIUM SULFATE HEPTAHYDRATE 2 G: 40 INJECTION, SOLUTION INTRAVENOUS at 10:27

## 2020-12-07 RX ADMIN — POTASSIUM CHLORIDE 40 MEQ: 1500 TABLET, EXTENDED RELEASE ORAL at 21:45

## 2020-12-07 RX ADMIN — POTASSIUM BICARBONATE 40 MEQ: 782 TABLET, EFFERVESCENT ORAL at 09:57

## 2020-12-07 RX ADMIN — POTASSIUM CHLORIDE 10 MEQ: 10 INJECTION, SOLUTION INTRAVENOUS at 09:56

## 2020-12-07 RX ADMIN — PRAVASTATIN SODIUM 40 MG: 20 TABLET ORAL at 21:45

## 2020-12-07 RX ADMIN — POTASSIUM CHLORIDE 10 MEQ: 10 INJECTION, SOLUTION INTRAVENOUS at 13:52

## 2020-12-07 RX ADMIN — PERFLUTREN 1 ML: 6.52 INJECTION, SUSPENSION INTRAVENOUS at 14:34

## 2020-12-07 NOTE — ED NOTES
TRANSFER - OUT REPORT:    Verbal report given to Levern Vince (name) on Corby Arnold  being transferred to Telemetry (unit) for routine progression of care       Report consisted of patients Situation, Background, Assessment and   Recommendations(SBAR). Information from the following report(s) SBAR, ED Summary and MAR was reviewed with the receiving nurse. Lines:   Peripheral IV 12/07/20 Left Antecubital (Active)   Site Assessment Clean, dry, & intact 12/07/20 0933   Phlebitis Assessment 0 12/07/20 0933   Infiltration Assessment 0 12/07/20 0933   Dressing Status Clean, dry, & intact 12/07/20 0933   Dressing Type Transparent;Tape 12/07/20 0933   Hub Color/Line Status Pink 12/07/20 0933   Action Taken Blood drawn 12/07/20 0933   Alcohol Cap Used Yes 12/07/20 0933       Peripheral IV 12/07/20 Right Arm (Active)   Site Assessment Clean, dry, & intact 12/07/20 0950   Phlebitis Assessment 0 12/07/20 0950   Infiltration Assessment 0 12/07/20 0950   Dressing Status Clean, dry, & intact 12/07/20 0950   Hub Color/Line Status Green 12/07/20 0950   Alcohol Cap Used Yes 12/07/20 0950        Opportunity for questions and clarification was provided.       Patient transported with:   Registered Nurse

## 2020-12-07 NOTE — ED PROVIDER NOTES
EMERGENCY DEPARTMENT HISTORY AND PHYSICAL EXAM    Date: 12/7/2020  Patient Name: Virgen Medina    History of Presenting Illness     Chief Complaint   Patient presents with    Extremity Weakness         History Provided By: Patient and EMS    9:17 AM  Virgen Medina is a 68 y.o. male with PMHX of diabetes, CHF, obesity, multiple myeloma who presents to the emergency department C/O left arm and leg weakness and numbness. Patient states his symptoms started yesterday but he is unsure what time. He states he also has pain in his left shoulder and some shortness of breath. He states he has been having difficulty speaking intermittently for about a week. He denies any fever or cough, vomiting, chest pain, bowel or urinary complaints. No relieving or exacerbating factors identified. PCP: Sloan Dorsey MD    Current Facility-Administered Medications   Medication Dose Route Frequency Provider Last Rate Last Dose    calcium gluconate injection 1 g  1 g IntraVENous NOW Anest, Mary Gilbert MD        magnesium sulfate 2 g/50 ml IVPB (premix or compounded)  2 g IntraVENous Q1H Evelyn Peterson MD        potassium chloride (K-DUR, KLOR-CON) SR tablet 40 mEq  40 mEq Oral BID Evelyn Peterson MD         Current Outpatient Medications   Medication Sig Dispense Refill    LENALIDOMIDE PO Take 25 mg by mouth.  felodipine (PLENDIL SR) 10 mg 24 hr tablet Take 10 mg by mouth daily.  spironolactone (ALDACTONE) 25 mg tablet Take  by mouth daily.  pyridoxine, vitamin B6, (VITAMIN B-6) 50 mg tablet Take 50 mg by mouth daily.  metOLazone (ZAROXOLYN) 10 mg tablet Take 5 mg by mouth daily.  rivaroxaban (XARELTO PO) Take 10 mg by mouth.  POTASSIUM CHLORIDE 20 mEq by Does Not Apply route.  valacyclovir HCl (VALTREX PO) Take  by mouth.  omeprazole (PRILOSEC) 20 mg capsule Take 20 mg by mouth daily.  DEXAMETHASONE, BULK, by Does Not Apply route.       SITagliptin-metFORMIN (JANUMET) 50-1,000 mg per tablet Take 1 Tab by mouth two (2) times daily (with meals).  cholecalciferol (Vitamin D3) (1000 Units /25 mcg) tablet Take 1,000 Units by mouth daily.  cyanocobalamin (VITAMIN B12) 500 mcg tablet Take 1,000 mcg by mouth daily.  lisinopril (PRINIVIL, ZESTRIL) 5 mg tablet Take 1 Tab by mouth daily. (Patient taking differently: Take 20 mg by mouth daily.) 30 Tab 0    furosemide (LASIX) 40 mg tablet Take 1 Tab by mouth daily. 30 Tab 0    isosorbide mononitrate ER (IMDUR) 30 mg tablet Take 1 Tab by mouth daily. 30 Tab 0    metoprolol succinate (TOPROL-XL) 25 mg XL tablet Take 1 Tab by mouth daily. 30 Tab 0    pravastatin (PRAVACHOL) 40 mg tablet Take 1 Tab by mouth nightly. 30 Tab 0    aspirin 81 mg chewable tablet Take 81 mg by mouth daily.  metFORMIN (GLUCOPHAGE) 1,000 mg tablet Take 1,000 mg by mouth two (2) times daily (with meals).  gabapentin (NEURONTIN) 400 mg capsule Take 800 mg by mouth two (2) times a day.  SITagliptin (JANUVIA) 100 mg tablet Take 100 mg by mouth daily.  calcium-cholecalciferol, d3, (CALCIUM 600 + D) 600-125 mg-unit tab Take  by mouth daily.  pioglitazone (ACTOS) 30 mg tablet Take 30 mg by mouth daily. Past History     Past Medical History:  Past Medical History:   Diagnosis Date    [facility]     Diabetes ([facility]     FH: chemotherapy     H[facility] stem cell transplant ([facility]     Hypertension     Multiple myeloma ([facility]     Neuropathy        Past Surgical History:  Past Surgical History:   Procedure Laterality Date    HX CHOLECYSTECTOMY      HX KYPHOPLASTY         Family History:  History reviewed. No pertinent family history. Social History:  Social History     Tobacco Use    Smoking status: Former Smoker    Smokeless tobacco: Never Used   Substance Use Topics    Alcohol use: Never     Frequency: Never     Comment: rare    Drug use: No       Allergies:   Allergies   Allergen Reactions    Iodine Hives         Review of Systems   Review of Systems   Constitutional: Negative for fever. Respiratory: Positive for shortness of breath. Negative for cough. Cardiovascular: Negative for chest pain. Gastrointestinal: Negative for abdominal pain. Musculoskeletal: Positive for arthralgias. Neurological: Positive for speech difficulty, weakness and numbness. Negative for headaches. All other systems reviewed and are negative.         Physical Exam     Vitals:    12/07/20 0925 12/07/20 1435 12/07/20 1500   BP: (!) 151/62 (!) 151/62 (!) 160/61   Pulse: 85  71   Resp: 18  20   Temp: 99.8 °F (37.7 °C)     SpO2: 97%  100%   Weight: 119.3 kg (263 lb) 119.3 kg (263 lb)    Height: 5' 9\" (1.753 m) 5' 9\" (1.753 m)      Physical Exam    Nursing notes and vital signs reviewed    Constitutional: Non toxic appearing, moderate distress  Head: Normocephalic, Atraumatic  Eyes: EOMI  Neck: Supple  Cardiovascular: Regular rate and rhythm, no murmurs, rubs, or gallops  Chest: Normal work of breathing and chest excursion bilaterally  Lungs: Clear to ausculation bilaterally  Abdomen: Soft, non tender, non distended  Back: No evidence of trauma or deformity  Extremities: No evidence of trauma or deformity, moderate bilateral with left greater than right LE edema  Skin: Warm and dry, normal cap refill  Neuro: Alert and appropriate, garbled speech, left arm and leg are weaker when compared to the right arm and leg and patient holds left hand with fingers in flexion and is unable to extend them, left arm appears atrophied when compared to right arm  Psychiatric: Normal mood and affect      Diagnostic Study Results     Labs -     Recent Results (from the past 12 hour(s))   CBC WITH AUTOMATED DIFF    Collection Time: 12/07/20  9:26 AM   Result Value Ref Range    WBC 6.0 4.6 - 13.2 K/uL    RBC 3.70 (L) 4.70 - 5.50 M/uL    HGB 10.7 (L) 13.0 - 16.0 g/dL    HCT 31.1 (L) 36.0 - 48.0 %    MCV 84.1 74.0 - 97.0 FL    MCH 28.9 24.0 - 34.0 PG    MCHC 34.4 31.0 - 37.0 g/dL    RDW 15.5 (H) 11.6 - 14.5 %    PLATELET 587 087 - 004 K/uL    MPV 10.9 9.2 - 11.8 FL    NEUTROPHILS 54 40 - 73 %    LYMPHOCYTES 28 21 - 52 %    MONOCYTES 15 (H) 3 - 10 %    EOSINOPHILS 3 0 - 5 %    BASOPHILS 0 0 - 2 %    ABS. NEUTROPHILS 3.3 1.8 - 8.0 K/UL    ABS. LYMPHOCYTES 1.7 0.9 - 3.6 K/UL    ABS. MONOCYTES 0.9 0.05 - 1.2 K/UL    ABS. EOSINOPHILS 0.2 0.0 - 0.4 K/UL    ABS. BASOPHILS 0.0 0.0 - 0.1 K/UL    DF AUTOMATED     METABOLIC PANEL, COMPREHENSIVE    Collection Time: 12/07/20  9:26 AM   Result Value Ref Range    Sodium 138 136 - 145 mmol/L    Potassium 2.9 (LL) 3.5 - 5.5 mmol/L    Chloride 101 100 - 111 mmol/L    CO2 30 21 - 32 mmol/L    Anion gap 7 3.0 - 18 mmol/L    Glucose 163 (H) 74 - 99 mg/dL    BUN 11 7.0 - 18 MG/DL    Creatinine 0.98 0.6 - 1.3 MG/DL    BUN/Creatinine ratio 11 (L) 12 - 20      GFR est AA >60 >60 ml/min/1.73m2    GFR est non-AA >60 >60 ml/min/1.73m2    Calcium 8.7 8.5 - 10.1 MG/DL    Bilirubin, total 1.0 0.2 - 1.0 MG/DL    ALT (SGPT) 39 16 - 61 U/L    AST (SGOT) 22 10 - 38 U/L    Alk.  phosphatase 74 45 - 117 U/L    Protein, total 6.5 6.4 - 8.2 g/dL    Albumin 3.2 (L) 3.4 - 5.0 g/dL    Globulin 3.3 2.0 - 4.0 g/dL    A-G Ratio 1.0 0.8 - 1.7     PROTHROMBIN TIME + INR    Collection Time: 12/07/20  9:26 AM   Result Value Ref Range    Prothrombin time 16.0 (H) 11.5 - 15.2 sec    INR 1.3 (H) 0.8 - 1.2     PTT    Collection Time: 12/07/20  9:26 AM   Result Value Ref Range    aPTT 27.8 23.0 - 36.4 SEC   CARDIAC PANEL,(CK, CKMB & TROPONIN)    Collection Time: 12/07/20  9:26 AM   Result Value Ref Range    CK - MB 6.2 (H) <3.6 ng/ml    CK-MB Index 3.8 0.0 - 4.0 %     39 - 308 U/L    Troponin-I, QT 0.08 (H) 0.0 - 0.045 NG/ML   MAGNESIUM    Collection Time: 12/07/20  9:26 AM   Result Value Ref Range    Magnesium 1.2 (L) 1.6 - 2.6 mg/dL   EKG, 12 LEAD, INITIAL    Collection Time: 12/07/20  9:29 AM   Result Value Ref Range    Ventricular Rate 83 BPM    Atrial Rate 83 BPM    P-R Interval 180 ms    QRS Duration 126 ms    Q-T Interval 378 ms    QTC Calculation (Bezet) 444 ms    Calculated P Axis 53 degrees    Calculated R Axis 23 degrees    Calculated T Axis 174 degrees    Diagnosis       Sinus rhythm with ventricular fusion beats  paroxysmal LBBB  T wave abnormality, consider lateral ischemia  Abnormal ECG  When compared with ECG of 16-FEB-2020 06:15,  Significant changes have occurred  Confirmed by Salvatore Lozada MD. ([id number]) on 12/7/2020 2:17:40 PM     EKG, 12 LEAD, SUBSEQUENT    Collection Time: 12/07/20  9:54 AM   Result Value Ref Range    Ventricular Rate 78 BPM    Atrial Rate 78 BPM    P-R Interval 184 ms    QRS Duration 158 ms    Q-T Interval 446 ms    QTC Calculation (Bezet) 508 ms    Calculated P Axis 51 degrees    Calculated R Axis 18 degrees    Calculated T Axis 89 degrees    Diagnosis       Normal sinus rhythm  Left bundle branch block  Abnormal ECG  When compared with ECG of 07-DEC-2020 09:29,  QRS duration has increased  QT has lengthened  Confirmed by Salvatore Lozada MD. ([id number]) on 12/7/2020 2:18:18 PM     URINALYSIS W/ RFLX MICROSCOPIC    Collection Time: 12/07/20 10:05 AM   Result Value Ref Range    Color YELLOW      Appearance CLEAR      Specific gravity 1.011 1.005 - 1.030      pH (UA) 6.0 5.0 - 8.0      Protein 30 (A) NEG mg/dL    Glucose Negative NEG mg/dL    Ketone TRACE (A) NEG mg/dL    Bilirubin Negative NEG      Blood SMALL (A) NEG      Urobilinogen 1.0 0.2 - 1.0 EU/dL    Nitrites Negative NEG      Leukocyte Esterase TRACE (A) NEG     URINE MICROSCOPIC ONLY    Collection Time: 12/07/20 10:05 AM   Result Value Ref Range    WBC Negative 0 - 5 /hpf    RBC 1 to 3 0 - 5 /hpf    Epithelial cells 1+ 0 - 5 /lpf    Bacteria 2+ (A) NEG /hpf   EKG, 12 LEAD, SUBSEQUENT    Collection Time: 12/07/20 10:53 AM   Result Value Ref Range    Ventricular Rate 69 BPM    Atrial Rate 69 BPM    P-R Interval 184 ms    QRS Duration 116 ms    Q-T Interval 412 ms    QTC Calculation (Bezet) 441 ms    Calculated P Axis 20 degrees    Calculated R Axis 5 degrees    Calculated T Axis 73 degrees    Diagnosis       Normal sinus rhythm  Incomplete left bundle branch block  Non-specific ST/T wave changes  Abnormal ECG  When compared with ECG of 07-DEC-2020 09:54,  Left bundle branch block is no longer present  Confirmed by Ed Welch MD. ([id number]) on 12/7/2020 5:31:41 PM     METABOLIC PANEL, BASIC    Collection Time: 12/07/20 12:25 PM   Result Value Ref Range    Sodium 137 136 - 145 mmol/L    Potassium 3.1 (L) 3.5 - 5.5 mmol/L    Chloride 101 100 - 111 mmol/L    CO2 33 (H) 21 - 32 mmol/L    Anion gap 3 3.0 - 18 mmol/L    Glucose 141 (H) 74 - 99 mg/dL    BUN 10 7.0 - 18 MG/DL    Creatinine 0.83 0.6 - 1.3 MG/DL    BUN/Creatinine ratio 12 12 - 20      GFR est AA >60 >60 ml/min/1.73m2    GFR est non-AA >60 >60 ml/min/1.73m2    Calcium 8.9 8.5 - 10.1 MG/DL   CARDIAC PANEL,(CK, CKMB & TROPONIN)    Collection Time: 12/07/20 12:25 PM   Result Value Ref Range    CK - MB 6.1 (H) <3.6 ng/ml    CK-MB Index 3.3 0.0 - 4.0 %     39 - 308 U/L    Troponin-I, QT 0.07 (H) 0.0 - 0.045 NG/ML       Radiologic Studies -   CT HEAD WO CONT   Final Result   IMPRESSION:      1. No acute intracranial abnormalities are identified. 2. Progressive lucent/lytic lesions in the calvarium, which can be seen in the   setting of metastatic disease or myeloma. Please note that head CT may be negative in the acute setting and MRI could best   further evaluate for acute/subacute ischemia/infarct in the high/persistent   index of clinical suspicion. XR CHEST PORT   Final Result   Impression:      No plain film evidence of acute cardiopulmonary disease, allowing for technique. XR SHOULDER LT AP/LAT MIN 2 V   Final Result   Impression: No acute findings or significant degenerative change appreciated. Scattered small bone lucencies noted compatible with history of multiple   myeloma.             MRI BRAIN WO CONT    (Results Pending)     CT Results  (Last 48 hours)               12/07/20 1156  CT HEAD WO CONT Final result    Impression:  IMPRESSION:       1. No acute intracranial abnormalities are identified. 2. Progressive lucent/lytic lesions in the calvarium, which can be seen in the   setting of metastatic disease or myeloma. Please note that head CT may be negative in the acute setting and MRI could best   further evaluate for acute/subacute ischemia/infarct in the high/persistent   index of clinical suspicion. Narrative:  EXAM: CT head       HISTORY:    -Provided with order: left sided weakness, stroke?   -Additional: None       COMPARISON: 02/16/20       TECHNIQUE: Axial CT imaging of the head was performed without intravenous   contrast. Sagittal and coronal reconstructions were created from the axial data. One or more dose reduction techniques were used on this CT: automated exposure   control, adjustment of the mAs and/or kVp according to patient size, and   iterative reconstruction techniques. The specific techniques used on this CT   exam have been documented in the patient's electronic medical record. Digital Imaging and Communications in Medicine (DICOM) format image data are   available to nonaffiliated external healthcare facilities or entities on a   secure, media free, reciprocally searchable basis with patient authorization for   at least a 12-month period after this study. _______________       FINDINGS:           BRAIN, POSTERIOR FOSSA, EXTRA-AXIAL SPACES AND MENINGES:    The sulci, folia, ventricles and basal cisterns are   within normal limits for   the patient's age. There are no areas of abnormal parenchymal attenuation. There is no intracranial hemorrhage, mass effect, or midline shift. There are no abnormal extra-axial fluid collections. CALVARIUM: Progressive lucent lesions in the calvarium. SINUSES/MASTOIDS: Slight left maxillary sinus mucosal thickening. OTHER: None.        _______________               CXR Results  (Last 48 hours)               12/07/20 1033  XR CHEST PORT Final result    Impression:  Impression:       No plain film evidence of acute cardiopulmonary disease, allowing for technique. Narrative:  HISTORY:    -Provided with order: left shoulder pain   -Additional: None       Technique : AP PORTABLE CHEST       Comparison : 02/16/20        FINDINGS:       HEART AND MEDIASTINUM: Right IJ central venous catheter tip projects over   expected SVC. External monitoring wires leads and pads partially obscures   visibility. LUNGS AND PLEURAL SPACES: No consolidation, congestive heart failure, pleural   effusion or pneumothorax. BONY THORAX AND SOFT TISSUES: No acute osseous abnormality. Medications given in the ED-  Medications   calcium gluconate injection 1 g ( IntraVENous Canceled Entry 12/7/20 0935)   magnesium sulfate 2 g/50 ml IVPB (premix or compounded) (has no administration in time range)   potassium chloride (K-DUR, KLOR-CON) SR tablet 40 mEq (has no administration in time range)   potassium bicarb-citric acid (EFFER-K) tablet 40 mEq (40 mEq Oral Given 12/7/20 0957)   potassium chloride 10 mEq in 100 ml IVPB (0 mEq IntraVENous IV Completed 12/7/20 1138)   magnesium sulfate 2 g/50 ml IVPB (premix or compounded) (0 g IntraVENous IV Completed 12/7/20 1138)   potassium chloride 10 mEq in 100 ml IVPB (0 mEq IntraVENous IV Completed 12/7/20 1515)   perflutren lipid microspheres (DEFINITY) in NS bolus IV (1 mL IntraVENous Given 12/7/20 1434)         Medical Decision Making   I am the first provider for this patient. I reviewed the vital signs, available nursing notes, past medical history, past surgical history, family history and social history. Vital Signs-Reviewed the patient's vital signs.     Pulse Oximetry Analysis - 100% on room air, not hypoxic     Cardiac Monitor:  Rate: 83 bpm  Rhythm: Sinus rhythm    EKG interpretation: (Preliminary)  EKG read by Dr. Davion Alberts at 9:31 AM  Sinus rhythm at a rate of 83 bpm, [place] interval of 180 ms, QRS duration 126 ms, PVCs noted, when compared to prior from February 2020 patient has had widening of his QRS complex    Repeat EKG interpretation: (Preliminary)  EKG read by Dr. Davion Alberts at 10:58 AM  Normal sinus rhythm at a rate of 69 bpm, [place] interval 184 ms, QRS duration 160 ms, improvement in QRS morphology and widening from prior    Records Reviewed: Nursing Notes, Old Medical Records and Previous electrocardiograms    Provider Notes (Medical Decision Making): Sandy Burks is a 68 y.o. male seen for left-sided weakness. Patient had extremely widened QRS on initial EKG and cardiac monitor that improved here after we addressed his profound hypokalemia and hypomagnesemia. Patient's timeline for his dysarthria and left-sided weakness is also unclear and patient is not a TPA candidate. Discussed with neurology who will consult on patient and recommends inpatient MRI for further evaluation of this left-sided weakness. Patient had left shoulder pain is slightly elevated troponin here is already downtrending. Discussed with cardiology who arranged for stat echo and will consult on the patient. Discussed with hospitalist for further in-hospital patient management. Procedures:  Procedures    ED Course:   CONSULT NOTE:   12:39 PM  Dr. Davion Alberts spoke with Dr. Alison Saxena   Specialty: Cardiology  Discussed pt's hx, disposition, and available diagnostic and imaging results over the telephone. Reviewed care plans. Repeat cardiac panel and will arrange echo, continue stroke eval.     CONSULT NOTE:   12:49 PM  Dr. Davion Alberst spoke with Dr. Tonya Bowens   Specialty: Neurology  Discussed pt's hx, disposition, and available diagnostic and imaging results over the telephone. Reviewed care plans. Will need MRI as inpatient, but not stat in ED as symptoms are outside tPA window. CONSULT NOTE:   3:34 PM  Dr. Jed Bowman spoke with Dr. Brittani Morales   Specialty: Cardiology  Discussed pt's hx, disposition, and available diagnostic and imaging results over the telephone. Reviewed care plans. Can be admitted here and he will consult. CONSULT NOTE:   3:56 PM  Dr. Jed Bowman spoke with Dr. Helen Nagy   Specialty: Hospitalist  Discussed pt's hx, disposition, and available diagnostic and imaging results over the telephone. Reviewed care plans. Accepts to telemetry. Diagnosis and Disposition     Critical Care Time: 4:13 PM  I have spent 46 minutes of critical care time involved in lab review, consultations with specialist, family decision-making, and documentation. During this entire length of time I was immediately available to the patient. Critical Care: The reason for providing this level of medical care for this critically ill patient was due a critical illness that impaired one or more vital organ systems such that there was a high probability of imminent or life threatening deterioration in the patients condition. This care involved high complexity decision making to assess, manipulate, and support vital system functions, to treat this degreee vital organ system failure and to prevent further life threatening deterioration of the patients condition. Core Measures:  For Hospitalized Patients:    1. Hospitalization Decision Time:  The decision to hospitalize the patient was made by Dr. Jed Bowman at 9:20 AM on 12/7/2020    3:57 PM  Patient is being admitted to the hospital by Dr. Helen Nagy. The results of their tests and reasons for their admission have been discussed with them and/or available family. They convey agreement and understanding for the need to be admitted and for their admission diagnosis. CONDITIONS ON ADMISSION:  Sepsis is not present at the time of admission. Deep Vein Thrombosis is not present at the time of admission.  Thrombosis is not present at the time of admission. Urinary Tract Infection is not present at the time of admission. Pneumonia is not present at the time of admission. MRSA is not present at the time of admission. Wound infection is not present at the time of admission. Pressure Ulcer is not present at the time of admission. CLINICAL IMPRESSION:    1. Hypokalemia    2. Hypomagnesemia    3. Elevated troponin    4. Abnormal EKG    5. Left-sided weakness      _______________________________      Please note that this dictation was completed with [email or web address], the computer voice recognition software. Quite often unanticipated grammatical, syntax, homophones, and other interpretive errors are inadvertently transcribed by the computer software. Please disregard these errors. Please excuse any errors that have escaped final proofreading.

## 2020-12-07 NOTE — PROGRESS NOTES
Problem: Pressure Injury - Risk of  Goal: *Prevention of pressure injury  Description: Document Vance Scale and appropriate interventions in the flowsheet. Outcome: Progressing Towards Goal  Note: Pressure Injury Interventions: Activity Interventions: Assess need for specialty bed, Chair cushion, Increase time out of bed, Pressure redistribution bed/mattress(bed type), PT/OT evaluation    Mobility Interventions: Assess need for specialty bed, Chair cushion, Float heels, Pressure redistribution bed/mattress (bed type), PT/OT evaluation, Turn and reposition approx.  every two hours(pillow and wedges)    Nutrition Interventions: Offer support with meals,snacks and hydration, Discuss nutritional consult with provider, Document food/fluid/supplement intake                     Problem: Patient Education: Go to Patient Education Activity  Goal: Patient/Family Education  Outcome: Progressing Towards Goal

## 2020-12-07 NOTE — ED NOTES
Pt hourly rounding competed. Safety   Pt (xxx) resting on stretcher with side rails up and call bell in reach. () in chair    () in parents arms. Toileting   Pt offered ()Bedpan     ()Assistance to Restroom     (xxx)Urinal  Ongoing Updates  Updated on plan of care and status of test results.   Pain Management  Inquired as to comfort and offered comfort measures:    (xxx) warm blankets   () dimmed lights

## 2020-12-07 NOTE — ED TRIAGE NOTES
Pt states \" I am having left shoulder niumbness and numbness to the left leg since yesterday. Pt states \" I have been having trouble with my speech as well for about a weak. \"

## 2020-12-07 NOTE — ED NOTES
Attempted to call report to the floor and nurse is unavailable at the moment. Will receive a return call.

## 2020-12-07 NOTE — H&P
History & Physical    Patient: Nazario Carter MRN: [id number]  CSN: [id number]    YOB: 1940  Age: 68 y.o. Sex: male      DOA: 12/7/2020  Primary Care Provider:  Erum Craig MD      Assessment/Plan     Hospital Problems  Date Reviewed: 2/17/2020          Codes Class Noted POA    Hypokalemia ICD-10-CM: E87.6  ICD-9-CM: 276.8  12/7/2020 Unknown        Neuropathy ICD-10-CM: G62.9  ICD-9-CM: 355.9  Unknown Unknown        Hypomagnesemia ICD-10-CM: E83.42  ICD-9-CM: 275.2  12/7/2020 Unknown        Elevated troponin ICD-10-CM: R77.8  ICD-9-CM: 790.6  12/7/2020 Unknown        Weakness ICD-10-CM: R53.1  ICD-9-CM: 780.79  12/7/2020 Unknown        Morbid obesity ([facility] ICD-10-CM: E66.01  ICD-9-CM: 278.01  2/17/2020 Yes        Multiple myeloma ([facility] ICD-10-CM: C90.00  ICD-9-CM: 203.00  2/16/2020 Yes        Type 2 diabetes mellitus ([facility] ICD-10-CM: E11.9  ICD-9-CM: 250.00  2/12/2020 Yes                Admit to tele     Neuropathy , and weakness  Will r/o stroke   Mri brain   Possible due to progressive mm -cns involvement ? Will have neuro and oncologist on board   Neuro check   Speech /pt/ot evaluation   We will check TSH, B12 B6    Hypokalemia, hypomagnesemia  K and magnesium replacement  We will continue monitoring  Cardiac monitor due to EKG changes  Calcium gluconate given    Elevated  troponin  Continue cardiac monitoring  No chest pain  Check ce trend   Dr. Jose Leal is on board      MM   Continue home meds   Will have oncologist on board tomorrow     DM type II , with complication,  - ssi, diabetic diet , hypoglycemia protocol       HTN, accelerated  Start some home medication, his symptoms over several days-if has stroke like subacute one      full code   Estimate  length of stay : 2-3 day    DVT :scd  ppi proph  CC: Weakness and numbness       HPI:     Nazario Carter is a 68 y.o. male with, CHF ECT came to ER to weakness and numbness for several days.   He follow-up with [facility] for his multiple myeloma patient was a started about 2 weeks ago. He has a left weakness days ago. He also noted his speech becomes slurred worsening in the morning. Improving with the day. He was found K 2.9. mg 1.2, ekg lbb, st seg change, K and mg were administrated and ekg got improving. No chest pain. Daughter was at the bedside. Dr. Grace Paul was consulted for abnormal ekg and elevated trop, stat echo done     Admission and treatment discussed with patient and family, all agree and indicated a verbal understanding   Visit Vitals  BP (!) 175/62   Pulse 68   Temp 99.8 °F (37.7 °C)   Resp 19   Ht 5' 9\" (1.753 m)   Wt 119.3 kg (263 lb)   SpO2 100%   BMI 38.84 kg/m²      O2 Device: Room air      Past Medical History:   Diagnosis Date    Cancer ([facility]     Diabetes ([facility]     FH: chemotherapy     H/O stem cell transplant ([facility]     Hypertension     Multiple myeloma ([facility]     Neuropathy        Past Surgical History:   Procedure Laterality Date    HX CHOLECYSTECTOMY      HX KYPHOPLASTY       Fhx: htn parents     Social History     Socioeconomic History    Marital status:      Spouse name: Not on file    Number of children: Not on file    Years of education: Not on file    Highest education level: Not on file   Tobacco Use    Smoking status: Former Smoker    Smokeless tobacco: Never Used   Substance and Sexual Activity    Alcohol use: Never     Frequency: Never     Comment: rare    Drug use: No       Prior to Admission medications    Medication Sig Start Date End Date Taking? Authorizing Provider   LENALIDOMIDE PO Take 25 mg by mouth. Yes Other, MD Annie   felodipine (PLENDIL SR) 10 mg 24 hr tablet Take 10 mg by mouth daily. Yes Other, MD Annie   spironolactone (ALDACTONE) 25 mg tablet Take  by mouth daily. Yes Other, MD Annie   pyridoxine, vitamin B6, (VITAMIN B-6) 50 mg tablet Take 50 mg by mouth daily. Yes George, MD Annie   metOLazone (ZAROXOLYN) 10 mg tablet Take 5 mg by mouth daily.    Yes George, Annie MD   rivaroxaban (XARELTO PO) Take 10 mg by mouth. Yes George, MD Annie   POTASSIUM CHLORIDE 20 mEq by Does Not Apply route. Yes George, MD Annie   valacyclovir HCl (VALTREX PO) Take  by mouth. Yes Annie Vazquez MD   omeprazole (PRILOSEC) 20 mg capsule Take 20 mg by mouth daily. Yes George, MD Annie   DEXAMETHASONE, BULK, by Does Not Apply route. Yes George, MD Annie   SITagliptin-metFORMIN (JANUMET) 50-1,000 mg per tablet Take 1 Tab by mouth two (2) times daily (with meals). Yes Provider, Historical   cholecalciferol (Vitamin D3) (1000 Units /25 mcg) tablet Take 1,000 Units by mouth daily. Yes Provider, Historical   cyanocobalamin (VITAMIN B12) 500 mcg tablet Take 1,000 mcg by mouth daily. Yes Provider, Historical   lisinopril (PRINIVIL, ZESTRIL) 5 mg tablet Take 1 Tab by mouth daily. Patient taking differently: Take 20 mg by mouth daily. 2/14/20  Yes Jazzy Araiza MD   furosemide (LASIX) 40 mg tablet Take 1 Tab by mouth daily. 2/14/20  Yes Harpreet Rhodes MD   isosorbide mononitrate ER (IMDUR) 30 mg tablet Take 1 Tab by mouth daily. 2/15/20  Yes Harpreet Rhodes MD   metoprolol succinate (TOPROL-XL) 25 mg XL tablet Take 1 Tab by mouth daily. 2/15/20  Yes Harpreet Rhodes MD   pravastatin (PRAVACHOL) 40 mg tablet Take 1 Tab by mouth nightly. 2/14/20  Yes Harpreet Rhodes MD   aspirin 81 mg chewable tablet Take 81 mg by mouth daily. Yes George, MD Annie   metFORMIN (GLUCOPHAGE) 1,000 mg tablet Take 1,000 mg by mouth two (2) times daily (with meals). Yes Annie Vazquez MD   gabapentin (NEURONTIN) 400 mg capsule Take 800 mg by mouth two (2) times a day. Yes Annie Vazquez MD   SITagliptin (JANUVIA) 100 mg tablet Take 100 mg by mouth daily. Yes George, MD Annie   calcium-cholecalciferol, d3, (CALCIUM 600 + D) 600-125 mg-unit tab Take  by mouth daily. Yes Annie Vazquez MD   pioglitazone (ACTOS) 30 mg tablet Take 30 mg by mouth daily.    Yes Other, MD Annie       Allergies   Allergen Reactions    Iodine Hives       Review of Systems  Gen: No fever, chills, malaise, weight loss/gain. Heent: No headache, rhinorrhea, epistaxis, ear pain, hearing loss, sinus pain, neck pain/stiffness, sore throat. Heart: No chest pain, palpitations, CHARLES, pnd, or orthopnea. Resp: No cough, hemoptysis, wheezing and shortness of breath. GI: No nausea, vomiting, diarrhea, constipation, melena or hematochezia. : No urinary obstruction, dysuria or hematuria. Derm: No rash, new skin lesion or pruritis. Musc/skeletal: no bone or joint complains. Vasc: No edema, cyanosis or claudication. Endo: No heat/cold intolerance, no polyuria,polydipsia or polyphagia. Neuro: left unilateral weakness, numbness, tingling. Slurred speech  No seizures. Heme: No easy bruising or bleeding. Physical Exam:     Physical Exam:  Visit Vitals  BP (!) 175/62   Pulse 68   Temp 99.8 °F (37.7 °C)   Resp 19   Ht 5' 9\" (1.753 m)   Wt 119.3 kg (263 lb)   SpO2 100%   BMI 38.84 kg/m²      O2 Device: Room air    Temp (24hrs), Av.8 °F (37.7 °C), Min:99.8 °F (37.7 °C), Max:99.8 °F (37.7 °C)    No intake/output data recorded. No intake/output data recorded. General:  Awake, cooperative, no distress. Head:  Normocephalic, without obvious abnormality, atraumatic. Eyes:  Conjunctivae/corneas clear, sclera anicteric, PERRL, EOMs intact. Nose: Nares normal. No drainage or sinus tenderness. Throat: Lips, mucosa, and tongue normal. .   Neck: Supple, symmetrical, trachea midline, no adenopathy. Lungs:   Clear to auscultation bilaterally. Heart:  Regular rate and rhythm, S1, S2 normal, no murmur, click, rub or gallop. Abdomen: Soft, non-tender. Bowel sounds normal. No masses,  No organomegaly. Extremities: Extremities normal, atraumatic, no cyanosis or edema. Pulses: 2+ and symmetric all extremities. Skin: Skin color-pink, texture, turgor normal. No rashes or lesions. Capillary refill normal    Neurologic: CNII-XII intact.  Left side weakness, left hand contracted, sensation intact        Labs Reviewed:    BMP:   Lab Results   Component Value Date/Time     12/07/2020 12:25 PM    K 3.1 (L) 12/07/2020 12:25 PM     12/07/2020 12:25 PM    CO2 33 (H) 12/07/2020 12:25 PM    AGAP 3 12/07/2020 12:25 PM     (H) 12/07/2020 12:25 PM    BUN 10 12/07/2020 12:25 PM    CREA 0.83 12/07/2020 12:25 PM    GFRAA >60 12/07/2020 12:25 PM    GFRNA >60 12/07/2020 12:25 PM     CMP:   Lab Results   Component Value Date/Time     12/07/2020 12:25 PM    K 3.1 (L) 12/07/2020 12:25 PM     12/07/2020 12:25 PM    CO2 33 (H) 12/07/2020 12:25 PM    AGAP 3 12/07/2020 12:25 PM     (H) 12/07/2020 12:25 PM    BUN 10 12/07/2020 12:25 PM    CREA 0.83 12/07/2020 12:25 PM    GFRAA >60 12/07/2020 12:25 PM    GFRNA >60 12/07/2020 12:25 PM    CA 8.9 12/07/2020 12:25 PM    MG 1.2 (L) 12/07/2020 09:26 AM    ALB 3.2 (L) 12/07/2020 09:26 AM    TP 6.5 12/07/2020 09:26 AM    GLOB 3.3 12/07/2020 09:26 AM    AGRAT 1.0 12/07/2020 09:26 AM    ALT 39 12/07/2020 09:26 AM     CBC:   Lab Results   Component Value Date/Time    WBC 6.0 12/07/2020 09:26 AM    HGB 10.7 (L) 12/07/2020 09:26 AM    HCT 31.1 (L) 12/07/2020 09:26 AM     12/07/2020 09:26 AM     All Cardiac Markers in the last 24 hours:   Lab Results   Component Value Date/Time     12/07/2020 12:25 PM     12/07/2020 09:26 AM    CKMB 6.1 (H) 12/07/2020 12:25 PM    CKMB 6.2 (H) 12/07/2020 09:26 AM    CKND1 3.3 12/07/2020 12:25 PM    CKND1 3.8 12/07/2020 09:26 AM    TROIQ 0.07 (H) 12/07/2020 12:25 PM    TROIQ 0.08 (H) 12/07/2020 09:26 AM     Recent Glucose Results:   Lab Results   Component Value Date/Time     (H) 12/07/2020 12:25 PM     (H) 12/07/2020 09:26 AM     ABG: No results found for: PH, PHI, PCO2, PCO2I, PO2, PO2I, HCO3, HCO3I, FIO2, FIO2I  COAGS:   Lab Results   Component Value Date/Time    APTT 27.8 12/07/2020 09:26 AM    PTP 16.0 (H) 12/07/2020 09:26 AM    INR 1.3 (H) 12/07/2020 09:26 AM     Liver Panel:   Lab Results   Component Value Date/Time    ALB 3.2 (L) 12/07/2020 09:26 AM    TP 6.5 12/07/2020 09:26 AM    GLOB 3.3 12/07/2020 09:26 AM    AGRAT 1.0 12/07/2020 09:26 AM    ALT 39 12/07/2020 09:26 AM    AP 74 12/07/2020 09:26 AM     Pancreatic Markers: No results found for: AMYLPOCT, AML, LIPPOCT, LPSE    Xr Shoulder Lt Ap/lat Min 2 V    Result Date: 12/7/2020  EXAM:  XR SHOULDER LT AP/LAT MIN 2 V INDICATION: Left shoulder pain COMPARISON: None. FINDINGS: Two views of the left shoulder demonstrate no acute finding. A few scattered and confluent lucencies are seen in the scapula, proximal humerus and clavicle. Impression: No acute findings or significant degenerative change appreciated. Scattered small bone lucencies noted compatible with history of multiple myeloma. Ct Head Wo Cont    Result Date: 12/7/2020  EXAM: CT head HISTORY: -Provided with order: left sided weakness, stroke? -Additional: None COMPARISON: 02/16/20 TECHNIQUE: Axial CT imaging of the head was performed without intravenous contrast. Sagittal and coronal reconstructions were created from the axial data. One or more dose reduction techniques were used on this CT: automated exposure control, adjustment of the mAs and/or kVp according to patient size, and iterative reconstruction techniques. The specific techniques used on this CT exam have been documented in the patient's electronic medical record. Digital Imaging and Communications in Medicine (DICOM) format image data are available to nonaffiliated external healthcare facilities or entities on a secure, media free, reciprocally searchable basis with patient authorization for at least a 12-month period after this study. FINDINGS: BRAIN, POSTERIOR FOSSA, EXTRA-AXIAL SPACES AND MENINGES:  The sulci, folia, ventricles and basal cisterns are   within normal limits for the patient's age. There are no areas of abnormal parenchymal attenuation.    There is no intracranial hemorrhage, mass effect, or midline shift. There are no abnormal extra-axial fluid collections. CALVARIUM: Progressive lucent lesions in the calvarium. SINUSES/MASTOIDS: Slight left maxillary sinus mucosal thickening. OTHER: None. IMPRESSION: 1. No acute intracranial abnormalities are identified. 2. Progressive lucent/lytic lesions in the calvarium, which can be seen in the setting of metastatic disease or myeloma. Please note that head CT may be negative in the acute setting and MRI could best further evaluate for acute/subacute ischemia/infarct in the high/persistent index of clinical suspicion. Xr Chest Port    Result Date: 12/7/2020  HISTORY: -Provided with order: left shoulder pain -Additional: None Technique : AP PORTABLE CHEST Comparison : 02/16/20 FINDINGS: HEART AND MEDIASTINUM: Right IJ central venous catheter tip projects over expected SVC. External monitoring wires leads and pads partially obscures visibility. LUNGS AND PLEURAL SPACES: No consolidation, congestive heart failure, pleural effusion or pneumothorax. BONY THORAX AND SOFT TISSUES: No acute osseous abnormality. Impression: No plain film evidence of acute cardiopulmonary disease, allowing for technique.      Procedures/imaging: see electronic medical records for all procedures/Xrays and details which were not copied into this note but were reviewed prior to creation of Pastora Kimble MD, Internal Medicine     CC: Edna Osborne MD

## 2020-12-08 ENCOUNTER — APPOINTMENT (OUTPATIENT)
Dept: MRI IMAGING | Age: 80
DRG: 472 | End: 2020-12-08
Attending: PSYCHIATRY & NEUROLOGY
Payer: MEDICARE

## 2020-12-08 PROBLEM — G81.94 LEFT HEMIPARESIS (HCC): Status: ACTIVE | Noted: 2020-12-08

## 2020-12-08 LAB
ANION GAP SERPL CALC-SCNC: 8 MMOL/L (ref 3–18)
BASOPHILS # BLD: 0 K/UL (ref 0–0.1)
BASOPHILS NFR BLD: 0 % (ref 0–2)
BUN SERPL-MCNC: 8 MG/DL (ref 7–18)
BUN/CREAT SERPL: 11 (ref 12–20)
CALCIUM SERPL-MCNC: 8.1 MG/DL (ref 8.5–10.1)
CHLORIDE SERPL-SCNC: 102 MMOL/L (ref 100–111)
CHOLEST SERPL-MCNC: 89 MG/DL
CK MB CFR SERPL CALC: 2.1 % (ref 0–4)
CK MB CFR SERPL CALC: 2.1 % (ref 0–4)
CK MB SERPL-MCNC: 3.2 NG/ML (ref 5–25)
CK MB SERPL-MCNC: 3.9 NG/ML (ref 5–25)
CK SERPL-CCNC: 153 U/L (ref 39–308)
CK SERPL-CCNC: 189 U/L (ref 39–308)
CO2 SERPL-SCNC: 29 MMOL/L (ref 21–32)
CREAT SERPL-MCNC: 0.75 MG/DL (ref 0.6–1.3)
DIFFERENTIAL METHOD BLD: ABNORMAL
EOSINOPHIL # BLD: 0.2 K/UL (ref 0–0.4)
EOSINOPHIL NFR BLD: 5 % (ref 0–5)
ERYTHROCYTE [DISTWIDTH] IN BLOOD BY AUTOMATED COUNT: 15.5 % (ref 11.6–14.5)
EST. AVERAGE GLUCOSE BLD GHB EST-MCNC: 137 MG/DL
EST. AVERAGE GLUCOSE BLD GHB EST-MCNC: 143 MG/DL
FOLATE SERPL-MCNC: 10.4 NG/ML (ref 3.1–17.5)
GLUCOSE BLD STRIP.AUTO-MCNC: 153 MG/DL (ref 70–110)
GLUCOSE BLD STRIP.AUTO-MCNC: 158 MG/DL (ref 70–110)
GLUCOSE BLD STRIP.AUTO-MCNC: 192 MG/DL (ref 70–110)
GLUCOSE BLD STRIP.AUTO-MCNC: 193 MG/DL (ref 70–110)
GLUCOSE SERPL-MCNC: 125 MG/DL (ref 74–99)
HBA1C MFR BLD: 6.4 % (ref 4.2–5.6)
HBA1C MFR BLD: 6.6 % (ref 4.2–5.6)
HCT VFR BLD AUTO: 29.2 % (ref 36–48)
HDLC SERPL-MCNC: 46 MG/DL (ref 40–60)
HDLC SERPL: 1.9 {RATIO} (ref 0–5)
HGB BLD-MCNC: 10 G/DL (ref 13–16)
IGA SERPL-MCNC: 74 MG/DL (ref 70–400)
IGG SERPL-MCNC: 1130 MG/DL (ref 700–1600)
IGM SERPL-MCNC: <21 MG/DL (ref 40–230)
LDLC SERPL CALC-MCNC: 28.6 MG/DL (ref 0–100)
LIPID PROFILE,FLP: NORMAL
LYMPHOCYTES # BLD: 1.4 K/UL (ref 0.9–3.6)
LYMPHOCYTES NFR BLD: 32 % (ref 21–52)
MAGNESIUM SERPL-MCNC: 1.4 MG/DL (ref 1.6–2.6)
MCH RBC QN AUTO: 28.8 PG (ref 24–34)
MCHC RBC AUTO-ENTMCNC: 34.2 G/DL (ref 31–37)
MCV RBC AUTO: 84.1 FL (ref 74–97)
MONOCYTES # BLD: 0.8 K/UL (ref 0.05–1.2)
MONOCYTES NFR BLD: 18 % (ref 3–10)
NEUTS SEG # BLD: 1.9 K/UL (ref 1.8–8)
NEUTS SEG NFR BLD: 45 % (ref 40–73)
PLATELET # BLD AUTO: 179 K/UL (ref 135–420)
PMV BLD AUTO: 11.3 FL (ref 9.2–11.8)
POTASSIUM SERPL-SCNC: 3.1 MMOL/L (ref 3.5–5.5)
RBC # BLD AUTO: 3.47 M/UL (ref 4.7–5.5)
SODIUM SERPL-SCNC: 139 MMOL/L (ref 136–145)
TRIGL SERPL-MCNC: 72 MG/DL (ref ?–150)
TROPONIN I SERPL-MCNC: 0.05 NG/ML (ref 0–0.04)
TROPONIN I SERPL-MCNC: 0.07 NG/ML (ref 0–0.04)
VIT B12 SERPL-MCNC: 474 PG/ML (ref 211–911)
VLDLC SERPL CALC-MCNC: 14.4 MG/DL
WBC # BLD AUTO: 4.2 K/UL (ref 4.6–13.2)

## 2020-12-08 PROCEDURE — 82232 ASSAY OF BETA-2 PROTEIN: CPT

## 2020-12-08 PROCEDURE — 36415 COLL VENOUS BLD VENIPUNCTURE: CPT

## 2020-12-08 PROCEDURE — 92610 EVALUATE SWALLOWING FUNCTION: CPT

## 2020-12-08 PROCEDURE — 82784 ASSAY IGA/IGD/IGG/IGM EACH: CPT

## 2020-12-08 PROCEDURE — 97161 PT EVAL LOW COMPLEX 20 MIN: CPT

## 2020-12-08 PROCEDURE — 74011250637 HC RX REV CODE- 250/637: Performed by: HOSPITALIST

## 2020-12-08 PROCEDURE — 83735 ASSAY OF MAGNESIUM: CPT

## 2020-12-08 PROCEDURE — 72141 MRI NECK SPINE W/O DYE: CPT

## 2020-12-08 PROCEDURE — 74011250636 HC RX REV CODE- 250/636: Performed by: HOSPITALIST

## 2020-12-08 PROCEDURE — 85025 COMPLETE CBC W/AUTO DIFF WBC: CPT

## 2020-12-08 PROCEDURE — 80061 LIPID PANEL: CPT

## 2020-12-08 PROCEDURE — 72148 MRI LUMBAR SPINE W/O DYE: CPT

## 2020-12-08 PROCEDURE — 82962 GLUCOSE BLOOD TEST: CPT

## 2020-12-08 PROCEDURE — 97530 THERAPEUTIC ACTIVITIES: CPT

## 2020-12-08 PROCEDURE — 83036 HEMOGLOBIN GLYCOSYLATED A1C: CPT

## 2020-12-08 PROCEDURE — 97162 PT EVAL MOD COMPLEX 30 MIN: CPT

## 2020-12-08 PROCEDURE — 97167 OT EVAL HIGH COMPLEX 60 MIN: CPT

## 2020-12-08 PROCEDURE — 82550 ASSAY OF CK (CPK): CPT

## 2020-12-08 PROCEDURE — 97535 SELF CARE MNGMENT TRAINING: CPT

## 2020-12-08 PROCEDURE — 74011636637 HC RX REV CODE- 636/637: Performed by: HOSPITALIST

## 2020-12-08 PROCEDURE — 85810 BLOOD VISCOSITY EXAMINATION: CPT

## 2020-12-08 PROCEDURE — 65660000000 HC RM CCU STEPDOWN

## 2020-12-08 PROCEDURE — 83883 ASSAY NEPHELOMETRY NOT SPEC: CPT

## 2020-12-08 PROCEDURE — 80048 BASIC METABOLIC PNL TOTAL CA: CPT

## 2020-12-08 RX ORDER — FUROSEMIDE 40 MG/1
40 TABLET ORAL DAILY
Status: DISCONTINUED | OUTPATIENT
Start: 2020-12-09 | End: 2020-12-14 | Stop reason: HOSPADM

## 2020-12-08 RX ORDER — MAGNESIUM SULFATE HEPTAHYDRATE 40 MG/ML
2 INJECTION, SOLUTION INTRAVENOUS
Status: COMPLETED | OUTPATIENT
Start: 2020-12-08 | End: 2020-12-08

## 2020-12-08 RX ORDER — INSULIN GLARGINE 100 [IU]/ML
5 INJECTION, SOLUTION SUBCUTANEOUS
Status: DISCONTINUED | OUTPATIENT
Start: 2020-12-08 | End: 2020-12-10

## 2020-12-08 RX ORDER — FERROUS SULFATE, DRIED 160(50) MG
1 TABLET, EXTENDED RELEASE ORAL DAILY
Status: DISCONTINUED | OUTPATIENT
Start: 2020-12-09 | End: 2020-12-14 | Stop reason: HOSPADM

## 2020-12-08 RX ORDER — FELODIPINE 5 MG/1
10 TABLET, EXTENDED RELEASE ORAL DAILY
Status: DISCONTINUED | OUTPATIENT
Start: 2020-12-09 | End: 2020-12-14 | Stop reason: HOSPADM

## 2020-12-08 RX ORDER — POTASSIUM CHLORIDE 20 MEQ/1
40 TABLET, EXTENDED RELEASE ORAL
Status: COMPLETED | OUTPATIENT
Start: 2020-12-08 | End: 2020-12-08

## 2020-12-08 RX ORDER — METOLAZONE 5 MG/1
5 TABLET ORAL DAILY
Status: DISCONTINUED | OUTPATIENT
Start: 2020-12-09 | End: 2020-12-14 | Stop reason: HOSPADM

## 2020-12-08 RX ORDER — LISINOPRIL 5 MG/1
5 TABLET ORAL DAILY
Status: DISCONTINUED | OUTPATIENT
Start: 2020-12-09 | End: 2020-12-14 | Stop reason: HOSPADM

## 2020-12-08 RX ORDER — SPIRONOLACTONE 25 MG/1
25 TABLET ORAL DAILY
Status: DISCONTINUED | OUTPATIENT
Start: 2020-12-09 | End: 2020-12-14 | Stop reason: HOSPADM

## 2020-12-08 RX ADMIN — ISOSORBIDE MONONITRATE 30 MG: 30 TABLET, EXTENDED RELEASE ORAL at 08:23

## 2020-12-08 RX ADMIN — GABAPENTIN 800 MG: 400 CAPSULE ORAL at 08:22

## 2020-12-08 RX ADMIN — GABAPENTIN 800 MG: 400 CAPSULE ORAL at 22:08

## 2020-12-08 RX ADMIN — MAGNESIUM SULFATE HEPTAHYDRATE 2 G: 40 INJECTION, SOLUTION INTRAVENOUS at 11:56

## 2020-12-08 RX ADMIN — CYANOCOBALAMIN TAB 1000 MCG 1000 MCG: 1000 TAB at 08:23

## 2020-12-08 RX ADMIN — POTASSIUM CHLORIDE 40 MEQ: 1500 TABLET, EXTENDED RELEASE ORAL at 13:15

## 2020-12-08 RX ADMIN — INSULIN LISPRO 2 UNITS: 100 INJECTION, SOLUTION INTRAVENOUS; SUBCUTANEOUS at 22:08

## 2020-12-08 RX ADMIN — INSULIN LISPRO 2 UNITS: 100 INJECTION, SOLUTION INTRAVENOUS; SUBCUTANEOUS at 11:56

## 2020-12-08 RX ADMIN — Medication 50 MG: at 08:23

## 2020-12-08 RX ADMIN — METOPROLOL SUCCINATE 25 MG: 25 TABLET, EXTENDED RELEASE ORAL at 08:23

## 2020-12-08 RX ADMIN — PRAVASTATIN SODIUM 40 MG: 20 TABLET ORAL at 22:08

## 2020-12-08 RX ADMIN — VALACYCLOVIR HYDROCHLORIDE 500 MG: 500 TABLET, FILM COATED ORAL at 22:08

## 2020-12-08 RX ADMIN — INSULIN GLARGINE 5 UNITS: 100 INJECTION, SOLUTION SUBCUTANEOUS at 22:08

## 2020-12-08 RX ADMIN — MAGNESIUM SULFATE HEPTAHYDRATE 2 G: 40 INJECTION, SOLUTION INTRAVENOUS at 10:51

## 2020-12-08 RX ADMIN — MAGNESIUM SULFATE HEPTAHYDRATE 2 G: 40 INJECTION, SOLUTION INTRAVENOUS at 13:15

## 2020-12-08 RX ADMIN — VALACYCLOVIR HYDROCHLORIDE 500 MG: 500 TABLET, FILM COATED ORAL at 08:23

## 2020-12-08 RX ADMIN — INSULIN LISPRO 2 UNITS: 100 INJECTION, SOLUTION INTRAVENOUS; SUBCUTANEOUS at 07:10

## 2020-12-08 RX ADMIN — POTASSIUM CHLORIDE 40 MEQ: 1500 TABLET, EXTENDED RELEASE ORAL at 08:23

## 2020-12-08 RX ADMIN — ASPIRIN 81 MG: 81 TABLET, CHEWABLE ORAL at 08:23

## 2020-12-08 NOTE — PROGRESS NOTES
Problem: Mobility Impaired (Adult and Pediatric)  Goal: *Acute Goals and Plan of Care (Insert Text)  Description: Physical Therapy short term goals initiated 12/08/2020, to be achieved in 2 days. Pt will:   1. Perform bed mobility with Annemarie in prep for OOB activity. 2. Perform sit <> stand transfers with LRAD and CGA in prep for functional mobility and ambulation. 3. Ambulate 100 ft with RW and SBA in prep for household and community mobility. 4. Ascend/descend 3 stairs with B HR and CGA for safe home entry. Note:     PHYSICAL THERAPY EVALUATION    Patient: Cody Victoria (73 y.o. male)  Date: 12/8/2020  Primary Diagnosis: Hypokalemia [E87.6]        Precautions:  Aspiration  PLOF: Pt reports that he has been having difficulty ambulating and negotiating stairs that worsened on 12/6. Pt was using standard walker PTA. ASSESSMENT :  Based on the objective data described below, the patient presents with decr UE and LE strength (L>R), decr sensation on B feet d/t neuropathy, decr balance, and decr activity tolerance resulting in deficits in bed mobility, transfers, and gait and resulting in incr risk of falling. Pt supine in bed on PT entry, denied pain, agreeable to participate in therapy. Pt performed supine > sit with Cole. Pt required mod for sit <> stand transfers with RW with vc for hand placement. Pt performed sidestepping along EOB L and R for 8ft with RW and CGA with occasional Cole for posterior LOB. Pt able to take significantly larger sidestep with R LE compared to L. Pt requires Cole for stand > sit transfer in order to control descent. Pt returned to supine in bed with modA, left with all needs met and all questions answered. Pt is motivated to participate in rehab and would be a good candidate for acute rehab in order to maximize functional gains and decr risk of falling.  Pt would benefit from additional skilled therapy in the acute care setting in order to incr functional mobility and indep. Patient will benefit from skilled intervention to address the above impairments. Patient's rehabilitation potential is considered to be Good  Factors which may influence rehabilitation potential include:   []         None noted  []         Mental ability/status  [x]         Medical condition  []         Home/family situation and support systems  []         Safety awareness  []         Pain tolerance/management  []         Other:      PLAN :  Recommendations and Planned Interventions:   [x]           Bed Mobility Training             [x]    Neuromuscular Re-Education  [x]           Transfer Training                   []    Orthotic/Prosthetic Training  [x]           Gait Training                          [x]    Modalities  [x]           Therapeutic Exercises           []    Edema Management/Control  [x]           Therapeutic Activities            [x]    Family Training/Education  [x]           Patient Education  []           Other (comment):    Frequency/Duration: Patient will be followed by physical therapy 1-2 times per day/4-7 days per week to address goals. Discharge Recommendations: Inpatient Rehab  Further Equipment Recommendations for Discharge: N/A     SUBJECTIVE:   Patient stated I am a fall risk (when asked if pt used assistive devices at home).     OBJECTIVE DATA SUMMARY:     Past Medical History:   Diagnosis Date    Cancer ([facility]     Diabetes ([facility]     FH: chemotherapy     H/O stem cell transplant ([facility]     Hypertension     Multiple myeloma ([facility]     Neuropathy      Past Surgical History:   Procedure Laterality Date    HX CHOLECYSTECTOMY      HX KYPHOPLASTY       Barriers to Learning/Limitations: None  Compensate with: Visual Cues, Verbal Cues, and Tactile Cues  Home Situation:  Home Situation  Home Environment: Private residence  # Steps to Enter: 3  Rails to Enter: Yes  Hand Rails : Bilateral  One/Two Story Residence: Two story, live on 1st floor  # of Interior Steps: Tommie 87: Left  Lift Chair Available: No  Living Alone: No  Support Systems: Child(desiree)  Patient Expects to be Discharged to[de-identified] Private residence  Current DME Used/Available at Home: Dorann Bence, straight, Raised toilet seat  Tub or Shower Type: Tub/Shower combination(on 2nd floor; 1/2 bath on first)  Critical Behavior:  Neurologic State: Alert  Orientation Level: Oriented X4  Cognition: Follows commands; Appropriate for age attention/concentration  Safety/Judgement: Awareness of environment;Good awareness of safety precautions  Psychosocial  Patient Behaviors: Calm; Cooperative  Family  Behaviors: Supportive  Needs Expressed: Educational;Emotional  Purposeful Interaction: Yes  Pt Identified Daily Priority: Clinical issues (comment)  Caritas Process: Nurture loving kindness;Enable mendoza/hope;Establish trust;Nurture spiritual self;Teaching/learning; Attend basic human needs;Create healing environment  Caring Interventions: Reassure  Reassure: Therapeutic listening; Informing; Acceptance; Instilling mendoza and hope;Support family  Therapeutic Modalities: Deep breathing;Humor; Intentional therapeutic touch  Family  Behaviors: Supportive  Strength:    Strength: Generally decreased, functional  Tone & Sensation:   Tone: Abnormal  Sensation: Impaired(neuropathy in B feet)  Range Of Motion:  AROM: Generally decreased, functional  PROM: Generally decreased, functional  Functional Mobility:  Bed Mobility:  Supine to Sit: Minimum assistance; Additional time  Sit to Supine: Moderate assistance; Additional time  Scooting: Moderate assistance  Transfers:  Sit to Stand: Moderate assistance  Stand to Sit: Additional time;Minimum assistance  Balance:   Sitting: Intact  Standing: Impaired; With support  Standing - Static: Fair  Standing - Dynamic : Fair(-)  Ambulation/Gait Training:  Distance (ft): 8 Feet (ft)  Assistive Device: Gait belt;Walker, rolling  Ambulation - Level of Assistance: Contact guard assistance;Minimal assistance  Gait Abnormalities: Decreased step clearance;Shuffling gait  Stance: Left decreased  Speed/Roslyn: Slow  Step Length: Left shortened  Pain:  Pain level pre-treatment: 0/10   Pain level post-treatment: 0/10   Pain Intervention(s) : Medication (see MAR); Rest, Ice, Repositioning  Response to intervention: Nurse notified, See doc flow  Activity Tolerance:   Pt tolerated sidestepping along EOB with occasional posterior LOB requiring Cole to recover. Please refer to the flowsheet for vital signs taken during this treatment. After treatment:   []         Patient left in no apparent distress sitting up in chair  [x]         Patient left in no apparent distress in bed  [x]         Call bell left within reach  [x]         Nursing notified  []         Caregiver present  []         Bed alarm activated  []         SCDs applied    COMMUNICATION/EDUCATION:   [x]         Role of Physical Therapy in the acute care setting. [x]         Fall prevention education was provided and the patient/caregiver indicated understanding. [x]         Patient/family have participated as able in goal setting and plan of care. [x]         Patient/family agree to work toward stated goals and plan of care. []         Patient understands intent and goals of therapy, but is neutral about his/her participation. []         Patient is unable to participate in goal setting/plan of care: ongoing with therapy staff.  []         Other:     Thank you for this referral.  Alfa Salas   Time Calculation: 24 mins      Eval Complexity: History: MEDIUM  Complexity : 1-2 comorbidities / personal factors will impact the outcome/ POC Exam:MEDIUM Complexity : 3 Standardized tests and measures addressing body structure, function, activity limitation and / or participation in recreation  Presentation: MEDIUM Complexity : Evolving with changing characteristics  Clinical Decision Making:Medium Complexity    Overall Complexity:MEDIUM

## 2020-12-08 NOTE — CONSULTS
[facility] Consult Note      Patient: Ezio Hudson MRN: [id number]  SSN: [id number]    YOB: 1940  Age: 68 y.o. Sex: male        Date of Consultation: 12/7/2020  Referring Physician: Dr. Marco Kaplan  Reason for Consultation: left sided weakness, abnormal EKG, minimally elevated troponin. HPI:  I was asked by Dr. Marco Kaplan  To see this pleasant patient for abnormal EKG, with minimally elevated troponin. Ezio Hudson is a 79-year-old gentleman came in the hospital with worsening of left-sided weakness numbness and left shoulder pain and he was found to be in left bundle-branch block intermittently with minimal troponin elevation and electrolyte imbalance with hypokalemia. Patient's past medical history significant for multiple myeloma and also have history of chronic combined systolic and diastolic heart failure with low EF 35-40% in the past.  Patient have stress test done in the past that was negative for ischemia. At this point patient denied any active anginal symptoms or symptoms of CHF patient have mild chronic lower extremity swelling. Patient is being assessed for stroke.      Past Medical History:   Diagnosis Date    [facility]     Diabetes [facility]     FH: chemotherapy     [facility]     Hypertension     [facility] myel[facility]     Neuropathy      Past Surgical History:   Procedure Laterality Date    HX CHOLECYSTECTOMY      HX KYPHOPLASTY       Current Facility-Administered Medications   Medication Dose Route Frequency    calcium gluconate injection 1 g  1 g IntraVENous NOW    magnesium sulfate 2 g/50 ml IVPB (premix or compounded)  2 g IntraVENous Q1H    potassium chloride (K-DUR, KLOR-CON) SR tablet 40 mEq  40 mEq Oral BID    insulin lispro (HUMALOG) injection   SubCUTAneous AC&HS    glucose chewable tablet 16 g  4 Tab Oral PRN    glucagon (GLUCAGEN) injection 1 mg  1 mg IntraMUSCular PRN    [START ON 12/8/2020] aspirin chewable tablet 81 mg  81 mg Oral DAILY    [START ON 12/8/2020] cyanocobalamin tablet 1,000 mcg  1,000 mcg Oral DAILY    gabapentin (NEURONTIN) capsule 800 mg  800 mg Oral BID    [START ON 12/8/2020] isosorbide mononitrate ER (IMDUR) tablet 30 mg  30 mg Oral DAILY    [START ON 12/8/2020] lenalidomide cap 25 mg (Patient Supplied)  25 mg Oral DAILY    [START ON 12/8/2020] metoprolol succinate (TOPROL-XL) XL tablet 25 mg  25 mg Oral DAILY    pravastatin (PRAVACHOL) tablet 40 mg  40 mg Oral QHS    [START ON 12/8/2020] pyridoxine (vitamin B6) (VITAMIN B-6) tablet 50 mg  50 mg Oral DAILY    valACYclovir (VALTREX) tablet 500 mg  500 mg Oral BID       Allergies and Intolerances: Allergies   Allergen Reactions    Iodine Hives       Family History:   History reviewed. No pertinent family history. Social History:   He  reports that he has quit smoking. He has never used smokeless tobacco.  He  reports no history of alcohol use. Review of Systems:     Review of Systems    Weakness ++,       Gen: No fever, chills, malaise, weight loss/gain. Heent: No headache, rhinorrhea, epistaxis, ear pain, hearing loss, sinus pain, neck pain/stiffness, sore throat. Heart: No chest pain, palpitations, CHARLES, pnd, or orthopnea. Resp: No cough, hemoptysis, wheezing and shortness of breath. GI: No nausea, vomiting, diarrhea, constipation, melena or hematochezia. : No urinary obstruction, dysuria or hematuria. Derm: No rash, new skin lesion or pruritis. Musc/skeletal: no bone or joint complains. Vasc: + edema, cyanosis or claudication. Endo: No heat/cold intolerance, no polyuria,polydipsia or polyphagia. Neuro: No unilateral weakness, numbness, tingling. No seizures. Heme: No easy bruising or bleeding.         Physical:   Patient Vitals for the past 6 hrs:   Pulse Resp BP SpO2   12/07/20 1821 72 18 (!) 153/57 97 %   12/07/20 1630 68 19 (!) 175/62 100 %   12/07/20 1600 87 23 (!) 193/82 100 %   12/07/20 1530 65 18 (!) 156/75 100 %   12/07/20 1500 71 20 (!) 160/61 100 %   12/07/20 1435 -- -- (!) 151/62 --         Exam:   General Appearance: Comfortable, not using accessory muscles of respiration. HEENT: SHELLY. HEAD: Atraumatic  NECK: No JVD, no thyroidomeglay. CAROTIDS:  LUNGS: Clear bilaterally. HEART: S1+S2     ABD: Non-tender, BS Audible    EXT: 1+ edema, and no cysnosis. VASCULAR EXAM: Pulses are intact. PSYCHIATRIC EXAM: Mood is appropriate. MUSCULOSKELETAL: Grossly no joint deformity. NEUROLOGICAL:   Patient is alert, oriented and have mild weakness and numbness on the left side. Review of Data:   LABS:   Lab Results   Component Value Date/Time    WBC 6.0 12/07/2020 09:26 AM    HGB 10.7 (L) 12/07/2020 09:26 AM    HCT 31.1 (L) 12/07/2020 09:26 AM    PLATELET 019 26/96/1435 09:26 AM     Lab Results   Component Value Date/Time    Sodium 137 12/07/2020 12:25 PM    Potassium 3.1 (L) 12/07/2020 12:25 PM    Chloride 101 12/07/2020 12:25 PM    CO2 33 (H) 12/07/2020 12:25 PM    Glucose 141 (H) 12/07/2020 12:25 PM    BUN 10 12/07/2020 12:25 PM    Creatinine 0.83 12/07/2020 12:25 PM     Lab Results   Component Value Date/Time    Cholesterol, total 123 02/12/2020 01:50 PM    HDL Cholesterol 69 (H) 02/12/2020 01:50 PM    LDL, calculated 42.8 02/12/2020 01:50 PM    Triglyceride 56 02/12/2020 01:50 PM     No components found for: GPT  Lab Results   Component Value Date/Time    Hemoglobin A1c 6.2 (H) 02/13/2020 03:28 AM       RADIOLOGY:  CT Results  (Last 48 hours)               12/07/20 1156  CT HEAD WO CONT Final result    Impression:  IMPRESSION:       1. No acute intracranial abnormalities are identified. 2. Progressive lucent/lytic lesions in the calvarium, which can be seen in the   setting of metastatic disease or myeloma. Please note that head CT may be negative in the acute setting and MRI could best   further evaluate for acute/subacute ischemia/infarct in the high/persistent   index of clinical suspicion.            Narrative:  EXAM: CT head       HISTORY:    -Provided with order: left sided weakness, stroke?   -Additional: None       COMPARISON: 02/16/20       TECHNIQUE: Axial CT imaging of the head was performed without intravenous   contrast. Sagittal and coronal reconstructions were created from the axial data. One or more dose reduction techniques were used on this CT: automated exposure   control, adjustment of the mAs and/or kVp according to patient size, and   iterative reconstruction techniques. The specific techniques used on this CT   exam have been documented in the patient's electronic medical record. Digital Imaging and Communications in Medicine (DICOM) format image data are   available to nonaffiliated external healthcare facilities or entities on a   secure, media free, reciprocally searchable basis with patient authorization for   at least a 12-month period after this study. _______________       FINDINGS:           BRAIN, POSTERIOR FOSSA, EXTRA-AXIAL SPACES AND MENINGES:    The sulci, folia, ventricles and basal cisterns are   within normal limits for   the patient's age. There are no areas of abnormal parenchymal attenuation. There is no intracranial hemorrhage, mass effect, or midline shift. There are no abnormal extra-axial fluid collections. CALVARIUM: Progressive lucent lesions in the calvarium. SINUSES/MASTOIDS: Slight left maxillary sinus mucosal thickening. OTHER: None.        _______________               CXR Results  (Last 48 hours)               12/07/20 1033  XR CHEST PORT Final result    Impression:  Impression:       No plain film evidence of acute cardiopulmonary disease, allowing for technique. Narrative:  HISTORY:    -Provided with order: left shoulder pain   -Additional: None       Technique : AP PORTABLE CHEST       Comparison : 02/16/20        FINDINGS:       HEART AND MEDIASTINUM: Right IJ central venous catheter tip projects over   expected SVC. External monitoring wires leads and pads partially obscures   visibility. LUNGS AND PLEURAL SPACES: No consolidation, congestive heart failure, pleural   effusion or pneumothorax. BONY THORAX AND SOFT TISSUES: No acute osseous abnormality.                    Cardiology Procedures:   Results for orders placed or performed during the hospital encounter of 12/07/20   EKG, 12 LEAD, INITIAL   Result Value Ref Range    Ventricular Rate 83 BPM    Atrial Rate 83 BPM    P-R Interval 180 ms    QRS Duration 126 ms    Q-T Interval 378 ms    QTC Calculation (Bezet) 444 ms    Calculated P Axis 53 degrees    Calculated R Axis 23 degrees    Calculated T Axis 174 degrees    Diagnosis       Sinus rhythm with ventricular fusion beats  paroxysmal LBBB  T wave abnormality, consider lateral ischemia  Abnormal ECG  When compared with ECG of 16-FEB-2020 06:15,  Significant changes have occurred  Confirmed by Jane Rosario MD. ([id number]) on 12/7/2020 2:17:40 PM        Echo Results  (Last 48 hours)    None       Cardiolite (Tc-99m Sestamibi) stress test        Impression / Plan:    Patient Active Problem List   Diagnosis Code    Open wound of right index finger without damage to nail S61.200A    Chest pain R07.9    Lower extremity edema R60.0    Type 2 diabetes mellitus ([facility] R53.9    Systolic CHF, chronic ([facility]) E66.04    Metabolic encephalopathy Z78.63    Cellulitis L03.90    LEANDRA (acute kidney injury) ([facility]) N17.9    Olecranon fracture S52.023A    Multiple myeloma ([facility]) C90.00    Hypertension I10    Morbid obesity ([facility]) E66.01    Morbid obesity with BMI of 40.0-44.9, adult ([facility]) E66.01, Z68.41    Hypokalemia E87.6    Neuropathy G62.9    Hypomagnesemia E83.42    Elevated troponin R77.8    Weakness R53.1       Assessment and plan    Left-sided weakness being assessed for stroke  minimal troponin elevation without evidence of ACS  intermittent left bundle-branch block   electrolyte imbalance  chronic systolic heart failure  hypertension  multiple myeloma         plan. patient's echocardiogram have actually shown improved LV function up to 50% to 55%. intermittent left bundle-branch block  Hypertension  Multiple myeloma        Patient is being assessed for stroke and treated for electrolyte imbalance. Continue with supportive treatment. At this point patient will need exclusion/ assessment of stroke  Discussed with patient and daughter. Further suggestions / recommendation depending upon clinical course.              Signed By: Socrates Alvares MD     December 7, 2020

## 2020-12-08 NOTE — PROGRESS NOTES
Reason for Admission:   C/o left shoulder and leg numbness               RUR Score:   26      PCP: First and Last name: Joyce Enrique   Name of Practice:   Are you a current patient: Yes/No:   Approximate date of last visit:    Can you do a virtual visit with your PCP:              Resources/supports as identified by patient/family:                   Top Challenges facing patient (as identified by patient/family and CM): Finances/Medication cost?      Medicare and               Transportation?  daughter              Support system or lack thereof? Living arrangements? Daughter and son In law              Self-care/ADLs/Cognition?  independent          Current Advanced Directive/Advance Care Plan:                            Plan for utilizing home health:                     Transition of Care Plan:   Chart reviewed and spoke with pt at bedside, pt lives with daughter and son in law, independent with care, pt lives with his daughter and son in law, hx includes  DM,HTN. Cancer,Multiple myeloma, daughter requested medicaid screening for personal care, cm notified [facility] by Email   Care Management Interventions  PCP Verified by CM: Yes  Palliative Care Criteria Met (RRAT>21 & CHF Dx)?: No  Mode of Transport at Discharge:  Other (see comment)  Transition of Care Consult (CM Consult): Home Health  Physical Therapy Consult: Yes  Speech Therapy Consult: Yes  Current Support Network: Relative's Home(daughter)  Confirm Follow Up Transport: Family

## 2020-12-08 NOTE — PROGRESS NOTES
Problem: Pressure Injury - Risk of  Goal: *Prevention of pressure injury  Description: Document Vance Scale and appropriate interventions in the flowsheet. Outcome: Progressing Towards Goal  Note: Pressure Injury Interventions: Activity Interventions: Assess need for specialty bed, Increase time out of bed, Pressure redistribution bed/mattress(bed type), PT/OT evaluation    Mobility Interventions: Assess need for specialty bed, HOB 30 degrees or less, Pressure redistribution bed/mattress (bed type), PT/OT evaluation    Nutrition Interventions: Document food/fluid/supplement intake, Discuss nutritional consult with provider                     Problem: Patient Education: Go to Patient Education Activity  Goal: Patient/Family Education  Outcome: Progressing Towards Goal     Problem: Falls - Risk of  Goal: *Absence of Falls  Description: Document Scott Mccalljared Fall Risk and appropriate interventions in the flowsheet.   Outcome: Progressing Towards Goal  Note: Fall Risk Interventions:            Medication Interventions: Assess postural VS orthostatic hypotension, Bed/chair exit alarm, Evaluate medications/consider consulting pharmacy, Patient to call before getting OOB    Elimination Interventions: Bed/chair exit alarm, Call light in reach, Elevated toilet seat              Problem: Patient Education: Go to Patient Education Activity  Goal: Patient/Family Education  Outcome: Progressing Towards Goal     Problem: Patient Education: Go to Patient Education Activity  Goal: Patient/Family Education  Outcome: Progressing Towards Goal     Problem: TIA/CVA Stroke: 0-24 hours  Goal: Off Pathway (Use only if patient is Off Pathway)  Outcome: Progressing Towards Goal  Goal: Activity/Safety  Outcome: Progressing Towards Goal  Goal: Consults, if ordered  Outcome: Progressing Towards Goal  Goal: Diagnostic Test/Procedures  Outcome: Progressing Towards Goal  Goal: Nutrition/Diet  Outcome: Progressing Towards Goal  Goal: Discharge Planning  Outcome: Progressing Towards Goal  Goal: Medications  Outcome: Progressing Towards Goal  Goal: Respiratory  Outcome: Progressing Towards Goal  Goal: Treatments/Interventions/Procedures  Outcome: Progressing Towards Goal  Goal: Minimize risk of bleeding post-thrombolytic infusion  Outcome: Progressing Towards Goal  Goal: Monitor for complications post-thrombolytic infusion  Outcome: Progressing Towards Goal  Goal: Psychosocial  Outcome: Progressing Towards Goal  Goal: *Hemodynamically stable  Outcome: Progressing Towards Goal  Goal: *Neurologically stable  Description: Absence of additional neurological deficits    Outcome: Progressing Towards Goal  Goal: *Verbalizes anxiety and depression are reduced or absent  Outcome: Progressing Towards Goal  Goal: *Absence of Signs of Aspiration on Current Diet  Outcome: Progressing Towards Goal  Goal: *Absence of deep venous thrombosis signs and symptoms(Stroke Metric)  Outcome: Progressing Towards Goal  Goal: *Ability to perform ADLs and demonstrates progressive mobility and function  Outcome: Progressing Towards Goal  Goal: *Stroke education started(Stroke Metric)  Outcome: Progressing Towards Goal  Goal: *Dysphagia screen performed(Stroke Metric)  Outcome: Progressing Towards Goal  Goal: *Rehab consulted(Stroke Metric)  Outcome: Progressing Towards Goal     Problem: TIA/CVA Stroke: Day 2 Until Discharge  Goal: Off Pathway (Use only if patient is Off Pathway)  Outcome: Progressing Towards Goal  Goal: Activity/Safety  Outcome: Progressing Towards Goal  Goal: Diagnostic Test/Procedures  Outcome: Progressing Towards Goal  Goal: Nutrition/Diet  Outcome: Progressing Towards Goal  Goal: Discharge Planning  Outcome: Progressing Towards Goal  Goal: Medications  Outcome: Progressing Towards Goal  Goal: Respiratory  Outcome: Progressing Towards Goal  Goal: Treatments/Interventions/Procedures  Outcome: Progressing Towards Goal  Goal: Psychosocial  Outcome: Progressing Towards Goal  Goal: *Verbalizes anxiety and depression are reduced or absent  Outcome: Progressing Towards Goal  Goal: *Absence of aspiration  Outcome: Progressing Towards Goal  Goal: *Absence of deep venous thrombosis signs and symptoms(Stroke Metric)  Outcome: Progressing Towards Goal  Goal: *Optimal pain control at patient's stated goal  Outcome: Progressing Towards Goal  Goal: *Tolerating diet  Outcome: Progressing Towards Goal  Goal: *Ability to perform ADLs and demonstrates progressive mobility and function  Outcome: Progressing Towards Goal  Goal: *Stroke education continued(Stroke Metric)  Outcome: Progressing Towards Goal     Problem: Ischemic Stroke: Discharge Outcomes  Goal: *Verbalizes anxiety and depression are reduced or absent  Outcome: Progressing Towards Goal  Goal: *Verbalize understanding of risk factor modification(Stroke Metric)  Outcome: Progressing Towards Goal  Goal: *Hemodynamically stable  Outcome: Progressing Towards Goal  Goal: *Absence of aspiration pneumonia  Outcome: Progressing Towards Goal  Goal: *Aware of needed dietary changes  Outcome: Progressing Towards Goal  Goal: *Verbalize understanding of prescribed medications including anti-coagulants, anti-lipid, and/or anti-platelets(Stroke Metric)  Outcome: Progressing Towards Goal  Goal: *Tolerating diet  Outcome: Progressing Towards Goal  Goal: *Aware of follow-up diagnostics related to anticoagulants  Outcome: Progressing Towards Goal  Goal: *Ability to perform ADLs and demonstrates progressive mobility and function  Outcome: Progressing Towards Goal  Goal: *Absence of DVT(Stroke Metric)  Outcome: Progressing Towards Goal  Goal: *Absence of aspiration  Outcome: Progressing Towards Goal  Goal: *Optimal pain control at patient's stated goal  Outcome: Progressing Towards Goal  Goal: *Home safety concerns addressed  Outcome: Progressing Towards Goal  Goal: *Describes available resources and support systems  Outcome: Progressing Towards Goal  Goal: *Verbalizes understanding of activation of EMS(911) for stroke symptoms(Stroke Metric)  Outcome: Progressing Towards Goal  Goal: *Understands and describes signs and symptoms to report to providers(Stroke Metric)  Outcome: Progressing Towards Goal  Goal: *Neurolgocially stable (absence of additional neurological deficits)  Outcome: Progressing Towards Goal  Goal: *Verbalizes importance of follow-up with primary care physician(Stroke Metric)  Outcome: Progressing Towards Goal  Goal: *Smoking cessation discussed,if applicable(Stroke Metric)  Outcome: Progressing Towards Goal  Goal: *Depression screening completed(Stroke Metric)  Outcome: Progressing Towards Goal     Problem: Pain  Goal: *Control of Pain  Outcome: Progressing Towards Goal  Goal: *PALLIATIVE CARE:  Alleviation of Pain  Outcome: Progressing Towards Goal     Problem: Patient Education: Go to Patient Education Activity  Goal: Patient/Family Education  Outcome: Progressing Towards Goal

## 2020-12-08 NOTE — PROGRESS NOTES
[facility] REFERRAL CONTACT NOTE  [id number] [facility]    Re: Christi Gonsalez Consult for IP Rehab Screen received. Will review case and advise as appropriate. Thank you for the consult.     Maddie Stephens

## 2020-12-08 NOTE — PROGRESS NOTES
Cardiology Progress Note        Patient: Scott Haney        Sex: male          DOA: 12/7/2020  YOB: 1940      Age:  68 y.o.        LOS:  LOS: 1 day   Assessment/Plan     Principal Problem:    Left hemiparesis ([address] (12/8/2020)    Active Problems:    Type 2 diabetes mellitus ([address] (2/12/2020)      Multiple myeloma ([address] (2/16/2020)      Morbid obesity ([address] (2/17/2020)      Hypokalemia (12/7/2020)      Neuropathy ()      Hypomagnesemia (12/7/2020)      Elevated troponin (12/7/2020)      Weakness (12/7/2020)        Plan:  Cardiac telemetry reviewed    Minimal troponin elevation  Cardiomyopathy   Left bundle-branch block  Hypertension  Left-sided weakness  Electrolyte imbalance        patient EF is improved. No evidence of ACS  continue with current supportive treatment. No plan for invasive cardiac testing. Discussed with patient and daughter. continue with current antihypertensive medication. Subjective:    cc:  Minimal troponin elevation  Cardiomyopathy   Left bundle-branch block  Hypertension  Left-sided weakness  Electrolyte imbalance      REVIEW OF SYSTEMS:     General: No fevers or chills. Cardiovascular: No chest pain or pressure. No palpitations. No ankle swelling  Pulmonary: No SOB, orthopnea, PND  Gastrointestinal: No nausea, vomiting or diarrhea      Objective:      Visit Vitals  BP (!) 128/55 (BP 1 Location: Right arm, BP Patient Position: At rest;Supine)   Pulse 69   Temp 99 °F (37.2 °C)   Resp 18   Ht 5' 9\" (1.753 m)   Wt 115.6 kg (254 lb 14.4 oz)   SpO2 100%   BMI 37.64 kg/m²     Body mass index is 37.64 kg/m². Physical Exam:  General Appearance: Comfortable, not using accessory muscles of respiration. NECK: No JVD, no thyroidomeglay. LUNGS: Clear bilaterally. HEART: S1+S2 audible,    ABD: Non-tender, BS Audible    EXT: trace edema, and no cysnosis. VASCULAR EXAM: Pulses are intact.     PSYCHIATRIC EXAM: Mood is appropriate. Medication:  Current Facility-Administered Medications   Medication Dose Route Frequency    insulin glargine (LANTUS) injection 5 Units  5 Units SubCUTAneous QHS    [START ON 12/9/2020] calcium-vitamin D (OS-NUVIA +D3) 500 mg-200 unit per tablet 1 Tab  1 Tab Oral DAILY    [START ON 12/9/2020] felodipine (PLENDIL SR) 24 hr tablet 10 mg  10 mg Oral DAILY    [START ON 12/9/2020] furosemide (LASIX) tablet 40 mg  40 mg Oral DAILY    [START ON 12/9/2020] lisinopriL (PRINIVIL, ZESTRIL) tablet 5 mg  5 mg Oral DAILY    [START ON 12/9/2020] metOLazone (ZAROXOLYN) tablet 5 mg  5 mg Oral DAILY    [START ON 12/9/2020] spironolactone (ALDACTONE) tablet 25 mg  25 mg Oral DAILY    insulin lispro (HUMALOG) injection   SubCUTAneous AC&HS    glucose chewable tablet 16 g  4 Tab Oral PRN    glucagon (GLUCAGEN) injection 1 mg  1 mg IntraMUSCular PRN    aspirin chewable tablet 81 mg  81 mg Oral DAILY    cyanocobalamin tablet 1,000 mcg  1,000 mcg Oral DAILY    gabapentin (NEURONTIN) capsule 800 mg  800 mg Oral BID    isosorbide mononitrate ER (IMDUR) tablet 30 mg  30 mg Oral DAILY    lenalidomide cap 25 mg (Patient Supplied)  25 mg Oral DAILY    metoprolol succinate (TOPROL-XL) XL tablet 25 mg  25 mg Oral DAILY    pravastatin (PRAVACHOL) tablet 40 mg  40 mg Oral QHS    pyridoxine (vitamin B6) (VITAMIN B-6) tablet 50 mg  50 mg Oral DAILY    valACYclovir (VALTREX) tablet 500 mg  500 mg Oral BID               Lab/Data Reviewed:  Procedures/imaging: see electronic medical records for all procedures/Xrays   and details which were not copied into this note but were reviewed prior to creation of Plan       All lab results for the last 24 hours reviewed.      Recent Labs     12/08/20  0012 12/07/20  0926   WBC 4.2* 6.0   HGB 10.0* 10.7*   HCT 29.2* 31.1*    183     Recent Labs     12/08/20  0012 12/07/20  1225 12/07/20  0926    137 138   K 3.1* 3.1* 2.9*    101 101   CO2 29 33* 30   * 141* 163*   BUN 8 10 11   CREA 0.75 0.83 0.98   CA 8.1* 8.9 8.7       RADIOLOGY:  CT Results  (Last 48 hours)               12/07/20 1156  CT HEAD WO CONT Final result    Impression:  IMPRESSION:       1. No acute intracranial abnormalities are identified. 2. Progressive lucent/lytic lesions in the calvarium, which can be seen in the   setting of metastatic disease or myeloma. Please note that head CT may be negative in the acute setting and MRI could best   further evaluate for acute/subacute ischemia/infarct in the high/persistent   index of clinical suspicion. Narrative:  EXAM: CT head       HISTORY:    -Provided with order: left sided weakness, stroke?   -Additional: None       COMPARISON: 02/16/20       TECHNIQUE: Axial CT imaging of the head was performed without intravenous   contrast. Sagittal and coronal reconstructions were created from the axial data. One or more dose reduction techniques were used on this CT: automated exposure   control, adjustment of the mAs and/or kVp according to patient size, and   iterative reconstruction techniques. The specific techniques used on this CT   exam have been documented in the patient's electronic medical record. Digital Imaging and Communications in Medicine (DICOM) format image data are   available to nonaffiliated external healthcare facilities or entities on a   secure, media free, reciprocally searchable basis with patient authorization for   at least a 12-month period after this study. _______________       FINDINGS:           BRAIN, POSTERIOR FOSSA, EXTRA-AXIAL SPACES AND MENINGES:    The sulci, folia, ventricles and basal cisterns are   within normal limits for   the patient's age. There are no areas of abnormal parenchymal attenuation. There is no intracranial hemorrhage, mass effect, or midline shift. There are no abnormal extra-axial fluid collections.        CALVARIUM: Progressive lucent lesions in the calvarium. SINUSES/MASTOIDS: Slight left maxillary sinus mucosal thickening. OTHER: None.        _______________               CXR Results  (Last 48 hours)               12/07/20 1033  XR CHEST PORT Final result    Impression:  Impression:       No plain film evidence of acute cardiopulmonary disease, allowing for technique. Narrative:  HISTORY:    -Provided with order: left shoulder pain   -Additional: None       Technique : AP PORTABLE CHEST       Comparison : 02/16/20        FINDINGS:       HEART AND MEDIASTINUM: Right IJ central venous catheter tip projects over   expected SVC. External monitoring wires leads and pads partially obscures   visibility. LUNGS AND PLEURAL SPACES: No consolidation, congestive heart failure, pleural   effusion or pneumothorax. BONY THORAX AND SOFT TISSUES: No acute osseous abnormality.                    Cardiology Procedures:   Results for orders placed or performed during the hospital encounter of 12/07/20   EKG, 12 LEAD, INITIAL   Result Value Ref Range    Ventricular Rate 83 BPM    Atrial Rate 83 BPM    P-R Interval 180 ms    QRS Duration 126 ms    Q-T Interval 378 ms    QTC Calculation (Bezet) 444 ms    Calculated P Axis 53 degrees    Calculated R Axis 23 degrees    Calculated T Axis 174 degrees    Diagnosis       Sinus rhythm with ventricular fusion beats  paroxysmal LBBB  T wave abnormality, consider lateral ischemia  Abnormal ECG  When compared with ECG of 16-FEB-2020 06:15,  Significant changes have occurred  Confirmed by Allyssa Gaspar MD. ([id number]) on 12/7/2020 2:17:40 PM        Echo Results  (Last 48 hours)    None       Cardiolite (Tc-99m Sestamibi) stress test    Signed By: Hong Carvajal MD     December 8, 2020

## 2020-12-08 NOTE — PROGRESS NOTES
Problem: Pressure Injury - Risk of  Goal: *Prevention of pressure injury  Description: Document Vance Scale and appropriate interventions in the flowsheet. Outcome: Progressing Towards Goal  Note: Pressure Injury Interventions: Activity Interventions: Increase time out of bed, Pressure redistribution bed/mattress(bed type), PT/OT evaluation    Mobility Interventions: HOB 30 degrees or less, Pressure redistribution bed/mattress (bed type), PT/OT evaluation    Nutrition Interventions: Document food/fluid/supplement intake, Offer support with meals,snacks and hydration                     Problem: Patient Education: Go to Patient Education Activity  Goal: Patient/Family Education  Outcome: Progressing Towards Goal     Problem: Falls - Risk of  Goal: *Absence of Falls  Description: Document Evangelist Fall Risk and appropriate interventions in the flowsheet.   Outcome: Progressing Towards Goal  Note: Fall Risk Interventions:            Medication Interventions: Bed/chair exit alarm, Teach patient to arise slowly, Patient to call before getting OOB                   Problem: Patient Education: Go to Patient Education Activity  Goal: Patient/Family Education  Outcome: Progressing Towards Goal     Problem: Patient Education: Go to Patient Education Activity  Goal: Patient/Family Education  Outcome: Progressing Towards Goal     Problem: TIA/CVA Stroke: 0-24 hours  Goal: Off Pathway (Use only if patient is Off Pathway)  Outcome: Progressing Towards Goal  Goal: Activity/Safety  Outcome: Progressing Towards Goal  Goal: Consults, if ordered  Outcome: Progressing Towards Goal  Goal: Diagnostic Test/Procedures  Outcome: Progressing Towards Goal  Goal: Nutrition/Diet  Outcome: Progressing Towards Goal  Goal: Discharge Planning  Outcome: Progressing Towards Goal  Goal: Medications  Outcome: Progressing Towards Goal  Goal: Respiratory  Outcome: Progressing Towards Goal  Goal: Treatments/Interventions/Procedures  Outcome: Progressing Towards Goal  Goal: Minimize risk of bleeding post-thrombolytic infusion  Outcome: Progressing Towards Goal  Goal: Monitor for complications post-thrombolytic infusion  Outcome: Progressing Towards Goal  Goal: Psychosocial  Outcome: Progressing Towards Goal  Goal: *Hemodynamically stable  Outcome: Progressing Towards Goal  Goal: *Neurologically stable  Description: Absence of additional neurological deficits    Outcome: Progressing Towards Goal  Goal: *Verbalizes anxiety and depression are reduced or absent  Outcome: Progressing Towards Goal  Goal: *Absence of Signs of Aspiration on Current Diet  Outcome: Progressing Towards Goal  Goal: *Absence of deep venous thrombosis signs and symptoms(Stroke Metric)  Outcome: Progressing Towards Goal  Goal: *Ability to perform ADLs and demonstrates progressive mobility and function  Outcome: Progressing Towards Goal  Goal: *Stroke education started(Stroke Metric)  Outcome: Progressing Towards Goal  Goal: *Dysphagia screen performed(Stroke Metric)  Outcome: Progressing Towards Goal  Goal: *Rehab consulted(Stroke Metric)  Outcome: Progressing Towards Goal     Problem: TIA/CVA Stroke: Day 2 Until Discharge  Goal: Off Pathway (Use only if patient is Off Pathway)  Outcome: Progressing Towards Goal  Goal: Activity/Safety  Outcome: Progressing Towards Goal  Goal: Diagnostic Test/Procedures  Outcome: Progressing Towards Goal  Goal: Nutrition/Diet  Outcome: Progressing Towards Goal  Goal: Discharge Planning  Outcome: Progressing Towards Goal  Goal: Medications  Outcome: Progressing Towards Goal  Goal: Respiratory  Outcome: Progressing Towards Goal  Goal: Treatments/Interventions/Procedures  Outcome: Progressing Towards Goal  Goal: Psychosocial  Outcome: Progressing Towards Goal  Goal: *Verbalizes anxiety and depression are reduced or absent  Outcome: Progressing Towards Goal  Goal: *Absence of aspiration  Outcome: Progressing Towards Goal  Goal: *Absence of deep venous thrombosis signs and symptoms(Stroke Metric)  Outcome: Progressing Towards Goal  Goal: *Optimal pain control at patient's stated goal  Outcome: Progressing Towards Goal  Goal: *Tolerating diet  Outcome: Progressing Towards Goal  Goal: *Ability to perform ADLs and demonstrates progressive mobility and function  Outcome: Progressing Towards Goal  Goal: *Stroke education continued(Stroke Metric)  Outcome: Progressing Towards Goal     Problem: Ischemic Stroke: Discharge Outcomes  Goal: *Verbalizes anxiety and depression are reduced or absent  Outcome: Progressing Towards Goal  Goal: *Verbalize understanding of risk factor modification(Stroke Metric)  Outcome: Progressing Towards Goal  Goal: *Hemodynamically stable  Outcome: Progressing Towards Goal  Goal: *Absence of aspiration pneumonia  Outcome: Progressing Towards Goal  Goal: *Aware of needed dietary changes  Outcome: Progressing Towards Goal  Goal: *Verbalize understanding of prescribed medications including anti-coagulants, anti-lipid, and/or anti-platelets(Stroke Metric)  Outcome: Progressing Towards Goal  Goal: *Tolerating diet  Outcome: Progressing Towards Goal  Goal: *Aware of follow-up diagnostics related to anticoagulants  Outcome: Progressing Towards Goal  Goal: *Ability to perform ADLs and demonstrates progressive mobility and function  Outcome: Progressing Towards Goal  Goal: *Absence of DVT(Stroke Metric)  Outcome: Progressing Towards Goal  Goal: *Absence of aspiration  Outcome: Progressing Towards Goal  Goal: *Optimal pain control at patient's stated goal  Outcome: Progressing Towards Goal  Goal: *Home safety concerns addressed  Outcome: Progressing Towards Goal  Goal: *Describes available resources and support systems  Outcome: Progressing Towards Goal  Goal: *Verbalizes understanding of activation of EMS(911) for stroke symptoms(Stroke Metric)  Outcome: Progressing Towards Goal  Goal: *Understands and describes signs and symptoms to report to providers(Stroke Metric)  Outcome: Progressing Towards Goal  Goal: *Neurolgocially stable (absence of additional neurological deficits)  Outcome: Progressing Towards Goal  Goal: *Verbalizes importance of follow-up with primary care physician(Stroke Metric)  Outcome: Progressing Towards Goal  Goal: *Smoking cessation discussed,if applicable(Stroke Metric)  Outcome: Progressing Towards Goal  Goal: *Depression screening completed(Stroke Metric)  Outcome: Progressing Towards Goal     Problem: Pain  Goal: *Control of Pain  Outcome: Progressing Towards Goal  Goal: *PALLIATIVE CARE:  Alleviation of Pain  Outcome: Progressing Towards Goal     Problem: Patient Education: Go to Patient Education Activity  Goal: Patient/Family Education  Outcome: Progressing Towards Goal

## 2020-12-08 NOTE — CONSULTS
Phone: [phone]  Paging : [phone]     Hematology/Oncology Consult Note    Patient: Floridalma Hernandez MRN: [id number]  CSN: [id number]    YOB: 1940  Age: 68 y.o. Sex: male    DOA: 12/7/2020 LOS:  LOS: 1 day            REASON FOR CONSULTATION:     MM    ASSESSMENT:     · Hem/Onc Problems: MM, unclear type, s/p stem cell transplant 2005, in remission since but relapse two weeks, was started on revlimid, dex, maybe other agents  · Reason for Admission: Neuropathy and weakness, MRI did not show stroke  · Other Active Problems:   · EKG change with mildly elevated troponin    Active Problems:  Hospital Problems  Date Reviewed: 2/17/2020          Codes Class Noted POA    Hypokalemia ICD-10-CM: E87.6  ICD-9-CM: 276.8  12/7/2020 Unknown        Neuropathy ICD-10-CM: G62.9  ICD-9-CM: 355.9  Unknown Unknown        Hypomagnesemia ICD-10-CM: E83.42  ICD-9-CM: 275.2  12/7/2020 Unknown        Elevated troponin ICD-10-CM: R77.8  ICD-9-CM: 790.6  12/7/2020 Unknown        Weakness ICD-10-CM: R53.1  ICD-9-CM: 780.79  12/7/2020 Unknown        Morbid obesity ([facility] ICD-10-CM: E66.01  ICD-9-CM: 278.01  2/17/2020 Yes        Multiple myeloma ([facility] ICD-10-CM: C90.00  ICD-9-CM: 203.00  2/16/2020 Yes        Type 2 diabetes mellitus ([facility] ICD-10-CM: E11.9  ICD-9-CM: 250.00  2/12/2020 Yes                RECOMMENDATIONS:   · The type and status of his MM are not clear. Need to obtain records from [facility].   · Guessing from his protein / globin level, I doubt he has hyperviscosity syndrome counting for his neurologic symptoms. I will check immunoglobin level and serum viscosity  · Will check SPEP, free light chains, beta microglobin level  · OK to continue revlimid  · MM is unlikely the cause of his presenting symptoms judging from his protein and globin level but again we will wait to see the test results as above.   · Other work ups and management per primary team        HPI:     Floridalma Hernandez is a 68 y.o., BLACK OR , male, who I have been asked to see for MM. He has history of MM diagnosed in 2005 and got stem cell transplant in LTAC, [facility]. He has been in remission since until 2 weeks ago when he was noted to have recurrence and was started on chemo with regimen including revlimid and dexamethasone. The type of MM and level of M protein are not clear to me; he got his care at [facility].    He was admitted to [facility] with lower extremity weakness and numbness. MRI of brain did not show stroke. Cardiology is seeing for mildly elevated troponin. Past Medical History:   Diagnosis Date    Cancer ([facility]     Diabetes ([facility]     FH: chemotherapy     [facility]     Hypertension     Multiple myeloma ([facility]     Neuropathy        Past Surgical History:   Procedure Laterality Date    HX CHOLECYSTECTOMY      HX KYPHOPLASTY         History reviewed. No pertinent family history.     Social History     Socioeconomic History    Marital status:      Spouse name: Not on file    Number of children: Not on file    Years of education: Not on file    Highest education level: Not on file   Tobacco Use    Smoking status: Former Smoker    Smokeless tobacco: Never Used   Substance and Sexual Activity    Alcohol use: Never     Frequency: Never     Comment: rare    Drug use: No       Current Facility-Administered Medications   Medication Dose Route Frequency    potassium chloride (K-DUR, KLOR-CON) SR tablet 40 mEq  40 mEq Oral BID    insulin lispro (HUMALOG) injection   SubCUTAneous AC&HS    glucose chewable tablet 16 g  4 Tab Oral PRN    glucagon (GLUCAGEN) injection 1 mg  1 mg IntraMUSCular PRN    aspirin chewable tablet 81 mg  81 mg Oral DAILY    cyanocobalamin tablet 1,000 mcg  1,000 mcg Oral DAILY    gabapentin (NEURONTIN) capsule 800 mg  800 mg Oral BID    isosorbide mononitrate ER (IMDUR) tablet 30 mg  30 mg Oral DAILY    lenalidomide cap 25 mg (Patient Supplied)  25 mg Oral DAILY    metoprolol succinate (TOPROL-XL) XL tablet 25 mg  25 mg Oral DAILY    pravastatin (PRAVACHOL) tablet 40 mg  40 mg Oral QHS    pyridoxine (vitamin B6) (VITAMIN B-6) tablet 50 mg  50 mg Oral DAILY    valACYclovir (VALTREX) tablet 500 mg  500 mg Oral BID       Prior to Admission medications    Medication Sig Start Date End Date Taking? Authorizing Provider   LENALIDOMIDE PO Take 25 mg by mouth. Yes Other, MD Annie   felodipine (PLENDIL SR) 10 mg 24 hr tablet Take 10 mg by mouth daily. Yes George, MD Annie   spironolactone (ALDACTONE) 25 mg tablet Take  by mouth daily. Yes George, MD Annie   pyridoxine, vitamin B6, (VITAMIN B-6) 50 mg tablet Take 50 mg by mouth daily. Yes George, MD Annie   metOLazone (ZAROXOLYN) 10 mg tablet Take 5 mg by mouth daily. Yes George, MD Annie   rivaroxaban (XARELTO PO) Take 10 mg by mouth. Yes George, MD Annie   POTASSIUM CHLORIDE 20 mEq by Does Not Apply route. Yes George, MD Annie   valacyclovir HCl (VALTREX PO) Take  by mouth. Yes George, MD Annie   omeprazole (PRILOSEC) 20 mg capsule Take 20 mg by mouth daily. Yes George, MD Annie   DEXAMETHASONE, BULK, by Does Not Apply route. Yes George, MD Annie   SITagliptin-metFORMIN (JANUMET) 50-1,000 mg per tablet Take 1 Tab by mouth two (2) times daily (with meals). Yes Provider, Historical   cholecalciferol (Vitamin D3) (1000 Units /25 mcg) tablet Take 1,000 Units by mouth daily. Yes Provider, Historical   cyanocobalamin (VITAMIN B12) 500 mcg tablet Take 1,000 mcg by mouth daily. Yes Provider, Historical   lisinopril (PRINIVIL, ZESTRIL) 5 mg tablet Take 1 Tab by mouth daily. Patient taking differently: Take 20 mg by mouth daily. 2/14/20  Yes Jazzy Araiza MD   furosemide (LASIX) 40 mg tablet Take 1 Tab by mouth daily. 2/14/20  Yes Harpreet Rhodes MD   isosorbide mononitrate ER (IMDUR) 30 mg tablet Take 1 Tab by mouth daily.  2/15/20  Yes Harpreet Rhodes MD   metoprolol succinate (TOPROL-XL) 25 mg XL tablet Take 1 Tab by mouth daily. 2/15/20  Yes Jaden Payne MD   pravastatin (PRAVACHOL) 40 mg tablet Take 1 Tab by mouth nightly. 2/14/20  Yes Jaden Payne MD   aspirin 81 mg chewable tablet Take 81 mg by mouth daily. Yes Annie Vazquez MD   metFORMIN (GLUCOPHAGE) 1,000 mg tablet Take 1,000 mg by mouth two (2) times daily (with meals). Yes Annie Vazquez MD   gabapentin (NEURONTIN) 400 mg capsule Take 800 mg by mouth two (2) times a day. Yes Annie Vazquez MD   SITagliptin (JANUVIA) 100 mg tablet Take 100 mg by mouth daily. Yes Annie Vazquez MD   calcium-cholecalciferol, d3, (CALCIUM 600 + D) 600-125 mg-unit tab Take  by mouth daily. Yes Annie Vazquez MD   pioglitazone (ACTOS) 30 mg tablet Take 30 mg by mouth daily. Yes Annie Vazquez MD       Allergies   Allergen Reactions    Iodine Hives         ROS:     CONSTITUTION: No fevers, chills, night sweats, anorexia, or weight loss. HEENT: No visual changes. No change in hearing acuity, tinnitus, hoarseness, sore throat, or postnasal drip. CARDIOVASCULAR: No chest pain, palpitations, or syncope. No extremity edema. RESPIRATORY: No shortness of breath, cough, wheezing, or hemoptysis. GASTROINTESINAL: No dysphagia, odynophagia, nausea, or vomiting. No reflux symptoms, abdominal pain, or bloating. No constipation, diarrhea, or rectal bleeding. GENITOURINARY: no complaints. NEUROLOGICALl: As in HPI  PSYCHIATRIC: No anxiety, depression, or memory loss. HEMATOLOGIC: No easy bruising. No unusual bleeding. No enlarged lymph nodes. MUSCULOSKELETAL: No unusual joint or muscle ache. SKIN: No rash or pruritus.     Physical Exam:      VITAL SIGNS:   Patient Vitals for the past 24 hrs:   BP Temp Pulse Resp SpO2 Height Weight   12/08/20 0717 (!) 155/67 99 °F (37.2 °C) 74 18 99 % -- --   12/08/20 0521 (!) 156/62 98.4 °F (36.9 °C) 77 18 98 % -- 115.6 kg (254 lb 14.4 oz)   12/07/20 2300 135/64 100 °F (37.8 °C) 78 18 99 % -- --   12/07/20 2003 (!) 164/62 98.3 °F (36.8 °C) 78 18 100 % -- --   12/07/20 1821 (!) 153/57 -- 72 18 97 % -- --   12/07/20 1630 (!) 175/62 -- 68 19 100 % -- --   12/07/20 1600 (!) 193/82 -- 87 23 100 % -- --   12/07/20 1530 (!) 156/75 -- 65 18 100 % -- --   12/07/20 1500 (!) 160/61 -- 71 20 100 % -- --   12/07/20 1435 (!) 151/62 -- -- -- -- 5' 9\" (1.753 m) 119.3 kg (263 lb)   12/07/20 0925 (!) 151/62 99.8 °F (37.7 °C) 85 18 97 % 5' 9\" (1.753 m) 119.3 kg (263 lb)     GENERAL: normal appearance, no acute distress. HEENT: conjunctivae normal, eyelids normal, PERRLA, anicteric, external ears and nose normal, hearing grossly normal.   NECK: supple, no masses. LUNG: breathing comfortably, lungs clear to auscultation and percussion. CARDIOVASCULAR: normal heart sounds, heart rhythm regular, no peripheral edema. ABDOMEN: soft. bowel sounds normal, non-tender non distension, no hepatosplenomegaly  PELVIS: pelvic exam deferred. RECTUM: rectal exam deferred. LYMPH NODES: no cervical adenopathy, no axillary adenopathy, no supraclavicular adenopathy, no infraclavicular adenopathy. SKIN: no rash, no petechiae, no ecchymosis, no pallor, no cutaneous nodules. MUSCULOSKELETAL: normal muscle strength. NEUROLOGIC: no focal motor deficit, normal gait, no abnormal mental status. PSYCHIATRIC: oriented to person, time and place, mood and affect appropriate to situation. Ancillary Studies:     Bloodwork:  Recent Results (from the past 24 hour(s))   CBC WITH AUTOMATED DIFF    Collection Time: 12/07/20  9:26 AM   Result Value Ref Range    WBC 6.0 4.6 - 13.2 K/uL    RBC 3.70 (L) 4.70 - 5.50 M/uL    HGB 10.7 (L) 13.0 - 16.0 g/dL    HCT 31.1 (L) 36.0 - 48.0 %    MCV 84.1 74.0 - 97.0 FL    MCH 28.9 24.0 - 34.0 PG    MCHC 34.4 31.0 - 37.0 g/dL    RDW 15.5 (H) 11.6 - 14.5 %    PLATELET 846 289 - 873 K/uL    MPV 10.9 9.2 - 11.8 FL    NEUTROPHILS 54 40 - 73 %    LYMPHOCYTES 28 21 - 52 %    MONOCYTES 15 (H) 3 - 10 %    EOSINOPHILS 3 0 - 5 %    BASOPHILS 0 0 - 2 %    ABS. NEUTROPHILS 3.3 1.8 - 8.0 K/UL    ABS. LYMPHOCYTES 1.7 0.9 - 3.6 K/UL    ABS. MONOCYTES 0.9 0.05 - 1.2 K/UL    ABS. EOSINOPHILS 0.2 0.0 - 0.4 K/UL    ABS. BASOPHILS 0.0 0.0 - 0.1 K/UL    DF AUTOMATED     METABOLIC PANEL, COMPREHENSIVE    Collection Time: 12/07/20  9:26 AM   Result Value Ref Range    Sodium 138 136 - 145 mmol/L    Potassium 2.9 (LL) 3.5 - 5.5 mmol/L    Chloride 101 100 - 111 mmol/L    CO2 30 21 - 32 mmol/L    Anion gap 7 3.0 - 18 mmol/L    Glucose 163 (H) 74 - 99 mg/dL    BUN 11 7.0 - 18 MG/DL    Creatinine 0.98 0.6 - 1.3 MG/DL    BUN/Creatinine ratio 11 (L) 12 - 20      GFR est AA >60 >60 ml/min/1.73m2    GFR est non-AA >60 >60 ml/min/1.73m2    Calcium 8.7 8.5 - 10.1 MG/DL    Bilirubin, total 1.0 0.2 - 1.0 MG/DL    ALT (SGPT) 39 16 - 61 U/L    AST (SGOT) 22 10 - 38 U/L    Alk.  phosphatase 74 45 - 117 U/L    Protein, total 6.5 6.4 - 8.2 g/dL    Albumin 3.2 (L) 3.4 - 5.0 g/dL    Globulin 3.3 2.0 - 4.0 g/dL    A-G Ratio 1.0 0.8 - 1.7     PROTHROMBIN TIME + INR    Collection Time: 12/07/20  9:26 AM   Result Value Ref Range    Prothrombin time 16.0 (H) 11.5 - 15.2 sec    INR 1.3 (H) 0.8 - 1.2     PTT    Collection Time: 12/07/20  9:26 AM   Result Value Ref Range    aPTT 27.8 23.0 - 36.4 SEC   CARDIAC PANEL,(CK, CKMB & TROPONIN)    Collection Time: 12/07/20  9:26 AM   Result Value Ref Range    CK - MB 6.2 (H) <3.6 ng/ml    CK-MB Index 3.8 0.0 - 4.0 %     39 - 308 U/L    Troponin-I, QT 0.08 (H) 0.0 - 0.045 NG/ML   MAGNESIUM    Collection Time: 12/07/20  9:26 AM   Result Value Ref Range    Magnesium 1.2 (L) 1.6 - 2.6 mg/dL   HEMOGLOBIN A1C WITH EAG    Collection Time: 12/07/20  9:26 AM   Result Value Ref Range    Hemoglobin A1c 6.4 (H) 4.2 - 5.6 %    Est. average glucose 137 mg/dL   EKG, 12 LEAD, INITIAL    Collection Time: 12/07/20  9:29 AM   Result Value Ref Range    Ventricular Rate 83 BPM    Atrial Rate 83 BPM    P-R Interval 180 ms    QRS Duration 126 ms    Q-T Interval 378 ms    QTC Calculation (Bezet) 444 ms    Calculated P Axis 53 degrees    Calculated R Axis 23 degrees    Calculated T Axis 174 degrees    Diagnosis       Sinus rhythm with ventricular fusion beats  paroxysmal LBBB  T wave abnormality, consider lateral ischemia  Abnormal ECG  When compared with ECG of 16-FEB-2020 06:15,  Significant changes have occurred  Confirmed by Raffy Kent MD. ([id number]) on 12/7/2020 2:17:40 PM     EKG, 12 LEAD, SUBSEQUENT    Collection Time: 12/07/20  9:54 AM   Result Value Ref Range    Ventricular Rate 78 BPM    Atrial Rate 78 BPM    P-R Interval 184 ms    QRS Duration 158 ms    Q-T Interval 446 ms    QTC Calculation (Bezet) 508 ms    Calculated P Axis 51 degrees    Calculated R Axis 18 degrees    Calculated T Axis 89 degrees    Diagnosis       Normal sinus rhythm  Left bundle branch block  Abnormal ECG  When compared with ECG of 07-DEC-2020 09:29,  QRS duration has increased  QT has lengthened  Confirmed by Raffy Kent MD. ([id number]) on 12/7/2020 2:18:18 PM     URINALYSIS W/ RFLX MICROSCOPIC    Collection Time: 12/07/20 10:05 AM   Result Value Ref Range    Color YELLOW      Appearance CLEAR      Specific gravity 1.011 1.005 - 1.030      pH (UA) 6.0 5.0 - 8.0      Protein 30 (A) NEG mg/dL    Glucose Negative NEG mg/dL    Ketone TRACE (A) NEG mg/dL    Bilirubin Negative NEG      Blood SMALL (A) NEG      Urobilinogen 1.0 0.2 - 1.0 EU/dL    Nitrites Negative NEG      Leukocyte Esterase TRACE (A) NEG     URINE MICROSCOPIC ONLY    Collection Time: 12/07/20 10:05 AM   Result Value Ref Range    WBC Negative 0 - 5 /hpf    RBC 1 to 3 0 - 5 /hpf    Epithelial cells 1+ 0 - 5 /lpf    Bacteria 2+ (A) NEG /hpf   EKG, 12 LEAD, SUBSEQUENT    Collection Time: 12/07/20 10:53 AM   Result Value Ref Range    Ventricular Rate 69 BPM    Atrial Rate 69 BPM    P-R Interval 184 ms    QRS Duration 116 ms    Q-T Interval 412 ms    QTC Calculation (Bezet) 441 ms    Calculated P Axis 20 degrees    Calculated R Axis 5 degrees    Calculated T Axis 73 degrees    Diagnosis       Normal sinus rhythm  Incomplete left bundle branch block  Non-specific ST/T wave changes  Abnormal ECG  When compared with ECG of 07-DEC-2020 09:54,  Left bundle branch block is no longer present  Confirmed by Yusuf Espinoza MD. ([id number]) on 12/7/2020 8:59:59 PM     METABOLIC PANEL, BASIC    Collection Time: 12/07/20 12:25 PM   Result Value Ref Range    Sodium 137 136 - 145 mmol/L    Potassium 3.1 (L) 3.5 - 5.5 mmol/L    Chloride 101 100 - 111 mmol/L    CO2 33 (H) 21 - 32 mmol/L    Anion gap 3 3.0 - 18 mmol/L    Glucose 141 (H) 74 - 99 mg/dL    BUN 10 7.0 - 18 MG/DL    Creatinine 0.83 0.6 - 1.3 MG/DL    BUN/Creatinine ratio 12 12 - 20      GFR est AA >60 >60 ml/min/1.73m2    GFR est non-AA >60 >60 ml/min/1.73m2    Calcium 8.9 8.5 - 10.1 MG/DL   CARDIAC PANEL,(CK, CKMB & TROPONIN)    Collection Time: 12/07/20 12:25 PM   Result Value Ref Range    CK - MB 6.1 (H) <3.6 ng/ml    CK-MB Index 3.3 0.0 - 4.0 %     39 - 308 U/L    Troponin-I, QT 0.07 (H) 0.0 - 0.045 NG/ML   VITAMIN B12 & FOLATE    Collection Time: 12/07/20 12:25 PM   Result Value Ref Range    Vitamin B12 474 211 - 911 pg/mL    Folate 10.4 3.10 - 17.50 ng/mL   TSH 3RD GENERATION    Collection Time: 12/07/20 12:25 PM   Result Value Ref Range    TSH 1.16 0.36 - 3.74 uIU/mL   TSH 3RD GENERATION    Collection Time: 12/07/20 12:25 PM   Result Value Ref Range    TSH 0.74 0.36 - 3.74 uIU/mL   ECHO ADULT COMPLETE    Collection Time: 12/07/20  2:36 PM   Result Value Ref Range    IVC proximal 1.60 cm   GLUCOSE, POC    Collection Time: 12/07/20  9:11 PM   Result Value Ref Range    Glucose (POC) 135 (H) 70 - 110 mg/dL   CBC WITH AUTOMATED DIFF    Collection Time: 12/08/20 12:12 AM   Result Value Ref Range    WBC 4.2 (L) 4.6 - 13.2 K/uL    RBC 3.47 (L) 4.70 - 5.50 M/uL    HGB 10.0 (L) 13.0 - 16.0 g/dL    HCT 29.2 (L) 36.0 - 48.0 %    MCV 84.1 74.0 - 97.0 FL    MCH 28.8 24.0 - 34.0 PG    MCHC 34.2 31.0 - 37.0 g/dL    RDW 15.5 (H) 11.6 - 14.5 %    PLATELET 902 868 - 906 K/uL    MPV 11.3 9.2 - 11.8 FL    NEUTROPHILS 45 40 - 73 %    LYMPHOCYTES 32 21 - 52 %    MONOCYTES 18 (H) 3 - 10 %    EOSINOPHILS 5 0 - 5 %    BASOPHILS 0 0 - 2 %    ABS. NEUTROPHILS 1.9 1.8 - 8.0 K/UL    ABS. LYMPHOCYTES 1.4 0.9 - 3.6 K/UL    ABS. MONOCYTES 0.8 0.05 - 1.2 K/UL    ABS. EOSINOPHILS 0.2 0.0 - 0.4 K/UL    ABS. BASOPHILS 0.0 0.0 - 0.1 K/UL    DF AUTOMATED     MAGNESIUM    Collection Time: 12/08/20 12:12 AM   Result Value Ref Range    Magnesium 1.4 (L) 1.6 - 2.6 mg/dL   METABOLIC PANEL, BASIC    Collection Time: 12/08/20 12:12 AM   Result Value Ref Range    Sodium 139 136 - 145 mmol/L    Potassium 3.1 (L) 3.5 - 5.5 mmol/L    Chloride 102 100 - 111 mmol/L    CO2 29 21 - 32 mmol/L    Anion gap 8 3.0 - 18 mmol/L    Glucose 125 (H) 74 - 99 mg/dL    BUN 8 7.0 - 18 MG/DL    Creatinine 0.75 0.6 - 1.3 MG/DL    BUN/Creatinine ratio 11 (L) 12 - 20      GFR est AA >60 >60 ml/min/1.73m2    GFR est non-AA >60 >60 ml/min/1.73m2    Calcium 8.1 (L) 8.5 - 10.1 MG/DL   CARDIAC PANEL,(CK, CKMB & TROPONIN)    Collection Time: 12/08/20  1:12 AM   Result Value Ref Range    CK - MB 3.9 (H) <3.6 ng/ml    CK-MB Index 2.1 0.0 - 4.0 %     39 - 308 U/L    Troponin-I, QT 0.07 (H) 0.0 - 0.045 NG/ML   GLUCOSE, POC    Collection Time: 12/08/20  7:06 AM   Result Value Ref Range    Glucose (POC) 158 (H) 70 - 110 mg/dL       Radiology:   Xr Shoulder Lt Ap/lat Min 2 V    Result Date: 12/7/2020  EXAM:  XR SHOULDER LT AP/LAT MIN 2 V INDICATION: Left shoulder pain COMPARISON: None. FINDINGS: Two views of the left shoulder demonstrate no acute finding. A few scattered and confluent lucencies are seen in the scapula, proximal humerus and clavicle. Impression: No acute findings or significant degenerative change appreciated. Scattered small bone lucencies noted compatible with history of multiple myeloma.      Ct Head Wo Cont    Result Date: 12/7/2020  EXAM: CT head HISTORY: -Provided with order: left sided weakness, stroke? -Additional: None COMPARISON: 02/16/20 TECHNIQUE: Axial CT imaging of the head was performed without intravenous contrast. Sagittal and coronal reconstructions were created from the axial data. One or more dose reduction techniques were used on this CT: automated exposure control, adjustment of the mAs and/or kVp according to patient size, and iterative reconstruction techniques. The specific techniques used on this CT exam have been documented in the patient's electronic medical record. Digital Imaging and Communications in Medicine (DICOM) format image data are available to nonaffiliated external healthcare facilities or entities on a secure, media free, reciprocally searchable basis with patient authorization for at least a 12-month period after this study. _______________ FINDINGS: BRAIN, POSTERIOR FOSSA, EXTRA-AXIAL SPACES AND MENINGES:  The sulci, folia, ventricles and basal cisterns are   within normal limits for the patient's age. There are no areas of abnormal parenchymal attenuation. There is no intracranial hemorrhage, mass effect, or midline shift. There are no abnormal extra-axial fluid collections. CALVARIUM: Progressive lucent lesions in the calvarium. SINUSES/MASTOIDS: Slight left maxillary sinus mucosal thickening. OTHER: None. _______________     IMPRESSION: 1. No acute intracranial abnormalities are identified. 2. Progressive lucent/lytic lesions in the calvarium, which can be seen in the setting of metastatic disease or myeloma. Please note that head CT may be negative in the acute setting and MRI could best further evaluate for acute/subacute ischemia/infarct in the high/persistent index of clinical suspicion. Xr Chest Port    Result Date: 12/7/2020  HISTORY: -Provided with order: left shoulder pain -Additional: None Technique : AP PORTABLE CHEST Comparison : 02/16/20 FINDINGS: HEART AND MEDIASTINUM: Right IJ central venous catheter tip projects over expected SVC. External monitoring wires leads and pads partially obscures visibility. LUNGS AND PLEURAL SPACES: No consolidation, congestive heart failure, pleural effusion or pneumothorax. BONY THORAX AND SOFT TISSUES: No acute osseous abnormality. Impression: No plain film evidence of acute cardiopulmonary disease, allowing for technique. Thanks for the consult. We will follow! Samuel Monson.  Christiano Rios MD, PhD, Katya Rios  Phone: [phone]  Pager: [phone]

## 2020-12-08 NOTE — CONSULTS
NEUROLOGY CONSULTATION NOTE    Patient: Danyell Arteaga MRN: [id number]  CSN: [id number]    YOB: 1940  Age: 68 y.o. Sex: male    DOA: 12/7/2020 LOS:  LOS: 1 day        Requesting Physician: Dr. Bethany Tapia  Reason for Consultation: Left-sided weakness             HISTORY OF PRESENT ILLNESS:   Danyell Arteaga is a 68 y.o. male with past medical history of multiple myeloma and chronic left shoulder pain, presented to emergency room for worsening weakness in the past 2 weeks. Patient related that he was diagnosed as left shoulder pain years ago. He has chronic progressively worsening left arm weakness, for the past 2 weeks, he has worsening weakness in left lower limb. He also noticed a transient dysarthria yesterday morning, which improves with time. Brain MRI without contrast ruled out acute infarct except questionable cervical spine canal stenosis at C2 and C3 level. Patient endorsed chronic neck pain and low back pain. PAST MEDICAL HISTORY:  Past Medical History:   Diagnosis Date    Cancer ([facility]     Diabetes ([facility]     FH: chemotherapy     H/O stem cell transplant ([facility]     Hypertension     Multiple myeloma ([facility]     Neuropathy      PAST SURGICAL HISTORY:  Past Surgical History:   Procedure Laterality Date    HX CHOLECYSTECTOMY      HX KYPHOPLASTY       FAMILY HISTORY:  History reviewed. No pertinent family history.   SOCIAL HISTORY:  Social History     Socioeconomic History    Marital status:      Spouse name: Not on file    Number of children: Not on file    Years of education: Not on file    Highest education level: Not on file   Tobacco Use    Smoking status: Former Smoker    Smokeless tobacco: Never Used   Substance and Sexual Activity    Alcohol use: Never     Frequency: Never     Comment: rare    Drug use: No     MEDICATIONS:  Current Facility-Administered Medications   Medication Dose Route Frequency    potassium chloride (K-DUR, KLOR-CON) SR tablet 40 mEq  40 mEq Oral NOW    magnesium sulfate 2 g/50 ml IVPB (premix or compounded)  2 g IntraVENous Q1H    insulin lispro (HUMALOG) injection   SubCUTAneous AC&HS    glucose chewable tablet 16 g  4 Tab Oral PRN    glucagon (GLUCAGEN) injection 1 mg  1 mg IntraMUSCular PRN    aspirin chewable tablet 81 mg  81 mg Oral DAILY    cyanocobalamin tablet 1,000 mcg  1,000 mcg Oral DAILY    gabapentin (NEURONTIN) capsule 800 mg  800 mg Oral BID    isosorbide mononitrate ER (IMDUR) tablet 30 mg  30 mg Oral DAILY    lenalidomide cap 25 mg (Patient Supplied)  25 mg Oral DAILY    metoprolol succinate (TOPROL-XL) XL tablet 25 mg  25 mg Oral DAILY    pravastatin (PRAVACHOL) tablet 40 mg  40 mg Oral QHS    pyridoxine (vitamin B6) (VITAMIN B-6) tablet 50 mg  50 mg Oral DAILY    valACYclovir (VALTREX) tablet 500 mg  500 mg Oral BID     Prior to Admission medications    Medication Sig Start Date End Date Taking? Authorizing Provider   LENALIDOMIDE PO Take 25 mg by mouth. Yes George, MD Annie   felodipine (PLENDIL SR) 10 mg 24 hr tablet Take 10 mg by mouth daily. Yes Annie Vazquez MD   spironolactone (ALDACTONE) 25 mg tablet Take  by mouth daily. Yes George, MD Annie   pyridoxine, vitamin B6, (VITAMIN B-6) 50 mg tablet Take 50 mg by mouth daily. Yes Annie Vazquez MD   metOLazone (ZAROXOLYN) 10 mg tablet Take 5 mg by mouth daily. Yes Annie Vazquez MD   rivaroxaban (XARELTO PO) Take 10 mg by mouth. Yes Annie Vazquez MD   POTASSIUM CHLORIDE 20 mEq by Does Not Apply route. Yes George, MD Annie   valacyclovir HCl (VALTREX PO) Take  by mouth. Yes George, MD Annie   omeprazole (PRILOSEC) 20 mg capsule Take 20 mg by mouth daily. Yes Annie Vazquez MD   DEXAMETHASONE, BULK, by Does Not Apply route. Yes George, MD Annie   SITagliptin-metFORMIN (JANUMET) 50-1,000 mg per tablet Take 1 Tab by mouth two (2) times daily (with meals).    Yes Provider, Historical   cholecalciferol (Vitamin D3) (1000 Units /25 mcg) tablet Take 1,000 Units by mouth daily. Yes Provider, Historical   cyanocobalamin (VITAMIN B12) 500 mcg tablet Take 1,000 mcg by mouth daily. Yes Provider, Historical   lisinopril (PRINIVIL, ZESTRIL) 5 mg tablet Take 1 Tab by mouth daily. Patient taking differently: Take 20 mg by mouth daily. 2/14/20  Yes Marlen Redmond MD   furosemide (LASIX) 40 mg tablet Take 1 Tab by mouth daily. 2/14/20  Yes Masood Morgan MD   isosorbide mononitrate ER (IMDUR) 30 mg tablet Take 1 Tab by mouth daily. 2/15/20  Yes Masood Morgan MD   metoprolol succinate (TOPROL-XL) 25 mg XL tablet Take 1 Tab by mouth daily. 2/15/20  Yes Masood Morgan MD   pravastatin (PRAVACHOL) 40 mg tablet Take 1 Tab by mouth nightly. 2/14/20  Yes Masood Morgan MD   aspirin 81 mg chewable tablet Take 81 mg by mouth daily. Yes George, MD Annie   metFORMIN (GLUCOPHAGE) 1,000 mg tablet Take 1,000 mg by mouth two (2) times daily (with meals). Yes Annie Vazquez MD   gabapentin (NEURONTIN) 400 mg capsule Take 800 mg by mouth two (2) times a day. Yes Annie Vazquez MD   SITagliptin (JANUVIA) 100 mg tablet Take 100 mg by mouth daily. Yes Annie Vazquez MD   calcium-cholecalciferol, d3, (CALCIUM 600 + D) 600-125 mg-unit tab Take  by mouth daily. Yes Annie Vazquez MD   pioglitazone (ACTOS) 30 mg tablet Take 30 mg by mouth daily. Yes George, MD Annie       ALLERGIES:  Allergies   Allergen Reactions    Iodine Hives       Review of Systems  GENERAL: No fevers or chills. HEENT: No change in vision, earache, tinnitus, sore throat or sinus congestion. NECK: No pain or stiffness. CARDIOVASCULAR: No chest pain or pressure. No palpitations. PULMONARY: No shortness of breath, cough or wheeze. GASTROINTESTINAL: No abdominal pain, nausea, vomiting or diarrhea. GENITOURINARY: No urinary frequency, urgency, hesitancy or dysuria. MUSCULOSKELETAL: No joint or muscle pain, no back pain, no recent trauma. DERMATOLOGIC: No rash, no itching, no lesions.     ENDOCRINE: No polyuria, polydipsia, no heat or cold intolerance. No recent change in weight. HEMATOLOGICAL: No anemia or easy bruising or bleeding. NEUROLOGIC: Left-sided weakness. PHYSICAL EXAMINATION:     Visit Vitals  /69 (BP 1 Location: Right arm, BP Patient Position: At rest;Supine)   Pulse 82   Temp 98.4 °F (36.9 °C)   Resp 18   Ht 5' 9\" (1.753 m)   Wt 115.6 kg (254 lb 14.4 oz)   SpO2 98%   BMI 37.64 kg/m²      O2 Device: Room air  GENERAL: Pleasant, in no apparent distress. HEENT: Moist mucous membranes, sclerae anicteric, scalp is atraumatic. CVS: Regular rate and rhythm, no murmurs or gallops. No carotid bruits. PULMONARY: Clear to auscultation bilaterally. No rales or rhonchi. No wheezing. EXTREMITIES: Normal range of motion at all sites. No deformities. ABDOMEN: Soft, nontender. SKIN: No rashes or ecchymoses. Warm and dry. NEUROLOGIC: Alert and oriented x3. Speech is fluent without any aphasia or dysarthria. Cranial nerves: Face is symmetric with symmetric smile. Facial sensation is intact. Extraocular movements are intact with no nystagmus. Visual fields are full to confrontation. PERRL. Tongue is midline. Palate elevates symmetrically. Hearing intact to speech. Sternocleidomastoid and trapezius strengths are full bilaterally. Motor: Mild handgrip weakness in left upper limb, 4+/5 left lower limb, 5/5 right upper and lower limb. Sensory: Intact to pinprick and touch on all four. Normal vibratory sensation on toes bilaterally. Coordination: Intact coordination with finger-nose-finger bilaterally. Normal fine movements. No bradykinesia detected. Deep tendon reflexes: 2+ at biceps, brachioradialis, patella and ankles bilaterally. Toes are down-going bilaterally. Gait assessment: Deferred.     Labs: Results:       Chemistry Recent Labs     12/08/20  0012 12/07/20  1225 12/07/20  0926   * 141* 163*    137 138   K 3.1* 3.1* 2.9*    101 101   CO2 29 33* 30   BUN 8 10 11   CREA 0.75 0.83 0.98   CA 8.1* 8.9 8. 7   AGAP 8 3 7   BUCR 11* 12 11*   AP  --   --  74   TP  --   --  6.5   ALB  --   --  3.2*   GLOB  --   --  3.3   AGRAT  --   --  1.0      CBC w/Diff Recent Labs     12/08/20  0012 12/07/20  0926   WBC 4.2* 6.0   RBC 3.47* 3.70*   HGB 10.0* 10.7*   HCT 29.2* 31.1*    183   GRANS 45 54   LYMPH 32 28   EOS 5 3      Cardiac Enzymes Recent Labs     12/08/20  0710 12/08/20  0112    189   CKND1 2.1 2.1      Coagulation Recent Labs     12/07/20 0926   PTP 16.0*   INR 1.3*   APTT 27.8       Lipid Panel Lab Results   Component Value Date/Time    Cholesterol, total 89 12/08/2020 01:12 AM    HDL Cholesterol 46 12/08/2020 01:12 AM    LDL, calculated 28.6 12/08/2020 01:12 AM    VLDL, calculated 14.4 12/08/2020 01:12 AM    Triglyceride 72 12/08/2020 01:12 AM    CHOL/HDL Ratio 1.9 12/08/2020 01:12 AM      BNP No results for input(s): BNPP in the last 72 hours. Liver Enzymes Recent Labs     12/07/20 0926   TP 6.5   ALB 3.2*   AP 74      Thyroid Studies Lab Results   Component Value Date/Time    TSH 1.16 12/07/2020 12:25 PM    TSH 0.74 12/07/2020 12:25 PM          Radiology:  Xr Shoulder Lt Ap/lat Min 2 V    Result Date: 12/7/2020  EXAM:  XR SHOULDER LT AP/LAT MIN 2 V INDICATION: Left shoulder pain COMPARISON: None. FINDINGS: Two views of the left shoulder demonstrate no acute finding. A few scattered and confluent lucencies are seen in the scapula, proximal humerus and clavicle. Impression: No acute findings or significant degenerative change appreciated. Scattered small bone lucencies noted compatible with history of multiple myeloma. Mri Brain Wo Cont    Result Date: 12/8/2020  EXAM: MRI brain without gadolinium CLINICAL INDICATION/HISTORY: Extremity weakness, possible CVA   > Additional: None.  COMPARISON: 2/16/2020   > Reference Exam: CT head 12/7/2020 TECHNIQUE: Sagittal T1, axial T1, T2, FLAIR, T2 gradient, diffusion and coronal T1 and T2 sequences obtained of the brain. _______________ FINDINGS: Diffusion:  There are no areas of restricted diffusion, no evidence of an acute infarction. Brain parenchyma:  Stable very mild periventricular white matter and central pontine increased T2 and FLAIR signal. No new cortical or white matter signal abnormality with no evidence of mass hemorrhage edema or mass effect. Ventricles and sulci:  Mild diffuse central and cortical volume loss. Extra-axial:  No extra-axial fluid collection or mass is noted. Brain vasculature:  No vascular abnormality is appreciated on this routine brain MR examination. Craniocervical junction:  Cervical medullary junction unremarkable. Interval increased development of likely degenerative Schmorl's node endplate defect inferior C2 vertebral body. Suggestion of possible worsening of ligamentum flavum thickening C2 and C3 with some questionable cord flattening and canal stenosis, limited evaluation on this study. Skull base, extracranial and calvarium:  Previous bilateral lens implants with stable bilateral proptosis with no retrobulbar abnormality. Sinuses and IACs unremarkable. Minimal increased T2 signal inferior right mastoid. Sella unremarkable. Multiple small low T1 and increased T2 signal foci throughout the intradiploic space of the skull correlating to small lytic lesions on CT. _______________     IMPRESSION: 1. No evidence of acute infarct or other acute intracranial finding. 2. Stable very mild white matter signal changes nonspecific probable chronic microvascular ischemic disease. 3. Questionable interval increased acquired canal stenosis and cord flattening C2 and C3 levels limited evaluation on this study. If there is clinical concern for cervical myelopathy, further evaluation could be performed with MRI dedicated to cervical spine. 4. Multiple small T2 hyperintense skull lesions concerning for metastatic disease or multiple myeloma.     Ct Head Wo Cont    Result Date: 12/7/2020  EXAM: CT head HISTORY: -Provided with order: left sided weakness, stroke? -Additional: None COMPARISON: 02/16/20 TECHNIQUE: Axial CT imaging of the head was performed without intravenous contrast. Sagittal and coronal reconstructions were created from the axial data. One or more dose reduction techniques were used on this CT: automated exposure control, adjustment of the mAs and/or kVp according to patient size, and iterative reconstruction techniques. The specific techniques used on this CT exam have been documented in the patient's electronic medical record. Digital Imaging and Communications in Medicine (DICOM) format image data are available to nonaffiliated external healthcare facilities or entities on a secure, media free, reciprocally searchable basis with patient authorization for at least a 12-month period after this study. _______________ FINDINGS: BRAIN, POSTERIOR FOSSA, EXTRA-AXIAL SPACES AND MENINGES:  The sulci, folia, ventricles and basal cisterns are   within normal limits for the patient's age. There are no areas of abnormal parenchymal attenuation. There is no intracranial hemorrhage, mass effect, or midline shift. There are no abnormal extra-axial fluid collections. CALVARIUM: Progressive lucent lesions in the calvarium. SINUSES/MASTOIDS: Slight left maxillary sinus mucosal thickening. OTHER: None. _______________     IMPRESSION: 1. No acute intracranial abnormalities are identified. 2. Progressive lucent/lytic lesions in the calvarium, which can be seen in the setting of metastatic disease or myeloma. Please note that head CT may be negative in the acute setting and MRI could best further evaluate for acute/subacute ischemia/infarct in the high/persistent index of clinical suspicion. Xr Chest Port    Result Date: 12/7/2020  HISTORY: -Provided with order: left shoulder pain -Additional: None Technique : AP PORTABLE CHEST Comparison : 02/16/20 FINDINGS: HEART AND MEDIASTINUM: Right IJ central venous catheter tip projects over expected SVC. External monitoring wires leads and pads partially obscures visibility. LUNGS AND PLEURAL SPACES: No consolidation, congestive heart failure, pleural effusion or pneumothorax. BONY THORAX AND SOFT TISSUES: No acute osseous abnormality. Impression: No plain film evidence of acute cardiopulmonary disease, allowing for technique. ASSESSMENT/IMPRESSION:  70-year-old male with past medical history of multiple myeloma, chronic left shoulder pain presents with progressively worsening weakness in left upper and lower limb. 1. Left hemiparesis: Likely secondary to cervical myelopathy/radiculopathy based on brain MRI finding. Lumbar spine myelopathy needs to be ruled out. RECOMMENDATIONS:  1. C-spine and L-spine MRI. I will follow the patient.  Please do not hesitate to return with any questions.    ------------------------------------  Ada Dougherty MD  12/8/2020  12:20 PM

## 2020-12-08 NOTE — PROGRESS NOTES
Hospitalist Progress Note-critical care note     Patient: Pamela Mota MRN: [id number]  CSN: [id number]    YOB: 1940  Age: 68 y.o. Sex: male    DOA: 12/7/2020 LOS:  LOS: 1 day            Chief complaint: left hemiparesis, htn, hypokalemia, hypoKlemia and hypomagnesemia , dm2    Assessment/Plan         Hospital Problems  Date Reviewed: 2/17/2020          Codes Class Noted POA    Left hemiparesis ([facility] ICD-10-CM: G81.94  ICD-9-CM: 342.90  12/8/2020 Unknown        Hypokalemia ICD-10-CM: E87.6  ICD-9-CM: 276.8  12/7/2020 Unknown        Neuropathy ICD-10-CM: G62.9  ICD-9-CM: 355.9  Unknown Unknown        Hypomagnesemia ICD-10-CM: E83.42  ICD-9-CM: 275.2  12/7/2020 Unknown        Elevated troponin ICD-10-CM: R77.8  ICD-9-CM: 790.6  12/7/2020 Unknown        Weakness ICD-10-CM: R53.1  ICD-9-CM: 780.79  12/7/2020 Unknown        Morbid obesity ([facility] ICD-10-CM: E66.01  ICD-9-CM: 278.01  2/17/2020 Yes        Multiple myeloma ([facility] ICD-10-CM: C90.00  ICD-9-CM: 203.00  2/16/2020 Yes        Type 2 diabetes mellitus ([facility].) ICD-10-CM: E11.9  ICD-9-CM: 250.00  2/12/2020 Yes                Neuropathy , and weakness  Mri brain no acute stroke   Case discussed with Jose Butterfield will have mri of spine   Appreciated dr. Janis Draper and Kori Points on board      Hypokalemia, hypomagnesemia  Still low   Replacement      Elevated  troponin  Continue cardiac monitoring  No chest pain  Check ce trend   Dr. Denny Siddiqi is on board-appreciated         MM   Continue home meds   Oncologist on board      DM type II , with complication,  - add lantus, ssi, diabetic diet , hypoglycemia protocol        HTN, accelerated  Restart other home medication      Morbid obesity  Lifestyle modification     Subjective: still weakness      Daughter was at the bedside     Disposition :tbd,   Review of systems:    General: No fevers or chills. Cardiovascular: No chest pain or pressure. No palpitations. Pulmonary: No shortness of breath.    Gastrointestinal: No nausea, vomiting. Vital signs/Intake and Output:  Visit Vitals  /69 (BP 1 Location: Right arm, BP Patient Position: At rest;Supine)   Pulse 82   Temp 98.4 °F (36.9 °C)   Resp 18   Ht 5' 9\" (1.753 m)   Wt 115.6 kg (254 lb 14.4 oz)   SpO2 98%   BMI 37.64 kg/m²     Current Shift:  12/08 0701 - 12/08 1900  In: -   Out: 200 [Urine:200]  Last three shifts:  12/06 1901 - 12/08 0700  In: -   Out: 800 [Urine:800]    Physical Exam:  General: WD, WN. Alert, cooperative, no acute distress    HEENT: NC, Atraumatic. PERRLA, anicteric sclerae. Lungs: CTA Bilaterally. No Wheezing/Rhonchi/Rales. Heart:  Regular  rhythm,  No murmur, No Rubs, No Gallops  Abdomen: Soft, Non distended, Non tender. +Bowel sounds,   Extremities: No c/c/e  Psych:   Not anxious or agitated. Neurologic:   left side weakness           Labs: Results:       Chemistry Recent Labs     12/08/20  0012 12/07/20  1225 12/07/20  0926   * 141* 163*    137 138   K 3.1* 3.1* 2.9*    101 101   CO2 29 33* 30   BUN 8 10 11   CREA 0.75 0.83 0.98   CA 8.1* 8.9 8.7   AGAP 8 3 7   BUCR 11* 12 11*   AP  --   --  74   TP  --   --  6.5   ALB  --   --  3.2*   GLOB  --   --  3.3   AGRAT  --   --  1.0      CBC w/Diff Recent Labs     12/08/20  0012 12/07/20  0926   WBC 4.2* 6.0   RBC 3.47* 3.70*   HGB 10.0* 10.7*   HCT 29.2* 31.1*    183   GRANS 45 54   LYMPH 32 28   EOS 5 3      Cardiac Enzymes Recent Labs     12/08/20  0710 12/08/20  0112    189   CKND1 2.1 2.1      Coagulation Recent Labs     12/07/20  0926   PTP 16.0*   INR 1.3*   APTT 27.8       Lipid Panel Lab Results   Component Value Date/Time    Cholesterol, total 89 12/08/2020 01:12 AM    HDL Cholesterol 46 12/08/2020 01:12 AM    LDL, calculated 28.6 12/08/2020 01:12 AM    VLDL, calculated 14.4 12/08/2020 01:12 AM    Triglyceride 72 12/08/2020 01:12 AM    CHOL/HDL Ratio 1.9 12/08/2020 01:12 AM      BNP No results for input(s): BNPP in the last 72 hours.    Liver Enzymes Recent Labs     12/07/20  0926   TP 6.5   ALB 3.2*   AP 74      Thyroid Studies Lab Results   Component Value Date/Time    TSH 1.16 12/07/2020 12:25 PM    TSH 0.74 12/07/2020 12:25 PM        Procedures/imaging: see electronic medical records for all procedures/Xrays and details which were not copied into this note but were reviewed prior to creation of Plan    Xr Shoulder Lt Ap/lat Min 2 V    Result Date: 12/7/2020  EXAM:  XR SHOULDER LT AP/LAT MIN 2 V INDICATION: Left shoulder pain COMPARISON: None. FINDINGS: Two views of the left shoulder demonstrate no acute finding. A few scattered and confluent lucencies are seen in the scapula, proximal humerus and clavicle. Impression: No acute findings or significant degenerative change appreciated. Scattered small bone lucencies noted compatible with history of multiple myeloma. Mri Brain Wo Cont    Result Date: 12/8/2020  EXAM: MRI brain without gadolinium CLINICAL INDICATION/HISTORY: Extremity weakness, possible CVA   > Additional: None. COMPARISON: 2/16/2020   > Reference Exam: CT head 12/7/2020 TECHNIQUE: Sagittal T1, axial T1, T2, FLAIR, T2 gradient, diffusion and coronal T1 and T2 sequences obtained of the brain. _______________ FINDINGS: Diffusion:  There are no areas of restricted diffusion, no evidence of an acute infarction. Brain parenchyma:  Stable very mild periventricular white matter and central pontine increased T2 and FLAIR signal. No new cortical or white matter signal abnormality with no evidence of mass hemorrhage edema or mass effect. Ventricles and sulci:  Mild diffuse central and cortical volume loss. Extra-axial:  No extra-axial fluid collection or mass is noted. Brain vasculature:  No vascular abnormality is appreciated on this routine brain MR examination. Craniocervical junction:  Cervical medullary junction unremarkable. Interval increased development of likely degenerative Schmorl's node endplate defect inferior C2 vertebral body.  Suggestion of possible worsening of ligamentum flavum thickening C2 and C3 with some questionable cord flattening and canal stenosis, limited evaluation on this study. Skull base, extracranial and calvarium:  Previous bilateral lens implants with stable bilateral proptosis with no retrobulbar abnormality. Sinuses and IACs unremarkable. Minimal increased T2 signal inferior right mastoid. Sella unremarkable. Multiple small low T1 and increased T2 signal foci throughout the intradiploic space of the skull correlating to small lytic lesions on CT. _______________     IMPRESSION: 1. No evidence of acute infarct or other acute intracranial finding. 2. Stable very mild white matter signal changes nonspecific probable chronic microvascular ischemic disease. 3. Questionable interval increased acquired canal stenosis and cord flattening C2 and C3 levels limited evaluation on this study. If there is clinical concern for cervical myelopathy, further evaluation could be performed with MRI dedicated to cervical spine. 4. Multiple small T2 hyperintense skull lesions concerning for metastatic disease or multiple myeloma. Ct Head Wo Cont    Result Date: 12/7/2020  EXAM: CT head HISTORY: -Provided with order: left sided weakness, stroke? -Additional: None COMPARISON: 02/16/20 TECHNIQUE: Axial CT imaging of the head was performed without intravenous contrast. Sagittal and coronal reconstructions were created from the axial data. One or more dose reduction techniques were used on this CT: automated exposure control, adjustment of the mAs and/or kVp according to patient size, and iterative reconstruction techniques. The specific techniques used on this CT exam have been documented in the patient's electronic medical record.  Digital Imaging and Communications in Medicine (DICOM) format image data are available to nonaffiliated external healthcare facilities or entities on a secure, media free, reciprocally searchable basis with patient authorization for at least a 12-month period after this study. _______________ FINDINGS: BRAIN, POSTERIOR FOSSA, EXTRA-AXIAL SPACES AND MENINGES:  The sulci, folia, ventricles and basal cisterns are   within normal limits for the patient's age. There are no areas of abnormal parenchymal attenuation. There is no intracranial hemorrhage, mass effect, or midline shift. There are no abnormal extra-axial fluid collections. CALVARIUM: Progressive lucent lesions in the calvarium. SINUSES/MASTOIDS: Slight left maxillary sinus mucosal thickening. OTHER: None. _______________     IMPRESSION: 1. No acute intracranial abnormalities are identified. 2. Progressive lucent/lytic lesions in the calvarium, which can be seen in the setting of metastatic disease or myeloma. Please note that head CT may be negative in the acute setting and MRI could best further evaluate for acute/subacute ischemia/infarct in the high/persistent index of clinical suspicion. Xr Chest Port    Result Date: 12/7/2020  HISTORY: -Provided with order: left shoulder pain -Additional: None Technique : AP PORTABLE CHEST Comparison : 02/16/20 FINDINGS: HEART AND MEDIASTINUM: Right IJ central venous catheter tip projects over expected SVC. External monitoring wires leads and pads partially obscures visibility. LUNGS AND PLEURAL SPACES: No consolidation, congestive heart failure, pleural effusion or pneumothorax. BONY THORAX AND SOFT TISSUES: No acute osseous abnormality. Impression: No plain film evidence of acute cardiopulmonary disease, allowing for technique.        Dirk Caor MD

## 2020-12-08 NOTE — PROGRESS NOTES
Problem: Self Care Deficits Care Plan (Adult)  Goal: *Acute Goals and Plan of Care (Insert Text)  Description: Initial OT STGs (12/8/2020) Within 7 days:    1. Patient will perform toilet transfer with CGA/Cole in preparation for bowel and bladder management. 2. Patient will perform bowel and bladder management with setup/Cole for increased independence with ADLs. 3. Patient will perform UB dressing with setup while utilizing jori-techniques for increased independence with ADLs. 4. Patient will perform LB dressing with minimal assist while utilizing jori-techniques for increased independence with ADLs. 5. Patient will perform UE exercises with setup assist for 15 minutes to increase independence with ADLs. 6. Patient will utilize energy conservation techniques with 1 verbal cue(s) for increased independence with ADLs. PLOF: Pt was unsteady d/t peripheral neuropathy; began using SW with HHPT; otherwise mod I for ADLs; ascends stairs 1x/wk for bathing in Tub shower w/ Tub Bench    Outcome: Progressing Towards Goal     OCCUPATIONAL THERAPY EVALUATION    Patient: Brittani Martinez (46 y.o. male)  Date: 12/8/2020  Primary Diagnosis: Hypokalemia [E87.6]        Precautions: Back, Aspiration  PLOF: Pt was unsteady d/t peripheral neuropathy; began using SW with HHPT; otherwise mod I for ADLs; ascends stairs 1x/wk for bathing in Tub shower w/ Tub Bench    ASSESSMENT :  Based on the objective data described below, the patient presents with significant decline in ADLs d/t L sided weakness especially L hand discoordination. Pt w/ decreased lateral scooting alongside bed d/t decreased motor coordination and decreased L sided strength for UE/LE placement. Attempted bed modification w/ slant towards HOB w/o improvement. Pt performed best w/ tactile input of pad under hips to lift and shift hips up EOB.  Pt requiring increased time and multiple rest breaks d/t SOB/CHARLES, however, pt highly motivated to return back to independence. Pt able to complete simple grooming seated EOB. Pt assisted back to bed and using bed rails to pull self up in bed. Requiring rest break. LUE w/ GMC, but limited finger isolation, especially digits 2-5. Pt reports plans in home to get stair lift or modification to limit need to walk up to seconds floor. Educated on OT tx approaches for CVA vs cervical impingement, but importance of LUE use to prevent learned disuse. Pt in agreement for Acute Rehab and ability to tolerate 3 hours of therapy a day for goal of returning home to good family support system. Education: Reviewed home safety, body mechanics, signs and symptoms of a stroke, risk factors, blood sugar management in relation to stroke, and adaptive dressing techniques with patient verbalized understanding at this time. Stroke Education: Patient educated on signs/symptoms of stroke and importance of early intervention. Patient verbalized understanding. Patient will benefit from skilled intervention to address the above impairments.   Patients rehabilitation potential is considered to be Good  Factors which may influence rehabilitation potential include:   []             None noted  []             Mental ability/status  [x]             Medical condition  []             Home/family situation and support systems  []             Safety awareness  []             Pain tolerance/management  []             Other:      PLAN :  Recommendations and Planned Interventions:   [x]               Self Care Training                [x]        Therapeutic Activities  [x]               Functional Mobility Training [x]        Cognitive Retraining  [x]               Therapeutic Exercises         [x]        Endurance Activities  [x]               Balance Training                  [x]        Neuromuscular Re-Education  [x]               Visual/Perceptual Training   [x]   Home Safety Training  [x]               Patient Education                 [x]        Family Training/Education  []               Other (comment):    Frequency/Duration: Patient will be followed by occupational therapy 1-2 times per day/4-7 days per week to address goals. Discharge Recommendations: Inpatient Rehab  Further Equipment Recommendations for Discharge: To Be Determined (TBD) at next level of care      SUBJECTIVE:   Patient stated I'm a fall risk.     OBJECTIVE DATA SUMMARY:     Past Medical History:   Diagnosis Date    Cancer ([facility].)     Diabetes ([facility].)     FH: chemotherapy     H/O stem cell transplant ([facility].)     Hypertension     Multiple myeloma ([facility].)     Neuropathy      Past Surgical History:   Procedure Laterality Date    HX CHOLECYSTECTOMY      HX KYPHOPLASTY       Barriers to Learning/Limitations: None  Compensate with: visual, verbal, tactile, kinesthetic cues/model    Home Situation/Prior Level of Function: supportive daughter at [facility]: Private residence  # Steps to Enter: 3  One/Two Story Residence: Two story  # of Interior Steps: 12  Interior Rails: Left  Lift Chair Available: No  Living Alone: No  Support Systems: Child(desiree)  Patient Expects to be Discharged to[de-identified] Private residence  Current DME Used/Available at Home: [employer] or Shower Type: Tub/Shower combination(on 2nd floor; 1/2 bath on first)  [x]  Right hand dominant   []  Left hand dominant    Cognitive/Behavioral Status:  Neurologic State: Alert; Appropriate for age  Orientation Level: Oriented X4  Cognition: Follows commands  Safety/Judgement: Awareness of environment; Insight into deficits    Vision/Perceptual:    Tracking: (non-smooth)    Convergence: (R eye does not converge)    Visual Fields: Difficulty detecting stimulus in right upper quadrant; Difficulty detecting stimulus in right lower quadrant; Difficulty detecting stimulus  in right lateral quadrant  Diplopia: No    Acuity: Impaired near vision    Corrective Lenses: Reading glasses    Coordination: BUE  Coordination: Generally decreased, functional  Fine Motor Skills-Upper: Left Impaired;Right Intact    Gross Motor Skills-Upper: Left Impaired;Right Intact    Balance:  Sitting: Intact  Standing: Impaired; With support    Strength: BUE  Strength: Generally decreased, functional    Tone & Sensation: BUE  Tone: Abnormal  Sensation: Impaired    Range of Motion: BUE  AROM: Generally decreased, functional  PROM: Generally decreased, functional    Functional Mobility and Transfers for ADLs:  Bed Mobility:  Supine to Sit: Minimum assistance; Adaptive equipment; Additional time(use of bedrails)  Sit to Supine: Minimum assistance; Moderate assistance  Scooting: Moderate assistance    ADL Assessment:  Feeding: Contact guard assistance    Oral Facial Hygiene/Grooming: Setup(seated)    Bathing: Moderate assistance;Maximum assistance; Additional time    Upper Body Dressing: Minimum assistance    Lower Body Dressing: Moderate assistance;Maximum assistance; Additional time    Toileting: Moderate assistance;Maximum assistance    ADL Intervention:  Feeding  Container Management: Contact guard assistance  Food to Mouth: Contact guard assistance  Drink to Mouth: Contact guard assistance    Grooming  Washing Hands: Set-up(hand wipes seated EOB)    Lower Body Dressing Assistance  Underpants: Maximum assistance  Socks: Maximum assistance; Compensatory technique training;Proximal stability    Toileting  Bladder Hygiene: Contact guard assistance  Clothing Management: Maximum assistance    Cognitive Retraining  Safety/Judgement: Awareness of environment; Insight into deficits    Neuromuscular Re-Education:  LUE functional reaching for ADL objects in all planes, reaching across body    Pain:  Pain level Pre-treatment: 0/10  Pain level Post-treatment: 0/10  Pain Intervention(s): Medication administer by RN (see MAR);  Rest, Ice, Repositioning  Response to Intervention: Nurse notified, see doc flowsheet    Please refer to the flowsheet for vital signs taken during this treatment. After treatment:   [] Patient left in no apparent distress sitting up in chair  [x] Patient left in no apparent distress in bed  [x] Call bell left within reach  [x] Nursing notified/Lacey  [x] Caregiver present/handoff to Transport; pt going to MRI for neck  [] Bed alarm activated    COMMUNICATION/EDUCATION:   [x] Home safety education was provided and the patient/caregiver indicated understanding. [x] Patient/family have participated as able in goal setting and plan of care. [x] Patient/family agree to work toward stated goals and plan of care. [] Patient understands intent and goals of therapy, but is neutral about his/her participation. [] Patient is unable to participate in goal setting and plan of care. Thank you for this referral.  Vicki Melchor, OTR/L, CSRS, CDCS, CFPS  Time Calculation: 78 mins    Eval Complexity: History: HIGH Complexity : Extensive review of history including physical, cognitive and psychosocial history ; Examination: HIGH Complexity : 5 or more performance deficits relating to physical, cognitive , or psychosocial skils that result in activity limitations and / or participation restrictions; Decision Making:HIGH Complexity : Patient presents with comorbidities that affect occupational performance.  Signifigant modification of tasks or assistance (eg, physical or verbal) with assessment (s) is necessary to enable patient to complete evaluation

## 2020-12-08 NOTE — ROUTINE PROCESS
Assume care of patient from [address]. Patient received in bed awake. Patient A&Ox4, denies pain and discomfort. No distress noted. Bed locked in low position. Call bell within reach and patient verbalized understanding of use for assistance and needs. 3293- Bedside and Verbal shift change report given to Michelle Lubin RN (oncoming nurse) by Gordon Medley RN (offgoing nurse). Report included the following information SBAR, Kardex, Intake/Output, MAR and Recent Results. [address] [place] [facility] Night Shift Chart Audit    Chart Audit completed?  YES

## 2020-12-08 NOTE — PROGRESS NOTES
Bedside shift change report given to [address] [place] [address] (oncoming nurse) by Estevan Krishna (offgoing nurse). Report included the following information SBAR, Kardex, ED Summary, Intake/Output, MAR and Accordion. 4660 Shift assessment complete. Shift Summary: Shift uneventful. No complaints of chest pain or shortness of breath. Call light is within reach.

## 2020-12-09 ENCOUNTER — APPOINTMENT (OUTPATIENT)
Dept: CT IMAGING | Age: 80
DRG: 472 | End: 2020-12-09
Attending: INTERNAL MEDICINE
Payer: MEDICARE

## 2020-12-09 LAB
ANION GAP SERPL CALC-SCNC: 6 MMOL/L (ref 3–18)
B2 MICROGLOB SERPL-MCNC: 2 MG/L (ref 0.6–2.4)
BASOPHILS # BLD: 0 K/UL (ref 0–0.1)
BASOPHILS NFR BLD: 0 % (ref 0–2)
BUN SERPL-MCNC: 7 MG/DL (ref 7–18)
BUN/CREAT SERPL: 9 (ref 12–20)
CALCIUM SERPL-MCNC: 8.1 MG/DL (ref 8.5–10.1)
CHLORIDE SERPL-SCNC: 104 MMOL/L (ref 100–111)
CO2 SERPL-SCNC: 27 MMOL/L (ref 21–32)
CREAT SERPL-MCNC: 0.79 MG/DL (ref 0.6–1.3)
DIFFERENTIAL METHOD BLD: ABNORMAL
EOSINOPHIL # BLD: 0.3 K/UL (ref 0–0.4)
EOSINOPHIL NFR BLD: 5 % (ref 0–5)
ERYTHROCYTE [DISTWIDTH] IN BLOOD BY AUTOMATED COUNT: 15.6 % (ref 11.6–14.5)
GLUCOSE BLD STRIP.AUTO-MCNC: 158 MG/DL (ref 70–110)
GLUCOSE BLD STRIP.AUTO-MCNC: 186 MG/DL (ref 70–110)
GLUCOSE BLD STRIP.AUTO-MCNC: 302 MG/DL (ref 70–110)
GLUCOSE BLD STRIP.AUTO-MCNC: 420 MG/DL (ref 70–110)
GLUCOSE SERPL-MCNC: 145 MG/DL (ref 74–99)
HCT VFR BLD AUTO: 29.1 % (ref 36–48)
HGB BLD-MCNC: 10 G/DL (ref 13–16)
LYMPHOCYTES # BLD: 1.5 K/UL (ref 0.9–3.6)
LYMPHOCYTES NFR BLD: 29 % (ref 21–52)
MAGNESIUM SERPL-MCNC: 1.9 MG/DL (ref 1.6–2.6)
MCH RBC QN AUTO: 29.1 PG (ref 24–34)
MCHC RBC AUTO-ENTMCNC: 34.4 G/DL (ref 31–37)
MCV RBC AUTO: 84.6 FL (ref 74–97)
MONOCYTES # BLD: 1 K/UL (ref 0.05–1.2)
MONOCYTES NFR BLD: 18 % (ref 3–10)
NEUTS SEG # BLD: 2.5 K/UL (ref 1.8–8)
NEUTS SEG NFR BLD: 48 % (ref 40–73)
PLATELET # BLD AUTO: 204 K/UL (ref 135–420)
PMV BLD AUTO: 10.4 FL (ref 9.2–11.8)
POTASSIUM SERPL-SCNC: 3.6 MMOL/L (ref 3.5–5.5)
RBC # BLD AUTO: 3.44 M/UL (ref 4.7–5.5)
SODIUM SERPL-SCNC: 137 MMOL/L (ref 136–145)
VISC SER: 1.5 REL.SALINE (ref 1.4–2.1)
WBC # BLD AUTO: 5.3 K/UL (ref 4.6–13.2)

## 2020-12-09 PROCEDURE — 74011636637 HC RX REV CODE- 636/637: Performed by: INTERNAL MEDICINE

## 2020-12-09 PROCEDURE — 74011250637 HC RX REV CODE- 250/637: Performed by: INTERNAL MEDICINE

## 2020-12-09 PROCEDURE — 97530 THERAPEUTIC ACTIVITIES: CPT

## 2020-12-09 PROCEDURE — 74011250636 HC RX REV CODE- 250/636: Performed by: INTERNAL MEDICINE

## 2020-12-09 PROCEDURE — 74011000636 HC RX REV CODE- 636: Performed by: HOSPITALIST

## 2020-12-09 PROCEDURE — 97535 SELF CARE MNGMENT TRAINING: CPT

## 2020-12-09 PROCEDURE — 74011636637 HC RX REV CODE- 636/637: Performed by: HOSPITALIST

## 2020-12-09 PROCEDURE — 82962 GLUCOSE BLOOD TEST: CPT

## 2020-12-09 PROCEDURE — 71260 CT THORAX DX C+: CPT

## 2020-12-09 PROCEDURE — 83735 ASSAY OF MAGNESIUM: CPT

## 2020-12-09 PROCEDURE — 36415 COLL VENOUS BLD VENIPUNCTURE: CPT

## 2020-12-09 PROCEDURE — 85025 COMPLETE CBC W/AUTO DIFF WBC: CPT

## 2020-12-09 PROCEDURE — 80048 BASIC METABOLIC PNL TOTAL CA: CPT

## 2020-12-09 PROCEDURE — 74011250637 HC RX REV CODE- 250/637: Performed by: HOSPITALIST

## 2020-12-09 PROCEDURE — 74011000636 HC RX REV CODE- 636: Performed by: INTERNAL MEDICINE

## 2020-12-09 PROCEDURE — 84154 ASSAY OF PSA FREE: CPT

## 2020-12-09 PROCEDURE — 97116 GAIT TRAINING THERAPY: CPT

## 2020-12-09 PROCEDURE — 65660000000 HC RM CCU STEPDOWN

## 2020-12-09 PROCEDURE — 97110 THERAPEUTIC EXERCISES: CPT

## 2020-12-09 RX ORDER — DIPHENHYDRAMINE HCL 25 MG
50 CAPSULE ORAL ONCE
Status: COMPLETED | OUTPATIENT
Start: 2020-12-09 | End: 2020-12-09

## 2020-12-09 RX ORDER — DEXAMETHASONE 4 MG/1
20 TABLET ORAL
Status: DISCONTINUED | OUTPATIENT
Start: 2020-12-09 | End: 2020-12-14 | Stop reason: HOSPADM

## 2020-12-09 RX ORDER — DIPHENHYDRAMINE HCL 25 MG
50 CAPSULE ORAL ONCE
Status: DISCONTINUED | OUTPATIENT
Start: 2020-12-09 | End: 2020-12-09

## 2020-12-09 RX ADMIN — ISOSORBIDE MONONITRATE 30 MG: 30 TABLET, EXTENDED RELEASE ORAL at 09:27

## 2020-12-09 RX ADMIN — DIATRIZOATE MEGLUMINE AND DIATRIZOATE SODIUM 30 ML: 660; 100 LIQUID ORAL; RECTAL at 20:09

## 2020-12-09 RX ADMIN — ZOLEDRONIC ACID 4 MG: 4 INJECTION, SOLUTION INTRAVENOUS at 12:00

## 2020-12-09 RX ADMIN — CALCIUM CARBONATE-VITAMIN D TAB 500 MG-200 UNIT 1 TABLET: 500-200 TAB at 09:26

## 2020-12-09 RX ADMIN — PRAVASTATIN SODIUM 40 MG: 20 TABLET ORAL at 22:46

## 2020-12-09 RX ADMIN — IOPAMIDOL 100 ML: 612 INJECTION, SOLUTION INTRAVENOUS at 22:15

## 2020-12-09 RX ADMIN — VALACYCLOVIR HYDROCHLORIDE 500 MG: 500 TABLET, FILM COATED ORAL at 22:46

## 2020-12-09 RX ADMIN — INSULIN LISPRO 10 UNITS: 100 INJECTION, SOLUTION INTRAVENOUS; SUBCUTANEOUS at 21:56

## 2020-12-09 RX ADMIN — LENALIDOMIDE 25 MG: 25 CAPSULE ORAL at 09:00

## 2020-12-09 RX ADMIN — VALACYCLOVIR HYDROCHLORIDE 500 MG: 500 TABLET, FILM COATED ORAL at 09:26

## 2020-12-09 RX ADMIN — FUROSEMIDE 40 MG: 40 TABLET ORAL at 09:27

## 2020-12-09 RX ADMIN — DEXAMETHASONE 20 MG: 4 TABLET ORAL at 11:59

## 2020-12-09 RX ADMIN — SPIRONOLACTONE 25 MG: 25 TABLET ORAL at 09:27

## 2020-12-09 RX ADMIN — METOLAZONE 5 MG: 5 TABLET ORAL at 09:25

## 2020-12-09 RX ADMIN — INSULIN LISPRO 2 UNITS: 100 INJECTION, SOLUTION INTRAVENOUS; SUBCUTANEOUS at 12:00

## 2020-12-09 RX ADMIN — ASPIRIN 81 MG: 81 TABLET, CHEWABLE ORAL at 09:26

## 2020-12-09 RX ADMIN — INSULIN LISPRO 2 UNITS: 100 INJECTION, SOLUTION INTRAVENOUS; SUBCUTANEOUS at 06:35

## 2020-12-09 RX ADMIN — FELODIPINE 10 MG: 5 TABLET, EXTENDED RELEASE ORAL at 12:00

## 2020-12-09 RX ADMIN — LISINOPRIL 5 MG: 5 TABLET ORAL at 09:27

## 2020-12-09 RX ADMIN — INSULIN GLARGINE 5 UNITS: 100 INJECTION, SOLUTION SUBCUTANEOUS at 21:56

## 2020-12-09 RX ADMIN — INSULIN LISPRO 8 UNITS: 100 INJECTION, SOLUTION INTRAVENOUS; SUBCUTANEOUS at 16:59

## 2020-12-09 RX ADMIN — PREDNISONE 50 MG: 20 TABLET ORAL at 21:17

## 2020-12-09 RX ADMIN — DIPHENHYDRAMINE HYDROCHLORIDE 50 MG: 25 CAPSULE ORAL at 21:17

## 2020-12-09 RX ADMIN — PREDNISONE 50 MG: 20 TABLET ORAL at 09:26

## 2020-12-09 RX ADMIN — GABAPENTIN 800 MG: 400 CAPSULE ORAL at 22:46

## 2020-12-09 RX ADMIN — CYANOCOBALAMIN TAB 1000 MCG 1000 MCG: 1000 TAB at 09:26

## 2020-12-09 RX ADMIN — Medication 50 MG: at 09:25

## 2020-12-09 RX ADMIN — GABAPENTIN 800 MG: 400 CAPSULE ORAL at 09:27

## 2020-12-09 RX ADMIN — METOPROLOL SUCCINATE 25 MG: 25 TABLET, EXTENDED RELEASE ORAL at 09:27

## 2020-12-09 RX ADMIN — PREDNISONE 50 MG: 20 TABLET ORAL at 15:33

## 2020-12-09 NOTE — PROGRESS NOTES
Cardiology Progress Note        Patient: Sandy Burks        Sex: male          DOA: 12/7/2020  YOB: 1940      Age:  68 y.o.        LOS:  LOS: 2 days   Assessment/Plan     Principal Problem:    Left hemiparesis ([address] (12/8/2020)    Active Problems:    Type 2 diabetes mellitus ([address] (2/12/2020)      Multiple myeloma ([address] (2/16/2020)      Morbid obesity ([address] (2/17/2020)      Hypokalemia (12/7/2020)      Neuropathy ()      Hypomagnesemia (12/7/2020)      Elevated troponin (12/7/2020)      Weakness (12/7/2020)        Plan:  Cardiac telemetry reviewed and stable. Left-sided weakness  Cardiomyopathy  Left bundle branch block  Minimally elevated troponin      patient EF is improved. No evidence of ACS  continue with current supportive treatment. No plan for invasive cardiac testing. Discussed with patient. Continue with current cardiac medications. Subjective:    cc:  Left-sided weakness  Cardiomyopathy  Left bundle branch block  Minimally elevated troponin        REVIEW OF SYSTEMS:     General: No fevers or chills. Cardiovascular: No chest pain or pressure. No palpitations. No ankle swelling  Pulmonary: No SOB, orthopnea, PND  Gastrointestinal: No nausea, vomiting or diarrhea      Objective:      Visit Vitals  /69 (BP 1 Location: Right arm, BP Patient Position: At rest)   Pulse 71   Temp 98.8 °F (37.1 °C)   Resp 18   Ht 5' 9\" (1.753 m)   Wt 116.1 kg (256 lb)   SpO2 99%   BMI 37.80 kg/m²     Body mass index is 37.8 kg/m². Physical Exam:  General Appearance: Comfortable, not using accessory muscles of respiration. NECK: No JVD, no thyroidomeglay. LUNGS: Clear bilaterally. HEART: S1+S2 audible,    ABD: Non-tender, BS Audible    EXT: No edema, and no cysnosis. VASCULAR EXAM: Pulses are intact. PSYCHIATRIC EXAM: Mood is appropriate.     Medication:  Current Facility-Administered Medications   Medication Dose Route Frequency    dexAMETHasone (DECADRON) tablet 20 mg  20 mg Oral Q7D    diatrizoate leeanne-diatrizoat sod (MD-GASTROVIEW,GASTROGRAFIN) 66-10 % contrast solution 30 mL  30 mL Oral ONCE    diphenhydrAMINE (BENADRYL) capsule 50 mg  50 mg Oral ONCE    predniSONE (DELTASONE) tablet 50 mg  50 mg Oral BID    insulin glargine (LANTUS) injection 5 Units  5 Units SubCUTAneous QHS    calcium-vitamin D (OS-NUVIA +D3) 500 mg-200 unit per tablet 1 Tab  1 Tab Oral DAILY    felodipine (PLENDIL SR) 24 hr tablet 10 mg  10 mg Oral DAILY    furosemide (LASIX) tablet 40 mg  40 mg Oral DAILY    lisinopriL (PRINIVIL, ZESTRIL) tablet 5 mg  5 mg Oral DAILY    metOLazone (ZAROXOLYN) tablet 5 mg  5 mg Oral DAILY    spironolactone (ALDACTONE) tablet 25 mg  25 mg Oral DAILY    insulin lispro (HUMALOG) injection   SubCUTAneous AC&HS    glucose chewable tablet 16 g  4 Tab Oral PRN    glucagon (GLUCAGEN) injection 1 mg  1 mg IntraMUSCular PRN    aspirin chewable tablet 81 mg  81 mg Oral DAILY    cyanocobalamin tablet 1,000 mcg  1,000 mcg Oral DAILY    gabapentin (NEURONTIN) capsule 800 mg  800 mg Oral BID    isosorbide mononitrate ER (IMDUR) tablet 30 mg  30 mg Oral DAILY    lenalidomide cap 25 mg (Patient Supplied)  25 mg Oral DAILY    metoprolol succinate (TOPROL-XL) XL tablet 25 mg  25 mg Oral DAILY    pravastatin (PRAVACHOL) tablet 40 mg  40 mg Oral QHS    pyridoxine (vitamin B6) (VITAMIN B-6) tablet 50 mg  50 mg Oral DAILY    valACYclovir (VALTREX) tablet 500 mg  500 mg Oral BID               Lab/Data Reviewed:  Procedures/imaging: see electronic medical records for all procedures/Xrays   and details which were not copied into this note but were reviewed prior to creation of Plan       All lab results for the last 24 hours reviewed.      Recent Labs     12/09/20  0350 12/08/20 0012 12/07/20  0926   WBC 5.3 4.2* 6.0   HGB 10.0* 10.0* 10.7*   HCT 29.1* 29.2* 31.1*    179 183     Recent Labs     12/09/20  0350 12/08/20 0012 12/07/20  1225    139 137   K 3.6 3.1* 3.1*    102 101   CO2 27 29 33*   * 125* 141*   BUN 7 8 10   CREA 0.79 0.75 0.83   CA 8.1* 8.1* 8.9       RADIOLOGY:  CT Results  (Last 48 hours)    None        CXR Results  (Last 48 hours)    None            Cardiology Procedures:   Results for orders placed or performed during the hospital encounter of 12/07/20   EKG, 12 LEAD, INITIAL   Result Value Ref Range    Ventricular Rate 83 BPM    Atrial Rate 83 BPM    P-R Interval 180 ms    QRS Duration 126 ms    Q-T Interval 378 ms    QTC Calculation (Bezet) 444 ms    Calculated P Axis 53 degrees    Calculated R Axis 23 degrees    Calculated T Axis 174 degrees    Diagnosis       Sinus rhythm with ventricular fusion beats  paroxysmal LBBB  T wave abnormality, consider lateral ischemia  Abnormal ECG  When compared with ECG of 16-FEB-2020 06:15,  Significant changes have occurred  Confirmed by Akila Staples MD. ([id number]) on 12/7/2020 2:17:40 PM        Echo Results  (Last 48 hours)    None       Cardiolite (Tc-99m Sestamibi) stress test    Signed By: Neda Arshad MD     December 9, 2020

## 2020-12-09 NOTE — DIABETES MGMT
GLYCEMIC CONTROL PROGRESS NOTE:    - discussed in rounds, known h/o T2DM, HbA1C within recommended range for age + comorbids  - BG out of target range non-ICU: < 180 mg/dL  - TDD = 11 (5 Lantus + 6 units - Humalog Normal Insulin Sensitivity Corrective Coverage)    Recent Glucose Results:   Lab Results   Component Value Date/Time     (H) 12/09/2020 03:50 AM    GLUCPOC 158 (H) 12/09/2020 06:23 AM    GLUCPOC 192 (H) 12/08/2020 09:18 PM    GLUCPOC 153 (H) 12/08/2020 04:26 PM     Evens Langston MS, RN, CDE  Glycemic Control Team  [phone]  Pager [phone] (M-TH 8:00-4:30P)  *After Hours pager [phone]

## 2020-12-09 NOTE — PROGRESS NOTES
Problem: Pressure Injury - Risk of  Goal: *Prevention of pressure injury  Description: Document Vance Scale and appropriate interventions in the flowsheet. Outcome: Progressing Towards Goal  Note: Pressure Injury Interventions: Activity Interventions: Increase time out of bed, Pressure redistribution bed/mattress(bed type), PT/OT evaluation    Mobility Interventions: HOB 30 degrees or less, Pressure redistribution bed/mattress (bed type), PT/OT evaluation    Nutrition Interventions: Document food/fluid/supplement intake, Offer support with meals,snacks and hydration                     Problem: Patient Education: Go to Patient Education Activity  Goal: Patient/Family Education  Outcome: Progressing Towards Goal     Problem: Falls - Risk of  Goal: *Absence of Falls  Description: Document Evangelist Fall Risk and appropriate interventions in the flowsheet.   Outcome: Progressing Towards Goal  Note: Fall Risk Interventions:            Medication Interventions: Bed/chair exit alarm, Patient to call before getting OOB, Teach patient to arise slowly    Elimination Interventions: Bed/chair exit alarm, Call light in reach, Patient to call for help with toileting needs, Toilet paper/wipes in reach, Toileting schedule/hourly rounds              Problem: Patient Education: Go to Patient Education Activity  Goal: Patient/Family Education  Outcome: Progressing Towards Goal     Problem: Patient Education: Go to Patient Education Activity  Goal: Patient/Family Education  Outcome: Progressing Towards Goal     Problem: TIA/CVA Stroke: 0-24 hours  Goal: Off Pathway (Use only if patient is Off Pathway)  Outcome: Progressing Towards Goal  Goal: Activity/Safety  Outcome: Progressing Towards Goal  Goal: Consults, if ordered  Outcome: Progressing Towards Goal  Goal: Diagnostic Test/Procedures  Outcome: Progressing Towards Goal  Goal: Nutrition/Diet  Outcome: Progressing Towards Goal  Goal: Discharge Planning  Outcome: Progressing Towards Goal  Goal: Medications  Outcome: Progressing Towards Goal  Goal: Respiratory  Outcome: Progressing Towards Goal  Goal: Treatments/Interventions/Procedures  Outcome: Progressing Towards Goal  Goal: Minimize risk of bleeding post-thrombolytic infusion  Outcome: Progressing Towards Goal  Goal: Monitor for complications post-thrombolytic infusion  Outcome: Progressing Towards Goal  Goal: Psychosocial  Outcome: Progressing Towards Goal  Goal: *Hemodynamically stable  Outcome: Progressing Towards Goal  Goal: *Neurologically stable  Description: Absence of additional neurological deficits    Outcome: Progressing Towards Goal  Goal: *Verbalizes anxiety and depression are reduced or absent  Outcome: Progressing Towards Goal  Goal: *Absence of Signs of Aspiration on Current Diet  Outcome: Progressing Towards Goal  Goal: *Absence of deep venous thrombosis signs and symptoms(Stroke Metric)  Outcome: Progressing Towards Goal  Goal: *Ability to perform ADLs and demonstrates progressive mobility and function  Outcome: Progressing Towards Goal  Goal: *Stroke education started(Stroke Metric)  Outcome: Progressing Towards Goal  Goal: *Dysphagia screen performed(Stroke Metric)  Outcome: Progressing Towards Goal  Goal: *Rehab consulted(Stroke Metric)  Outcome: Progressing Towards Goal     Problem: TIA/CVA Stroke: Day 2 Until Discharge  Goal: Off Pathway (Use only if patient is Off Pathway)  Outcome: Progressing Towards Goal  Goal: Activity/Safety  Outcome: Progressing Towards Goal  Goal: Diagnostic Test/Procedures  Outcome: Progressing Towards Goal  Goal: Nutrition/Diet  Outcome: Progressing Towards Goal  Goal: Discharge Planning  Outcome: Progressing Towards Goal  Goal: Medications  Outcome: Progressing Towards Goal  Goal: Respiratory  Outcome: Progressing Towards Goal  Goal: Treatments/Interventions/Procedures  Outcome: Progressing Towards Goal  Goal: Psychosocial  Outcome: Progressing Towards Goal  Goal: *Verbalizes anxiety and depression are reduced or absent  Outcome: Progressing Towards Goal  Goal: *Absence of aspiration  Outcome: Progressing Towards Goal  Goal: *Absence of deep venous thrombosis signs and symptoms(Stroke Metric)  Outcome: Progressing Towards Goal  Goal: *Optimal pain control at patient's stated goal  Outcome: Progressing Towards Goal  Goal: *Tolerating diet  Outcome: Progressing Towards Goal  Goal: *Ability to perform ADLs and demonstrates progressive mobility and function  Outcome: Progressing Towards Goal  Goal: *Stroke education continued(Stroke Metric)  Outcome: Progressing Towards Goal     Problem: Ischemic Stroke: Discharge Outcomes  Goal: *Verbalizes anxiety and depression are reduced or absent  Outcome: Progressing Towards Goal  Goal: *Verbalize understanding of risk factor modification(Stroke Metric)  Outcome: Progressing Towards Goal  Goal: *Hemodynamically stable  Outcome: Progressing Towards Goal  Goal: *Absence of aspiration pneumonia  Outcome: Progressing Towards Goal  Goal: *Aware of needed dietary changes  Outcome: Progressing Towards Goal  Goal: *Verbalize understanding of prescribed medications including anti-coagulants, anti-lipid, and/or anti-platelets(Stroke Metric)  Outcome: Progressing Towards Goal  Goal: *Tolerating diet  Outcome: Progressing Towards Goal  Goal: *Aware of follow-up diagnostics related to anticoagulants  Outcome: Progressing Towards Goal  Goal: *Ability to perform ADLs and demonstrates progressive mobility and function  Outcome: Progressing Towards Goal  Goal: *Absence of DVT(Stroke Metric)  Outcome: Progressing Towards Goal  Goal: *Absence of aspiration  Outcome: Progressing Towards Goal  Goal: *Optimal pain control at patient's stated goal  Outcome: Progressing Towards Goal  Goal: *Home safety concerns addressed  Outcome: Progressing Towards Goal  Goal: *Describes available resources and support systems  Outcome: Progressing Towards Goal  Goal: *Verbalizes understanding of activation of [id number]) for stroke symptoms(Stroke Metric)  Outcome: Progressing Towards Goal  Goal: *Understands and describes signs and symptoms to report to providers(Stroke Metric)  Outcome: Progressing Towards Goal  Goal: *Neurolgocially stable (absence of additional neurological deficits)  Outcome: Progressing Towards Goal  Goal: *Verbalizes importance of follow-up with primary care physician(Stroke Metric)  Outcome: Progressing Towards Goal  Goal: *Smoking cessation discussed,if applicable(Stroke Metric)  Outcome: Progressing Towards Goal  Goal: *Depression screening completed(Stroke Metric)  Outcome: Progressing Towards Goal     Problem: Pain  Goal: *Control of Pain  Outcome: Progressing Towards Goal  Goal: *PALLIATIVE CARE:  Alleviation of Pain  Outcome: Progressing Towards Goal     Problem: Patient Education: Go to Patient Education Activity  Goal: Patient/Family Education  Outcome: Progressing Towards Goal     Problem: Patient Education: Go to Patient Education Activity  Goal: Patient/Family Education  Outcome: Progressing Towards Goal     Problem: Diabetes Self-Management  Goal: *Disease process and treatment process  Description: Define diabetes and identify own type of diabetes; list 3 options for treating diabetes. Outcome: Progressing Towards Goal  Goal: *Incorporating nutritional management into lifestyle  Description: Describe effect of type, amount and timing of food on blood glucose; list 3 methods for planning meals. Outcome: Progressing Towards Goal  Goal: *Incorporating physical activity into lifestyle  Description: State effect of exercise on blood glucose levels. Outcome: Progressing Towards Goal  Goal: *Developing strategies to promote health/change behavior  Description: Define the ABC's of diabetes; identify appropriate screenings, schedule and personal plan for screenings.   Outcome: Progressing Towards Goal  Goal: *Using medications safely  Description: State effect of diabetes medications on diabetes; name diabetes medication taking, action and side effects. Outcome: Progressing Towards Goal  Goal: *Monitoring blood glucose, interpreting and using results  Description: Identify recommended blood glucose targets  and personal targets. Outcome: Progressing Towards Goal  Goal: *Prevention, detection, treatment of acute complications  Description: List symptoms of hyper- and hypoglycemia; describe how to treat low blood sugar and actions for lowering  high blood glucose level. Outcome: Progressing Towards Goal  Goal: *Prevention, detection and treatment of chronic complications  Description: Define the natural course of diabetes and describe the relationship of blood glucose levels to long term complications of diabetes.   Outcome: Progressing Towards Goal  Goal: *Developing strategies to address psychosocial issues  Description: Describe feelings about living with diabetes; identify support needed and support network  Outcome: Progressing Towards Goal  Goal: *Insulin pump training  Outcome: Progressing Towards Goal  Goal: *Sick day guidelines  Outcome: Progressing Towards Goal  Goal: *Patient Specific Goal (EDIT GOAL, INSERT TEXT)  Outcome: Progressing Towards Goal     Problem: Patient Education: Go to Patient Education Activity  Goal: Patient/Family Education  Outcome: Progressing Towards Goal     Problem: Patient Education: Go to Patient Education Activity  Goal: Patient/Family Education  Outcome: Progressing Towards Goal     Problem: Patient Education: Go to Patient Education Activity  Goal: Patient/Family Education  Outcome: Progressing Towards Goal

## 2020-12-09 NOTE — PROGRESS NOTES
Bedside shift change report given to Bárbara Landaverde RN (oncoming nurse) by Nehemias Mayfield RN (offgoing nurse). Report included the following information SBAR, Kardex, ED Summary, Intake/Output, MAR and [employer]ion.

## 2020-12-09 NOTE — PROGRESS NOTES
DC Plan:  TBD, noted PT/OT recommending inpatient rehab    Met with patient and his daughter Aleksandr Blum at bedside; discussed PT/OT recommendations of inpatient rehab - per patient, he will be having an MRI to evaluate spinal stenosis; pending further testing and resulting treatment plan, care manager to follow for discharge needs.

## 2020-12-09 NOTE — PROGRESS NOTES
NEUROLOGY PROGRESS NOTE        Patient: Mary Aldana        Sex: male          DOA: 12/7/2020  YOB: 1940      Age:  68 y.o.         LOS: 2 days     Identification:  [id number] male presents with left-sided weakness        SUBJECTIVE:   He continues to have weakness in left arm and leg. MRI of C spine revealed Multilevel degenerative disc disease and facet arthropathy with associated minimal C3-C4, moderately severe C4-C5, and moderate C5-C6 cervical cord impingement. There is cervical cord abutment without deformation at C2-C3 and C6-C7 levels. This could account for his upper extremity weakness/numbness. REVIEW OF SYSTEMS: Left-sided weakness    OBJECTIVE:      Visit Vitals  BP (!) 155/59 (BP 1 Location: Right arm, BP Patient Position: At rest)   Pulse 69   Temp 98.4 °F (36.9 °C)   Resp 18   Ht 5' 9\" (1.753 m)   Wt 116.1 kg (256 lb)   SpO2 100%   BMI 37.80 kg/m²     Physical Exam:  GENERAL: Pleasant, in no apparent distress. HEENT: Moist mucous membranes, sclerae anicteric, scalp is atraumatic. CVS: Regular rate and rhythm, no murmurs or gallops. No carotid bruits. PULMONARY: Clear to auscultation bilaterally. No rales or rhonchi. No wheezing. EXTREMITIES: Normal range of motion at all sites. No deformities. ABDOMEN: Soft, nontender. SKIN: No rashes or ecchymoses. Warm and dry. NEUROLOGIC: Alert and oriented x3. Speech is fluent without any aphasia or dysarthria. Cranial nerves: Face is symmetric with symmetric smile. Facial sensation is intact. Extraocular movements are intact with no nystagmus. Visual fields are full to confrontation. PERRL. Tongue is midline. Palate elevates symmetrically. Hearing intact to speech. Sternocleidomastoid and trapezius strengths are full bilaterally. Motor: Mild handgrip weakness in left upper limb, 4+/5 left lower limb, 5/5 right upper and lower limb. Sensory: Intact to pinprick and touch on all four.  Normal vibratory sensation on toes bilaterally. Coordination: Intact coordination with finger-nose-finger bilaterally. Normal fine movements. No bradykinesia detected. Deep tendon reflexes: 2+ at biceps, brachioradialis, patella and ankles bilaterally. Toes are down-going bilaterally. Gait assessment: Deferred.     Current Facility-Administered Medications   Medication Dose Route Frequency    predniSONE (DELTASONE) tablet 50 mg  50 mg Oral TID WITH MEALS    diphenhydrAMINE (BENADRYL) capsule 50 mg  50 mg Oral ONCE    zoledronic acid (ZOMETA) 4 mg/100mL infusion  4 mg IntraVENous ONCE    insulin glargine (LANTUS) injection 5 Units  5 Units SubCUTAneous QHS    calcium-vitamin D (OS-NUVIA +D3) 500 mg-200 unit per tablet 1 Tab  1 Tab Oral DAILY    felodipine (PLENDIL SR) 24 hr tablet 10 mg  10 mg Oral DAILY    furosemide (LASIX) tablet 40 mg  40 mg Oral DAILY    lisinopriL (PRINIVIL, ZESTRIL) tablet 5 mg  5 mg Oral DAILY    metOLazone (ZAROXOLYN) tablet 5 mg  5 mg Oral DAILY    spironolactone (ALDACTONE) tablet 25 mg  25 mg Oral DAILY    insulin lispro (HUMALOG) injection   SubCUTAneous AC&HS    glucose chewable tablet 16 g  4 Tab Oral PRN    glucagon (GLUCAGEN) injection 1 mg  1 mg IntraMUSCular PRN    aspirin chewable tablet 81 mg  81 mg Oral DAILY    cyanocobalamin tablet 1,000 mcg  1,000 mcg Oral DAILY    gabapentin (NEURONTIN) capsule 800 mg  800 mg Oral BID    isosorbide mononitrate ER (IMDUR) tablet 30 mg  30 mg Oral DAILY    lenalidomide cap 25 mg (Patient Supplied)  25 mg Oral DAILY    metoprolol succinate (TOPROL-XL) XL tablet 25 mg  25 mg Oral DAILY    pravastatin (PRAVACHOL) tablet 40 mg  40 mg Oral QHS    pyridoxine (vitamin B6) (VITAMIN B-6) tablet 50 mg  50 mg Oral DAILY    valACYclovir (VALTREX) tablet 500 mg  500 mg Oral BID       Laboratory  Recent Results (from the past 24 hour(s))   GLUCOSE, POC    Collection Time: 12/08/20 11:25 AM   Result Value Ref Range    Glucose (POC) 193 (H) 70 - 110 mg/dL   GLUCOSE, POC Collection Time: 12/08/20  4:26 PM   Result Value Ref Range    Glucose (POC) 153 (H) 70 - 110 mg/dL   GLUCOSE, POC    Collection Time: 12/08/20  9:18 PM   Result Value Ref Range    Glucose (POC) 192 (H) 70 - 110 mg/dL   CBC WITH AUTOMATED DIFF    Collection Time: 12/09/20  3:50 AM   Result Value Ref Range    WBC 5.3 4.6 - 13.2 K/uL    RBC 3.44 (L) 4.70 - 5.50 M/uL    HGB 10.0 (L) 13.0 - 16.0 g/dL    HCT 29.1 (L) 36.0 - 48.0 %    MCV 84.6 74.0 - 97.0 FL    MCH 29.1 24.0 - 34.0 PG    MCHC 34.4 31.0 - 37.0 g/dL    RDW 15.6 (H) 11.6 - 14.5 %    PLATELET 895 045 - 041 K/uL    MPV 10.4 9.2 - 11.8 FL    NEUTROPHILS 48 40 - 73 %    LYMPHOCYTES 29 21 - 52 %    MONOCYTES 18 (H) 3 - 10 %    EOSINOPHILS 5 0 - 5 %    BASOPHILS 0 0 - 2 %    ABS. NEUTROPHILS 2.5 1.8 - 8.0 K/UL    ABS. LYMPHOCYTES 1.5 0.9 - 3.6 K/UL    ABS. MONOCYTES 1.0 0.05 - 1.2 K/UL    ABS. EOSINOPHILS 0.3 0.0 - 0.4 K/UL    ABS. BASOPHILS 0.0 0.0 - 0.1 K/UL    DF AUTOMATED     MAGNESIUM    Collection Time: 12/09/20  3:50 AM   Result Value Ref Range    Magnesium 1.9 1.6 - 2.6 mg/dL   METABOLIC PANEL, BASIC    Collection Time: 12/09/20  3:50 AM   Result Value Ref Range    Sodium 137 136 - 145 mmol/L    Potassium 3.6 3.5 - 5.5 mmol/L    Chloride 104 100 - 111 mmol/L    CO2 27 21 - 32 mmol/L    Anion gap 6 3.0 - 18 mmol/L    Glucose 145 (H) 74 - 99 mg/dL    BUN 7 7.0 - 18 MG/DL    Creatinine 0.79 0.6 - 1.3 MG/DL    BUN/Creatinine ratio 9 (L) 12 - 20      GFR est AA >60 >60 ml/min/1.73m2    GFR est non-AA >60 >60 ml/min/1.73m2    Calcium 8.1 (L) 8.5 - 10.1 MG/DL   GLUCOSE, POC    Collection Time: 12/09/20  6:23 AM   Result Value Ref Range    Glucose (POC) 158 (H) 70 - 110 mg/dL       Radiology:  Xr Shoulder Lt Ap/lat Min 2 V    Result Date: 12/7/2020  EXAM:  XR SHOULDER LT AP/LAT MIN 2 V INDICATION: Left shoulder pain COMPARISON: None. FINDINGS: Two views of the left shoulder demonstrate no acute finding.  A few scattered and confluent lucencies are seen in the scapula, proximal humerus and clavicle. Impression: No acute findings or significant degenerative change appreciated. Scattered small bone lucencies noted compatible with history of multiple myeloma. Mri Brain Wo Cont    Result Date: 12/8/2020  EXAM: MRI brain without gadolinium CLINICAL INDICATION/HISTORY: Extremity weakness, possible CVA   > Additional: None. COMPARISON: 2/16/2020   > Reference Exam: CT head 12/7/2020 TECHNIQUE: Sagittal T1, axial T1, T2, FLAIR, T2 gradient, diffusion and coronal T1 and T2 sequences obtained of the brain. _______________ FINDINGS: Diffusion:  There are no areas of restricted diffusion, no evidence of an acute infarction. Brain parenchyma:  Stable very mild periventricular white matter and central pontine increased T2 and FLAIR signal. No new cortical or white matter signal abnormality with no evidence of mass hemorrhage edema or mass effect. Ventricles and sulci:  Mild diffuse central and cortical volume loss. Extra-axial:  No extra-axial fluid collection or mass is noted. Brain vasculature:  No vascular abnormality is appreciated on this routine brain MR examination. Craniocervical junction:  Cervical medullary junction unremarkable. Interval increased development of likely degenerative Schmorl's node endplate defect inferior C2 vertebral body. Suggestion of possible worsening of ligamentum flavum thickening C2 and C3 with some questionable cord flattening and canal stenosis, limited evaluation on this study. Skull base, extracranial and calvarium:  Previous bilateral lens implants with stable bilateral proptosis with no retrobulbar abnormality. Sinuses and IACs unremarkable. Minimal increased T2 signal inferior right mastoid. Sella unremarkable. Multiple small low T1 and increased T2 signal foci throughout the intradiploic space of the skull correlating to small lytic lesions on CT. _______________     IMPRESSION: 1.  No evidence of acute infarct or other acute intracranial finding. 2. Stable very mild white matter signal changes nonspecific probable chronic microvascular ischemic disease. 3. Questionable interval increased acquired canal stenosis and cord flattening C2 and C3 levels limited evaluation on this study. If there is clinical concern for cervical myelopathy, further evaluation could be performed with MRI dedicated to cervical spine. 4. Multiple small T2 hyperintense skull lesions concerning for metastatic disease or multiple myeloma. Mri Cerv Spine Wo Cont    Result Date: 12/8/2020  EXAM: MRI OF THE CERVICAL SPINE, WITHOUT IV CONTRAST CLINICAL INDICATION/HISTORY: Left extremity weakness, hemiparesis COMPARISON: Brain MRI 12/7/2020, lumbar spine MRI 12/8/2020 TECHNIQUE: T1 weighted sagittal and axial, STIR and T2 FSE sagittal, T2 FSE axial. _______________ FINDINGS: OSSEOUS, CERVICAL ALIGNMENT, CRANIOCERVICAL JUNCTION: There is gradual reversal of the normal cervical lordosis, apex at C3-C4. No listhesis. Extensive abnormal marrow signal intensity throughout all the visualized cervical spine and upper thoracic vertebral levels. There is also heterogeneous signal abnormality throughout visualized skull base. No clearly acute pathologic fracture. Striated edema is seen throughout paraspinal musculature extending between C2 and C5 posterior spinous processes. Moderate degenerative osteoarthropathy of the atlantoaxial articulation. CERVICAL CORD, VISUALIZED POSTERIOR FOSSA: Visualized posterior fossa is unremarkable. No Chiari I malformation. Cord morphology and signal intensity are unremarkable throughout. PARASPINOUS SOFT TISSUES, VISUALIZED SOFT TISSUE NECK: Visualized soft tissues are unremarkable. C2-C3: Mild broad-based disc bulge effaces ventral thecal sac CSF and abuts but does not deform the adjacent ventral cervical cord. Mild right and moderate left facet arthropathy.  Moderate overall canal stenosis, largest AP dimension of the thecal sac measuring 7 mm. Mild bilateral foraminal stenoses. C3-C4: Mild broad-based disc bulge effaces ventral thecal sac CSF and causes minimal concave deformation of the ventral cervical cord, eccentric to the left. Moderate bilateral facet arthropathy. Left greater than right uncovertebral proliferative changes with moderately severe left and moderate right foraminal stenoses. C4-C5: Moderate broad-based disc osteophyte complex, eccentric to the right with moderately severe concave deformation of the ventral cervical cord, most prominent along right lateral aspect. No cervical cord signal abnormality. Moderate bilateral facet arthropathy. Prominent uncovertebral proliferative changes with severe right and left foraminal stenoses. C5-C6: Moderate broad-based disc osteophyte complex effaces ventral thecal sac CSF and causes moderate concave deformation of the ventral cervical cord. Moderate bilateral facet arthropathy. Prominent uncovertebral proliferative changes with severe left and right foraminal stenoses. C6-C7: Moderate broad-based disc bulge, eccentric to the right with abutment of ventral right paracentral cervical cord but no definite cord deformation. Moderate bilateral facet arthropathy. Prominent uncovertebral proliferative changes with severe left and right foraminal stenoses. C7-T1: No significant disc pathology. Mild right and severe left facet arthropathy. Moderately severe left foraminal stenosis. Canal and right foramen remain adequate. _______________     IMPRESSION: 1. Multilevel degenerative disc disease and facet arthropathy with associated minimal C3-C4, moderately severe C4-C5, and moderate C5-C6 cervical cord impingement. There is cervical cord abutment without deformation at C2-C3 and C6-C7 levels. 2. Facet arthropathy and uncovertebral proliferative changes cause several high-grade foraminal stenoses, as delineated level by level.  3. Diffusely abnormal marrow signal intensity throughout cervical and upper thoracic spine, osseous metastatic disease versus multiple myeloma. Mri Lumb Spine Wo Cont    Result Date: 12/8/2020  EXAM: MRI OF THE LUMBAR SPINE, WITHOUT IV CONTRAST CLINICAL INDICATION/HISTORY: Left lower extremity weakness, hemiparesis COMPARISON: 12/13/2019 TECHNIQUE: T1 weighted sagittal and axial, STIR and T2 FSE sagittal, T2 FSE axial. _______________ FINDINGS: OSSEOUS, LUMBAR ALIGNMENT: Hypointense T1 and T2 signal intensity throughout the L1 vertebral body related to previous vertebral augmentation. Moderate L1 vertebral body height loss. Diffusely heterogeneous marrow signal intensity is seen throughout all of the lower thoracic and lumbar spine vertebra as well as throughout visualized portions of the osseous pelvis. Most of the tiny small foci of signal abnormality measure 2-6 mm in size. Signal abnormality also extends throughout middle and posterior column, to include several slightly larger regions of osseous metastatic disease involving the posterior spinous processes and transverse processes. No clearly acute pathologic fracture. Moderate T11 wedge compression deformity with asymmetric anterior vertebral body height loss, unchanged compared to previous MRI. There are mild degenerative changes of the left and right sacroiliac joints. PARASPINAL AND RETROPERITONEAL SOFT TISSUES: Simple appearing subcentimeter right renal cysts. No acute abnormality throughout the visualized retroperitoneum. OTHER: Conus medullaris ends at the L2 vertebral body level. Correlation of axial and sagittal images reveals the following: T11-T12: Visualized only on sagittal sequences. Moderate broad-based disc bulge and endplate spondylosis efface ventral thecal sac CSF. There is abutment but no definite depression of the adjacent thoracic cord. Moderate overall canal stenosis, largest AP dimension of the thecal sac measuring just over 6 mm. Moderate bilateral facet arthropathy. Moderate bilateral foraminal stenoses. T12-L1: Minimal broad-based disc bulge and subtle unchanged retropulsion of the L1 posterior wall flattened ventral thecal sac. Moderate bilateral facet arthropathy and ligamentum flavum buckling. Moderate canal stenosis. Mild right and left foraminal stenoses. L1-L2: Minimal broad-based disc bulge flattens ventral thecal sac. Moderate bilateral facet arthropathy and ligamentum flavum buckling. Canal remains adequate. Mild left and right foraminal stenoses. L2-L3: Moderate broad-based disc bulge flattens ventral thecal sac. Moderately severe bilateral facet arthropathy and ligamentum flavum buckling. Moderate overall canal stenosis, largest AP dimension of the thecal sac measuring just under 6 mm. No high-grade lateral recess effacement. Mild right and moderate left foraminal stenoses. L3-L4: Moderate broad-based disc bulge, slightly eccentric to the right, with partial effacement of left and right lateral recesses. Moderately severe bilateral facet arthropathy and ligamentum flavum buckling. Moderate overall canal stenosis, largest AP dimension of the thecal sac measuring just under 7 mm. Mild right and moderate left foraminal stenoses. L4-L5: Mild broad-based disc bulge flattens ventral thecal sac. Moderately severe bilateral facet arthropathy and ligamentum flavum buckling. Mild to moderate overall canal stenosis, largest AP dimension of the thecal sac measuring just over 7 mm. Mild bilateral foraminal stenoses. L5-S1: Minimal broad-based disc bulge abuts ventral thecal sac. Severe bilateral facet arthropathy. Canal remains adequate. Mild right and moderate left foraminal stenoses, neither with associated foraminal nerve root impingement. _______________     IMPRESSION: 1. Numerous osseous lesions throughout visualized lower thoracic spine, lumbar spine, and osseous pelvis. Indeterminant to what extent these findings represent diffuse osseous metastatic disease versus multiple myeloma.  No evident acute pathologic fracture. Unchanged chronic T11 and L1 wedge compression deformities. Previous L1 vertebral augmentation. 2. Multilevel degenerative disc disease and facet arthrosis with mild to moderate canal and stenoses. There is abutment of the ventral distal thoracic cord at T11-T12. No high-grade stenosis or definite nerve root impingement elsewhere. Ct Head Wo Cont    Result Date: 12/7/2020  EXAM: CT head HISTORY: -Provided with order: left sided weakness, stroke? -Additional: None COMPARISON: 02/16/20 TECHNIQUE: Axial CT imaging of the head was performed without intravenous contrast. Sagittal and coronal reconstructions were created from the axial data. One or more dose reduction techniques were used on this CT: automated exposure control, adjustment of the mAs and/or kVp according to patient size, and iterative reconstruction techniques. The specific techniques used on this CT exam have been documented in the patient's electronic medical record. Digital Imaging and Communications in Medicine (DICOM) format image data are available to nonaffiliated external healthcare facilities or entities on a secure, media free, reciprocally searchable basis with patient authorization for at least a 12-month period after this study. _______________ FINDINGS: BRAIN, POSTERIOR FOSSA, EXTRA-AXIAL SPACES AND MENINGES:  The sulci, folia, ventricles and basal cisterns are   within normal limits for the patient's age. There are no areas of abnormal parenchymal attenuation. There is no intracranial hemorrhage, mass effect, or midline shift. There are no abnormal extra-axial fluid collections. CALVARIUM: Progressive lucent lesions in the calvarium. SINUSES/MASTOIDS: Slight left maxillary sinus mucosal thickening. OTHER: None. _______________     IMPRESSION: 1. No acute intracranial abnormalities are identified. 2. Progressive lucent/lytic lesions in the calvarium, which can be seen in the setting of metastatic disease or myeloma.  Please note that head CT may be negative in the acute setting and MRI could best further evaluate for acute/subacute ischemia/infarct in the high/persistent index of clinical suspicion. Xr Chest Port    Result Date: 12/7/2020  HISTORY: -Provided with order: left shoulder pain -Additional: None Technique : AP PORTABLE CHEST Comparison : 02/16/20 FINDINGS: HEART AND MEDIASTINUM: Right IJ central venous catheter tip projects over expected SVC. External monitoring wires leads and pads partially obscures visibility. LUNGS AND PLEURAL SPACES: No consolidation, congestive heart failure, pleural effusion or pneumothorax. BONY THORAX AND SOFT TISSUES: No acute osseous abnormality. Impression: No plain film evidence of acute cardiopulmonary disease, allowing for technique. ASSESSMENT/IMPRESSION:   82-year-old male with past medical history of multiple myeloma, chronic left shoulder pain presents with progressively worsening weakness in left upper and lower limb. 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   RECOMMENDATIONS:  1. Neurosurgery or orthopedic consult for further evaluation and treatment. .        Neurology will sign off. Please do not hesitate to call for questions. Signed:   Martinez Herring MD  12/9/2020  10:20 AM

## 2020-12-09 NOTE — ROUTINE PROCESS
Assume care of patient from Shahnaz Torres RN. Patient received in bed awake. Patient A&Ox4, denies pain and discomfort. No distress noted. Bed locked in low position. Call bell within reach and patient verbalized understanding of use for assistance and needs. 1310- Bedside and Verbal shift change report given to Shahnaz Torres RN (oncoming nurse) by Neeraj Gomez RN (offgoing nurse). Report included the following information SBAR, Kardex, Intake/Output, MAR and Recent Results. 3 [place] [facility] Night Shift Chart Audit    Chart Audit completed?  YES

## 2020-12-09 NOTE — PROGRESS NOTES
Problem: Self Care Deficits Care Plan (Adult)  Goal: *Acute Goals and Plan of Care (Insert Text)  Description: Initial OT STGs (12/8/2020) Within 7 days:    1. Patient will perform toilet transfer with CGA/Cole in preparation for bowel and bladder management. 2. Patient will perform bowel and bladder management with setup/Cole for increased independence with ADLs. 3. Patient will perform UB dressing with setup while utilizing jori-techniques for increased independence with ADLs. 4. Patient will perform LB dressing with minimal assist while utilizing jori-techniques for increased independence with ADLs. 5. Patient will perform UE exercises with setup assist for 15 minutes to increase independence with ADLs. 6. Patient will utilize energy conservation techniques with 1 verbal cue(s) for increased independence with ADLs. PLOF: Pt was unsteady d/t peripheral neuropathy; began using SW with HHPT; otherwise mod I for ADLs; ascends stairs 1x/wk for bathing in Tub shower w/ Tub Bench  Outcome: Progressing Towards Goal     OCCUPATIONAL THERAPY TREATMENT    Patient: Becca Hollis (89 y.o. male)  Date: 12/9/2020  Diagnosis: Hypokalemia [E87.6]   Left hemiparesis (HCC)       Precautions: Aspiration  PLOF: Pt was unsteady d/t peripheral neuropathy; began using SW with HHPT; otherwise mod I for ADLs; ascends stairs 1x/wk for bathing in Tub shower w/ Tub Bench    Chart, occupational therapy assessment, plan of care, and goals were reviewed. ASSESSMENT:  Patient highly motivated. Pt's meal came and pt agreeable to sitting EOB for meal. Pt reports he transferred to [facility] w/ nursing staff for BM. Pt able to use bedrail and w/ increased concentration, pt able to place LUE in  handle of bedrail to assist self. Pt w/ increased difficulty with scooting to EOB. Pt able to complete meal and requesting to return back to bed. Pt mod/maxA to motor plan to EOB. Pt also w/ increased slurred speech.  Discussed w/ daughter pt's current status and daughter confirms that his Multiple Myeloma regiment often causes slurred speech and fatigue/weakness. Pt requiring increased time to stand and assist w/ wt/shift to R and assist w/ LLE mvmt. Pt able to sit EOB for recovery of breathing. Pt assisted back to bed and able to perform supine scoot up in bed w/ Cole for RLE hand placement on headboard. Pt requesting toileting assist w/ urinal. Pt left w/ all needs met and daughter at bedside. Progression toward goals:  []          Improving appropriately and progressing toward goals  [x]          Improving slowly and progressing toward goals  []          Not making progress toward goals and plan of care will be adjusted     PLAN:  Patient continues to benefit from skilled intervention to address the above impairments. Continue treatment per established plan of care. Discharge Recommendations:  Inpatient Rehab  Further Equipment Recommendations for Discharge: To Be Determined (TBD) at next level of care      SUBJECTIVE:   Patient stated I got like this at home when I did this regiment.  (re: slurred speech and weakness with MM tx)    OBJECTIVE DATA SUMMARY:   Cognitive/Behavioral Status:  Neurologic State: Alert  Orientation Level: Oriented X4  Cognition: Follows commands  Safety/Judgement: Awareness of environment, Good awareness of safety precautions    Functional Mobility and Transfers for ADLs:   Bed Mobility:  Supine to Sit: Minimum assistance  Sit to Supine: Moderate assistance   Transfers:  Sit to Stand: Moderate assistance  Bed to Chair: Moderate assistance    Balance:  Sitting: Intact; High guard  Standing: Impaired; With support    ADL Intervention:  Feeding  Container Management: Contact guard assistance  Food to Mouth: Contact guard assistance  Drink to Mouth: Contact guard assistance    Grooming  Position Performed: Seated edge of bed  Washing Hands: Set-up(hand wipes)    Toileting  Bladder Hygiene: Maximum assistance(long sitting; use of urinal)  Bowel Hygiene: Maximum assistance; Total assistance (dependent)    Cognitive Retraining  Problem Solving: Inductive reason; Identifying the task; Identifying the problem;General alternative solution;Deductive reason; Awareness of environment  Executive Functions: Executing cognitive plans;Managing time  Organizing/Sequencing: Breaking task down;Prioritizing    Pain:  Pain level pre-treatment: 0/10   Pain level post-treatment: 0/10  Pain Intervention(s): Medication administered by RN (see MAR); Rest, Ice, Repositioning   Response to intervention: Nurse notified, See doc flow sheet    Please refer to the flowsheet for vital signs taken during this treatment. After treatment:   []  Patient left in no apparent distress sitting up in chair  [x]  Patient left in no apparent distress in bed  [x]  Call bell left within reach  [x]  Nursing notified/Lacey  [x]  Caregiver present/daughter  []  Bed alarm activated    COMMUNICATION/EDUCATION:   [x] Role of Occupational Therapy in the acute care setting  [x] Home safety education was provided and the patient/caregiver indicated understanding. [x] Patient/family have participated as able in working towards goals and plan of care. [x] Patient/family agree to work toward stated goals and plan of care. [] Patient understands intent and goals of therapy, but is neutral about his/her participation. [] Patient is unable to participate in goal setting and plan of care.       Thank you for this referral.  Vicki Melchor, OTR/L, CSRS, CDCS, CFPS  Time Calculation: 42 mins

## 2020-12-09 NOTE — PROGRESS NOTES
Phone: [phone]  Paging : [phone]      Hematology / Oncology Progress Note    Admit Date: 12/7/2020    Assessment:     · Hem/Onc Issues:  MM, unclear type, diagnosed 2 weeks ago and was started on revlimid/dex based chemo in [facility]   · Reasons for Admission: Weakness, left worse than right, both lower and upper extremities  · Other Active Issues:    · MRI of C spine revealed Multilevel degenerative disc disease and facet arthropathy with associated minimal C3-C4, moderately severe C4-C5, and moderate C5-C6 cervical cord impingement. There is cervical cord abutment without deformation at C2-C3 and C6-C7 levels. This could account for his upper extremity weakness/numbness. · MRI of L spine also showed numerous osseous lesions throughout visualized lower thoracic spine, lumbar spine, and osseous pelvis. Indeterminant to what extent these findings represent diffuse osseous metastatic disease versus multiple myeloma. No evident acute pathologic fracture. Unchanged chronic T11 and L1 wedge compression deformities. Previous L1 vertebral augmentation. Multilevel degenerative disc disease and facet arthrosis with mild to moderate canal and stenoses. There is abutment of the ventral distal thoracic cord at T11-T12. No high-grade stenosis or definite nerve root impingement elsewhere. Principal Problem:    Left hemiparesis ([address] (12/8/2020)    Active Problems:    Type 2 diabetes mellitus ([address] (2/12/2020)      Multiple myeloma ([address] (2/16/2020)      Morbid obesity ([address] (2/17/2020)      Hypokalemia (12/7/2020)      Neuropathy ()      Hypomagnesemia (12/7/2020)      Elevated troponin (12/7/2020)      Weakness (12/7/2020)        Plan:     · His weakness/numbness of extremities, left side worse, could be accounted for by the spine MRI findings above, particularly the cord abutment without deformation at C2-C3 and C6-C7 levels and abutment of the ventral distal thoracic cord at T11-T12.  These are more from arthrosis rather than from neoplastic process. May benefit from Neuro surgery/spine surgery team evaluation to see what kind of intervention can be offered. · Oncologically, his SPEP is still pending, but judging from his protein level and unremarkable immunoglobulin level, I doubt he has hyperviscosity syndrome. Serum viscosity level is still pending. · Given that he has bone lesions that are not clear if they metastatic disease vs multiple myeloma, I will get CT of CAP and PSA to rule out other metastatic disease. · He will need zometa, I will order  · We will follow      Subjectives:     Left sided leg and arm weakness and numbness    Objectives:      VITAL SIGNS:   Patient Vitals for the past 24 hrs:   BP Temp Pulse Resp SpO2 Weight   12/09/20 0319 (!) 148/66 98.3 °F (36.8 °C) 70 18 98 % 116.1 kg (256 lb)   12/08/20 2311 (!) 150/66 99 °F (37.2 °C) 69 18 98 % --   12/08/20 2119 (!) 132/54 98.9 °F (37.2 °C) 72 18 100 % --   12/08/20 1622 (!) 128/55 99 °F (37.2 °C) 69 18 100 % --   12/08/20 1121 133/69 98.4 °F (36.9 °C) 82 18 98 % --     GENERAL: normal appearance, no acute distress. HEENT: conjunctivae normal, eyelids normal, PERRLA, anicteric, external ears and nose normal, hearing grossly normal.   NECK: supple, no masses. LUNG: breathing comfortably, lungs clear to auscultation and percussion. CARDIOVASCULAR: normal heart sounds, heart rhythm regular, no peripheral edema. ABDOMEN: soft. bowel sounds normal, non-tender non distension, no hepatosplenomegaly   PELVIS: pelvic exam deferred. RECTUM: rectal exam deferred. LYMPH NODES: no cervical adenopathy, no axillary adenopathy, no supraclavicular adenopathy, no infraclavicular adenopathy. SKIN: no rash, no petechiae, no ecchymosis, no pallor, no cutaneous nodules. MUSCULOSKELETAL: normal muscle strength. NEUROLOGIC: no focal motor deficit, normal gait, no abnormal mental status. I did not appreciate much weakness.   PSYCHIATRIC: oriented to person, time and place, mood and affect appropriate. Medications:      Current Medications:    Current Facility-Administered Medications   Medication Dose Route Frequency    insulin glargine (LANTUS) injection 5 Units  5 Units SubCUTAneous QHS    calcium-vitamin D (OS-NUVIA +D3) 500 mg-200 unit per tablet 1 Tab  1 Tab Oral DAILY    felodipine (PLENDIL SR) 24 hr tablet 10 mg  10 mg Oral DAILY    furosemide (LASIX) tablet 40 mg  40 mg Oral DAILY    lisinopriL (PRINIVIL, ZESTRIL) tablet 5 mg  5 mg Oral DAILY    metOLazone (ZAROXOLYN) tablet 5 mg  5 mg Oral DAILY    spironolactone (ALDACTONE) tablet 25 mg  25 mg Oral DAILY    insulin lispro (HUMALOG) injection   SubCUTAneous AC&HS    glucose chewable tablet 16 g  4 Tab Oral PRN    glucagon (GLUCAGEN) injection 1 mg  1 mg IntraMUSCular PRN    aspirin chewable tablet 81 mg  81 mg Oral DAILY    cyanocobalamin tablet 1,000 mcg  1,000 mcg Oral DAILY    gabapentin (NEURONTIN) capsule 800 mg  800 mg Oral BID    isosorbide mononitrate ER (IMDUR) tablet 30 mg  30 mg Oral DAILY    lenalidomide cap 25 mg (Patient Supplied)  25 mg Oral DAILY    metoprolol succinate (TOPROL-XL) XL tablet 25 mg  25 mg Oral DAILY    pravastatin (PRAVACHOL) tablet 40 mg  40 mg Oral QHS    pyridoxine (vitamin B6) (VITAMIN B-6) tablet 50 mg  50 mg Oral DAILY    valACYclovir (VALTREX) tablet 500 mg  500 mg Oral BID       Allergies: Allergies   Allergen Reactions    Iodine Hives         Ancillary Study Results:      Laboratory:    Recent Results (from the past 24 hour(s))   IMMUNOGLOBULINS, G/A/M, QT.     Collection Time: 12/08/20  9:45 AM   Result Value Ref Range    Immunoglobulin G 1,130 700 - 1,600 mg/dL    Immunoglobulin A 74 70 - 400 mg/dL    Immunoglobulin M <21 (L) 40 - 230 mg/dL   BETA-2 MICROGLOBULIN    Collection Time: 12/08/20  9:45 AM   Result Value Ref Range    Beta-2 Microglobulin, serum 2.0 0.6 - 2.4 mg/L   GLUCOSE, POC    Collection Time: 12/08/20 11:25 AM Result Value Ref Range    Glucose (POC) 193 (H) 70 - 110 mg/dL   GLUCOSE, POC    Collection Time: 12/08/20  4:26 PM   Result Value Ref Range    Glucose (POC) 153 (H) 70 - 110 mg/dL   GLUCOSE, POC    Collection Time: 12/08/20  9:18 PM   Result Value Ref Range    Glucose (POC) 192 (H) 70 - 110 mg/dL   CBC WITH AUTOMATED DIFF    Collection Time: 12/09/20  3:50 AM   Result Value Ref Range    WBC 5.3 4.6 - 13.2 K/uL    RBC 3.44 (L) 4.70 - 5.50 M/uL    HGB 10.0 (L) 13.0 - 16.0 g/dL    HCT 29.1 (L) 36.0 - 48.0 %    MCV 84.6 74.0 - 97.0 FL    MCH 29.1 24.0 - 34.0 PG    MCHC 34.4 31.0 - 37.0 g/dL    RDW 15.6 (H) 11.6 - 14.5 %    PLATELET 510 017 - 147 K/uL    MPV 10.4 9.2 - 11.8 FL    NEUTROPHILS 48 40 - 73 %    LYMPHOCYTES 29 21 - 52 %    MONOCYTES 18 (H) 3 - 10 %    EOSINOPHILS 5 0 - 5 %    BASOPHILS 0 0 - 2 %    ABS. NEUTROPHILS 2.5 1.8 - 8.0 K/UL    ABS. LYMPHOCYTES 1.5 0.9 - 3.6 K/UL    ABS. MONOCYTES 1.0 0.05 - 1.2 K/UL    ABS. EOSINOPHILS 0.3 0.0 - 0.4 K/UL    ABS. BASOPHILS 0.0 0.0 - 0.1 K/UL    DF AUTOMATED     MAGNESIUM    Collection Time: 12/09/20  3:50 AM   Result Value Ref Range    Magnesium 1.9 1.6 - 2.6 mg/dL   METABOLIC PANEL, BASIC    Collection Time: 12/09/20  3:50 AM   Result Value Ref Range    Sodium 137 136 - 145 mmol/L    Potassium 3.6 3.5 - 5.5 mmol/L    Chloride 104 100 - 111 mmol/L    CO2 27 21 - 32 mmol/L    Anion gap 6 3.0 - 18 mmol/L    Glucose 145 (H) 74 - 99 mg/dL    BUN 7 7.0 - 18 MG/DL    Creatinine 0.79 0.6 - 1.3 MG/DL    BUN/Creatinine ratio 9 (L) 12 - 20      GFR est AA >60 >60 ml/min/1.73m2    GFR est non-AA >60 >60 ml/min/1.73m2    Calcium 8.1 (L) 8.5 - 10.1 MG/DL   GLUCOSE, POC    Collection Time: 12/09/20  6:23 AM   Result Value Ref Range    Glucose (POC) 158 (H) 70 - 110 mg/dL         Radiology:   Xr Shoulder Lt Ap/lat Min 2 V    Result Date: 12/7/2020  EXAM:  XR SHOULDER LT AP/LAT MIN 2 V INDICATION: Left shoulder pain COMPARISON: None.  FINDINGS: Two views of the left shoulder demonstrate no acute finding. A few scattered and confluent lucencies are seen in the scapula, proximal humerus and clavicle. Impression: No acute findings or significant degenerative change appreciated. Scattered small bone lucencies noted compatible with history of multiple myeloma. Mri Brain Wo Cont    Result Date: 12/8/2020  EXAM: MRI brain without gadolinium CLINICAL INDICATION/HISTORY: Extremity weakness, possible CVA   > Additional: None. COMPARISON: 2/16/2020   > Reference Exam: CT head 12/7/2020 TECHNIQUE: Sagittal T1, axial T1, T2, FLAIR, T2 gradient, diffusion and coronal T1 and T2 sequences obtained of the brain. _______________ FINDINGS: Diffusion:  There are no areas of restricted diffusion, no evidence of an acute infarction. Brain parenchyma:  Stable very mild periventricular white matter and central pontine increased T2 and FLAIR signal. No new cortical or white matter signal abnormality with no evidence of mass hemorrhage edema or mass effect. Ventricles and sulci:  Mild diffuse central and cortical volume loss. Extra-axial:  No extra-axial fluid collection or mass is noted. Brain vasculature:  No vascular abnormality is appreciated on this routine brain MR examination. Craniocervical junction:  Cervical medullary junction unremarkable. Interval increased development of likely degenerative Schmorl's node endplate defect inferior C2 vertebral body. Suggestion of possible worsening of ligamentum flavum thickening C2 and C3 with some questionable cord flattening and canal stenosis, limited evaluation on this study. Skull base, extracranial and calvarium:  Previous bilateral lens implants with stable bilateral proptosis with no retrobulbar abnormality. Sinuses and IACs unremarkable. Minimal increased T2 signal inferior right mastoid. Sella unremarkable. Multiple small low T1 and increased T2 signal foci throughout the intradiploic space of the skull correlating to small lytic lesions on CT. _______________     IMPRESSION: 1. No evidence of acute infarct or other acute intracranial finding. 2. Stable very mild white matter signal changes nonspecific probable chronic microvascular ischemic disease. 3. Questionable interval increased acquired canal stenosis and cord flattening C2 and C3 levels limited evaluation on this study. If there is clinical concern for cervical myelopathy, further evaluation could be performed with MRI dedicated to cervical spine. 4. Multiple small T2 hyperintense skull lesions concerning for metastatic disease or multiple myeloma. Mri Cerv Spine Wo Cont    Result Date: 12/8/2020  EXAM: MRI OF THE CERVICAL SPINE, WITHOUT IV CONTRAST CLINICAL INDICATION/HISTORY: Left extremity weakness, hemiparesis COMPARISON: Brain MRI 12/7/2020, lumbar spine MRI 12/8/2020 TECHNIQUE: T1 weighted sagittal and axial, STIR and T2 FSE sagittal, T2 FSE axial. _______________ FINDINGS: OSSEOUS, CERVICAL ALIGNMENT, CRANIOCERVICAL JUNCTION: There is gradual reversal of the normal cervical lordosis, apex at C3-C4. No listhesis. Extensive abnormal marrow signal intensity throughout all the visualized cervical spine and upper thoracic vertebral levels. There is also heterogeneous signal abnormality throughout visualized skull base. No clearly acute pathologic fracture. Striated edema is seen throughout paraspinal musculature extending between C2 and C5 posterior spinous processes. Moderate degenerative osteoarthropathy of the atlantoaxial articulation. CERVICAL CORD, VISUALIZED POSTERIOR FOSSA: Visualized posterior fossa is unremarkable. No Chiari I malformation. Cord morphology and signal intensity are unremarkable throughout. PARASPINOUS SOFT TISSUES, VISUALIZED SOFT TISSUE NECK: Visualized soft tissues are unremarkable. C2-C3: Mild broad-based disc bulge effaces ventral thecal sac CSF and abuts but does not deform the adjacent ventral cervical cord. Mild right and moderate left facet arthropathy. Moderate overall canal stenosis, largest AP dimension of the thecal sac measuring 7 mm. Mild bilateral foraminal stenoses. C3-C4: Mild broad-based disc bulge effaces ventral thecal sac CSF and causes minimal concave deformation of the ventral cervical cord, eccentric to the left. Moderate bilateral facet arthropathy. Left greater than right uncovertebral proliferative changes with moderately severe left and moderate right foraminal stenoses. C4-C5: Moderate broad-based disc osteophyte complex, eccentric to the right with moderately severe concave deformation of the ventral cervical cord, most prominent along right lateral aspect. No cervical cord signal abnormality. Moderate bilateral facet arthropathy. Prominent uncovertebral proliferative changes with severe right and left foraminal stenoses. C5-C6: Moderate broad-based disc osteophyte complex effaces ventral thecal sac CSF and causes moderate concave deformation of the ventral cervical cord. Moderate bilateral facet arthropathy. Prominent uncovertebral proliferative changes with severe left and right foraminal stenoses. C6-C7: Moderate broad-based disc bulge, eccentric to the right with abutment of ventral right paracentral cervical cord but no definite cord deformation. Moderate bilateral facet arthropathy. Prominent uncovertebral proliferative changes with severe left and right foraminal stenoses. C7-T1: No significant disc pathology. Mild right and severe left facet arthropathy. Moderately severe left foraminal stenosis. Canal and right foramen remain adequate. _______________     IMPRESSION: 1. Multilevel degenerative disc disease and facet arthropathy with associated minimal C3-C4, moderately severe C4-C5, and moderate C5-C6 cervical cord impingement. There is cervical cord abutment without deformation at C2-C3 and C6-C7 levels.  2. Facet arthropathy and uncovertebral proliferative changes cause several high-grade foraminal stenoses, as delineated level by level. 3. Diffusely abnormal marrow signal intensity throughout cervical and upper thoracic spine, osseous metastatic disease versus multiple myeloma. Mri Lumb Spine Wo Cont    Result Date: 12/8/2020  EXAM: MRI OF THE LUMBAR SPINE, WITHOUT IV CONTRAST CLINICAL INDICATION/HISTORY: Left lower extremity weakness, hemiparesis COMPARISON: 12/13/2019 TECHNIQUE: T1 weighted sagittal and axial, STIR and T2 FSE sagittal, T2 FSE axial. _______________ FINDINGS: OSSEOUS, LUMBAR ALIGNMENT: Hypointense T1 and T2 signal intensity throughout the L1 vertebral body related to previous vertebral augmentation. Moderate L1 vertebral body height loss. Diffusely heterogeneous marrow signal intensity is seen throughout all of the lower thoracic and lumbar spine vertebra as well as throughout visualized portions of the osseous pelvis. Most of the tiny small foci of signal abnormality measure 2-6 mm in size. Signal abnormality also extends throughout middle and posterior column, to include several slightly larger regions of osseous metastatic disease involving the posterior spinous processes and transverse processes. No clearly acute pathologic fracture. Moderate T11 wedge compression deformity with asymmetric anterior vertebral body height loss, unchanged compared to previous MRI. There are mild degenerative changes of the left and right sacroiliac joints. PARASPINAL AND RETROPERITONEAL SOFT TISSUES: Simple appearing subcentimeter right renal cysts. No acute abnormality throughout the visualized retroperitoneum. OTHER: Conus medullaris ends at the L2 vertebral body level. Correlation of axial and sagittal images reveals the following: T11-T12: Visualized only on sagittal sequences. Moderate broad-based disc bulge and endplate spondylosis efface ventral thecal sac CSF. There is abutment but no definite depression of the adjacent thoracic cord.  Moderate overall canal stenosis, largest AP dimension of the thecal sac measuring just over 6 mm. Moderate bilateral facet arthropathy. Moderate bilateral foraminal stenoses. T12-L1: Minimal broad-based disc bulge and subtle unchanged retropulsion of the L1 posterior wall flattened ventral thecal sac. Moderate bilateral facet arthropathy and ligamentum flavum buckling. Moderate canal stenosis. Mild right and left foraminal stenoses. L1-L2: Minimal broad-based disc bulge flattens ventral thecal sac. Moderate bilateral facet arthropathy and ligamentum flavum buckling. Canal remains adequate. Mild left and right foraminal stenoses. L2-L3: Moderate broad-based disc bulge flattens ventral thecal sac. Moderately severe bilateral facet arthropathy and ligamentum flavum buckling. Moderate overall canal stenosis, largest AP dimension of the thecal sac measuring just under 6 mm. No high-grade lateral recess effacement. Mild right and moderate left foraminal stenoses. L3-L4: Moderate broad-based disc bulge, slightly eccentric to the right, with partial effacement of left and right lateral recesses. Moderately severe bilateral facet arthropathy and ligamentum flavum buckling. Moderate overall canal stenosis, largest AP dimension of the thecal sac measuring just under 7 mm. Mild right and moderate left foraminal stenoses. L4-L5: Mild broad-based disc bulge flattens ventral thecal sac. Moderately severe bilateral facet arthropathy and ligamentum flavum buckling. Mild to moderate overall canal stenosis, largest AP dimension of the thecal sac measuring just over 7 mm. Mild bilateral foraminal stenoses. L5-S1: Minimal broad-based disc bulge abuts ventral thecal sac. Severe bilateral facet arthropathy. Canal remains adequate. Mild right and moderate left foraminal stenoses, neither with associated foraminal nerve root impingement. _______________     IMPRESSION: 1. Numerous osseous lesions throughout visualized lower thoracic spine, lumbar spine, and osseous pelvis.  Indeterminant to what extent these findings represent diffuse osseous metastatic disease versus multiple myeloma. No evident acute pathologic fracture. Unchanged chronic T11 and L1 wedge compression deformities. Previous L1 vertebral augmentation. 2. Multilevel degenerative disc disease and facet arthrosis with mild to moderate canal and stenoses. There is abutment of the ventral distal thoracic cord at T11-T12. No high-grade stenosis or definite nerve root impingement elsewhere. Ct Head Wo Cont    Result Date: 12/7/2020  EXAM: CT head HISTORY: -Provided with order: left sided weakness, stroke? -Additional: None COMPARISON: 02/16/20 TECHNIQUE: Axial CT imaging of the head was performed without intravenous contrast. Sagittal and coronal reconstructions were created from the axial data. One or more dose reduction techniques were used on this CT: automated exposure control, adjustment of the mAs and/or kVp according to patient size, and iterative reconstruction techniques. The specific techniques used on this CT exam have been documented in the patient's electronic medical record. Digital Imaging and Communications in Medicine (DICOM) format image data are available to nonaffiliated external healthcare facilities or entities on a secure, media free, reciprocally searchable basis with patient authorization for at least a 12-month period after this study. _______________ FINDINGS: BRAIN, POSTERIOR FOSSA, EXTRA-AXIAL SPACES AND MENINGES:  The sulci, folia, ventricles and basal cisterns are   within normal limits for the patient's age. There are no areas of abnormal parenchymal attenuation. There is no intracranial hemorrhage, mass effect, or midline shift. There are no abnormal extra-axial fluid collections. CALVARIUM: Progressive lucent lesions in the calvarium. SINUSES/MASTOIDS: Slight left maxillary sinus mucosal thickening. OTHER: None. _______________     IMPRESSION: 1. No acute intracranial abnormalities are identified.  2. Progressive lucent/lytic lesions in the calvarium, which can be seen in the setting of metastatic disease or myeloma. Please note that head CT may be negative in the acute setting and MRI could best further evaluate for acute/subacute ischemia/infarct in the high/persistent index of clinical suspicion. Xr Chest Port    Result Date: 12/7/2020  HISTORY: -Provided with order: left shoulder pain -Additional: None Technique : AP PORTABLE CHEST Comparison : 02/16/20 FINDINGS: HEART AND MEDIASTINUM: Right IJ central venous catheter tip projects over expected SVC. External monitoring wires leads and pads partially obscures visibility. LUNGS AND PLEURAL SPACES: No consolidation, congestive heart failure, pleural effusion or pneumothorax. BONY THORAX AND SOFT TISSUES: No acute osseous abnormality. Impression: No plain film evidence of acute cardiopulmonary disease, allowing for technique. Carey Garcias.  Francisco Casey MD, PhD, [place]  Phone: [phone]  Pager: [phone]

## 2020-12-09 NOTE — PROGRESS NOTES
Hospitalist Progress Note-critical care note     Patient: Becca Hollis MRN: [id number]  [facility]: [id number]    YOB: 1940  Age: 68 y.o.   Sex: male    DOA: 12/7/2020 LOS:  LOS: 2 days            Chief complaint: left hemiparesis, htn, hypokalemia, hypoKlemia and hypomagnesemia , dm2    Assessment/Plan         Hospital Problems  Date Reviewed: 2/17/2020          Codes Class Noted POA    * (Principal) Left hemiparesis ([facility] ICD-10-CM: G81.94  ICD-9-CM: 342.90  12/8/2020 Unknown        Hypokalemia ICD-10-CM: E87.6  ICD-9-CM: 276.8  12/7/2020 Unknown        Neuropathy ICD-10-CM: G62.9  ICD-9-CM: 355.9  Unknown Unknown        Hypomagnesemia ICD-10-CM: E83.42  ICD-9-CM: 275.2  12/7/2020 Unknown        Elevated troponin ICD-10-CM: R77.8  ICD-9-CM: 790.6  12/7/2020 Unknown        Weakness ICD-10-CM: R53.1  ICD-9-CM: 780.79  12/7/2020 Unknown        Morbid obesity ([facility] ICD-10-CM: E66.01  ICD-9-CM: 278.01  2/17/2020 Yes        Multiple myeloma ([facility] ICD-10-CM: C90.00  ICD-9-CM: 203.00  2/16/2020 Yes        Type 2 diabetes mellitus ([facility] ICD-10-CM: E11.9  ICD-9-CM: 250.00  2/12/2020 Yes                Neuropathy , and weakness  Mri brain no acute stroke    we will obtain orthopedic consult based on MRI findings    Hypokalemia, hypomagnesemia  Still low   Replacement ordered per protocol     Elevated  troponin  Continue cardiac monitoring  No chest pain  Check ce trend   Dr. Reanna Hernandez i consult appreciated not planning on further cardiac work-up but if needs surgery will ask for clearance        MM   Continue home meds   Oncologist on board   Zometa ordered by oncology       DM type II , with complication,  - add lantus, ssi, diabetic diet , hypoglycemia protocol        HTN, accelerated  Restart other home medication      Morbid obesity  Lifestyle modification     Subjective: still weakness not in any pain  Discussed the possibility of needing neck surgery  He would be willing to do this if it would improve his left arm weakness  Patient at baseline ambulates with walker    Disposition : Await orthopedic opinion may be skilled  Review of systems:    General: No fevers or chills. Cardiovascular: No chest pain or pressure. No palpitations. Pulmonary: No shortness of breath. Gastrointestinal: No nausea, vomiting. Vital signs/Intake and Output:  Visit Vitals  BP (!) 155/59 (BP 1 Location: Right arm, BP Patient Position: At rest)   Pulse 69   Temp 98.4 °F (36.9 °C)   Resp 18   Ht 5' 9\" (1.753 m)   Wt 116.1 kg (256 lb)   SpO2 100%   BMI 37.80 kg/m²     Current Shift:  No intake/output data recorded. Last three shifts:  12/07 1901 - 12/09 0700  In: 360 [P.O.:360]  Out: 2400 [Urine:2400]    Physical Exam:  General: WD, WN. Alert, cooperative, no acute distress    HEENT: NC, Atraumatic. PERRLA, anicteric sclerae. Lungs: CTA Bilaterally. No Wheezing/Rhonchi/Rales. Heart:  Regular  rhythm,  No murmur, No Rubs, No Gallops  Abdomen: Soft, Non distended, Non tender. +Bowel sounds,   Extremities: No c/c/e  Psych:   Not anxious or agitated.   Neurologic:   left side weakness weak           Labs: Results:       Chemistry Recent Labs     12/09/20 0350 12/08/20  0012 12/07/20  1225 12/07/20  0926   * 125* 141* 163*    139 137 138   K 3.6 3.1* 3.1* 2.9*    102 101 101   CO2 27 29 33* 30   BUN 7 8 10 11   CREA 0.79 0.75 0.83 0.98   CA 8.1* 8.1* 8.9 8.7   AGAP 6 8 3 7   BUCR 9* 11* 12 11*   AP  --   --   --  74   TP  --   --   --  6.5   ALB  --   --   --  3.2*   GLOB  --   --   --  3.3   AGRAT  --   --   --  1.0      CBC w/Diff Recent Labs     12/09/20  0350 12/08/20  0012 12/07/20  0926   WBC 5.3 4.2* 6.0   RBC 3.44* 3.47* 3.70*   HGB 10.0* 10.0* 10.7*   HCT 29.1* 29.2* 31.1*    179 183   GRANS 48 45 54   LYMPH 29 32 28   EOS 5 5 3      Cardiac Enzymes Recent Labs     12/08/20  0710 12/08/20  0112    189   CKND1 2.1 2.1      Coagulation Recent Labs     12/07/20  0926   PTP 16.0*   INR 1.3*   APTT 27.8       Lipid Panel Lab Results   Component Value Date/Time    Cholesterol, total 89 12/08/2020 01:12 AM    HDL Cholesterol 46 12/08/2020 01:12 AM    LDL, calculated 28.6 12/08/2020 01:12 AM    VLDL, calculated 14.4 12/08/2020 01:12 AM    Triglyceride 72 12/08/2020 01:12 AM    CHOL/HDL Ratio 1.9 12/08/2020 01:12 AM      BNP No results for input(s): BNPP in the last 72 hours. Liver Enzymes Recent Labs     12/07/20  0926   TP 6.5   ALB 3.2*   AP 74      Thyroid Studies Lab Results   Component Value Date/Time    TSH 1.16 12/07/2020 12:25 PM    TSH 0.74 12/07/2020 12:25 PM        Procedures/imaging: see electronic medical records for all procedures/Xrays and details which were not copied into this note but were reviewed prior to creation of Plan    Xr Shoulder Lt Ap/lat Min 2 V    Result Date: 12/7/2020  EXAM:  XR SHOULDER LT AP/LAT MIN 2 V INDICATION: Left shoulder pain COMPARISON: None. FINDINGS: Two views of the left shoulder demonstrate no acute finding. A few scattered and confluent lucencies are seen in the scapula, proximal humerus and clavicle. Impression: No acute findings or significant degenerative change appreciated. Scattered small bone lucencies noted compatible with history of multiple myeloma. Mri Brain Wo Cont    Result Date: 12/8/2020  EXAM: MRI brain without gadolinium CLINICAL INDICATION/HISTORY: Extremity weakness, possible CVA   > Additional: None. COMPARISON: 2/16/2020   > Reference Exam: CT head 12/7/2020 TECHNIQUE: Sagittal T1, axial T1, T2, FLAIR, T2 gradient, diffusion and coronal T1 and T2 sequences obtained of the brain. _______________ FINDINGS: Diffusion:  There are no areas of restricted diffusion, no evidence of an acute infarction. Brain parenchyma:  Stable very mild periventricular white matter and central pontine increased T2 and FLAIR signal. No new cortical or white matter signal abnormality with no evidence of mass hemorrhage edema or mass effect.  Ventricles and sulci: Mild diffuse central and cortical volume loss. Extra-axial:  No extra-axial fluid collection or mass is noted. Brain vasculature:  No vascular abnormality is appreciated on this routine brain MR examination. Craniocervical junction:  Cervical medullary junction unremarkable. Interval increased development of likely degenerative Schmorl's node endplate defect inferior C2 vertebral body. Suggestion of possible worsening of ligamentum flavum thickening C2 and C3 with some questionable cord flattening and canal stenosis, limited evaluation on this study. Skull base, extracranial and calvarium:  Previous bilateral lens implants with stable bilateral proptosis with no retrobulbar abnormality. Sinuses and IACs unremarkable. Minimal increased T2 signal inferior right mastoid. Sella unremarkable. Multiple small low T1 and increased T2 signal foci throughout the intradiploic space of the skull correlating to small lytic lesions on CT. _______________     IMPRESSION: 1. No evidence of acute infarct or other acute intracranial finding. 2. Stable very mild white matter signal changes nonspecific probable chronic microvascular ischemic disease. 3. Questionable interval increased acquired canal stenosis and cord flattening C2 and C3 levels limited evaluation on this study. If there is clinical concern for cervical myelopathy, further evaluation could be performed with MRI dedicated to cervical spine. 4. Multiple small T2 hyperintense skull lesions concerning for metastatic disease or multiple myeloma. Ct Head Wo Cont    Result Date: 12/7/2020  EXAM: CT head HISTORY: -Provided with order: left sided weakness, stroke? -Additional: None COMPARISON: 02/16/20 TECHNIQUE: Axial CT imaging of the head was performed without intravenous contrast. Sagittal and coronal reconstructions were created from the axial data.  One or more dose reduction techniques were used on this CT: automated exposure control, adjustment of the mAs and/or kVp according to patient size, and iterative reconstruction techniques. The specific techniques used on this CT exam have been documented in the patient's electronic medical record. Digital Imaging and Communications in Medicine (DICOM) format image data are available to nonaffiliated external healthcare facilities or entities on a secure, media free, reciprocally searchable basis with patient authorization for at least a 12-month period after this study. _______________ FINDINGS: BRAIN, POSTERIOR FOSSA, EXTRA-AXIAL SPACES AND MENINGES:  The sulci, folia, ventricles and basal cisterns are   within normal limits for the patient's age. There are no areas of abnormal parenchymal attenuation. There is no intracranial hemorrhage, mass effect, or midline shift. There are no abnormal extra-axial fluid collections. CALVARIUM: Progressive lucent lesions in the calvarium. SINUSES/MASTOIDS: Slight left maxillary sinus mucosal thickening. OTHER: None. _______________     IMPRESSION: 1. No acute intracranial abnormalities are identified. 2. Progressive lucent/lytic lesions in the calvarium, which can be seen in the setting of metastatic disease or myeloma. Please note that head CT may be negative in the acute setting and MRI could best further evaluate for acute/subacute ischemia/infarct in the high/persistent index of clinical suspicion. Xr Chest Port    Result Date: 12/7/2020  HISTORY: -Provided with order: left shoulder pain -Additional: None Technique : AP PORTABLE CHEST Comparison : 02/16/20 FINDINGS: HEART AND MEDIASTINUM: Right IJ central venous catheter tip projects over expected SVC. External monitoring wires leads and pads partially obscures visibility. LUNGS AND PLEURAL SPACES: No consolidation, congestive heart failure, pleural effusion or pneumothorax. BONY THORAX AND SOFT TISSUES: No acute osseous abnormality. Impression: No plain film evidence of acute cardiopulmonary disease, allowing for technique.

## 2020-12-09 NOTE — PROGRESS NOTES
Problem: Pressure Injury - Risk of  Goal: *Prevention of pressure injury  Description: Document Vance Scale and appropriate interventions in the flowsheet. Outcome: Progressing Towards Goal  Note: Pressure Injury Interventions: Activity Interventions: Assess need for specialty bed, Increase time out of bed, Pressure redistribution bed/mattress(bed type), PT/OT evaluation    Mobility Interventions: Assess need for specialty bed, HOB 30 degrees or less, Pressure redistribution bed/mattress (bed type), PT/OT evaluation    Nutrition Interventions: Document food/fluid/supplement intake, Discuss nutritional consult with provider                     Problem: Patient Education: Go to Patient Education Activity  Goal: Patient/Family Education  Outcome: Progressing Towards Goal     Problem: Falls - Risk of  Goal: *Absence of Falls  Description: Document Scott Mccalljared Fall Risk and appropriate interventions in the flowsheet.   Outcome: Progressing Towards Goal  Note: Fall Risk Interventions:            Medication Interventions: Assess postural VS orthostatic hypotension, Evaluate medications/consider consulting pharmacy, Patient to call before getting OOB, Teach patient to arise slowly    Elimination Interventions: Bed/chair exit alarm, Call light in reach, Elevated toilet seat              Problem: Patient Education: Go to Patient Education Activity  Goal: Patient/Family Education  Outcome: Progressing Towards Goal     Problem: Patient Education: Go to Patient Education Activity  Goal: Patient/Family Education  Outcome: Progressing Towards Goal     Problem: TIA/CVA Stroke: 0-24 hours  Goal: Off Pathway (Use only if patient is Off Pathway)  Outcome: Progressing Towards Goal  Goal: Activity/Safety  Outcome: Progressing Towards Goal  Goal: Consults, if ordered  Outcome: Progressing Towards Goal  Goal: Diagnostic Test/Procedures  Outcome: Progressing Towards Goal  Goal: Nutrition/Diet  Outcome: Progressing Towards Goal  Goal: Discharge Planning  Outcome: Progressing Towards Goal  Goal: Medications  Outcome: Progressing Towards Goal  Goal: Respiratory  Outcome: Progressing Towards Goal  Goal: Treatments/Interventions/Procedures  Outcome: Progressing Towards Goal  Goal: Minimize risk of bleeding post-thrombolytic infusion  Outcome: Progressing Towards Goal  Goal: Monitor for complications post-thrombolytic infusion  Outcome: Progressing Towards Goal  Goal: Psychosocial  Outcome: Progressing Towards Goal  Goal: *Hemodynamically stable  Outcome: Progressing Towards Goal  Goal: *Neurologically stable  Description: Absence of additional neurological deficits    Outcome: Progressing Towards Goal  Goal: *Verbalizes anxiety and depression are reduced or absent  Outcome: Progressing Towards Goal  Goal: *Absence of Signs of Aspiration on Current Diet  Outcome: Progressing Towards Goal  Goal: *Absence of deep venous thrombosis signs and symptoms(Stroke Metric)  Outcome: Progressing Towards Goal  Goal: *Ability to perform ADLs and demonstrates progressive mobility and function  Outcome: Progressing Towards Goal  Goal: *Stroke education started(Stroke Metric)  Outcome: Progressing Towards Goal  Goal: *Dysphagia screen performed(Stroke Metric)  Outcome: Progressing Towards Goal  Goal: *Rehab consulted(Stroke Metric)  Outcome: Progressing Towards Goal     Problem: TIA/CVA Stroke: Day 2 Until Discharge  Goal: Off Pathway (Use only if patient is Off Pathway)  Outcome: Progressing Towards Goal  Goal: Activity/Safety  Outcome: Progressing Towards Goal  Goal: Diagnostic Test/Procedures  Outcome: Progressing Towards Goal  Goal: Nutrition/Diet  Outcome: Progressing Towards Goal  Goal: Discharge Planning  Outcome: Progressing Towards Goal  Goal: Medications  Outcome: Progressing Towards Goal  Goal: Respiratory  Outcome: Progressing Towards Goal  Goal: Treatments/Interventions/Procedures  Outcome: Progressing Towards Goal  Goal: Psychosocial  Outcome: Progressing Towards Goal  Goal: *Verbalizes anxiety and depression are reduced or absent  Outcome: Progressing Towards Goal  Goal: *Absence of aspiration  Outcome: Progressing Towards Goal  Goal: *Absence of deep venous thrombosis signs and symptoms(Stroke Metric)  Outcome: Progressing Towards Goal  Goal: *Optimal pain control at patient's stated goal  Outcome: Progressing Towards Goal  Goal: *Tolerating diet  Outcome: Progressing Towards Goal  Goal: *Ability to perform ADLs and demonstrates progressive mobility and function  Outcome: Progressing Towards Goal  Goal: *Stroke education continued(Stroke Metric)  Outcome: Progressing Towards Goal     Problem: Ischemic Stroke: Discharge Outcomes  Goal: *Verbalizes anxiety and depression are reduced or absent  Outcome: Progressing Towards Goal  Goal: *Verbalize understanding of risk factor modification(Stroke Metric)  Outcome: Progressing Towards Goal  Goal: *Hemodynamically stable  Outcome: Progressing Towards Goal  Goal: *Absence of aspiration pneumonia  Outcome: Progressing Towards Goal  Goal: *Aware of needed dietary changes  Outcome: Progressing Towards Goal  Goal: *Verbalize understanding of prescribed medications including anti-coagulants, anti-lipid, and/or anti-platelets(Stroke Metric)  Outcome: Progressing Towards Goal  Goal: *Tolerating diet  Outcome: Progressing Towards Goal  Goal: *Aware of follow-up diagnostics related to anticoagulants  Outcome: Progressing Towards Goal  Goal: *Ability to perform ADLs and demonstrates progressive mobility and function  Outcome: Progressing Towards Goal  Goal: *Absence of DVT(Stroke Metric)  Outcome: Progressing Towards Goal  Goal: *Absence of aspiration  Outcome: Progressing Towards Goal  Goal: *Optimal pain control at patient's stated goal  Outcome: Progressing Towards Goal  Goal: *Home safety concerns addressed  Outcome: Progressing Towards Goal  Goal: *Describes available resources and support systems  Outcome: Progressing Towards Goal  Goal: *Verbalizes understanding of activation of EMS(911) for stroke symptoms(Stroke Metric)  Outcome: Progressing Towards Goal  Goal: *Understands and describes signs and symptoms to report to providers(Stroke Metric)  Outcome: Progressing Towards Goal  Goal: *Neurolgocially stable (absence of additional neurological deficits)  Outcome: Progressing Towards Goal  Goal: *Verbalizes importance of follow-up with primary care physician(Stroke Metric)  Outcome: Progressing Towards Goal  Goal: *Smoking cessation discussed,if applicable(Stroke Metric)  Outcome: Progressing Towards Goal  Goal: *Depression screening completed(Stroke Metric)  Outcome: Progressing Towards Goal     Problem: Pain  Goal: *Control of Pain  Outcome: Progressing Towards Goal  Goal: *PALLIATIVE CARE:  Alleviation of Pain  Outcome: Progressing Towards Goal     Problem: Patient Education: Go to Patient Education Activity  Goal: Patient/Family Education  Outcome: Progressing Towards Goal

## 2020-12-09 NOTE — PROGRESS NOTES
Problem: Mobility Impaired (Adult and Pediatric)  Goal: *Acute Goals and Plan of Care (Insert Text)  Description: Physical Therapy short term goals initiated 12/08/2020, to be achieved in 2 days. Pt will:   1. Perform bed mobility with Annemarie in prep for OOB activity. 2. Perform sit <> stand transfers with LRAD and CGA in prep for functional mobility and ambulation. 3. Ambulate 100 ft with RW and SBA in prep for household and community mobility. 4. Ascend/descend 3 stairs with B HR and CGA for safe home entry. Outcome: Progressing Towards Goal   PHYSICAL THERAPY TREATMENT    Patient: Azul Tinajero (47 y.o. male)  Date: 12/9/2020  Diagnosis: Hypokalemia [E87.6]   Left hemiparesis (HCC)  Precautions: Aspiration   Chart, physical therapy assessment, plan of care and goals were reviewed. ASSESSMENT:  Improving in bed mobility performance CGA/min A supine>sit today. Sitting balance intact w/o support. Mod assist STS with vc for hand placement. L LE weaker than R and knee unstable intially, requiring CGA to ant knee for safety; also bracing at toes as foot tends to slide forward during transfer. Gt limited to bedside with RW/min/mod assist. Recommend A of 2 for safety during GT away from bedside d/t decr'd strength/coordination L side and potential increased fall risk. Recommend acute in-pt rehab. Note pt given additional rest time this afternoon following OT session (see OT note). Progression toward goals:  []      Improving appropriately and progressing toward goals  [x]      Improving slowly and progressing toward goals  []      Not making progress toward goals and plan of care will be adjusted     PLAN:  Patient continues to benefit from skilled intervention to address the above impairments. Continue treatment per established plan of care.   Discharge Recommendations:  Inpatient Rehab  Further Equipment Recommendations for Discharge:  N/A     SUBJECTIVE:   Patient stated I was looking for you earlier.     OBJECTIVE DATA SUMMARY:   Critical Behavior:  Neurologic State: Alert  Orientation Level: Oriented X4  Cognition: Follows commands  Safety/Judgement: Awareness of environment, Good awareness of safety precautions  Functional Mobility Training:  Bed Mobility:  Supine to Sit: Contact guard assistance;Minimum assistance; Additional time  Sit to Supine: Moderate assistance  Scooting: Contact guard assistance; Additional time(scooting out toward EOB)  Transfers:  Sit to Stand: Moderate assistance; Other (comment)(vc for hand placement)  Stand to Sit: Minimum assistance; Other (comment)(as above)  Bed to Chair: Moderate assistance  Balance:  Sitting: Intact  Standing: Impaired; With support  Standing - Static: Constant support; Fair  Standing - Dynamic : Fair;Constant support(-)  Ambulation/Gait Training:  Distance (ft): 4 Feet (ft)  Assistive Device: Gait belt;Walker, rolling  Ambulation - Level of Assistance: Minimal assistance; Moderate assistance; Additional time(CG ant L knee and L foot braced for safety)  Gait Abnormalities: Decreased step clearance; Step to gait; Shuffling gait  Stance: Time;Weight shift  Speed/[place]: Slow  Step Length: Right shortened;Left shortened  Therapeutic Exercises:   LAQ 2x5; marching x5 LE's  Pain:  Pain Scale 1: Numeric (0 - 10)  Pain Intensity 1: 0  Activity Tolerance:   Fair   Please refer to the flowsheet for vital signs taken during this treatment.   After treatment:   [x] Patient left in no apparent distress sitting up EOB   [] Patient left in no apparent distress in bed  [x] Call bell left within reach  [x] Nursing notified  [] Caregiver present  [] Bed alarm activated      Kristen Weeks PT   Time Calculation: 27 mins

## 2020-12-09 NOTE — PROGRESS NOTES
Bedside shift change report given to [address] [place] [address] (oncoming nurse) by Li Nation (offgoing nurse). Report included the following information SBAR, Kardex, ED Summary, Intake/Output, MAR and Accordion. [id number] Shift assessment complete. 0930 Dr. Roberta Vogel ordered a CT of chest and abdomen with contrast. Patient is allergic to contrast. 13 hour prep intiated. Patient can have CT at [facility]. Shift Summary: Shift uneventful. No complaints of chest pain or shortness of breath. Call light is within reach.

## 2020-12-09 NOTE — PROGRESS NOTES
Bedside shift change report given to Luis Miles RN (oncoming nurse) by Rowan Francis RN (offgoing nurse). Report included the following information SBAR, Kardex and ED Summary.

## 2020-12-10 ENCOUNTER — ANESTHESIA EVENT (OUTPATIENT)
Dept: SURGERY | Age: 80
DRG: 472 | End: 2020-12-10
Payer: MEDICARE

## 2020-12-10 ENCOUNTER — ANESTHESIA (OUTPATIENT)
Dept: SURGERY | Age: 80
DRG: 472 | End: 2020-12-10
Payer: MEDICARE

## 2020-12-10 ENCOUNTER — APPOINTMENT (OUTPATIENT)
Dept: GENERAL RADIOLOGY | Age: 80
DRG: 472 | End: 2020-12-10
Attending: ORTHOPAEDIC SURGERY
Payer: MEDICARE

## 2020-12-10 LAB
ALBUMIN SERPL ELPH-MCNC: 3 G/DL (ref 2.9–4.4)
ALBUMIN/GLOB SERPL: 1.1 {RATIO} (ref 0.7–1.7)
ALPHA1 GLOB SERPL ELPH-MCNC: 0.2 G/DL (ref 0–0.4)
ALPHA2 GLOB SERPL ELPH-MCNC: 0.8 G/DL (ref 0.4–1)
ANION GAP SERPL CALC-SCNC: 8 MMOL/L (ref 3–18)
B-GLOBULIN SERPL ELPH-MCNC: 0.8 G/DL (ref 0.7–1.3)
BASOPHILS # BLD: 0 K/UL (ref 0–0.1)
BASOPHILS NFR BLD: 0 % (ref 0–2)
BUN SERPL-MCNC: 12 MG/DL (ref 7–18)
BUN/CREAT SERPL: 13 (ref 12–20)
CALCIUM SERPL-MCNC: 8.9 MG/DL (ref 8.5–10.1)
CHLORIDE SERPL-SCNC: 96 MMOL/L (ref 100–111)
CO2 SERPL-SCNC: 28 MMOL/L (ref 21–32)
CREAT SERPL-MCNC: 0.91 MG/DL (ref 0.6–1.3)
DIFFERENTIAL METHOD BLD: ABNORMAL
EOSINOPHIL # BLD: 0 K/UL (ref 0–0.4)
EOSINOPHIL NFR BLD: 0 % (ref 0–5)
ERYTHROCYTE [DISTWIDTH] IN BLOOD BY AUTOMATED COUNT: 15.2 % (ref 11.6–14.5)
GAMMA GLOB SERPL ELPH-MCNC: 1.1 G/DL (ref 0.4–1.8)
GLOBULIN SER-MCNC: 3 G/DL (ref 2.2–3.9)
GLUCOSE BLD STRIP.AUTO-MCNC: 154 MG/DL (ref 70–110)
GLUCOSE BLD STRIP.AUTO-MCNC: 158 MG/DL (ref 70–110)
GLUCOSE BLD STRIP.AUTO-MCNC: 218 MG/DL (ref 70–110)
GLUCOSE BLD STRIP.AUTO-MCNC: 286 MG/DL (ref 70–110)
GLUCOSE SERPL-MCNC: 249 MG/DL (ref 74–99)
HCT VFR BLD AUTO: 28 % (ref 36–48)
HGB BLD-MCNC: 9.8 G/DL (ref 13–16)
IGA SERPL-MCNC: 70 MG/DL (ref 61–437)
IGG SERPL-MCNC: 1124 MG/DL (ref 603–1613)
IGM SERPL-MCNC: 18 MG/DL (ref 15–143)
INTERPRETATION SERPL IEP-IMP: ABNORMAL
KAPPA LC FREE SER-MCNC: 15.1 MG/L (ref 3.3–19.4)
KAPPA LC FREE/LAMBDA FREE SER: 0.28 {RATIO} (ref 0.26–1.65)
LAMBDA LC FREE SERPL-MCNC: 54.2 MG/L (ref 5.7–26.3)
LYMPHOCYTES # BLD: 0.8 K/UL (ref 0.9–3.6)
LYMPHOCYTES NFR BLD: 18 % (ref 21–52)
M PROTEIN SERPL ELPH-MCNC: 0.8 G/DL
MAGNESIUM SERPL-MCNC: 2 MG/DL (ref 1.6–2.6)
MCH RBC QN AUTO: 29.1 PG (ref 24–34)
MCHC RBC AUTO-ENTMCNC: 35 G/DL (ref 31–37)
MCV RBC AUTO: 83.1 FL (ref 74–97)
MONOCYTES # BLD: 0.5 K/UL (ref 0.05–1.2)
MONOCYTES NFR BLD: 10 % (ref 3–10)
NEUTS SEG # BLD: 3.4 K/UL (ref 1.8–8)
NEUTS SEG NFR BLD: 72 % (ref 40–73)
PLATELET # BLD AUTO: 225 K/UL (ref 135–420)
PMV BLD AUTO: 10.1 FL (ref 9.2–11.8)
POTASSIUM SERPL-SCNC: 3.5 MMOL/L (ref 3.5–5.5)
PROT SERPL-MCNC: 6 G/DL (ref 6–8.5)
PSA FREE MFR SERPL: 22.3 %
PSA FREE SERPL-MCNC: 0.29 NG/ML
PSA SERPL-MCNC: 1.3 NG/ML (ref 0–4)
RBC # BLD AUTO: 3.37 M/UL (ref 4.7–5.5)
SODIUM SERPL-SCNC: 132 MMOL/L (ref 136–145)
WBC # BLD AUTO: 4.7 K/UL (ref 4.6–13.2)

## 2020-12-10 PROCEDURE — 74011000250 HC RX REV CODE- 250: Performed by: STUDENT IN AN ORGANIZED HEALTH CARE EDUCATION/TRAINING PROGRAM

## 2020-12-10 PROCEDURE — 74011250636 HC RX REV CODE- 250/636: Performed by: ORTHOPAEDIC SURGERY

## 2020-12-10 PROCEDURE — 83735 ASSAY OF MAGNESIUM: CPT

## 2020-12-10 PROCEDURE — 77030040361 HC SLV COMPR DVT MDII -B

## 2020-12-10 PROCEDURE — 77030003028 HC SUT VCRL J&J -A: Performed by: ORTHOPAEDIC SURGERY

## 2020-12-10 PROCEDURE — 77030038552 HC DRN WND MDII -A: Performed by: ORTHOPAEDIC SURGERY

## 2020-12-10 PROCEDURE — 77030013708 HC HNDPC SUC IRR PULS STRY –B: Performed by: ORTHOPAEDIC SURGERY

## 2020-12-10 PROCEDURE — 00NW0ZZ RELEASE CERVICAL SPINAL CORD, OPEN APPROACH: ICD-10-PCS | Performed by: ORTHOPAEDIC SURGERY

## 2020-12-10 PROCEDURE — 77030008477 HC STYL SATN SLP COVD -A: Performed by: ANESTHESIOLOGY

## 2020-12-10 PROCEDURE — 65660000000 HC RM CCU STEPDOWN

## 2020-12-10 PROCEDURE — 74011250636 HC RX REV CODE- 250/636: Performed by: STUDENT IN AN ORGANIZED HEALTH CARE EDUCATION/TRAINING PROGRAM

## 2020-12-10 PROCEDURE — 80048 BASIC METABOLIC PNL TOTAL CA: CPT

## 2020-12-10 PROCEDURE — 74011636637 HC RX REV CODE- 636/637: Performed by: HOSPITALIST

## 2020-12-10 PROCEDURE — 76060000036 HC ANESTHESIA 2.5 TO 3 HR: Performed by: ORTHOPAEDIC SURGERY

## 2020-12-10 PROCEDURE — 74011250637 HC RX REV CODE- 250/637: Performed by: HOSPITALIST

## 2020-12-10 PROCEDURE — 77030013079 HC BLNKT BAIR HGGR 3M -A: Performed by: ANESTHESIOLOGY

## 2020-12-10 PROCEDURE — 85025 COMPLETE CBC W/AUTO DIFF WBC: CPT

## 2020-12-10 PROCEDURE — 77030006579 HC BIT DRL STP SYNT -C: Performed by: ORTHOPAEDIC SURGERY

## 2020-12-10 PROCEDURE — 82962 GLUCOSE BLOOD TEST: CPT

## 2020-12-10 PROCEDURE — 36415 COLL VENOUS BLD VENIPUNCTURE: CPT

## 2020-12-10 PROCEDURE — 74011000258 HC RX REV CODE- 258: Performed by: NURSE ANESTHETIST, CERTIFIED REGISTERED

## 2020-12-10 PROCEDURE — 77030003029 HC SUT VCRL J&J -B: Performed by: ORTHOPAEDIC SURGERY

## 2020-12-10 PROCEDURE — 2709999900 HC NON-CHARGEABLE SUPPLY: Performed by: ORTHOPAEDIC SURGERY

## 2020-12-10 PROCEDURE — 77030006643: Performed by: ANESTHESIOLOGY

## 2020-12-10 PROCEDURE — 76210000006 HC OR PH I REC 0.5 TO 1 HR: Performed by: ORTHOPAEDIC SURGERY

## 2020-12-10 PROCEDURE — 77030008462 HC STPLR SKN PROX J&J -A: Performed by: ORTHOPAEDIC SURGERY

## 2020-12-10 PROCEDURE — 77030008683 HC TU ET CUF COVD -A: Performed by: ANESTHESIOLOGY

## 2020-12-10 PROCEDURE — 74011250636 HC RX REV CODE- 250/636: Performed by: NURSE ANESTHETIST, CERTIFIED REGISTERED

## 2020-12-10 PROCEDURE — 77030034475 HC MISC IMPL SPN: Performed by: ORTHOPAEDIC SURGERY

## 2020-12-10 PROCEDURE — 74011000250 HC RX REV CODE- 250: Performed by: ORTHOPAEDIC SURGERY

## 2020-12-10 PROCEDURE — 74011000250 HC RX REV CODE- 250: Performed by: NURSE ANESTHETIST, CERTIFIED REGISTERED

## 2020-12-10 PROCEDURE — 76010000132 HC OR TIME 2.5 TO 3 HR: Performed by: ORTHOPAEDIC SURGERY

## 2020-12-10 PROCEDURE — 77030004402 HC BUR NEUR STRY -C: Performed by: ORTHOPAEDIC SURGERY

## 2020-12-10 PROCEDURE — 77030027521 HC SEAL BPLR AQMNTYS EVS MEDT -F: Performed by: ORTHOPAEDIC SURGERY

## 2020-12-10 PROCEDURE — 77030040830 HC CATH URETH FOL MDII -A: Performed by: ORTHOPAEDIC SURGERY

## 2020-12-10 PROCEDURE — 74011250636 HC RX REV CODE- 250/636: Performed by: ANESTHESIOLOGY

## 2020-12-10 PROCEDURE — 0RG1071 FUSION OF CERVICAL VERTEBRAL JOINT WITH AUTOLOGOUS TISSUE SUBSTITUTE, POSTERIOR APPROACH, POSTERIOR COLUMN, OPEN APPROACH: ICD-10-PCS | Performed by: ORTHOPAEDIC SURGERY

## 2020-12-10 PROCEDURE — 01N10ZZ RELEASE CERVICAL NERVE, OPEN APPROACH: ICD-10-PCS | Performed by: ORTHOPAEDIC SURGERY

## 2020-12-10 RX ORDER — ACETAMINOPHEN 325 MG/1
650 TABLET ORAL
Status: DISCONTINUED | OUTPATIENT
Start: 2020-12-10 | End: 2020-12-14 | Stop reason: HOSPADM

## 2020-12-10 RX ORDER — KETAMINE HYDROCHLORIDE 10 MG/ML
INJECTION, SOLUTION INTRAMUSCULAR; INTRAVENOUS AS NEEDED
Status: DISCONTINUED | OUTPATIENT
Start: 2020-12-10 | End: 2020-12-10 | Stop reason: HOSPADM

## 2020-12-10 RX ORDER — ALBUTEROL SULFATE 0.83 MG/ML
2.5 SOLUTION RESPIRATORY (INHALATION)
Status: DISCONTINUED | OUTPATIENT
Start: 2020-12-10 | End: 2020-12-10 | Stop reason: HOSPADM

## 2020-12-10 RX ORDER — CEFAZOLIN SODIUM/WATER 2 G/20 ML
2 SYRINGE (ML) INTRAVENOUS EVERY 8 HOURS
Status: COMPLETED | OUTPATIENT
Start: 2020-12-11 | End: 2020-12-11

## 2020-12-10 RX ORDER — INSULIN LISPRO 100 [IU]/ML
3 INJECTION, SOLUTION INTRAVENOUS; SUBCUTANEOUS
Status: DISCONTINUED | OUTPATIENT
Start: 2020-12-10 | End: 2020-12-14 | Stop reason: HOSPADM

## 2020-12-10 RX ORDER — NALOXONE HYDROCHLORIDE 0.4 MG/ML
0.1 INJECTION, SOLUTION INTRAMUSCULAR; INTRAVENOUS; SUBCUTANEOUS AS NEEDED
Status: DISCONTINUED | OUTPATIENT
Start: 2020-12-10 | End: 2020-12-10 | Stop reason: HOSPADM

## 2020-12-10 RX ORDER — LIDOCAINE HYDROCHLORIDE 20 MG/ML
INJECTION, SOLUTION EPIDURAL; INFILTRATION; INTRACAUDAL; PERINEURAL AS NEEDED
Status: DISCONTINUED | OUTPATIENT
Start: 2020-12-10 | End: 2020-12-10 | Stop reason: HOSPADM

## 2020-12-10 RX ORDER — SODIUM CHLORIDE 0.9 % (FLUSH) 0.9 %
5-40 SYRINGE (ML) INJECTION AS NEEDED
Status: DISCONTINUED | OUTPATIENT
Start: 2020-12-10 | End: 2020-12-10 | Stop reason: HOSPADM

## 2020-12-10 RX ORDER — ONDANSETRON 2 MG/ML
INJECTION INTRAMUSCULAR; INTRAVENOUS AS NEEDED
Status: DISCONTINUED | OUTPATIENT
Start: 2020-12-10 | End: 2020-12-10 | Stop reason: HOSPADM

## 2020-12-10 RX ORDER — SODIUM CHLORIDE 0.9 % (FLUSH) 0.9 %
5-40 SYRINGE (ML) INJECTION EVERY 8 HOURS
Status: DISCONTINUED | OUTPATIENT
Start: 2020-12-10 | End: 2020-12-10 | Stop reason: HOSPADM

## 2020-12-10 RX ORDER — SODIUM CHLORIDE, SODIUM LACTATE, POTASSIUM CHLORIDE, CALCIUM CHLORIDE 600; 310; 30; 20 MG/100ML; MG/100ML; MG/100ML; MG/100ML
125 INJECTION, SOLUTION INTRAVENOUS CONTINUOUS
Status: DISCONTINUED | OUTPATIENT
Start: 2020-12-11 | End: 2020-12-11

## 2020-12-10 RX ORDER — GLYCOPYRROLATE 0.2 MG/ML
INJECTION INTRAMUSCULAR; INTRAVENOUS AS NEEDED
Status: DISCONTINUED | OUTPATIENT
Start: 2020-12-10 | End: 2020-12-10 | Stop reason: HOSPADM

## 2020-12-10 RX ORDER — POTASSIUM CHLORIDE 20 MEQ/1
40 TABLET, EXTENDED RELEASE ORAL
Status: COMPLETED | OUTPATIENT
Start: 2020-12-10 | End: 2020-12-11

## 2020-12-10 RX ORDER — INSULIN GLARGINE 100 [IU]/ML
10 INJECTION, SOLUTION SUBCUTANEOUS
Status: DISCONTINUED | OUTPATIENT
Start: 2020-12-10 | End: 2020-12-14 | Stop reason: HOSPADM

## 2020-12-10 RX ORDER — ONDANSETRON 4 MG/1
4 TABLET, ORALLY DISINTEGRATING ORAL
Status: DISCONTINUED | OUTPATIENT
Start: 2020-12-10 | End: 2020-12-14 | Stop reason: HOSPADM

## 2020-12-10 RX ORDER — DIAZEPAM 5 MG/1
2.5-5 TABLET ORAL
Qty: 60 TAB | Refills: 0 | OUTPATIENT
Start: 2020-12-10 | End: 2021-05-19

## 2020-12-10 RX ORDER — SODIUM CHLORIDE, SODIUM LACTATE, POTASSIUM CHLORIDE, CALCIUM CHLORIDE 600; 310; 30; 20 MG/100ML; MG/100ML; MG/100ML; MG/100ML
100 INJECTION, SOLUTION INTRAVENOUS CONTINUOUS
Status: DISCONTINUED | OUTPATIENT
Start: 2020-12-10 | End: 2020-12-10 | Stop reason: HOSPADM

## 2020-12-10 RX ORDER — SODIUM CHLORIDE, SODIUM LACTATE, POTASSIUM CHLORIDE, CALCIUM CHLORIDE 600; 310; 30; 20 MG/100ML; MG/100ML; MG/100ML; MG/100ML
125 INJECTION, SOLUTION INTRAVENOUS CONTINUOUS
Status: DISCONTINUED | OUTPATIENT
Start: 2020-12-10 | End: 2020-12-11

## 2020-12-10 RX ORDER — SUCCINYLCHOLINE CHLORIDE 100 MG/5ML
SYRINGE (ML) INTRAVENOUS AS NEEDED
Status: DISCONTINUED | OUTPATIENT
Start: 2020-12-10 | End: 2020-12-10 | Stop reason: HOSPADM

## 2020-12-10 RX ORDER — SODIUM CHLORIDE 0.9 % (FLUSH) 0.9 %
5-40 SYRINGE (ML) INJECTION EVERY 8 HOURS
Status: DISCONTINUED | OUTPATIENT
Start: 2020-12-11 | End: 2020-12-14 | Stop reason: HOSPADM

## 2020-12-10 RX ORDER — DIPHENHYDRAMINE HYDROCHLORIDE 50 MG/ML
12.5 INJECTION, SOLUTION INTRAMUSCULAR; INTRAVENOUS
Status: DISCONTINUED | OUTPATIENT
Start: 2020-12-10 | End: 2020-12-10 | Stop reason: HOSPADM

## 2020-12-10 RX ORDER — DOCUSATE SODIUM 100 MG/1
100 CAPSULE, LIQUID FILLED ORAL 2 TIMES DAILY
Status: DISCONTINUED | OUTPATIENT
Start: 2020-12-11 | End: 2020-12-14 | Stop reason: HOSPADM

## 2020-12-10 RX ORDER — BACITRACIN ZINC AND POLYMYXIN B SULFATE 500; 1000 [USP'U]/G; [USP'U]/G
OINTMENT TOPICAL AS NEEDED
Status: DISCONTINUED | OUTPATIENT
Start: 2020-12-10 | End: 2020-12-10 | Stop reason: HOSPADM

## 2020-12-10 RX ORDER — HYDROMORPHONE HYDROCHLORIDE 2 MG/ML
INJECTION, SOLUTION INTRAMUSCULAR; INTRAVENOUS; SUBCUTANEOUS AS NEEDED
Status: DISCONTINUED | OUTPATIENT
Start: 2020-12-10 | End: 2020-12-10 | Stop reason: HOSPADM

## 2020-12-10 RX ORDER — SODIUM CHLORIDE 0.9 % (FLUSH) 0.9 %
5-40 SYRINGE (ML) INJECTION AS NEEDED
Status: DISCONTINUED | OUTPATIENT
Start: 2020-12-10 | End: 2020-12-14 | Stop reason: HOSPADM

## 2020-12-10 RX ORDER — DIAZEPAM 5 MG/1
2.5 TABLET ORAL
Status: DISCONTINUED | OUTPATIENT
Start: 2020-12-10 | End: 2020-12-14 | Stop reason: HOSPADM

## 2020-12-10 RX ORDER — PROPOFOL 10 MG/ML
INJECTION, EMULSION INTRAVENOUS AS NEEDED
Status: DISCONTINUED | OUTPATIENT
Start: 2020-12-10 | End: 2020-12-10 | Stop reason: HOSPADM

## 2020-12-10 RX ORDER — FENTANYL CITRATE 50 UG/ML
INJECTION, SOLUTION INTRAMUSCULAR; INTRAVENOUS AS NEEDED
Status: DISCONTINUED | OUTPATIENT
Start: 2020-12-10 | End: 2020-12-10 | Stop reason: HOSPADM

## 2020-12-10 RX ORDER — HYDROMORPHONE HYDROCHLORIDE 1 MG/ML
0.5 INJECTION, SOLUTION INTRAMUSCULAR; INTRAVENOUS; SUBCUTANEOUS
Status: DISCONTINUED | OUTPATIENT
Start: 2020-12-10 | End: 2020-12-10 | Stop reason: HOSPADM

## 2020-12-10 RX ORDER — INSULIN LISPRO 100 [IU]/ML
INJECTION, SOLUTION INTRAVENOUS; SUBCUTANEOUS
Status: DISCONTINUED | OUTPATIENT
Start: 2020-12-10 | End: 2020-12-14 | Stop reason: HOSPADM

## 2020-12-10 RX ORDER — FENTANYL CITRATE 50 UG/ML
25 INJECTION, SOLUTION INTRAMUSCULAR; INTRAVENOUS AS NEEDED
Status: DISCONTINUED | OUTPATIENT
Start: 2020-12-10 | End: 2020-12-10 | Stop reason: HOSPADM

## 2020-12-10 RX ORDER — ROCURONIUM BROMIDE 10 MG/ML
INJECTION, SOLUTION INTRAVENOUS AS NEEDED
Status: DISCONTINUED | OUTPATIENT
Start: 2020-12-10 | End: 2020-12-10 | Stop reason: HOSPADM

## 2020-12-10 RX ORDER — NALOXONE HYDROCHLORIDE 4 MG/.1ML
SPRAY NASAL
Qty: 1 EACH | Refills: 0 | Status: ON HOLD
Start: 2020-12-10 | End: 2021-06-11 | Stop reason: CLARIF

## 2020-12-10 RX ORDER — OXYCODONE AND ACETAMINOPHEN 5; 325 MG/1; MG/1
1 TABLET ORAL
Status: DISCONTINUED | OUTPATIENT
Start: 2020-12-10 | End: 2020-12-14 | Stop reason: HOSPADM

## 2020-12-10 RX ORDER — DIPHENHYDRAMINE HYDROCHLORIDE 50 MG/ML
12.5 INJECTION, SOLUTION INTRAMUSCULAR; INTRAVENOUS
Status: DISCONTINUED | OUTPATIENT
Start: 2020-12-10 | End: 2020-12-14 | Stop reason: HOSPADM

## 2020-12-10 RX ORDER — NALOXONE HYDROCHLORIDE 0.4 MG/ML
0.1 INJECTION, SOLUTION INTRAMUSCULAR; INTRAVENOUS; SUBCUTANEOUS AS NEEDED
Status: CANCELLED | OUTPATIENT
Start: 2020-12-10

## 2020-12-10 RX ORDER — OXYCODONE AND ACETAMINOPHEN 5; 325 MG/1; MG/1
1.5 TABLET ORAL
Qty: 84 TAB | Refills: 0 | Status: SHIPPED | OUTPATIENT
Start: 2020-12-10 | End: 2020-12-20

## 2020-12-10 RX ORDER — CEFAZOLIN SODIUM/WATER 2 G/20 ML
2 SYRINGE (ML) INTRAVENOUS ONCE
Status: DISCONTINUED | OUTPATIENT
Start: 2020-12-10 | End: 2020-12-10 | Stop reason: HOSPADM

## 2020-12-10 RX ADMIN — PHENYLEPHRINE HYDROCHLORIDE 100 MCG: 10 INJECTION INTRAVENOUS at 20:03

## 2020-12-10 RX ADMIN — PROPOFOL 50 MG: 10 INJECTION, EMULSION INTRAVENOUS at 18:38

## 2020-12-10 RX ADMIN — ROCURONIUM BROMIDE 45 MG: 10 INJECTION, SOLUTION INTRAVENOUS at 18:29

## 2020-12-10 RX ADMIN — FELODIPINE 10 MG: 5 TABLET, EXTENDED RELEASE ORAL at 09:37

## 2020-12-10 RX ADMIN — ONDANSETRON HYDROCHLORIDE 4 MG: 2 INJECTION INTRAMUSCULAR; INTRAVENOUS at 20:16

## 2020-12-10 RX ADMIN — ROCURONIUM BROMIDE 20 MG: 10 INJECTION, SOLUTION INTRAVENOUS at 18:43

## 2020-12-10 RX ADMIN — INSULIN LISPRO 6 UNITS: 100 INJECTION, SOLUTION INTRAVENOUS; SUBCUTANEOUS at 06:50

## 2020-12-10 RX ADMIN — SUGAMMADEX 200 MG: 100 INJECTION, SOLUTION INTRAVENOUS at 20:16

## 2020-12-10 RX ADMIN — PHENYLEPHRINE HYDROCHLORIDE 100 MCG: 10 INJECTION INTRAVENOUS at 19:36

## 2020-12-10 RX ADMIN — GABAPENTIN 800 MG: 400 CAPSULE ORAL at 09:23

## 2020-12-10 RX ADMIN — PHENYLEPHRINE HYDROCHLORIDE 100 MCG: 10 INJECTION INTRAVENOUS at 18:24

## 2020-12-10 RX ADMIN — HYDROMORPHONE HYDROCHLORIDE 1 MG: 2 INJECTION, SOLUTION INTRAMUSCULAR; INTRAVENOUS; SUBCUTANEOUS at 20:21

## 2020-12-10 RX ADMIN — INSULIN LISPRO 3 UNITS: 100 INJECTION, SOLUTION INTRAVENOUS; SUBCUTANEOUS at 21:03

## 2020-12-10 RX ADMIN — FENTANYL CITRATE 25 MCG: 50 INJECTION, SOLUTION INTRAMUSCULAR; INTRAVENOUS at 21:11

## 2020-12-10 RX ADMIN — METOPROLOL SUCCINATE 25 MG: 25 TABLET, EXTENDED RELEASE ORAL at 09:23

## 2020-12-10 RX ADMIN — LISINOPRIL 5 MG: 5 TABLET ORAL at 09:23

## 2020-12-10 RX ADMIN — SODIUM CHLORIDE, SODIUM LACTATE, POTASSIUM CHLORIDE, AND CALCIUM CHLORIDE: 600; 310; 30; 20 INJECTION, SOLUTION INTRAVENOUS at 18:10

## 2020-12-10 RX ADMIN — LIDOCAINE HYDROCHLORIDE 80 MG: 20 INJECTION, SOLUTION EPIDURAL; INFILTRATION; INTRACAUDAL; PERINEURAL at 18:20

## 2020-12-10 RX ADMIN — PHENYLEPHRINE HYDROCHLORIDE 100 MCG: 10 INJECTION INTRAVENOUS at 19:16

## 2020-12-10 RX ADMIN — FENTANYL CITRATE 50 MCG: 50 INJECTION, SOLUTION INTRAMUSCULAR; INTRAVENOUS at 18:10

## 2020-12-10 RX ADMIN — KETAMINE HYDROCHLORIDE 20 MG: 10 INJECTION, SOLUTION INTRAMUSCULAR; INTRAVENOUS at 18:26

## 2020-12-10 RX ADMIN — ROCURONIUM BROMIDE 5 MG: 10 INJECTION, SOLUTION INTRAVENOUS at 18:20

## 2020-12-10 RX ADMIN — ISOSORBIDE MONONITRATE 30 MG: 30 TABLET, EXTENDED RELEASE ORAL at 09:23

## 2020-12-10 RX ADMIN — KETAMINE HYDROCHLORIDE 30 MG: 10 INJECTION, SOLUTION INTRAMUSCULAR; INTRAVENOUS at 18:20

## 2020-12-10 RX ADMIN — SODIUM CHLORIDE, SODIUM LACTATE, POTASSIUM CHLORIDE, AND CALCIUM CHLORIDE: 600; 310; 30; 20 INJECTION, SOLUTION INTRAVENOUS at 20:40

## 2020-12-10 RX ADMIN — PHENYLEPHRINE HYDROCHLORIDE 100 MCG: 10 INJECTION INTRAVENOUS at 18:41

## 2020-12-10 RX ADMIN — GLYCOPYRROLATE 0.2 MG: 0.2 INJECTION INTRAMUSCULAR; INTRAVENOUS at 18:36

## 2020-12-10 RX ADMIN — PHENYLEPHRINE HYDROCHLORIDE 100 MCG: 10 INJECTION INTRAVENOUS at 18:49

## 2020-12-10 RX ADMIN — INSULIN LISPRO 6 UNITS: 100 INJECTION, SOLUTION INTRAVENOUS; SUBCUTANEOUS at 12:15

## 2020-12-10 RX ADMIN — FENTANYL CITRATE 50 MCG: 50 INJECTION, SOLUTION INTRAMUSCULAR; INTRAVENOUS at 18:14

## 2020-12-10 RX ADMIN — ROCURONIUM BROMIDE 30 MG: 10 INJECTION, SOLUTION INTRAVENOUS at 18:37

## 2020-12-10 RX ADMIN — PROPOFOL 200 MG: 10 INJECTION, EMULSION INTRAVENOUS at 18:20

## 2020-12-10 RX ADMIN — Medication 120 MG: at 18:20

## 2020-12-10 NOTE — CONSULTS
Consult Note    Patient: Christina Olivares               Sex: male          DOA: 12/7/2020         YOB: 1940      Age:  68 y.o.        LOS:  LOS: 3 days              HPI:     Christina Olivares is a 68 y.o. male who has been seen for left upper and lower extremity weakness. He has noted over the week with worsening left upper extremity weakness. Has had some long-standing issues with the left hand curling, but this week getting worse and now with left lower extremity weakness. Has had one fall/slide and is concerned about more falls. Unable to use the left arm to eat. Difficulty lifting the left arm and difficulty with coordination of the left leg. Recently completed multiple myeloma treatment. Past Medical History:   Diagnosis Date    [facility]     Diabetes ([facility] [facility]     FH: chemotherapy     [facility]     Hypertension     [facility] ([facility]     Neuropathy        Past Surgical History:   Procedure Laterality Date    HX CHOLECYSTECTOMY      HX KYPHOPLASTY         History reviewed. No pertinent family history. Social History     Socioeconomic History    Marital status:      Spouse name: Not on file    Number of children: Not on file    Years of education: Not on file    Highest education level: Not on file   Tobacco Use    Smoking status: Former Smoker    Smokeless tobacco: Never Used   Substance and Sexual Activity    Alcohol use: Never     Frequency: Never     Comment: rare    Drug use: No       Prior to Admission medications    Medication Sig Start Date End Date Taking? Authorizing Provider   LENALIDOMIDE PO Take 25 mg by mouth. Yes Other, MD Annie   felodipine (PLENDIL SR) 10 mg 24 hr tablet Take 10 mg by mouth daily. Yes Other, MD Annie   spironolactone (ALDACTONE) 25 mg tablet Take  by mouth daily. Yes Other, MD Annie   pyridoxine, vitamin B6, (VITAMIN B-6) 50 mg tablet Take 50 mg by mouth daily.    Yes George, MD Annie   metOLazone (ZAROXOLYN) 10 mg tablet Take 5 mg by mouth daily. Yes George, MD Annie   rivaroxaban (XARELTO PO) Take 10 mg by mouth. Yes George, MD Annie   POTASSIUM CHLORIDE 20 mEq by Does Not Apply route. Yes George, MD Annie   valacyclovir HCl (VALTREX PO) Take  by mouth. Yes Annie Vazquez MD   omeprazole (PRILOSEC) 20 mg capsule Take 20 mg by mouth daily. Yes George, MD Annie   DEXAMETHASONE, BULK, by Does Not Apply route. Yes George, MD Annie   SITagliptin-metFORMIN (JANUMET) 50-1,000 mg per tablet Take 1 Tab by mouth two (2) times daily (with meals). Yes Provider, Historical   cholecalciferol (Vitamin D3) (1000 Units /25 mcg) tablet Take 1,000 Units by mouth daily. Yes Provider, Historical   cyanocobalamin (VITAMIN B12) 500 mcg tablet Take 1,000 mcg by mouth daily. Yes Provider, Historical   lisinopril (PRINIVIL, ZESTRIL) 5 mg tablet Take 1 Tab by mouth daily. Patient taking differently: Take 20 mg by mouth daily. 2/14/20  Yes Purnima Pratt MD   furosemide (LASIX) 40 mg tablet Take 1 Tab by mouth daily. 2/14/20  Yes Mariaelena Fox MD   isosorbide mononitrate ER (IMDUR) 30 mg tablet Take 1 Tab by mouth daily. 2/15/20  Yes Mariaelena Fox MD   metoprolol succinate (TOPROL-XL) 25 mg XL tablet Take 1 Tab by mouth daily. 2/15/20  Yes Mariaelena Fox MD   pravastatin (PRAVACHOL) 40 mg tablet Take 1 Tab by mouth nightly. 2/14/20  Yes Mariaelena Fox MD   aspirin 81 mg chewable tablet Take 81 mg by mouth daily. Yes George, MD Annie   metFORMIN (GLUCOPHAGE) 1,000 mg tablet Take 1,000 mg by mouth two (2) times daily (with meals). Yes Annie Vazquez MD   gabapentin (NEURONTIN) 400 mg capsule Take 800 mg by mouth two (2) times a day. Yes Annie Vazquez MD   SITagliptin (JANUVIA) 100 mg tablet Take 100 mg by mouth daily. Yes Annie Vazquez MD   calcium-cholecalciferol, d3, (CALCIUM 600 + D) 600-125 mg-unit tab Take  by mouth daily. Yes George, MD Annie   pioglitazone (ACTOS) 30 mg tablet Take 30 mg by mouth daily.    Yes Other, MD Annie Allergies   Allergen Reactions    Iodine Hives       Review of Systems  A comprehensive review of systems was negative except for that written in the History of Present Illness. Physical Exam:      Visit Vitals  BP (!) 141/55 (BP 1 Location: Right arm, BP Patient Position: At rest;Supine)   Pulse (!) 51   Temp 97.4 °F (36.3 °C)   Resp 16   Ht 5' 9\" (1.753 m)   Wt 113.7 kg (250 lb 11.2 oz)   SpO2 97%   BMI 37.02 kg/m²       Physical Exam:  Physical Exam:   General:  Alert, cooperative, no distress, appears stated age. Eyes:  Conjunctivae/corneas clear. PERRL, EOMs intact. Fundi benign   Ears:  Normal TMs and external ear canals both ears. Nose: Nares normal. Septum midline. Mucosa normal. No drainage or sinus tenderness. Mouth/Throat: Lips, mucosa, and tongue normal. Teeth and gums normal.   Neck: Supple, symmetrical, trachea midline, no adenopathy, thyroid: no enlargement/tenderness/nodules, no carotid bruit and no JVD. Back:   Symmetric, no curvature. ROM normal. No CVA tenderness. Lungs:   Clear to auscultation bilaterally. Heart:  Regular rate and rhythm, S1, S2 normal, no murmur, click, rub or gallop. Abdomen:   Soft, non-tender. Bowel sounds normal. No masses,  No organomegaly. Extremities: Extremities normal, atraumatic, no cyanosis or edema. Pulses: 2+ and symmetric all extremities. Skin: Skin color, texture, turgor normal. No rashes or lesions   Lymph nodes: Cervical, supraclavicular, and axillary nodes normal.   Neurologic: CNII-XII intact. Normal strength, sensation throughout right UE and bilateral LE. Left UE with 3/5 triceps. Unable to extend MCP joints on the left.  + Galicia's reflex bilaterally. Labs Reviewed:  CBC:   Lab Results   Component Value Date/Time    WBC 4.7 12/10/2020 04:06 AM    HGB 9.8 (L) 12/10/2020 04:06 AM    HCT 28.0 (L) 12/10/2020 04:06 AM     12/10/2020 04:06 AM        MRI C-Spine: 1.  Multilevel degenerative disc disease and facet arthropathy with associated  minimal C3-C4, moderately severe C4-C5, and moderate C5-C6 cervical cord  impingement. There is cervical cord abutment without deformation at C2-C3 and  C6-C7 levels. 2. Facet arthropathy and uncovertebral proliferative changes cause several  high-grade foraminal stenoses, as delineated level by level. 3. Diffusely abnormal marrow signal intensity throughout cervical and upper  thoracic spine, osseous metastatic disease versus multiple myeloma. Assessment/Plan     Principal Problem:    Left hemiparesis ([address] (12/8/2020)    Active Problems:    Type 2 diabetes mellitus ([address] (2/12/2020)      Multiple myeloma ([address] (2/16/2020)      Morbid obesity ([address] (2/17/2020)      Hypokalemia (12/7/2020)      Neuropathy ()      Hypomagnesemia (12/7/2020)      Elevated troponin (12/7/2020)      Weakness (12/7/2020)        68 y.o. male with severe spinal stenosis and left upper and lower extremity weakness. Discussed risks and benefits of surgical and non-surgical options and he would like to proceed with surgical C3-7 posterior cervical decompression and fusion. 1)  Discontinue Aspirin. 2) Medical clearance and the risks related to bleeding from recent chemotherapy treatments. 3) Cardiology clearance. 4) Will plan on surgery this evening if cleared by medical teams.

## 2020-12-10 NOTE — PROGRESS NOTES
[address] [facility] care of patient from [facility] [address]. Yusuf Dietz.

## 2020-12-10 NOTE — PROGRESS NOTES
Occupational Therapy Treatment Attempt    Chart reviewed. Attempted Occupational Therapy Treatment, however, patient unable to be seen due to:  []  Nausea/vomiting  []  Eating  []  Pain  []  Patient too lethargic  []  Off Unit for testing/procedure  []  Dialysis treatment in progress  []  Telemetry Results  [x]  Other: Pt getting CHG bath in prep for Cervical decompression (Dr. Cyrus Cardoza) scheduled for 1800 this date. Will reassess on next date.    Thank you for this referral.  Vicki Vann, OTR/L, CSRS, CDCS, CFPS

## 2020-12-10 NOTE — PROGRESS NOTES
Cardiology Progress Note        Patient: Alexi Gomez        Sex: male          DOA: 12/7/2020  YOB: 1940      Age:  68 y.o.        LOS:  LOS: 3 days   Assessment/Plan     Principal Problem:    Left hemiparesis ([address] (12/8/2020)    Active Problems:    Type 2 diabetes mellitus ([address] (2/12/2020)      Multiple myeloma ([address] (2/16/2020)      Morbid obesity ([address] (2/17/2020)      Hypokalemia (12/7/2020)      Neuropathy ()      Hypomagnesemia (12/7/2020)      Elevated troponin (12/7/2020)      Weakness (12/7/2020)        Plan:  Cardiac telemetry stable      Left-sided weakness   minimal troponin elevation   cardiomyopathy   left bundle-branch block   spinal stenosis       no evidence of ACS / decompensated CHF or valvular heart disease. stress test in February, 2020 was negative for ischemia. EF is now better at 50%  Patient have elevated but acceptable risk for spinal surgery. Discussed with patient and he expressed verbalized understanding. Patient is cleared for spinal surgery. Please call if any questions/concerned. Thx              Subjective:    cc:  Left-sided weakness  minimal troponin elevation  Cardiomyopathy   left bundle-branch block   spinal stenosis        REVIEW OF SYSTEMS:     General: No fevers or chills. Cardiovascular: No chest pain or pressure. No palpitations. No ankle swelling  Pulmonary: No SOB, orthopnea, PND  Gastrointestinal: No nausea, vomiting or diarrhea      Objective:      Visit Vitals  /63   Pulse 60   Temp 97.7 °F (36.5 °C)   Resp 16   Ht 5' 9\" (1.753 m)   Wt 113.7 kg (250 lb 11.2 oz)   SpO2 100%   BMI 37.02 kg/m²     Body mass index is 37.02 kg/m². Physical Exam:  General Appearance: Comfortable, not using accessory muscles of respiration. NECK: No JVD, no thyroidomeglay. LUNGS: Clear bilaterally. HEART: S1+S2 audible,    ABD: Non-tender, BS Audible    EXT: No edema, and no cysnosis.   VASCULAR EXAM: Pulses are intact. PSYCHIATRIC EXAM: Mood is appropriate. Medication:  Current Facility-Administered Medications   Medication Dose Route Frequency    insulin lispro (HUMALOG) injection   SubCUTAneous AC&HS    insulin glargine (LANTUS) injection 10 Units  10 Units SubCUTAneous QHS    insulin lispro (HUMALOG) injection 3 Units  3 Units SubCUTAneous TIDAC    potassium chloride (K-DUR, KLOR-CON) SR tablet 40 mEq  40 mEq Oral NOW    dexAMETHasone (DECADRON) tablet 20 mg  20 mg Oral Q7D    calcium-vitamin D (OS-NUVIA +D3) 500 mg-200 unit per tablet 1 Tab  1 Tab Oral DAILY    felodipine (PLENDIL SR) 24 hr tablet 10 mg  10 mg Oral DAILY    furosemide (LASIX) tablet 40 mg  40 mg Oral DAILY    lisinopriL (PRINIVIL, ZESTRIL) tablet 5 mg  5 mg Oral DAILY    metOLazone (ZAROXOLYN) tablet 5 mg  5 mg Oral DAILY    spironolactone (ALDACTONE) tablet 25 mg  25 mg Oral DAILY    glucose chewable tablet 16 g  4 Tab Oral PRN    glucagon (GLUCAGEN) injection 1 mg  1 mg IntraMUSCular PRN    cyanocobalamin tablet 1,000 mcg  1,000 mcg Oral DAILY    gabapentin (NEURONTIN) capsule 800 mg  800 mg Oral BID    isosorbide mononitrate ER (IMDUR) tablet 30 mg  30 mg Oral DAILY    lenalidomide cap 25 mg (Patient Supplied)  25 mg Oral DAILY    metoprolol succinate (TOPROL-XL) XL tablet 25 mg  25 mg Oral DAILY    pravastatin (PRAVACHOL) tablet 40 mg  40 mg Oral QHS    pyridoxine (vitamin B6) (VITAMIN B-6) tablet 50 mg  50 mg Oral DAILY    valACYclovir (VALTREX) tablet 500 mg  500 mg Oral BID               Lab/Data Reviewed:  Procedures/imaging: see electronic medical records for all procedures/Xrays   and details which were not copied into this note but were reviewed prior to creation of Plan       All lab results for the last 24 hours reviewed.            Recent Labs     12/10/20  0406 12/09/20  0350 12/08/20  0012   WBC 4.7 5.3 4.2*   HGB 9.8* 10.0* 10.0*   HCT 28.0* 29.1* 29.2*    204 179     Recent Labs     12/10/20  0406 12/09/20  0350 12/08/20  0012   * 137 139   K 3.5 3.6 3.1*   CL 96* 104 102   CO2 28 27 29   * 145* 125*   BUN 12 7 8   CREA 0.91 0.79 0.75   CA 8.9 8.1* 8.1*       RADIOLOGY:    CXR Results  (Last 48 hours)    None            Cardiology Procedures:   Results for orders placed or performed during the hospital encounter of 12/07/20   EKG, 12 LEAD, INITIAL   Result Value Ref Range    Ventricular Rate 83 BPM    Atrial Rate 83 BPM    P-R Interval 180 ms    QRS Duration 126 ms    Q-T Interval 378 ms    QTC Calculation (Bezet) 444 ms    Calculated P Axis 53 degrees    Calculated R Axis 23 degrees    Calculated T Axis 174 degrees    Diagnosis       Sinus rhythm with ventricular fusion beats  paroxysmal LBBB  T wave abnormality, consider lateral ischemia  Abnormal ECG  When compared with ECG of 16-FEB-2020 06:15,  Significant changes have occurred  Confirmed by Salvatore Lozada MD. ([id number]) on 12/7/2020 2:17:40 PM        Echo Results  (Last 48 hours)    None       Cardiolite (Tc-99m Sestamibi) stress test    Signed By: Victorina Interiano MD     December 10, 2020

## 2020-12-10 NOTE — DIABETES MGMT
GLYCEMIC CONTROL PROGRESS NOTE:    - discussed in rounds, known h/o T2DM, HbA1C within recommended range for age + comorbids  - Prednisone 50 mg x3 doses yesterday, steroid associated hyperglycemia  - BG out of target range non-ICU: < 180 mg/dL  - TDD = 27 (5 Lantus + 22 units - Humalog Normal Insulin Sensitivity Corrective Coverage)  - FBG out of target range r/t steroids        Recent Glucose Results:   Lab Results   Component Value Date/Time     (H) 12/10/2020 04:06 AM    GLUCPOC 286 (H) 12/10/2020 06:10 AM    GLUCPOC 420 (HH) 12/09/2020 09:06 PM    GLUCPOC 302 (H) 12/09/2020 04:30 PM     Luis Diaz MS, RN, CDE  Glycemic Control Team  [phone]  Pager [phone] (M-TH 8:00-4:30P)  *After Hours pager [phone]

## 2020-12-10 NOTE — PROGRESS NOTES
Bedside and Verbal shift change report given to Emmy James RN (oncoming nurse) by TAYA Friedman RN (offgoing nurse). Report included the following information SBAR, Kardex, Intake/Output, MAR and Recent Results.

## 2020-12-10 NOTE — PROGRESS NOTES
Phone: [phone]  Paging : [phone]      Hematology / Oncology Progress Note    Admit Date: 12/7/2020    Assessment:     · Hem/Onc Issues:  MM, unclear type, diagnosed 2 weeks ago and was started on revlimid/dex based chemo in [facility]   · Reasons for Admission: Left side weakness and hemiparesis due to cervical myelopathy  · Other Active Issues:    · MRI of C spine revealed Multilevel degenerative disc disease and facet arthropathy with associated minimal C3-C4, moderately severe C4-C5, and moderate C5-C6 cervical cord impingement. There is cervical cord abutment without deformation at C2-C3 and C6-C7 levels. This could account for his upper extremity weakness/numbness. · MRI of L spine also showed numerous osseous lesions throughout visualized lower thoracic spine, lumbar spine, and osseous pelvis. Indeterminant to what extent these findings represent diffuse osseous metastatic disease versus multiple myeloma. No evident acute pathologic fracture. Unchanged chronic T11 and L1 wedge compression deformities. Previous L1 vertebral augmentation. Multilevel degenerative disc disease and facet arthrosis with mild to moderate canal and stenoses. There is abutment of the ventral distal thoracic cord at T11-T12. No high-grade stenosis or definite nerve root impingement elsewhere. Principal Problem:    Left hemiparesis ([address] (12/8/2020)    Active Problems:    Type 2 diabetes mellitus ([address] (2/12/2020)      Multiple myeloma ([address] (2/16/2020)      Morbid obesity ([address] (2/17/2020)      Hypokalemia (12/7/2020)      Neuropathy ()      Hypomagnesemia (12/7/2020)      Elevated troponin (12/7/2020)      Weakness (12/7/2020)        Plan:     · Going for C-spine decompression and fusion today  · Oncologically, his SPEP is still pending, but judging from his protein level and unremarkable immunoglobulin level, I doubt he has hyperviscosity syndrome. Serum viscosity level is still pending.  Continue revlimid and dex   · Zometa ordered yesterday   · No other oncologic intervention. · Once discharged, follow up with his oncologist at [facility].  · We will sign off    Subjectives:     Left sided leg and arm weakness and numbness    Objectives:      VITAL SIGNS:   Patient Vitals for the past 24 hrs:   BP Temp Pulse Resp SpO2 Weight   12/10/20 0753 (!) 126/41 98.1 °F (36.7 °C) 68 16 99 % --   12/10/20 0500 (!) 141/55 97.4 °F (36.3 °C) (!) 51 16 -- --   12/10/20 0149 -- -- -- -- -- 113.7 kg (250 lb 11.2 oz)   12/09/20 2326 132/61 98.3 °F (36.8 °C) 63 16 97 % --   12/09/20 1959 122/60 98.4 °F (36.9 °C) 79 18 97 % --   12/09/20 1631 (!) 122/52 98.9 °F (37.2 °C) 68 18 97 % --   12/09/20 1150 136/69 98.8 °F (37.1 °C) 71 18 99 % --   12/09/20 0825 (!) 155/59 98.4 °F (36.9 °C) 69 18 100 % --     GENERAL: normal appearance, no acute distress. HEENT: conjunctivae normal, eyelids normal, PERRLA, anicteric, external ears and nose normal, hearing grossly normal.   NECK: supple, no masses. LUNG: breathing comfortably, lungs clear to auscultation and percussion. CARDIOVASCULAR: normal heart sounds, heart rhythm regular, no peripheral edema. ABDOMEN: soft. bowel sounds normal, non-tender non distension, no hepatosplenomegaly   PELVIS: pelvic exam deferred. RECTUM: rectal exam deferred. LYMPH NODES: no cervical adenopathy, no axillary adenopathy, no supraclavicular adenopathy, no infraclavicular adenopathy. SKIN: no rash, no petechiae, no ecchymosis, no pallor, no cutaneous nodules. MUSCULOSKELETAL: normal muscle strength. NEUROLOGIC: no focal motor deficit, normal gait, no abnormal mental status. I did not appreciate much weakness. PSYCHIATRIC: oriented to person, time and place, mood and affect appropriate.       Medications:      Current Medications:    Current Facility-Administered Medications   Medication Dose Route Frequency    dexAMETHasone (DECADRON) tablet 20 mg  20 mg Oral Q7D    insulin glargine (LANTUS) injection 5 Units  5 Units SubCUTAneous QHS    calcium-vitamin D (OS-NUVIA +D3) 500 mg-200 unit per tablet 1 Tab  1 Tab Oral DAILY    felodipine (PLENDIL SR) 24 hr tablet 10 mg  10 mg Oral DAILY    furosemide (LASIX) tablet 40 mg  40 mg Oral DAILY    lisinopriL (PRINIVIL, ZESTRIL) tablet 5 mg  5 mg Oral DAILY    metOLazone (ZAROXOLYN) tablet 5 mg  5 mg Oral DAILY    spironolactone (ALDACTONE) tablet 25 mg  25 mg Oral DAILY    insulin lispro (HUMALOG) injection   SubCUTAneous AC&HS    glucose chewable tablet 16 g  4 Tab Oral PRN    glucagon (GLUCAGEN) injection 1 mg  1 mg IntraMUSCular PRN    cyanocobalamin tablet 1,000 mcg  1,000 mcg Oral DAILY    gabapentin (NEURONTIN) capsule 800 mg  800 mg Oral BID    isosorbide mononitrate ER (IMDUR) tablet 30 mg  30 mg Oral DAILY    lenalidomide cap 25 mg (Patient Supplied)  25 mg Oral DAILY    metoprolol succinate (TOPROL-XL) XL tablet 25 mg  25 mg Oral DAILY    pravastatin (PRAVACHOL) tablet 40 mg  40 mg Oral QHS    pyridoxine (vitamin B6) (VITAMIN B-6) tablet 50 mg  50 mg Oral DAILY    valACYclovir (VALTREX) tablet 500 mg  500 mg Oral BID       Allergies:     Allergies   Allergen Reactions    Iodine Hives         Ancillary Study Results:      Laboratory:    Recent Results (from the past 24 hour(s))   GLUCOSE, POC    Collection Time: 12/09/20 11:48 AM   Result Value Ref Range    Glucose (POC) 186 (H) 70 - 110 mg/dL   GLUCOSE, POC    Collection Time: 12/09/20  4:30 PM   Result Value Ref Range    Glucose (POC) 302 (H) 70 - 110 mg/dL   GLUCOSE, POC    Collection Time: 12/09/20  9:06 PM   Result Value Ref Range    Glucose (POC) 420 (HH) 70 - 110 mg/dL   CBC WITH AUTOMATED DIFF    Collection Time: 12/10/20  4:06 AM   Result Value Ref Range    WBC 4.7 4.6 - 13.2 K/uL    RBC 3.37 (L) 4.70 - 5.50 M/uL    HGB 9.8 (L) 13.0 - 16.0 g/dL    HCT 28.0 (L) 36.0 - 48.0 %    MCV 83.1 74.0 - 97.0 FL    MCH 29.1 24.0 - 34.0 PG    MCHC 35.0 31.0 - 37.0 g/dL    RDW 15.2 (H) 11.6 - 14.5 %    PLATELET 026 526 - 804 K/uL    MPV 10.1 9.2 - 11.8 FL    NEUTROPHILS 72 40 - 73 %    LYMPHOCYTES 18 (L) 21 - 52 %    MONOCYTES 10 3 - 10 %    EOSINOPHILS 0 0 - 5 %    BASOPHILS 0 0 - 2 %    ABS. NEUTROPHILS 3.4 1.8 - 8.0 K/UL    ABS. LYMPHOCYTES 0.8 (L) 0.9 - 3.6 K/UL    ABS. MONOCYTES 0.5 0.05 - 1.2 K/UL    ABS. EOSINOPHILS 0.0 0.0 - 0.4 K/UL    ABS. BASOPHILS 0.0 0.0 - 0.1 K/UL    DF AUTOMATED     MAGNESIUM    Collection Time: 12/10/20  4:06 AM   Result Value Ref Range    Magnesium 2.0 1.6 - 2.6 mg/dL   METABOLIC PANEL, BASIC    Collection Time: 12/10/20  4:06 AM   Result Value Ref Range    Sodium 132 (L) 136 - 145 mmol/L    Potassium 3.5 3.5 - 5.5 mmol/L    Chloride 96 (L) 100 - 111 mmol/L    CO2 28 21 - 32 mmol/L    Anion gap 8 3.0 - 18 mmol/L    Glucose 249 (H) 74 - 99 mg/dL    BUN 12 7.0 - 18 MG/DL    Creatinine 0.91 0.6 - 1.3 MG/DL    BUN/Creatinine ratio 13 12 - 20      GFR est AA >60 >60 ml/min/1.73m2    GFR est non-AA >60 >60 ml/min/1.73m2    Calcium 8.9 8.5 - 10.1 MG/DL   GLUCOSE, POC    Collection Time: 12/10/20  6:10 AM   Result Value Ref Range    Glucose (POC) 286 (H) 70 - 110 mg/dL         Radiology:   Xr Shoulder Lt Ap/lat Min 2 V    Result Date: 12/7/2020  EXAM:  XR SHOULDER LT AP/LAT MIN 2 V INDICATION: Left shoulder pain COMPARISON: None. FINDINGS: Two views of the left shoulder demonstrate no acute finding. A few scattered and confluent lucencies are seen in the scapula, proximal humerus and clavicle. Impression: No acute findings or significant degenerative change appreciated. Scattered small bone lucencies noted compatible with history of multiple myeloma. Mri Brain Wo Cont    Result Date: 12/8/2020  EXAM: MRI brain without gadolinium CLINICAL INDICATION/HISTORY: Extremity weakness, possible CVA   > Additional: None.  COMPARISON: 2/16/2020   > Reference Exam: CT head 12/7/2020 TECHNIQUE: Sagittal T1, axial T1, T2, FLAIR, T2 gradient, diffusion and coronal T1 and T2 sequences obtained of the brain. _______________ FINDINGS: Diffusion:  There are no areas of restricted diffusion, no evidence of an acute infarction. Brain parenchyma:  Stable very mild periventricular white matter and central pontine increased T2 and FLAIR signal. No new cortical or white matter signal abnormality with no evidence of mass hemorrhage edema or mass effect. Ventricles and sulci:  Mild diffuse central and cortical volume loss. Extra-axial:  No extra-axial fluid collection or mass is noted. Brain vasculature:  No vascular abnormality is appreciated on this routine brain MR examination. Craniocervical junction:  Cervical medullary junction unremarkable. Interval increased development of likely degenerative Schmorl's node endplate defect inferior C2 vertebral body. Suggestion of possible worsening of ligamentum flavum thickening C2 and C3 with some questionable cord flattening and canal stenosis, limited evaluation on this study. Skull base, extracranial and calvarium:  Previous bilateral lens implants with stable bilateral proptosis with no retrobulbar abnormality. Sinuses and IACs unremarkable. Minimal increased T2 signal inferior right mastoid. Sella unremarkable. Multiple small low T1 and increased T2 signal foci throughout the intradiploic space of the skull correlating to small lytic lesions on CT. _______________     IMPRESSION: 1. No evidence of acute infarct or other acute intracranial finding. 2. Stable very mild white matter signal changes nonspecific probable chronic microvascular ischemic disease. 3. Questionable interval increased acquired canal stenosis and cord flattening C2 and C3 levels limited evaluation on this study. If there is clinical concern for cervical myelopathy, further evaluation could be performed with MRI dedicated to cervical spine. 4. Multiple small T2 hyperintense skull lesions concerning for metastatic disease or multiple myeloma.     Mri Cerv Spine Wo Cont    Result Date: 12/8/2020  EXAM: MRI OF THE CERVICAL SPINE, WITHOUT IV CONTRAST CLINICAL INDICATION/HISTORY: Left extremity weakness, hemiparesis COMPARISON: Brain MRI 12/7/2020, lumbar spine MRI 12/8/2020 TECHNIQUE: T1 weighted sagittal and axial, STIR and T2 FSE sagittal, T2 FSE axial. _______________ FINDINGS: OSSEOUS, CERVICAL ALIGNMENT, CRANIOCERVICAL JUNCTION: There is gradual reversal of the normal cervical lordosis, apex at C3-C4. No listhesis. Extensive abnormal marrow signal intensity throughout all the visualized cervical spine and upper thoracic vertebral levels. There is also heterogeneous signal abnormality throughout visualized skull base. No clearly acute pathologic fracture. Striated edema is seen throughout paraspinal musculature extending between C2 and C5 posterior spinous processes. Moderate degenerative osteoarthropathy of the atlantoaxial articulation. CERVICAL CORD, VISUALIZED POSTERIOR FOSSA: Visualized posterior fossa is unremarkable. No Chiari I malformation. Cord morphology and signal intensity are unremarkable throughout. PARASPINOUS SOFT TISSUES, VISUALIZED SOFT TISSUE NECK: Visualized soft tissues are unremarkable. C2-C3: Mild broad-based disc bulge effaces ventral thecal sac CSF and abuts but does not deform the adjacent ventral cervical cord. Mild right and moderate left facet arthropathy. Moderate overall canal stenosis, largest AP dimension of the thecal sac measuring 7 mm. Mild bilateral foraminal stenoses. C3-C4: Mild broad-based disc bulge effaces ventral thecal sac CSF and causes minimal concave deformation of the ventral cervical cord, eccentric to the left. Moderate bilateral facet arthropathy. Left greater than right uncovertebral proliferative changes with moderately severe left and moderate right foraminal stenoses.  C4-C5: Moderate broad-based disc osteophyte complex, eccentric to the right with moderately severe concave deformation of the ventral cervical cord, most prominent along right lateral aspect. No cervical cord signal abnormality. Moderate bilateral facet arthropathy. Prominent uncovertebral proliferative changes with severe right and left foraminal stenoses. C5-C6: Moderate broad-based disc osteophyte complex effaces ventral thecal sac CSF and causes moderate concave deformation of the ventral cervical cord. Moderate bilateral facet arthropathy. Prominent uncovertebral proliferative changes with severe left and right foraminal stenoses. C6-C7: Moderate broad-based disc bulge, eccentric to the right with abutment of ventral right paracentral cervical cord but no definite cord deformation. Moderate bilateral facet arthropathy. Prominent uncovertebral proliferative changes with severe left and right foraminal stenoses. C7-T1: No significant disc pathology. Mild right and severe left facet arthropathy. Moderately severe left foraminal stenosis. Canal and right foramen remain adequate. _______________     IMPRESSION: 1. Multilevel degenerative disc disease and facet arthropathy with associated minimal C3-C4, moderately severe C4-C5, and moderate C5-C6 cervical cord impingement. There is cervical cord abutment without deformation at C2-C3 and C6-C7 levels. 2. Facet arthropathy and uncovertebral proliferative changes cause several high-grade foraminal stenoses, as delineated level by level. 3. Diffusely abnormal marrow signal intensity throughout cervical and upper thoracic spine, osseous metastatic disease versus multiple myeloma. Mri Lumb Spine Wo Cont    Result Date: 12/8/2020  EXAM: MRI OF THE LUMBAR SPINE, WITHOUT IV CONTRAST CLINICAL INDICATION/HISTORY: Left lower extremity weakness, hemiparesis COMPARISON: 12/13/2019 TECHNIQUE: T1 weighted sagittal and axial, STIR and T2 FSE sagittal, T2 FSE axial. _______________ FINDINGS: OSSEOUS, LUMBAR ALIGNMENT: Hypointense T1 and T2 signal intensity throughout the L1 vertebral body related to previous vertebral augmentation.  Moderate L1 vertebral body height loss. Diffusely heterogeneous marrow signal intensity is seen throughout all of the lower thoracic and lumbar spine vertebra as well as throughout visualized portions of the osseous pelvis. Most of the tiny small foci of signal abnormality measure 2-6 mm in size. Signal abnormality also extends throughout middle and posterior column, to include several slightly larger regions of osseous metastatic disease involving the posterior spinous processes and transverse processes. No clearly acute pathologic fracture. Moderate T11 wedge compression deformity with asymmetric anterior vertebral body height loss, unchanged compared to previous MRI. There are mild degenerative changes of the left and right sacroiliac joints. PARASPINAL AND RETROPERITONEAL SOFT TISSUES: Simple appearing subcentimeter right renal cysts. No acute abnormality throughout the visualized retroperitoneum. OTHER: Conus medullaris ends at the L2 vertebral body level. Correlation of axial and sagittal images reveals the following: T11-T12: Visualized only on sagittal sequences. Moderate broad-based disc bulge and endplate spondylosis efface ventral thecal sac CSF. There is abutment but no definite depression of the adjacent thoracic cord. Moderate overall canal stenosis, largest AP dimension of the thecal sac measuring just over 6 mm. Moderate bilateral facet arthropathy. Moderate bilateral foraminal stenoses. T12-L1: Minimal broad-based disc bulge and subtle unchanged retropulsion of the L1 posterior wall flattened ventral thecal sac. Moderate bilateral facet arthropathy and ligamentum flavum buckling. Moderate canal stenosis. Mild right and left foraminal stenoses. L1-L2: Minimal broad-based disc bulge flattens ventral thecal sac. Moderate bilateral facet arthropathy and ligamentum flavum buckling. Canal remains adequate. Mild left and right foraminal stenoses. L2-L3: Moderate broad-based disc bulge flattens ventral thecal sac. Moderately severe bilateral facet arthropathy and ligamentum flavum buckling. Moderate overall canal stenosis, largest AP dimension of the thecal sac measuring just under 6 mm. No high-grade lateral recess effacement. Mild right and moderate left foraminal stenoses. L3-L4: Moderate broad-based disc bulge, slightly eccentric to the right, with partial effacement of left and right lateral recesses. Moderately severe bilateral facet arthropathy and ligamentum flavum buckling. Moderate overall canal stenosis, largest AP dimension of the thecal sac measuring just under 7 mm. Mild right and moderate left foraminal stenoses. L4-L5: Mild broad-based disc bulge flattens ventral thecal sac. Moderately severe bilateral facet arthropathy and ligamentum flavum buckling. Mild to moderate overall canal stenosis, largest AP dimension of the thecal sac measuring just over 7 mm. Mild bilateral foraminal stenoses. L5-S1: Minimal broad-based disc bulge abuts ventral thecal sac. Severe bilateral facet arthropathy. Canal remains adequate. Mild right and moderate left foraminal stenoses, neither with associated foraminal nerve root impingement. _______________     IMPRESSION: 1. Numerous osseous lesions throughout visualized lower thoracic spine, lumbar spine, and osseous pelvis. Indeterminant to what extent these findings represent diffuse osseous metastatic disease versus multiple myeloma. No evident acute pathologic fracture. Unchanged chronic T11 and L1 wedge compression deformities. Previous L1 vertebral augmentation. 2. Multilevel degenerative disc disease and facet arthrosis with mild to moderate canal and stenoses. There is abutment of the ventral distal thoracic cord at T11-T12. No high-grade stenosis or definite nerve root impingement elsewhere.     Ct Head Wo Cont    Result Date: 12/7/2020  EXAM: CT head HISTORY: -Provided with order: left sided weakness, stroke? -Additional: None COMPARISON: 02/16/20 TECHNIQUE: Axial CT imaging of the head was performed without intravenous contrast. Sagittal and coronal reconstructions were created from the axial data. One or more dose reduction techniques were used on this CT: automated exposure control, adjustment of the mAs and/or kVp according to patient size, and iterative reconstruction techniques. The specific techniques used on this CT exam have been documented in the patient's electronic medical record. Digital Imaging and Communications in Medicine (DICOM) format image data are available to nonaffiliated external healthcare facilities or entities on a secure, media free, reciprocally searchable basis with patient authorization for at least a 12-month period after this study. _______________ FINDINGS: BRAIN, POSTERIOR FOSSA, EXTRA-AXIAL SPACES AND MENINGES:  The sulci, folia, ventricles and basal cisterns are   within normal limits for the patient's age. There are no areas of abnormal parenchymal attenuation. There is no intracranial hemorrhage, mass effect, or midline shift. There are no abnormal extra-axial fluid collections. CALVARIUM: Progressive lucent lesions in the calvarium. SINUSES/MASTOIDS: Slight left maxillary sinus mucosal thickening. OTHER: None. _______________     IMPRESSION: 1. No acute intracranial abnormalities are identified. 2. Progressive lucent/lytic lesions in the calvarium, which can be seen in the setting of metastatic disease or myeloma. Please note that head CT may be negative in the acute setting and MRI could best further evaluate for acute/subacute ischemia/infarct in the high/persistent index of clinical suspicion. Xr Chest Port    Result Date: 12/7/2020  HISTORY: -Provided with order: left shoulder pain -Additional: None Technique : AP PORTABLE CHEST Comparison : 02/16/20 FINDINGS: HEART AND MEDIASTINUM: Right IJ central venous catheter tip projects over expected SVC. External monitoring wires leads and pads partially obscures visibility.  LUNGS AND PLEURAL SPACES: No consolidation, congestive heart failure, pleural effusion or pneumothorax. BONY THORAX AND SOFT TISSUES: No acute osseous abnormality. Impression: No plain film evidence of acute cardiopulmonary disease, allowing for technique. Sammie Spencer.  Arline Pritchett MD, PhD, [address]  Phone: [phone]  Pager: [phone]

## 2020-12-10 NOTE — PROGRESS NOTES
Hospitalist Progress Note-critical care note     Patient: Nadine Godfrey MRN: [id number]  CSN: [id number]    YOB: 1940  Age: 68 y.o. Sex: male    DOA: 12/7/2020 LOS:  LOS: 3 days            Chief complaint: left hemiparesis, htn, hypokalemia, hypoKlemia and hypomagnesemia , dm2    Assessment/Plan         Hospital Problems  Date Reviewed: 2/17/2020          Codes Class Noted POA    * (Principal) Left hemiparesis ([facility] ICD-10-CM: G81.94  ICD-9-CM: 342.90  12/8/2020 Unknown        Hypokalemia ICD-10-CM: E87.6  ICD-9-CM: 276.8  12/7/2020 Unknown        Neuropathy ICD-10-CM: G62.9  ICD-9-CM: 355.9  Unknown Unknown        Hypomagnesemia ICD-10-CM: E83.42  ICD-9-CM: 275.2  12/7/2020 Unknown        Elevated troponin ICD-10-CM: R77.8  ICD-9-CM: 790.6  12/7/2020 Unknown        Weakness ICD-10-CM: R53.1  ICD-9-CM: 780.79  12/7/2020 Unknown        Morbid obesity ([facility] ICD-10-CM: E66.01  ICD-9-CM: 278.01  2/17/2020 Yes        Multiple myeloma ([facility] ICD-10-CM: C90.00  ICD-9-CM: 203.00  2/16/2020 Yes        Type 2 diabetes mellitus ([facility] ICD-10-CM: E11.9  ICD-9-CM: 250.00  2/12/2020 Yes                Neuropathy , and weakness  Mri brain no acute stroke   Mri c/lumbar spine noted   Will have surgery today per ortho e     Hypokalemia, hypomagnesemia  Resolved   Mg at 2, will give K to optimize   Replacement      Elevated  troponin  Continue cardiac monitoring  No chest pain  Check ce trend   Dr. Song Palm is on board-no intervention needed         MM   lenalidomide   Oncologist on board-will start zometa per oncologist      DM type II , with complication,  - increase  lantus tp 10 , ssi, diabetic diet , hypoglycemia protocol    On steroid now       HTN, accelerated  Restart other home medication      Morbid obesity  Lifestyle modification     Subjective: I am ready for surgery        Disposition :tbd,   Review of systems:    General: No fevers or chills. Cardiovascular: No chest pain or pressure. No palpitations. Pulmonary: No shortness of breath. Gastrointestinal: No nausea, vomiting. Vital signs/Intake and Output:  Visit Vitals  /63   Pulse 62   Temp 98 °F (36.7 °C)   Resp 16   Ht 5' 9\" (1.753 m)   Wt 113.7 kg (250 lb 11.2 oz)   SpO2 99%   BMI 37.02 kg/m²     Current Shift:  12/10 0701 - 12/10 1900  In: -   Out: 550 [Urine:550]  Last three shifts:  12/08 1901 - 12/10 0700  In: 360 [P.O.:360]  Out: 3050 [Urine:3050]    Physical Exam:  General: WD, WN. Alert, cooperative, no acute distress    HEENT: NC, Atraumatic. PERRLA, anicteric sclerae. Lungs: CTA Bilaterally. No Wheezing/Rhonchi/Rales. Heart:  Regular  rhythm,  No murmur, No Rubs, No Gallops  Abdomen: Soft, Non distended, Non tender. +Bowel sounds,   Extremities: No c/c/e  Psych:   Not anxious or agitated. Neurologic:   left side weakness           Labs: Results:       Chemistry Recent Labs     12/10/20  0406 12/09/20  0350 12/08/20  0945 12/08/20  0012   * 145*  --  125*   * 137  --  139   K 3.5 3.6  --  3.1*   CL 96* 104  --  102   CO2 28 27  --  29   BUN 12 7  --  8   CREA 0.91 0.79  --  0.75   CA 8.9 8.1*  --  8.1*   AGAP 8 6  --  8   BUCR 13 9*  --  11*   TP  --   --  6.0  --       CBC w/Diff Recent Labs     12/10/20  0406 12/09/20  0350 12/08/20  0012   WBC 4.7 5.3 4.2*   RBC 3.37* 3.44* 3.47*   HGB 9.8* 10.0* 10.0*   HCT 28.0* 29.1* 29.2*    204 179   GRANS 72 48 45   LYMPH 18* 29 32   EOS 0 5 5      Cardiac Enzymes Recent Labs     12/08/20  0710 12/08/20  0112    189   CKND1 2.1 2.1      Coagulation No results for input(s): PTP, INR, APTT, INREXT, INREXT in the last 72 hours.     Lipid Panel Lab Results   Component Value Date/Time    Cholesterol, total 89 12/08/2020 01:12 AM    HDL Cholesterol 46 12/08/2020 01:12 AM    LDL, calculated 28.6 12/08/2020 01:12 AM    VLDL, calculated 14.4 12/08/2020 01:12 AM    Triglyceride 72 12/08/2020 01:12 AM    CHOL/HDL Ratio 1.9 12/08/2020 01:12 AM      BNP No results for input(s): BNPP in the last 72 hours. Liver Enzymes Recent Labs     12/08/20  0945   TP 6.0      Thyroid Studies Lab Results   Component Value Date/Time    TSH 1.16 12/07/2020 12:25 PM    TSH 0.74 12/07/2020 12:25 PM        Procedures/imaging: see electronic medical records for all procedures/Xrays and details which were not copied into this note but were reviewed prior to creation of Plan    Xr Shoulder Lt Ap/lat Min 2 V    Result Date: 12/7/2020  EXAM:  XR SHOULDER LT AP/LAT MIN 2 V INDICATION: Left shoulder pain COMPARISON: None. FINDINGS: Two views of the left shoulder demonstrate no acute finding. A few scattered and confluent lucencies are seen in the scapula, proximal humerus and clavicle. Impression: No acute findings or significant degenerative change appreciated. Scattered small bone lucencies noted compatible with history of multiple myeloma. Mri Brain Wo Cont    Result Date: 12/8/2020  EXAM: MRI brain without gadolinium CLINICAL INDICATION/HISTORY: Extremity weakness, possible CVA   > Additional: None. COMPARISON: 2/16/2020   > Reference Exam: CT head 12/7/2020 TECHNIQUE: Sagittal T1, axial T1, T2, FLAIR, T2 gradient, diffusion and coronal T1 and T2 sequences obtained of the brain. _______________ FINDINGS: Diffusion:  There are no areas of restricted diffusion, no evidence of an acute infarction. Brain parenchyma:  Stable very mild periventricular white matter and central pontine increased T2 and FLAIR signal. No new cortical or white matter signal abnormality with no evidence of mass hemorrhage edema or mass effect. Ventricles and sulci:  Mild diffuse central and cortical volume loss. Extra-axial:  No extra-axial fluid collection or mass is noted. Brain vasculature:  No vascular abnormality is appreciated on this routine brain MR examination. Craniocervical junction:  Cervical medullary junction unremarkable.  Interval increased development of likely degenerative Schmorl's node endplate defect inferior C2 vertebral body. Suggestion of possible worsening of ligamentum flavum thickening C2 and C3 with some questionable cord flattening and canal stenosis, limited evaluation on this study. Skull base, extracranial and calvarium:  Previous bilateral lens implants with stable bilateral proptosis with no retrobulbar abnormality. Sinuses and IACs unremarkable. Minimal increased T2 signal inferior right mastoid. Sella unremarkable. Multiple small low T1 and increased T2 signal foci throughout the intradiploic space of the skull correlating to small lytic lesions on CT. _______________     IMPRESSION: 1. No evidence of acute infarct or other acute intracranial finding. 2. Stable very mild white matter signal changes nonspecific probable chronic microvascular ischemic disease. 3. Questionable interval increased acquired canal stenosis and cord flattening C2 and C3 levels limited evaluation on this study. If there is clinical concern for cervical myelopathy, further evaluation could be performed with MRI dedicated to cervical spine. 4. Multiple small T2 hyperintense skull lesions concerning for metastatic disease or multiple myeloma. Ct Head Wo Cont    Result Date: 12/7/2020  EXAM: CT head HISTORY: -Provided with order: left sided weakness, stroke? -Additional: None COMPARISON: 02/16/20 TECHNIQUE: Axial CT imaging of the head was performed without intravenous contrast. Sagittal and coronal reconstructions were created from the axial data. One or more dose reduction techniques were used on this CT: automated exposure control, adjustment of the mAs and/or kVp according to patient size, and iterative reconstruction techniques. The specific techniques used on this CT exam have been documented in the patient's electronic medical record.  Digital Imaging and Communications in Medicine (DICOM) format image data are available to nonaffiliated external healthcare facilities or entities on a secure, media free, reciprocally searchable basis with patient authorization for at least a 12-month period after this study. _______________ FINDINGS: BRAIN, POSTERIOR FOSSA, EXTRA-AXIAL SPACES AND MENINGES:  The sulci, folia, ventricles and basal cisterns are   within normal limits for the patient's age. There are no areas of abnormal parenchymal attenuation. There is no intracranial hemorrhage, mass effect, or midline shift. There are no abnormal extra-axial fluid collections. CALVARIUM: Progressive lucent lesions in the calvarium. SINUSES/MASTOIDS: Slight left maxillary sinus mucosal thickening. OTHER: None. _______________     IMPRESSION: 1. No acute intracranial abnormalities are identified. 2. Progressive lucent/lytic lesions in the calvarium, which can be seen in the setting of metastatic disease or myeloma. Please note that head CT may be negative in the acute setting and MRI could best further evaluate for acute/subacute ischemia/infarct in the high/persistent index of clinical suspicion. Xr Chest Port    Result Date: 12/7/2020  HISTORY: -Provided with order: left shoulder pain -Additional: None Technique : AP PORTABLE CHEST Comparison : 02/16/20 FINDINGS: HEART AND MEDIASTINUM: Right IJ central venous catheter tip projects over expected SVC. External monitoring wires leads and pads partially obscures visibility. LUNGS AND PLEURAL SPACES: No consolidation, congestive heart failure, pleural effusion or pneumothorax. BONY THORAX AND SOFT TISSUES: No acute osseous abnormality. Impression: No plain film evidence of acute cardiopulmonary disease, allowing for technique.        Stuart Dowling MD

## 2020-12-10 NOTE — DISCHARGE INSTRUCTIONS
Dr. Salma Mcclendon Operative Instructions: Cervical Fusion    *YOU MUST AVOID SMOKING OR BEING AROUND ANYONE WHO SMOKES. AVOID ALL PRODUCTS THAT CONTAIN NICOTINE. DO NOT TAKE IBUPROFEN OR ANTI--INFLAMMATORIES, AS THESE MAY ALTER THE HEALING OF THE FUSION. Diet:   1. Begin with liquids and light foods such as jell-o or soups  2. Advance as tolerated to your regular diet if not nauseated. 3.  Swallowing difficulties may be experienced up to 2 weeks post-operatively. Modify food thickness as necessary. You may also obtain over-the-counter chloraseptic spray. Medications:  1. Strong oral narcotic pain medications have been prescribed for the first few days. Use only as directed. No pain medication is capable of taking away all the pain. Taking your pills at regular intervals will give you the best chance of having less pain. 2. If you need a refill PLEASE PLAN AHEAD. Call our office during regular hours (8-5). 3. Do not combine with alcoholic beverages. 4. Be careful as you walk, climb stairs or drive as mild dizziness is not unusual.  5. Do not take medications that have not been prescribed by your surgeon. 6. You may switch to over the counter pain medication of your choice as you become more comfortable. Activity and Restrictions:  1. You should not drive until you are clear by your surgeon at your post-operative visit. 2.  In regards to your cervical collar, you MUST wear this at all times until your first follow-up appointment. 3.  You should wear the collar even when sleeping. 4.  It can be removed for short periods of time as long as you are keeping your head centered over the shoulders without rotating or looking up or down. 5. You may take short walks inside and outside of your home and climb stairs. 6. You are to avoid work, housework, yard work, snow shoveling, lifting of more than a few pounds, overhead work or strenuous activity.   7. Avoid any excessive stretching or range-of-motion of the neck. Non-painful range-of-motion of the neck is allowed  8. Follow-up with Dr. Azar Level in 7-10 days. DRIVIN. You should not drive until after your follow-up appointment. 2.  You can be in a vehicle for short distances, but if you travel any long distance, please stop about every 30 minutes and walk/stretch. 3.  You should NEVER drive while taking narcotic medication. BATHING and INCISION CARE:  1. The incision may be tender to touch or feel numb: this is normal.   2.  Keep the incision clean and dry. Do not get the incision wet for 5 days. The incision will be closed with sutures under the skin and the skin will be glued. 3.  Do not apply any lotions, ointments or oils on the incision. 4.  If you notice any excessive swelling, redness, or persistent drainage around the incision, notify the office immediately. RETURN TO WORK :  1. The decision to return to work will be determined on an individual basis. 2.  Many people who have a strenuous job (construction, heavy labor, etc) may need to be off work for up to 8 weeks. 3.  If you need a work note, please let us know as soon as possible, and not the same day you are planning to return to work. NUTRITION :  1.  Good nutrition is an essential part of healing. 2.  You should eat a balanced diet each day, including fruits, vegetables, dairy products and protein. 3.  Remember to drink plenty of water. 4.  If you have not had a bowel movement within 3 days of surgery, you will need to use a laxative or suppository that can be obtained over-the-counter at your local pharmacy. BONE STIMULATOR:  1. A spinal bone stimulator may be prescribed for you under certain situations. 2.  A nurse will call you if one has been requested and discuss its use for approximately 4-6 months post-op every day. 3.  This device assists in bone healing and fusion.     CALL THE OFFICE:   If you have severe pain unrelieved by the medications, new numbness or tingling in your arms;   If you have a fever of 101.0°F or greater;    If you notice excessive swelling, redness, or persistent drainage from the incision or IV site; [facility] office number is [phone] from 8:00am to 5:00pm Monday through Friday. After 5:00pm, on weekends, or holidays, please leave a message with our answering service and the doctor on-call will get back to you shortly.

## 2020-12-10 NOTE — ANESTHESIA PREPROCEDURE EVALUATION
Relevant Problems   No relevant active problems       Anesthetic History   No history of anesthetic complications            Review of Systems / Medical History  Patient summary reviewed and pertinent labs reviewed    Pulmonary  Within defined limits            Pertinent negatives: No sleep apnea and smoker     Neuro/Psych   Within defined limits           Cardiovascular    Hypertension: well controlled              Exercise tolerance: <4 METS     GI/Hepatic/Renal             Pertinent negatives: No hiatal hernia and GERD   Endo/Other    Diabetes: well controlled, type 2      Pertinent negatives: No morbid obesity   Other Findings   Comments: multiple           Physical Exam    Airway  Mallampati: II  TM Distance: > 6 cm  Neck ROM: normal range of motion   Mouth opening: Normal     Cardiovascular    Rhythm: regular  Rate: normal         Dental         Pulmonary  Breath sounds clear to auscultation               Abdominal  GI exam deferred       Other Findings            Anesthetic Plan    ASA: 3  Anesthesia type: general          Induction: Intravenous  Anesthetic plan and risks discussed with: Patient

## 2020-12-10 NOTE — PROGRESS NOTES
TRANSFER - OUT REPORT:    Verbal report given to KRISTY Levy Rn(name) on Daniel Interiano  being transferred to Pre-op (unit) for ordered procedure       Report consisted of patients Situation, Background, Assessment and   Recommendations(SBAR). Information from the following report(s) SBAR, Kardex, Intake/Output, MAR, Recent Results and Cardiac Rhythm NSR with 1 degree AVB and BBB was reviewed with the receiving nurse. Lines:   Peripheral IV 12/07/20 Left Antecubital (Active)   Site Assessment Clean, dry, & intact 12/10/20 0800   Phlebitis Assessment 0 12/10/20 0800   Infiltration Assessment 0 12/10/20 0800   Dressing Status Occlusive 12/10/20 0800   Dressing Type Tape;Transparent 12/10/20 0800   Hub Color/Line Status Pink;Capped 12/10/20 0800   Action Taken Open ports on tubing capped 12/10/20 0420   Alcohol Cap Used Yes 12/10/20 0420       Peripheral IV 12/07/20 Right Arm (Active)   Site Assessment Clean, dry, & intact 12/10/20 0800   Phlebitis Assessment 0 12/10/20 0800   Infiltration Assessment 0 12/10/20 0800   Dressing Status Occlusive 12/10/20 0800   Dressing Type Tape;Transparent 12/10/20 0800   Hub Color/Line Status Green;Capped 12/10/20 0800   Action Taken Open ports on tubing capped 12/10/20 0420   Alcohol Cap Used Yes 12/10/20 0420        Opportunity for questions and clarification was provided.       Patient transported with:   Monitor  Registered Nurse

## 2020-12-10 NOTE — PROGRESS NOTES
Physical Therapy Treatment Attempt     Chart reviewed. Attempted Physical Therapy Treatment, however, patient unable to be seen due to:  []  Nausea/vomiting  []  Eating  []  Pain  []  Patient too lethargic  []  Off Unit for testing/procedure  []  Dialysis treatment in progress   []  Telemetry Results  [x]  Other: Per pt, he is preparing for surgery later today (cervical decompression with Dr. Mary Mendoza) and would like to hold off on therapy until after surgery. Will reassess following surgery. Will follow up later as patient's schedule allows.    Thank you for this referral.    Belkis Escobar, PT, DPT

## 2020-12-11 PROBLEM — M48.00 SPINAL STENOSIS: Status: ACTIVE | Noted: 2020-12-11

## 2020-12-11 LAB
ERYTHROCYTE [DISTWIDTH] IN BLOOD BY AUTOMATED COUNT: 15.3 % (ref 11.6–14.5)
GLUCOSE BLD STRIP.AUTO-MCNC: 161 MG/DL (ref 70–110)
GLUCOSE BLD STRIP.AUTO-MCNC: 172 MG/DL (ref 70–110)
GLUCOSE BLD STRIP.AUTO-MCNC: 178 MG/DL (ref 70–110)
GLUCOSE BLD STRIP.AUTO-MCNC: 222 MG/DL (ref 70–110)
HCT VFR BLD AUTO: 29.3 % (ref 36–48)
HGB BLD-MCNC: 10.3 G/DL (ref 13–16)
MAGNESIUM SERPL-MCNC: 1.9 MG/DL (ref 1.6–2.6)
MCH RBC QN AUTO: 29.4 PG (ref 24–34)
MCHC RBC AUTO-ENTMCNC: 35.2 G/DL (ref 31–37)
MCV RBC AUTO: 83.7 FL (ref 74–97)
PLATELET # BLD AUTO: 269 K/UL (ref 135–420)
PMV BLD AUTO: 10.1 FL (ref 9.2–11.8)
RBC # BLD AUTO: 3.5 M/UL (ref 4.7–5.5)
WBC # BLD AUTO: 7 K/UL (ref 4.6–13.2)

## 2020-12-11 PROCEDURE — 74011250637 HC RX REV CODE- 250/637: Performed by: HOSPITALIST

## 2020-12-11 PROCEDURE — 97167 OT EVAL HIGH COMPLEX 60 MIN: CPT

## 2020-12-11 PROCEDURE — 83735 ASSAY OF MAGNESIUM: CPT

## 2020-12-11 PROCEDURE — 97530 THERAPEUTIC ACTIVITIES: CPT

## 2020-12-11 PROCEDURE — 82962 GLUCOSE BLOOD TEST: CPT

## 2020-12-11 PROCEDURE — 85027 COMPLETE CBC AUTOMATED: CPT

## 2020-12-11 PROCEDURE — 97535 SELF CARE MNGMENT TRAINING: CPT

## 2020-12-11 PROCEDURE — 74011250636 HC RX REV CODE- 250/636: Performed by: HOSPITALIST

## 2020-12-11 PROCEDURE — 87635 SARS-COV-2 COVID-19 AMP PRB: CPT

## 2020-12-11 PROCEDURE — 74011250636 HC RX REV CODE- 250/636: Performed by: PHYSICIAN ASSISTANT

## 2020-12-11 PROCEDURE — 74011636637 HC RX REV CODE- 636/637: Performed by: HOSPITALIST

## 2020-12-11 PROCEDURE — 36415 COLL VENOUS BLD VENIPUNCTURE: CPT

## 2020-12-11 PROCEDURE — 97116 GAIT TRAINING THERAPY: CPT

## 2020-12-11 PROCEDURE — 97162 PT EVAL MOD COMPLEX 30 MIN: CPT

## 2020-12-11 PROCEDURE — 74011000250 HC RX REV CODE- 250: Performed by: PHYSICIAN ASSISTANT

## 2020-12-11 PROCEDURE — 65660000000 HC RM CCU STEPDOWN

## 2020-12-11 PROCEDURE — 74011250637 HC RX REV CODE- 250/637: Performed by: PHYSICIAN ASSISTANT

## 2020-12-11 RX ORDER — SODIUM CHLORIDE, SODIUM LACTATE, POTASSIUM CHLORIDE, CALCIUM CHLORIDE 600; 310; 30; 20 MG/100ML; MG/100ML; MG/100ML; MG/100ML
50 INJECTION, SOLUTION INTRAVENOUS CONTINUOUS
Status: DISPENSED | OUTPATIENT
Start: 2020-12-11 | End: 2020-12-11

## 2020-12-11 RX ORDER — OXYCODONE AND ACETAMINOPHEN 7.5; 325 MG/1; MG/1
1 TABLET ORAL
Status: DISCONTINUED | OUTPATIENT
Start: 2020-12-11 | End: 2020-12-14 | Stop reason: HOSPADM

## 2020-12-11 RX ORDER — HYDROMORPHONE HCL IN WATER/PF 1 MG/ML
SYRINGE (ML) INJECTION CONTINUOUS
Status: DISCONTINUED | OUTPATIENT
Start: 2020-12-11 | End: 2020-12-11

## 2020-12-11 RX ADMIN — PRAVASTATIN SODIUM 40 MG: 20 TABLET ORAL at 22:08

## 2020-12-11 RX ADMIN — ISOSORBIDE MONONITRATE 30 MG: 30 TABLET, EXTENDED RELEASE ORAL at 08:40

## 2020-12-11 RX ADMIN — INSULIN LISPRO 6 UNITS: 100 INJECTION, SOLUTION INTRAVENOUS; SUBCUTANEOUS at 12:47

## 2020-12-11 RX ADMIN — INSULIN LISPRO 3 UNITS: 100 INJECTION, SOLUTION INTRAVENOUS; SUBCUTANEOUS at 06:36

## 2020-12-11 RX ADMIN — INSULIN LISPRO 3 UNITS: 100 INJECTION, SOLUTION INTRAVENOUS; SUBCUTANEOUS at 10:05

## 2020-12-11 RX ADMIN — ACETAMINOPHEN 650 MG: 325 TABLET ORAL at 08:42

## 2020-12-11 RX ADMIN — VALACYCLOVIR HYDROCHLORIDE 500 MG: 500 TABLET, FILM COATED ORAL at 22:08

## 2020-12-11 RX ADMIN — METOLAZONE 5 MG: 5 TABLET ORAL at 08:40

## 2020-12-11 RX ADMIN — VALACYCLOVIR HYDROCHLORIDE 500 MG: 500 TABLET, FILM COATED ORAL at 08:39

## 2020-12-11 RX ADMIN — VALACYCLOVIR HYDROCHLORIDE 500 MG: 500 TABLET, FILM COATED ORAL at 00:23

## 2020-12-11 RX ADMIN — INSULIN LISPRO 3 UNITS: 100 INJECTION, SOLUTION INTRAVENOUS; SUBCUTANEOUS at 18:36

## 2020-12-11 RX ADMIN — DIAZEPAM 2.5 MG: 5 TABLET ORAL at 10:08

## 2020-12-11 RX ADMIN — CEFAZOLIN 2 G: 10 INJECTION, POWDER, FOR SOLUTION INTRAVENOUS at 18:35

## 2020-12-11 RX ADMIN — GABAPENTIN 800 MG: 400 CAPSULE ORAL at 22:08

## 2020-12-11 RX ADMIN — DOCUSATE SODIUM 100 MG: 100 CAPSULE, LIQUID FILLED ORAL at 08:39

## 2020-12-11 RX ADMIN — OXYCODONE HYDROCHLORIDE AND ACETAMINOPHEN 1 TABLET: 5; 325 TABLET ORAL at 00:25

## 2020-12-11 RX ADMIN — METOPROLOL SUCCINATE 25 MG: 25 TABLET, EXTENDED RELEASE ORAL at 08:39

## 2020-12-11 RX ADMIN — INSULIN LISPRO 3 UNITS: 100 INJECTION, SOLUTION INTRAVENOUS; SUBCUTANEOUS at 22:08

## 2020-12-11 RX ADMIN — SPIRONOLACTONE 25 MG: 25 TABLET ORAL at 08:40

## 2020-12-11 RX ADMIN — SODIUM CHLORIDE, SODIUM LACTATE, POTASSIUM CHLORIDE, AND CALCIUM CHLORIDE 50 ML/HR: 600; 310; 30; 20 INJECTION, SOLUTION INTRAVENOUS at 16:14

## 2020-12-11 RX ADMIN — CEFAZOLIN 2 G: 10 INJECTION, POWDER, FOR SOLUTION INTRAVENOUS at 10:09

## 2020-12-11 RX ADMIN — CEFAZOLIN 2 G: 10 INJECTION, POWDER, FOR SOLUTION INTRAVENOUS at 03:34

## 2020-12-11 RX ADMIN — OXYCODONE HYDROCHLORIDE AND ACETAMINOPHEN 1 TABLET: 5; 325 TABLET ORAL at 12:45

## 2020-12-11 RX ADMIN — Medication 50 MG: at 08:39

## 2020-12-11 RX ADMIN — SODIUM CHLORIDE, SODIUM LACTATE, POTASSIUM CHLORIDE, AND CALCIUM CHLORIDE 125 ML/HR: 600; 310; 30; 20 INJECTION, SOLUTION INTRAVENOUS at 00:29

## 2020-12-11 RX ADMIN — FUROSEMIDE 40 MG: 40 TABLET ORAL at 08:39

## 2020-12-11 RX ADMIN — INSULIN GLARGINE 10 UNITS: 100 INJECTION, SOLUTION SUBCUTANEOUS at 00:26

## 2020-12-11 RX ADMIN — LISINOPRIL 5 MG: 5 TABLET ORAL at 08:40

## 2020-12-11 RX ADMIN — POTASSIUM CHLORIDE 40 MEQ: 1500 TABLET, EXTENDED RELEASE ORAL at 00:26

## 2020-12-11 RX ADMIN — PRAVASTATIN SODIUM 40 MG: 20 TABLET ORAL at 00:23

## 2020-12-11 RX ADMIN — INSULIN GLARGINE 10 UNITS: 100 INJECTION, SOLUTION SUBCUTANEOUS at 22:08

## 2020-12-11 RX ADMIN — OXYCODONE HYDROCHLORIDE AND ACETAMINOPHEN 1 TABLET: 5; 325 TABLET ORAL at 06:39

## 2020-12-11 RX ADMIN — CYANOCOBALAMIN TAB 1000 MCG 1000 MCG: 1000 TAB at 08:39

## 2020-12-11 RX ADMIN — GABAPENTIN 800 MG: 400 CAPSULE ORAL at 08:40

## 2020-12-11 RX ADMIN — CALCIUM CARBONATE-VITAMIN D TAB 500 MG-200 UNIT 1 TABLET: 500-200 TAB at 08:40

## 2020-12-11 RX ADMIN — FELODIPINE 10 MG: 5 TABLET, EXTENDED RELEASE ORAL at 08:39

## 2020-12-11 RX ADMIN — OXYCODONE HYDROCHLORIDE AND ACETAMINOPHEN 1 TABLET: 5; 325 TABLET ORAL at 18:36

## 2020-12-11 RX ADMIN — GABAPENTIN 800 MG: 400 CAPSULE ORAL at 00:24

## 2020-12-11 RX ADMIN — DOCUSATE SODIUM 100 MG: 100 CAPSULE, LIQUID FILLED ORAL at 00:23

## 2020-12-11 RX ADMIN — Medication 10 ML: at 22:08

## 2020-12-11 NOTE — OP NOTES
Patient: Sandy Burks MRN: [id number]  SSN: [id number]    YOB: 1940  Age: 68 y.o. Sex: male      Date of Procedure: 12/10/2020   Preoperative Diagnosis: SPINAL STENOSIS  Postoperative Diagnosis: SPINAL STENOSIS    Procedure: Procedure(s):  CERVICAL 3- CERVICAL 7 SPINE POSTERIOR CERVICAL DECOMPRESSION AND FUSION WITH C-ARM  Surgeon(s) and Role:     * Peng Coker MD - Primary  Assistant: Noemy Claire PA-C  OR Assitance: Physician Assistant: LUZ Iraheta  Surg Asst-1: Sisto Saliva  Anesthesia: General   Estimated Blood Loss: 50cc  Specimens: * No specimens in log *   Findings: same  Complications: none  Implants:   Depuy-Synthes Symphony       Indications: This is a 68y.o. year-old male who presents with significant neck   and bilateral upper extremity pain, worsening ability to do activities of   daily living. X-rays and MRI scan revealing spinal stenosis, degenerative disk disease and disk herniation. Having having failed conservative care,  comes for operative intervention. Procedure:  After correct identification, endotracheal tube anesthesia was induced and antibiotics given. Cranial tongs were then placed. The patient was then rotated prone with the [facility] table. Posterior cervical spine was prepped and draped in the usual sterile   fashion. Midline incision made in the posterior cervical spine, taken down   to the spinous processes of C3-7, as well as the posterior   transverse process bilaterally at C3-7. A Gelpi retractor was   then placed. Intraoperative fluoroscopy was used to confirm the correct   level. A bur was then used to create a trough bilaterally at C4-6   as well as removing the superior half of C7. This provided a   completely mobile lamina of C4-6. A bur was then used to create a   starting hole bilaterally at C3-6 and a 14 mm drill was then   placed in superior lateral direction as well as the   superior anterior direction at C7. The 14 mm screws were then placed from   the [employer]ynthes Synapse screw system at C3-7. Rods were then   affixed to the screws using the Synapse caps, each of these caps were then   torqued in position. Intraoperative x-ray revealed excellent hardware   anatomy. The lamina was then completely removed, it was taken to the back   table soft tissue to be removed and be ground up to be used for bone graft,   inferior half of C3 to the superior half of C7 was removed. This provided   excellent midline decompression. Foraminotomy was then performed   bilaterally at C3-7. This provided excellent   decompression around the spinal cord and nerve roots. Wound was then   irrigated with pulse lavage irrigation. Bur was then used to remove the   posterior cortices of the facet joints bilaterally C3-7, as well   as the cartilaginous material of the facet joints C3-7. The bone   graft was taken from the laminectomy site was then placed in posterolateral   gutters, as well as facet joint. Drain was then placed. Fascia was then   closed using a #1 Vicryl suture, subcutaneous tissue approximated with 2-0   Vicryl suture. Skin approximated with staples. Sterile dressing applied. Cranial tongs were removed. The patient tolerated the procedure well, was taken to recovery room in   good condition. Assistant surgeon was needed throughout the procedure for managing bleeding, improving visualization and closure of the surgical wounds.

## 2020-12-11 NOTE — PROGRESS NOTES
Problem: Pressure Injury - Risk of  Goal: *Prevention of pressure injury  Description: Document Vance Scale and appropriate interventions in the flowsheet. Outcome: Progressing Towards Goal  Note: Pressure Injury Interventions:  Sensory Interventions: Assess changes in LOC, Float heels, Pressure redistribution bed/mattress (bed type)         Activity Interventions: Pressure redistribution bed/mattress(bed type), PT/OT evaluation    Mobility Interventions: Pressure redistribution bed/mattress (bed type), HOB 30 degrees or less    Nutrition Interventions: Document food/fluid/supplement intake, Offer support with meals,snacks and hydration                     Problem: Patient Education: Go to Patient Education Activity  Goal: Patient/Family Education  Outcome: Progressing Towards Goal     Problem: Falls - Risk of  Goal: *Absence of Falls  Description: Document Evangelist Fall Risk and appropriate interventions in the flowsheet.   Outcome: Progressing Towards Goal  Note: Fall Risk Interventions:  Mobility Interventions: Assess mobility with egress test, PT Consult for mobility concerns, Patient to call before getting OOB         Medication Interventions: Patient to call before getting OOB, Teach patient to arise slowly    Elimination Interventions: Call light in reach, Urinal in reach, Patient to call for help with toileting needs              Problem: Patient Education: Go to Patient Education Activity  Goal: Patient/Family Education  Outcome: Progressing Towards Goal

## 2020-12-11 NOTE — PROGRESS NOTES
0730: Roberts cath discontinued this morning. Neck pain well controlled with PRN Percocet. Bedside and Verbal shift change report given to Anita Jiang RN (oncoming nurse) by Benito Farrell RN (offgoing nurse). Report included the following information SBAR, Kardex, Intake/Output, MAR, Accordion, Recent Results and Med Rec Status.

## 2020-12-11 NOTE — PROGRESS NOTES
Progress Note POD #      Patient: J Luis Vieira               Sex: male          DOA: 12/7/2020         YOB: 1940      Surgery: Procedure(s):  CERVICAL 3- CERVICAL 7 SPINE POSTERIOR CERVICAL DECOMPRESSION AND FUSION WITH C-ARM           LOS: 4 days               Subjective:     Neck soreness. Arms and legs feel a lot better. Objective:      Visit Vitals  /63   Pulse 66   Temp 97.7 °F (36.5 °C)   Resp 16   Ht 5' 9\" (1.753 m)   Wt 113.7 kg (250 lb 11.2 oz)   SpO2 98%   BMI 37.02 kg/m²       Physical Exam:  Neurological: motor strength: 5/5 in lower extremities bilaterally except 4/5 left triceps, left hand finger abduction and extension                          sensation: intact to light touch  Patient mobility  Bed Mobility Training  Supine to Sit: Contact guard assistance, Minimum assistance, Additional time  Sit to Supine: Moderate assistance  Scooting: Contact guard assistance, Additional time(scooting out toward EOB)  Transfer Training  Sit to Stand:  Moderate assistance, Other (comment)(vc for hand placement)  Stand to Sit: Minimum assistance, Other (comment)(as above)  Bed to Chair: Moderate assistance      Gait Training  Assistive Device: Gait belt, Walker, rolling  Ambulation - Level of Assistance: Minimal assistance, Moderate assistance, Additional time(CG ant L knee and L foot braced for safety)  Distance (ft): 4 Feet (ft)            Intake and Output:  Current Shift:  12/10 1901 - 12/11 0700  In: 100 [I.V.:100]  Out: 265 [Urine:150; Drains:65]  Last three shifts:  12/09 0701 - 12/10 1900  In: 5 [P.O.:420]  Out: 2950 [Urine:2950]    Lab/Data Reviewed:    Lab/Data Reviewed:  Lab Results   Component Value Date/Time    WBC 4.7 12/10/2020 04:06 AM    HGB 9.8 (L) 12/10/2020 04:06 AM    HCT 28.0 (L) 12/10/2020 04:06 AM    PLATELET 625 20/22/1524 04:06 AM    MCV 83.1 12/10/2020 04:06 AM     Lab Results   Component Value Date/Time    aPTT 27.8 12/07/2020 09:26 AM     Lab Results Component Value Date/Time    INR 1.3 (H) 12/07/2020 09:26 AM    INR 1.1 02/16/2020 04:45 PM    INR 1.1 02/14/2020 02:45 AM    Prothrombin time 16.0 (H) 12/07/2020 09:26 AM    Prothrombin time 14.2 02/16/2020 04:45 PM    Prothrombin time 14.3 02/14/2020 02:45 AM            Assessment/Plan     Principal Problem:    Left hemiparesis ([facility] (12/8/2020)    Active Problems:    Type 2 diabetes mellitus ([address] (2/12/2020)      Multiple myeloma ([facility] [address] (2/16/2020)      Morbid obesity ([facility] [address] (2/17/2020)      Hypokalemia (12/7/2020)      Neuropathy ()      Hypomagnesemia (12/7/2020)      Elevated troponin (12/7/2020)      Weakness (12/7/2020)        1. Stable orthopaedically. Anticoagulation per medicine may begin 24 hours after surgery. 2. OOB with PT  3. D/C Planning  4. D/C PCA  5. D/C drain & change dressing prior to discharge. 6. Discharge per medicine  7. Follow-up in 10 days at [facility] with Dr. Jackeline Be.

## 2020-12-11 NOTE — PERIOP NOTES
Handoff given to JOHN Navarrete  Visit Vitals  BP (!) 170/68   Pulse 77   Temp 97.3 °F (36.3 °C)   Resp 16   Ht 5' 9\" (1.753 m)   Wt 113.7 kg (250 lb 11.2 oz)   SpO2 100%   BMI 37.02 kg/m²

## 2020-12-11 NOTE — PROGRESS NOTES
Problem: Self Care Deficits Care Plan (Adult)  Goal: *Acute Goals and Plan of Care (Insert Text)  Description: Initial Occupational Therapy Goals (12/11/2020) Within 7 day(s):    1. Patient will perform toilet transfer with min A in preparation for bowel and bladder management. 2. Patient will perform bowel and bladder management with CGA for increased independence with ADLs. 3. Patient will perform UB dressing with CGA for increased independence with ADLs. 4. Patient will perform LB dressing with mod A & A/E PRN for increased independence with ADLs. 5. Patient will perform self grooming tasks in seated position with supervision for increased independence with ADLs. 6. Patient will utilize energy conservation techniques with 1 verbal cue(s) for increased independence with ADLs. Outcome: Progressing Towards Goal   OCCUPATIONAL THERAPY EVALUATION    Patient: Bronwyn Stover (54 y.o. male)  Date: 12/11/2020  Primary Diagnosis: Hypokalemia [E87.6]  Procedure(s) (LRB):  CERVICAL 3- CERVICAL 7 SPINE POSTERIOR CERVICAL DECOMPRESSION AND FUSION WITH C-ARM (N/A) 1 Day Post-Op   Precautions: Fall, Spinal  PLOF: pt grossly mod I PTA until a few days before admission pt with L side weakness and reports having difficulty ambulating    ASSESSMENT AND RECOMMENDATIONS:  Based on the objective data described below, the patient presents with BUE decreased ROM and strength affecting UE ADLs. Pt seen with PT for additional set of skilled hands. Pt motivated to participate in therapy. BUE  strength weak however grossly WFL, L hand weaker than R. B hand sensation impaired and pt reporting sensation impaired PTA. Pt with decreased coordination and required CGA for proper hand placement during functional mobility. Pt required mod A to sit up to EOB, pt required frequent vc throughout session for proper body mechanics and safety. Pt requesting to use BSC to attempt bowel movement, pt completed initial STS with min A x2.  When attempting to take steps pt demonstrating decreased BLE strength and B knees buckling. Pt assisted to sit down on BSC with max A x2. Pt was able to void seated on commode. Pt performed toilet transfer with max A x2, OT moved BSC upon pt standing and positioned bed right behind pt for stand > sit which pt completed with mod/max A x2. Pt returned to supine with mod A x2. Pt would benefit from acute inpatient rehab to maximize safety/independence with ADLs and functional mobility. Education: Reviewed Neck/Back Precautions, C-Collar/Brace management, body mechanics, home safety, adaptive strategies and adaptive dressing techniques including clothing modifications with patient verbalizing understanding at this time. Patient will benefit from skilled intervention to address the above impairments. Patient's rehabilitation potential is considered to be Excellent  Factors which may influence rehabilitation potential include:   []             None noted  []             Mental ability/status  [x]             Medical condition  []             Home/family situation and support systems  []             Safety awareness  []             Pain tolerance/management  []             Other:      PLAN :  Recommendations and Planned Interventions:   [x]               Self Care Training                  [x]      Therapeutic Activities  [x]               Functional Mobility Training   []      Cognitive Retraining  [x]               Therapeutic Exercises           [x]      Endurance Activities  [x]               Balance Training                    [x]      Neuromuscular Re-Education  []               Visual/Perceptual Training     [x]      Home Safety Training  [x]               Patient Education                   [x]      Family Training/Education  []               Other (comment):    Frequency/Duration: Patient will be followed by occupational therapy 1-2 times per day/4-7 days per week to address goals.   Discharge Recommendations: Acute Inpatient Rehab  Further Equipment Recommendations for Discharge: To Be Determined (TBD) at next level of care     SUBJECTIVE:   Patient stated I want to try and get on the [facility].     OBJECTIVE DATA SUMMARY:     Past Medical History:   Diagnosis Date    Cancer ([facility] [address]     Diabetes ([facility]     FH: chemotherapy     H/O stem cell transplant ([facility]     Hypertension     Multiple myeloma ([facility]     Neuropathy      Past Surgical History:   Procedure Laterality Date    HX CHOLECYSTECTOMY      HX KYPHOPLASTY       Barriers to Learning/Limitations: yes;  physical  Compensate with: visual, verbal, tactile, kinesthetic cues/model    Home Situation/Prior Level of Function: pt grossly mod I for ADLs/functional mobility prior to L sided weakness  Home Situation  Home Environment: Private residence  # Steps to Enter: 35  Rails to Enter: Yes  Hand Rails : Bilateral  One/Two Story Residence: Two story, live on 1st floor  # of Interior Steps: 12  Interior Rails: Left  Lift Chair Available: No  Living Alone: No  Support Systems: Family member(s)  Patient Expects to be Discharged to[de-identified] Private residence  Current DME Used/Available at Home: Walker, rolling  Tub or Shower Type: Tub/Shower combination  []  Right hand dominant   []  Left hand dominant    Cognitive/Behavioral Status:  Neurologic State: Alert  Orientation Level: Oriented X4  Cognition: Follows commands  Safety/Judgement: Awareness of environment    Skin: posterior cervical incision w/ dressing & hemovac    Coordination: BUE  Coordination: Generally decreased, functional  Fine Motor Skills-Upper: Left Impaired;Right Impaired    Gross Motor Skills-Upper: Left Impaired;Right Impaired    Balance:  Sitting: Intact  Standing: Impaired; With support    Strength: BUE  BUE's not formally assessed, but observed functionally; B  pinch weak  Strength: Generally decreased, functional    Tone & Sensation:BUE  Tone: Normal  Sensation: Impaired     Range of Motion: BUE  AROM: Generally decreased, functional  PROM: Generally decreased, functional     Functional Mobility and Transfers for ADLs:  Bed Mobility:  Supine to Sit: Moderate assistance;Assist x2; Additional time(vc  Sit to Supine: Moderate assistance;Assist x2; Additional time(vc   Scooting: Contact guard assistance; Additional time     Transfers:  Sit to Stand: Minimum assistance;Assist x2(2nd STS mod/max Ax2    Toilet Transfer : Moderate assistance;Assist x2(BSC)  ADL Assessment:  Feeding: Setup;Contact guard assistance    Oral Facial Hygiene/Grooming: Setup;Contact guard assistance    Bathing: Maximum assistance    Upper Body Dressing: Moderate assistance    Lower Body Dressing: Total assistance    Toileting: Moderate assistance     ADL Intervention:  Toileting  Toileting Assistance: Moderate assistance  Bladder Hygiene: Contact guard assistance  Clothing Management: Maximum assistance    Cognitive Retraining  Safety/Judgement: Awareness of environment    Pain:  Pain level pre-treatment: 8/10  Pain level post-treatment: 8/10  Pain Intervention(s): Medication administer by Nursing (see MAR); Rest, Ice, Repositioning   Response to intervention: Nurse notified, see doc flow sheet    Activity Tolerance:   Fair-. Patient able to stand ~1 minute(s). Patient able to complete ADLs with intermittent rest breaks. Patient limited by pain, strength, ROM, coordination, sensation. Patient unsteady. Please refer to the flowsheet for vital signs taken during this treatment. After treatment:   []  Patient left in no apparent distress sitting up in chair  []  Patient sitting on EOB  [x]  Patient left in no apparent distress in bed  [x]  Call bell left within reach  [x]  Nursing notified  []  Caregiver present  [x]  Ice applied  []  SCD's on while back in bed  [] Bed alarm activated    COMMUNICATION/EDUCATION:   Communication/Collaboration:  [x]       Role of Occupational Therapy in the acute care setting.   [x]      Home safety education was provided and the patient/caregiver indicated understanding. [x]      Patient/family have participated as able in goal setting and plan of care. [x]      Patient/family agree to work toward stated goals and plan of care. []      Patient understands intent and goals of therapy, but is neutral about his/her participation. []      Patient is unable to participate in plan of care at this time. Thank you for this referral.  Fernando Palma, OTR/L  Time Calculation: 41 mins    Eval Complexity: History: HIGH Complexity : Extensive review of history including physical, cognitive and psychosocial history ; Examination: HIGH Complexity : 5 or more performance deficits relating to physical, cognitive , or psychosocial skils that result in activity limitations and / or participation restrictions; Decision Making:HIGH Complexity : Patient presents with comorbidities that affect occupational performance.  Signifigant modification of tasks or assistance (eg, physical or verbal) with assessment (s) is necessary to enable patient to complete evaluation

## 2020-12-11 NOTE — ANESTHESIA POSTPROCEDURE EVALUATION
Post-Anesthesia Evaluation and Assessment    Cardiovascular Function/Vital Signs  Visit Vitals  BP (!) 168/67   Pulse 72   Temp 36.3 °C (97.3 °F)   Resp 9   Ht 5' 9\" (1.753 m)   Wt 113.7 kg (250 lb 11.2 oz)   SpO2 99%   BMI 37.02 kg/m²       Patient is status post Procedure(s):  CERVICAL 3- CERVICAL 7 SPINE POSTERIOR CERVICAL DECOMPRESSION AND FUSION WITH C-ARM. Nausea/Vomiting: Controlled. Postoperative hydration reviewed and adequate. Pain:  Pain Scale 1: Numeric (0 - 10) (12/10/20 2123)  Pain Intensity 1: 2 (12/10/20 2123)   Managed. Neurological Status:   Neuro (WDL): Exceptions to WDL (12/10/20 2046)   At baseline. Mental Status and Level of Consciousness: Arousable. Pulmonary Status:   O2 Device: Nasal cannula (12/10/20 2105)   Adequate oxygenation and airway patent. Complications related to anesthesia: None    Post-anesthesia assessment completed. No concerns. Patient has met all discharge requirements.     Signed By: Judy Coyne CRNA    December 10, 2020

## 2020-12-11 NOTE — PROGRESS NOTES
Problem: Mobility Impaired (Adult and Pediatric)  Goal: *Acute Goals and Plan of Care (Insert Text)  Description: Physical Therapy Goals  Initiated 12/11/2020 and to be accomplished within 3-7 day(s)  1. Patient will move from supine to sit and sit to supine  in bed with supervision/set-up. 2.  Patient will transfer from bed to chair and chair to bed with minimal assistance/contact guard assist using the least restrictive device. 3.  Patient will perform sit to stand with minimal assistance/contact guard assist.  4.  Patient will ambulate with minimal assistance/contact guard assist for 100 feet with the least restrictive device. Note: []  Patient has met MD justice sommer for d/c home  []  Recommend HH with 24 hour adult care   [x]  Benefit from additional acute PT session to address:      PHYSICAL THERAPY EVALUATION    Patient: Nadine Godfrey (34 y.o. male)  Date: 12/11/2020  Primary Diagnosis: Hypokalemia [E87.6]  Procedure(s) (LRB):  CERVICAL 3- CERVICAL 7 SPINE POSTERIOR CERVICAL DECOMPRESSION AND FUSION WITH C-ARM (N/A) 1 Day Post-Op   Precautions:  Fall, Spinal    ASSESSMENT :  Based on the objective data described below, the patient presents with lower extremity weakness, decreased gait quality and endurance, impaired bed mobility and transfers, and overall limitations in functional mobility s/p C3-7 PCDF. Pt performed supine to sit with ModA for LE and trunk movement. Static sitting balance good. Sit to stand with MinAx2. Patient ambulated 2 side steps with RW, GB applied, MaxA due to LE weakness and instability. Pt required [address] for controlled descent onto [facility]. After sitting for several minutes. Pt assisted to stand with ModA and required MaxAx2 for stand pivot transfer/side step. Pt motivated to participate with PT and regain independent PLOF. Pt has family to assist upon eventual d/c home.  Pt would benefit from acute inpatient rehab to maximize functional mobility and restore PLOF.    Patient will benefit from skilled intervention to address the above impairments. Patient's rehabilitation potential is considered to be Good  Factors which may influence rehabilitation potential include:   []         None noted  []         Mental ability/status  []         Medical condition  []         Home/family situation and support systems  []         Safety awareness  [x]         Pain tolerance/management  []         Other:      PLAN :  Recommendations and Planned Interventions:   [x]           Bed Mobility Training             []    Neuromuscular Re-Education  [x]           Transfer Training                   []    Orthotic/Prosthetic Training  [x]           Gait Training                          []    Modalities  [x]           Therapeutic Exercises           []    Edema Management/Control  [x]           Therapeutic Activities            [x]    Family Training/Education  [x]           Patient Education  []           Other (comment):    Frequency/Duration: Patient will be followed by physical therapy 1-2 times daily until patient is discharged from hospital.  Discharge Recommendations: Acute Inpatient Rehab  Further Equipment Recommendations for Discharge: N/A     SUBJECTIVE:   Patient stated I want to work.     OBJECTIVE DATA SUMMARY:     Past Medical History:   Diagnosis Date    Cancer ([facility]     Diabetes ([facility]     FH: chemotherapy     H/O stem cell transplant ([facility]     Hypertension     Multiple myeloma ([facility]     Neuropathy      Past Surgical History:   Procedure Laterality Date    HX CHOLECYSTECTOMY      HX KYPHOPLASTY       Barriers to Learning/Limitations: None  Compensate with: Visual Cues and Verbal Cues  PLOF: Independent amb c/RW/SPC  Home Situation:  Home Situation  Home Environment: Private residence  # Steps to Enter: 35  Rails to Enter: Yes  Hand Rails : Bilateral  One/Two Story Residence: Two story, live on 1st floor  # of Interior Steps: 12  Interior Rails: Left  Lift Chair Available: No  Living Alone: No  Support Systems: Family member(s)  Patient Expects to be Discharged to[de-identified] Private residence  Current DME Used/Available at Home: Willem Palomo, rolling  Tub or Shower Type: Tub/Shower combination  Critical Behavior:  Neurologic State: Alert  Orientation Level: Oriented X4  Cognition: Follows commands  Safety/Judgement: Awareness of environment  Psychosocial  Patient Behaviors: Calm; Cooperative  Needs Expressed: Educational  Purposeful Interaction: Yes  Pt Identified Daily Priority: Clinical issues (comment)  Caritas Process: Teaching/learning  Caring Interventions: Reassure; Therapeutic modalities  Reassure: Therapeutic listening; Informing  Therapeutic Modalities: Humor; Intentional therapeutic touch  Skin Condition/Temp: Warm   Skin Integrity: Incision (comment)  Skin Integumentary  Skin Color: Appropriate for ethnicity  Skin Condition/Temp: Warm  Skin Integrity: Incision (comment)   Strength:    Strength: Generally decreased, functional  Tone & Sensation:   Tone: Normal   Sensation: Impaired  Range Of Motion:  AROM: Generally decreased, functional  PROM: Generally decreased, functional  Functional Mobility:  Bed Mobility:   Supine to Sit: Moderate assistance  Sit to Supine: Moderate assistance  Scooting: Contact guard assistance; Additional time  Transfers:  Sit to Stand: Minimum assistance;Assist x2(2nd STS mod/max Ax2)  Stand to Sit: Minimum assistance;Assist x2; Additional time  Balance:   Sitting: Intact  Standing: Impaired; With support  Standing - Static: Poor  Standing - Dynamic : Poor  Ambulation/Gait Training:  Distance (ft): 2 Feet (ft)(side steps)  Assistive Device: Gait belt;Walker, rolling  Ambulation - Level of Assistance: Maximum assistance;Assist x2  Gait Abnormalities: Decreased step clearance; Antalgic(posterior lean with weight shift into heels)  Base of Support: Widened  Stance: Weight shift  Pain:  Pain level pre-treatment: 8/10   Pain level post-treatment: 8/10   Pain Intervention(s) : Medication (see MAR); Rest, Ice, Repositioning  Response to intervention: Nurse notified, See doc flow    Activity Tolerance:   Fair  Please refer to the flowsheet for vital signs taken during this treatment. After treatment:   []         Patient left in no apparent distress sitting up in chair  [x]         Patient left in no apparent distress in bed  [x]         Call bell left within reach  [x]         Nursing notified  []         Caregiver present  []         Bed alarm activated  []         SCDs applied    COMMUNICATION/EDUCATION:   [x]         Role of Physical Therapy in the acute care setting. [x]         Fall prevention education was provided and the patient/caregiver indicated understanding. [x]         Patient/family have participated as able in goal setting and plan of care. [x]         Patient/family agree to work toward stated goals and plan of care. []         Patient understands intent and goals of therapy, but is neutral about his/her participation. []         Patient is unable to participate in goal setting/plan of care: ongoing with therapy staff.  []         Other:     Thank you for this referral.  Cheo Vasquez   Time Calculation: 41 mins    Eval Complexity: History: MEDIUM  Complexity : 1-2 comorbidities / personal factors will impact the outcome/ POC Exam:MEDIUM Complexity : 3 Standardized tests and measures addressing body structure, function, activity limitation and / or participation in recreation  Presentation: MEDIUM Complexity : Evolving with changing characteristics  Clinical Decision Making:Medium Complexity    Overall Complexity:MEDIUM

## 2020-12-11 NOTE — PROGRESS NOTES
Written shift change report given to MERVAT Hutton Rn  (oncoming nurse) by [employer] (offgoing nurse). Report included the following information SBAR, Kardex, Intake/Output, MAR and Recent Results.

## 2020-12-11 NOTE — PROGRESS NOTES
Problem: Pressure Injury - Risk of  Goal: *Prevention of pressure injury  Description: Document Vance Scale and appropriate interventions in the flowsheet. Outcome: Progressing Towards Goal  Note: Pressure Injury Interventions:  Sensory Interventions: Assess changes in LOC, Float heels, Pressure redistribution bed/mattress (bed type)         Activity Interventions: Pressure redistribution bed/mattress(bed type), PT/OT evaluation    Mobility Interventions: Pressure redistribution bed/mattress (bed type), HOB 30 degrees or less    Nutrition Interventions: Document food/fluid/supplement intake, Offer support with meals,snacks and hydration                     Problem: Falls - Risk of  Goal: *Absence of Falls  Description: Document Evangelist Fall Risk and appropriate interventions in the flowsheet.   Outcome: Progressing Towards Goal  Note: Fall Risk Interventions:  Mobility Interventions: Assess mobility with egress test, PT Consult for mobility concerns, Patient to call before getting OOB         Medication Interventions: Patient to call before getting OOB, Teach patient to arise slowly    Elimination Interventions: Call light in reach, Urinal in reach, Patient to call for help with toileting needs              Problem: Patient Education: Go to Patient Education Activity  Goal: Patient/Family Education  Outcome: Progressing Towards Goal

## 2020-12-11 NOTE — PROGRESS NOTES
0728:Received verbal bedside report from off going nurse BONNIE Briseno R.N. Patient care received. Patient alert and oriented x 4. Patient resting in bed denies pain. Patient stable. Call light with in reach bed in lowest position.

## 2020-12-11 NOTE — PERIOP NOTES
TRANSFER - OUT REPORT:    Verbal report given to Rosalba on Brittani Martinez  being transferred to Telemetry for routine progression of care       Report consisted of patients Situation, Background, Assessment and   Recommendations(SBAR). Information from the following report(s) OR summary, JENNIFER Love was reviewed with the receiving nurse. Lines:   Peripheral IV 12/07/20 Left Antecubital (Active)   Site Assessment Clean, dry, & intact 12/10/20 1608   Phlebitis Assessment 0 12/10/20 1608   Infiltration Assessment 0 12/10/20 1608   Dressing Status Occlusive 12/10/20 1608   Dressing Type Tape;Transparent 12/10/20 1608   Hub Color/Line Status Pink;Capped 12/10/20 1608   Action Taken Open ports on tubing capped 12/10/20 0420   Alcohol Cap Used Yes 12/10/20 0420       Peripheral IV 12/07/20 Right Arm (Active)   Site Assessment Clean, dry, & intact 12/10/20 2137   Phlebitis Assessment 0 12/10/20 2137   Infiltration Assessment 0 12/10/20 2137   Dressing Status Clean, dry, & intact 12/10/20 2137   Dressing Type Tape;Transparent 12/10/20 2137   Hub Color/Line Status Capped;Green 12/10/20 2137   Action Taken Open ports on tubing capped 12/10/20 0420   Alcohol Cap Used Yes 12/10/20 0420       Peripheral IV 12/10/20 Right Hand (Active)   Site Assessment Clean, dry, & intact 12/10/20 2137   Phlebitis Assessment 0 12/10/20 2137   Infiltration Assessment 0 12/10/20 2137   Dressing Status Clean, dry, & intact 12/10/20 2137   Dressing Type Transparent;Tape 12/10/20 2137   Hub Color/Line Status Pink; Infusing;Patent 12/10/20 2137        Opportunity for questions and clarification was provided.     Visit Vitals  BP (!) 155/67   Pulse 75   Temp 97.3 °F (36.3 °C)   Resp (!) 5   Ht 5' 9\" (1.753 m)   Wt 113.7 kg (250 lb 11.2 oz)   SpO2 100%   BMI 37.02 kg/m²       Intake/Output Summary (Last 24 hours) at 12/10/2020 2203  Last data filed at 12/10/2020 2050  Gross per 24 hour   Intake 160 ml   Output 1500 ml   Net -1340 ml Patient transported with:   O2 @ 2 liters   Registered nurse

## 2020-12-11 NOTE — PROGRESS NOTES
Cardiology Progress Note        Patient: Anahy Kolb        Sex: male          DOA: 12/7/2020  YOB: 1940      Age:  68 y.o.        LOS:  LOS: 4 days   Assessment/Plan     Principal Problem:    Left hemiparesis ([address] (12/8/2020)    Active Problems:    Type 2 diabetes mellitus ([address] (2/12/2020)      Multiple myeloma ([address] (2/16/2020)      Morbid obesity ([address] (2/17/2020)      Hypokalemia (12/7/2020)      Neuropathy ()      Hypomagnesemia (12/7/2020)      Elevated troponin (12/7/2020)      Weakness (12/7/2020)        Plan: 12/11/2020  Cardiac telemetry stable  Patient denied any chest pain or shortness of breath. Continue with current medical treatment. Discussed with patient.                 Left-sided weakness   minimal troponin elevation   cardiomyopathy   left bundle-branch block   spinal stenosis      12/10/2020  no evidence of ACS / decompensated CHF or valvular heart disease. stress test in February, 2020 was negative for ischemia. EF is now better at 50%  Patient have elevated but acceptable risk for spinal surgery. Discussed with patient and he expressed verbalized understanding. Patient is cleared for spinal surgery. Please call if any questions/concerned. Thx        Cardiac telemetry stable        Left-sided weakness   minimal troponin elevation   cardiomyopathy   left bundle-branch block   spinal stenosis        no evidence of ACS / decompensated CHF or valvular heart disease. stress test in February, 2020 was negative for ischemia. EF is now better at 50%  Patient have elevated but acceptable risk for spinal surgery. Discussed with patient and he expressed verbalized understanding. Patient is cleared for spinal surgery. Please call if any questions/concerned. Thx                Subjective:    cc:    Cardiomyopathy  Minimally elevated troponin  left bundle branch block        REVIEW OF SYSTEMS:     General: No fevers or chills.   Cardiovascular: No chest pain or pressure. No palpitations. No ankle swelling  Pulmonary: No SOB, orthopnea, PND  Gastrointestinal: No nausea, vomiting or diarrhea      Objective:      Visit Vitals  BP (!) 121/49   Pulse 61   Temp 98.1 °F (36.7 °C)   Resp 17   Ht 5' 9\" (1.753 m)   Wt 113.7 kg (250 lb 11.2 oz)   SpO2 97%   BMI 37.02 kg/m²     Body mass index is 37.02 kg/m². Physical Exam:  General Appearance: Comfortable, not using accessory muscles of respiration. NECK: No JVD, no thyroidomeglay. LUNGS: Clear bilaterally. HEART: S1+S2 audible,    ABD: Non-tender, BS Audible    EXT: No edema, and no cysnosis. VASCULAR EXAM: Pulses are intact. PSYCHIATRIC EXAM: Mood is appropriate.   Cervical collar around neck  Medication:  Current Facility-Administered Medications   Medication Dose Route Frequency    insulin lispro (HUMALOG) injection   SubCUTAneous AC&HS    insulin glargine (LANTUS) injection 10 Units  10 Units SubCUTAneous QHS    insulin lispro (HUMALOG) injection 3 Units  3 Units SubCUTAneous TIDAC    sodium chloride (NS) flush 5-40 mL  5-40 mL IntraVENous Q8H    sodium chloride (NS) flush 5-40 mL  5-40 mL IntraVENous PRN    lactated Ringers infusion  125 mL/hr IntraVENous CONTINUOUS    acetaminophen (TYLENOL) tablet 650 mg  650 mg Oral Q4H PRN    oxyCODONE-acetaminophen (PERCOCET) 5-325 mg per tablet 1 Tab  1 Tab Oral Q4H PRN    ondansetron (ZOFRAN ODT) tablet 4 mg  4 mg Oral Q6H PRN    phenol throat spray (CHLORASEPTIC) 1 Spray  1 Spray Oral PRN    benzocaine-menthoL (CEPACOL) lozenge 1 Lozenge  1 Lozenge Oral PRN    diazePAM (VALIUM) tablet 2.5 mg  2.5 mg Oral TID PRN    docusate sodium (COLACE) capsule 100 mg  100 mg Oral BID    diphenhydrAMINE (BENADRYL) injection 12.5 mg  12.5 mg IntraVENous Q4H PRN    rivaroxaban (XARELTO) tablet 10 mg  10 mg Oral Q24H    lactated Ringers infusion  125 mL/hr IntraVENous CONTINUOUS    ceFAZolin (ANCEF) 2 g/20 mL in sterile water IV syringe  2 g IntraVENous Q8H    dexAMETHasone (DECADRON) tablet 20 mg  20 mg Oral Q7D    calcium-vitamin D (OS-NUVIA +D3) 500 mg-200 unit per tablet 1 Tab  1 Tab Oral DAILY    felodipine (PLENDIL SR) 24 hr tablet 10 mg  10 mg Oral DAILY    furosemide (LASIX) tablet 40 mg  40 mg Oral DAILY    lisinopriL (PRINIVIL, ZESTRIL) tablet 5 mg  5 mg Oral DAILY    metOLazone (ZAROXOLYN) tablet 5 mg  5 mg Oral DAILY    spironolactone (ALDACTONE) tablet 25 mg  25 mg Oral DAILY    glucose chewable tablet 16 g  4 Tab Oral PRN    glucagon (GLUCAGEN) injection 1 mg  1 mg IntraMUSCular PRN    cyanocobalamin tablet 1,000 mcg  1,000 mcg Oral DAILY    gabapentin (NEURONTIN) capsule 800 mg  800 mg Oral BID    isosorbide mononitrate ER (IMDUR) tablet 30 mg  30 mg Oral DAILY    lenalidomide cap 25 mg (Patient Supplied)  25 mg Oral DAILY    metoprolol succinate (TOPROL-XL) XL tablet 25 mg  25 mg Oral DAILY    pravastatin (PRAVACHOL) tablet 40 mg  40 mg Oral QHS    pyridoxine (vitamin B6) (VITAMIN B-6) tablet 50 mg  50 mg Oral DAILY    valACYclovir (VALTREX) tablet 500 mg  500 mg Oral BID               Lab/Data Reviewed:  Procedures/imaging: see electronic medical records for all procedures/Xrays   and details which were not copied into this note but were reviewed prior to creation of Plan       All lab results for the last 24 hours reviewed.      Recent Labs     12/11/20  0525 12/10/20  0406 12/09/20  0350   WBC 7.0 4.7 5.3   HGB 10.3* 9.8* 10.0*   HCT 29.3* 28.0* 29.1*    225 204     Recent Labs     12/10/20  0406 12/09/20  0350   * 137   K 3.5 3.6   CL 96* 104   CO2 28 27   * 145*   BUN 12 7   CREA 0.91 0.79   CA 8.9 8.1*       RADIOLOGY:    CXR Results  (Last 48 hours)    None            Cardiology Procedures:   Results for orders placed or performed during the hospital encounter of 12/07/20   EKG, 12 LEAD, INITIAL   Result Value Ref Range    Ventricular Rate 83 BPM    Atrial Rate 83 BPM    P-R Interval 180 ms    QRS Duration 126 ms Q-T Interval 378 ms    QTC Calculation (Bezet) 444 ms    Calculated P Axis 53 degrees    Calculated R Axis 23 degrees    Calculated T Axis 174 degrees    Diagnosis       Sinus rhythm with ventricular fusion beats  paroxysmal LBBB  T wave abnormality, consider lateral ischemia  Abnormal ECG  When compared with ECG of 16-FEB-2020 06:15,  Significant changes have occurred  Confirmed by John Heard MD. ([id number]) on 12/7/2020 2:17:40 PM        Echo Results  (Last 48 hours)    None       Cardiolite (Tc-99m Sestamibi) stress test    Signed By: Christine Evangelista MD     December 11, 2020

## 2020-12-11 NOTE — PROGRESS NOTES
Hospitalist Progress Note-critical care note     Patient: Cody Victoria MRN: [id number]  [facility]: [id number]    YOB: 1940  Age: 68 y.o.   Sex: male    DOA: 12/7/2020 LOS:  LOS: 4 days            Chief complaint: left hemiparesis, htn, hypokalemia, hypoKlemia and hypomagnesemia , dm2    Assessment/Plan         Hospital Problems  Date Reviewed: 2/17/2020          Codes Class Noted POA    * (Principal) Left hemiparesis ([facility] ICD-10-CM: G81.94  ICD-9-CM: 342.90  12/8/2020 Unknown        Hypokalemia ICD-10-CM: E87.6  ICD-9-CM: 276.8  12/7/2020 Unknown        Neuropathy ICD-10-CM: G62.9  ICD-9-CM: 355.9  Unknown Unknown        Hypomagnesemia ICD-10-CM: E83.42  ICD-9-CM: 275.2  12/7/2020 Unknown        Elevated troponin ICD-10-CM: R77.8  ICD-9-CM: 790.6  12/7/2020 Unknown        Weakness ICD-10-CM: R53.1  ICD-9-CM: 780.79  12/7/2020 Unknown        Morbid obesity ([facility] ICD-10-CM: E66.01  ICD-9-CM: 278.01  2/17/2020 Yes        Multiple myeloma ([facility] ICD-10-CM: C90.00  ICD-9-CM: 203.00  2/16/2020 Yes        Type 2 diabetes mellitus ([facility] ICD-10-CM: E11.9  ICD-9-CM: 250.00  2/12/2020 Yes              Pt was admitted for neuropathy, stroke r/o , ortho on board for spinal stenosis, post surgery day 1    Neuropathy , and weakness due to spinal stenosis   CERVICAL 3- CERVICAL 7 SPINE POSTERIOR CERVICAL DECOMPRESSION AND FUSION WITH C-ARM per dr. Keyla Ward on 12/10   Mri brain no acute stroke   Will continue pt/ot      Hypokalemia, hypomagnesemia  Resolved   Replacement as needed      Elevated  troponin  Continue cardiac monitoring  No chest pain-  Dr. Andres Santos is on board-no intervention needed         MM   lenalidomide   Oncologist on board- Saqib Hyde per oncologist      DM type II , with complication,  - continue lantus tp 10 , ssi, diabetic diet , hypoglycemia protocol    On steroid now       HTN, accelerated  Restart other home medication      Morbid obesity  Lifestyle modification     Subjective: weakness better    Will send covid for placement   Disposition :tbd,   Review of systems:    General: No fevers or chills. Cardiovascular: No chest pain or pressure. No palpitations. Pulmonary: No shortness of breath. Gastrointestinal: No nausea, vomiting. Vital signs/Intake and Output:  Visit Vitals  BP (!) 121/49   Pulse 61   Temp 98.1 °F (36.7 °C)   Resp 17   Ht 5' 9\" (1.753 m)   Wt 113.7 kg (250 lb 11.2 oz)   SpO2 97%   BMI 37.02 kg/m²     Current Shift:  12/11 0701 - 12/11 1900  In: -   Out: 044 [Urine:325; Drains:30]  Last three shifts:  12/09 1901 - 12/11 0700  In: 1439.2 [P.O.:540; I.V.:899.2]  Out: 2565 [Urine:2400; Drains:115]    Physical Exam:  General: WD, WN. Alert, cooperative, no acute distress    HEENT: NC, Atraumatic. PERRLA, anicteric sclerae. Neck color noted   Lungs: CTA Bilaterally. No Wheezing/Rhonchi/Rales. Heart:  Regular  rhythm,  No murmur, No Rubs, No Gallops  Abdomen: Soft, Non distended, Non tender. +Bowel sounds,   Extremities: No c/c/e  Psych:   Not anxious or agitated. Neurologic:   left side weakness -mild improving           Labs: Results:       Chemistry Recent Labs     12/10/20  0406 12/09/20  0350   * 145*   * 137   K 3.5 3.6   CL 96* 104   CO2 28 27   BUN 12 7   CREA 0.91 0.79   CA 8.9 8.1*   AGAP 8 6   BUCR 13 9*      CBC w/Diff Recent Labs     12/11/20  0525 12/10/20  0406 12/09/20  0350   WBC 7.0 4.7 5.3   RBC 3.50* 3.37* 3.44*   HGB 10.3* 9.8* 10.0*   HCT 29.3* 28.0* 29.1*    225 204   GRANS  --  72 48   LYMPH  --  18* 29   EOS  --  0 5      Cardiac Enzymes No results for input(s): CPK, CKND1, YEFRI in the last 72 hours. No lab exists for component: CKRMB, TROIP   Coagulation No results for input(s): PTP, INR, APTT, INREXT, INREXT in the last 72 hours.     Lipid Panel Lab Results   Component Value Date/Time    Cholesterol, total 89 12/08/2020 01:12 AM    HDL Cholesterol 46 12/08/2020 01:12 AM    LDL, calculated 28.6 12/08/2020 01:12 AM    VLDL, calculated 14.4 12/08/2020 01:12 AM    Triglyceride 72 12/08/2020 01:12 AM    CHOL/HDL Ratio 1.9 12/08/2020 01:12 AM      BNP No results for input(s): BNPP in the last 72 hours. Liver Enzymes No results for input(s): TP, ALB, TBIL, AP in the last 72 hours. No lab exists for component: SGOT, GPT, DBIL   Thyroid Studies Lab Results   Component Value Date/Time    TSH 1.16 12/07/2020 12:25 PM    TSH 0.74 12/07/2020 12:25 PM        Procedures/imaging: see electronic medical records for all procedures/Xrays and details which were not copied into this note but were reviewed prior to creation of Plan    Xr Shoulder Lt Ap/lat Min 2 V    Result Date: 12/7/2020  EXAM:  XR SHOULDER LT AP/LAT MIN 2 V INDICATION: Left shoulder pain COMPARISON: None. FINDINGS: Two views of the left shoulder demonstrate no acute finding. A few scattered and confluent lucencies are seen in the scapula, proximal humerus and clavicle. Impression: No acute findings or significant degenerative change appreciated. Scattered small bone lucencies noted compatible with history of multiple myeloma. Mri Brain Wo Cont    Result Date: 12/8/2020  EXAM: MRI brain without gadolinium CLINICAL INDICATION/HISTORY: Extremity weakness, possible CVA   > Additional: None. COMPARISON: 2/16/2020   > Reference Exam: CT head 12/7/2020 TECHNIQUE: Sagittal T1, axial T1, T2, FLAIR, T2 gradient, diffusion and coronal T1 and T2 sequences obtained of the brain. _______________ FINDINGS: Diffusion:  There are no areas of restricted diffusion, no evidence of an acute infarction. Brain parenchyma:  Stable very mild periventricular white matter and central pontine increased T2 and FLAIR signal. No new cortical or white matter signal abnormality with no evidence of mass hemorrhage edema or mass effect. Ventricles and sulci:  Mild diffuse central and cortical volume loss. Extra-axial:  No extra-axial fluid collection or mass is noted.  Brain vasculature:  No vascular abnormality is appreciated on this routine brain MR examination. Craniocervical junction:  Cervical medullary junction unremarkable. Interval increased development of likely degenerative Schmorl's node endplate defect inferior C2 vertebral body. Suggestion of possible worsening of ligamentum flavum thickening C2 and C3 with some questionable cord flattening and canal stenosis, limited evaluation on this study. Skull base, extracranial and calvarium:  Previous bilateral lens implants with stable bilateral proptosis with no retrobulbar abnormality. Sinuses and IACs unremarkable. Minimal increased T2 signal inferior right mastoid. Sella unremarkable. Multiple small low T1 and increased T2 signal foci throughout the intradiploic space of the skull correlating to small lytic lesions on CT. _______________     IMPRESSION: 1. No evidence of acute infarct or other acute intracranial finding. 2. Stable very mild white matter signal changes nonspecific probable chronic microvascular ischemic disease. 3. Questionable interval increased acquired canal stenosis and cord flattening C2 and C3 levels limited evaluation on this study. If there is clinical concern for cervical myelopathy, further evaluation could be performed with MRI dedicated to cervical spine. 4. Multiple small T2 hyperintense skull lesions concerning for metastatic disease or multiple myeloma. Ct Head Wo Cont    Result Date: 12/7/2020  EXAM: CT head HISTORY: -Provided with order: left sided weakness, stroke? -Additional: None COMPARISON: 02/16/20 TECHNIQUE: Axial CT imaging of the head was performed without intravenous contrast. Sagittal and coronal reconstructions were created from the axial data. One or more dose reduction techniques were used on this CT: automated exposure control, adjustment of the mAs and/or kVp according to patient size, and iterative reconstruction techniques.   The specific techniques used on this CT exam have been documented in the patient's electronic medical record. Digital Imaging and Communications in Medicine (DICOM) format image data are available to nonaffiliated external healthcare facilities or entities on a secure, media free, reciprocally searchable basis with patient authorization for at least a 12-month period after this study. _______________ FINDINGS: BRAIN, POSTERIOR FOSSA, EXTRA-AXIAL SPACES AND MENINGES:  The sulci, folia, ventricles and basal cisterns are   within normal limits for the patient's age. There are no areas of abnormal parenchymal attenuation. There is no intracranial hemorrhage, mass effect, or midline shift. There are no abnormal extra-axial fluid collections. CALVARIUM: Progressive lucent lesions in the calvarium. SINUSES/MASTOIDS: Slight left maxillary sinus mucosal thickening. OTHER: None. _______________     IMPRESSION: 1. No acute intracranial abnormalities are identified. 2. Progressive lucent/lytic lesions in the calvarium, which can be seen in the setting of metastatic disease or myeloma. Please note that head CT may be negative in the acute setting and MRI could best further evaluate for acute/subacute ischemia/infarct in the high/persistent index of clinical suspicion. Xr Chest Port    Result Date: 12/7/2020  HISTORY: -Provided with order: left shoulder pain -Additional: None Technique : AP PORTABLE CHEST Comparison : 02/16/20 FINDINGS: HEART AND MEDIASTINUM: Right IJ central venous catheter tip projects over expected SVC. External monitoring wires leads and pads partially obscures visibility. LUNGS AND PLEURAL SPACES: No consolidation, congestive heart failure, pleural effusion or pneumothorax. BONY THORAX AND SOFT TISSUES: No acute osseous abnormality. Impression: No plain film evidence of acute cardiopulmonary disease, allowing for technique.        Khalida Steve MD

## 2020-12-12 LAB
ANION GAP SERPL CALC-SCNC: 9 MMOL/L (ref 3–18)
BUN SERPL-MCNC: 12 MG/DL (ref 7–18)
BUN/CREAT SERPL: 14 (ref 12–20)
CALCIUM SERPL-MCNC: 8.7 MG/DL (ref 8.5–10.1)
CHLORIDE SERPL-SCNC: 91 MMOL/L (ref 100–111)
CO2 SERPL-SCNC: 31 MMOL/L (ref 21–32)
CREAT SERPL-MCNC: 0.87 MG/DL (ref 0.6–1.3)
ERYTHROCYTE [DISTWIDTH] IN BLOOD BY AUTOMATED COUNT: 15.3 % (ref 11.6–14.5)
GLUCOSE BLD STRIP.AUTO-MCNC: 180 MG/DL (ref 70–110)
GLUCOSE BLD STRIP.AUTO-MCNC: 195 MG/DL (ref 70–110)
GLUCOSE BLD STRIP.AUTO-MCNC: 196 MG/DL (ref 70–110)
GLUCOSE BLD STRIP.AUTO-MCNC: 197 MG/DL (ref 70–110)
GLUCOSE BLD STRIP.AUTO-MCNC: 227 MG/DL (ref 70–110)
GLUCOSE SERPL-MCNC: 146 MG/DL (ref 74–99)
HCT VFR BLD AUTO: 30 % (ref 36–48)
HGB BLD-MCNC: 10.6 G/DL (ref 13–16)
MAGNESIUM SERPL-MCNC: 1.9 MG/DL (ref 1.6–2.6)
MCH RBC QN AUTO: 29.4 PG (ref 24–34)
MCHC RBC AUTO-ENTMCNC: 35.3 G/DL (ref 31–37)
MCV RBC AUTO: 83.3 FL (ref 74–97)
PLATELET # BLD AUTO: 282 K/UL (ref 135–420)
PMV BLD AUTO: 10.2 FL (ref 9.2–11.8)
POTASSIUM SERPL-SCNC: 2.8 MMOL/L (ref 3.5–5.5)
RBC # BLD AUTO: 3.6 M/UL (ref 4.7–5.5)
SODIUM SERPL-SCNC: 131 MMOL/L (ref 136–145)
VIT B6 SERPL-MCNC: 27.6 UG/L (ref 5.3–46.7)
WBC # BLD AUTO: 7.3 K/UL (ref 4.6–13.2)

## 2020-12-12 PROCEDURE — 80048 BASIC METABOLIC PNL TOTAL CA: CPT

## 2020-12-12 PROCEDURE — 74011250637 HC RX REV CODE- 250/637: Performed by: PHYSICIAN ASSISTANT

## 2020-12-12 PROCEDURE — 82962 GLUCOSE BLOOD TEST: CPT

## 2020-12-12 PROCEDURE — 83735 ASSAY OF MAGNESIUM: CPT

## 2020-12-12 PROCEDURE — 77030040361 HC SLV COMPR DVT MDII -B

## 2020-12-12 PROCEDURE — 74011250637 HC RX REV CODE- 250/637: Performed by: INTERNAL MEDICINE

## 2020-12-12 PROCEDURE — 97116 GAIT TRAINING THERAPY: CPT

## 2020-12-12 PROCEDURE — 74011250637 HC RX REV CODE- 250/637: Performed by: HOSPITALIST

## 2020-12-12 PROCEDURE — 74011636637 HC RX REV CODE- 636/637: Performed by: HOSPITALIST

## 2020-12-12 PROCEDURE — 65660000000 HC RM CCU STEPDOWN

## 2020-12-12 PROCEDURE — 97530 THERAPEUTIC ACTIVITIES: CPT

## 2020-12-12 PROCEDURE — 97110 THERAPEUTIC EXERCISES: CPT

## 2020-12-12 PROCEDURE — 36415 COLL VENOUS BLD VENIPUNCTURE: CPT

## 2020-12-12 PROCEDURE — 85027 COMPLETE CBC AUTOMATED: CPT

## 2020-12-12 RX ORDER — POTASSIUM CHLORIDE 20 MEQ/1
40 TABLET, EXTENDED RELEASE ORAL 2 TIMES DAILY
Status: COMPLETED | OUTPATIENT
Start: 2020-12-12 | End: 2020-12-12

## 2020-12-12 RX ADMIN — Medication 10 ML: at 23:04

## 2020-12-12 RX ADMIN — SPIRONOLACTONE 25 MG: 25 TABLET ORAL at 09:21

## 2020-12-12 RX ADMIN — Medication 10 ML: at 18:02

## 2020-12-12 RX ADMIN — INSULIN LISPRO 9 UNITS: 100 INJECTION, SOLUTION INTRAVENOUS; SUBCUTANEOUS at 23:03

## 2020-12-12 RX ADMIN — INSULIN LISPRO 3 UNITS: 100 INJECTION, SOLUTION INTRAVENOUS; SUBCUTANEOUS at 09:23

## 2020-12-12 RX ADMIN — METOPROLOL SUCCINATE 25 MG: 25 TABLET, EXTENDED RELEASE ORAL at 12:35

## 2020-12-12 RX ADMIN — CALCIUM CARBONATE-VITAMIN D TAB 500 MG-200 UNIT 1 TABLET: 500-200 TAB at 09:21

## 2020-12-12 RX ADMIN — ISOSORBIDE MONONITRATE 30 MG: 30 TABLET, EXTENDED RELEASE ORAL at 09:21

## 2020-12-12 RX ADMIN — LISINOPRIL 5 MG: 5 TABLET ORAL at 09:22

## 2020-12-12 RX ADMIN — POTASSIUM CHLORIDE 40 MEQ: 1500 TABLET, EXTENDED RELEASE ORAL at 23:03

## 2020-12-12 RX ADMIN — INSULIN LISPRO 3 UNITS: 100 INJECTION, SOLUTION INTRAVENOUS; SUBCUTANEOUS at 18:01

## 2020-12-12 RX ADMIN — Medication 50 MG: at 09:21

## 2020-12-12 RX ADMIN — INSULIN LISPRO 3 UNITS: 100 INJECTION, SOLUTION INTRAVENOUS; SUBCUTANEOUS at 06:40

## 2020-12-12 RX ADMIN — GABAPENTIN 800 MG: 400 CAPSULE ORAL at 09:20

## 2020-12-12 RX ADMIN — GABAPENTIN 800 MG: 400 CAPSULE ORAL at 23:02

## 2020-12-12 RX ADMIN — DOCUSATE SODIUM 100 MG: 100 CAPSULE, LIQUID FILLED ORAL at 09:21

## 2020-12-12 RX ADMIN — VALACYCLOVIR HYDROCHLORIDE 500 MG: 500 TABLET, FILM COATED ORAL at 23:01

## 2020-12-12 RX ADMIN — FUROSEMIDE 40 MG: 40 TABLET ORAL at 09:21

## 2020-12-12 RX ADMIN — FELODIPINE 10 MG: 5 TABLET, EXTENDED RELEASE ORAL at 09:20

## 2020-12-12 RX ADMIN — Medication 10 ML: at 06:41

## 2020-12-12 RX ADMIN — INSULIN LISPRO 3 UNITS: 100 INJECTION, SOLUTION INTRAVENOUS; SUBCUTANEOUS at 12:36

## 2020-12-12 RX ADMIN — CYANOCOBALAMIN TAB 1000 MCG 1000 MCG: 1000 TAB at 09:21

## 2020-12-12 RX ADMIN — INSULIN GLARGINE 10 UNITS: 100 INJECTION, SOLUTION SUBCUTANEOUS at 23:03

## 2020-12-12 RX ADMIN — VALACYCLOVIR HYDROCHLORIDE 500 MG: 500 TABLET, FILM COATED ORAL at 09:21

## 2020-12-12 RX ADMIN — POTASSIUM CHLORIDE 40 MEQ: 1500 TABLET, EXTENDED RELEASE ORAL at 12:37

## 2020-12-12 RX ADMIN — ACETAMINOPHEN 650 MG: 325 TABLET ORAL at 18:26

## 2020-12-12 RX ADMIN — METOLAZONE 5 MG: 5 TABLET ORAL at 09:20

## 2020-12-12 RX ADMIN — PRAVASTATIN SODIUM 40 MG: 20 TABLET ORAL at 23:02

## 2020-12-12 NOTE — ROUTINE PROCESS
Bedside and Verbal shift change report given to MILVIA Trevizo R.N. (oncoming nurse) by BONNIE Felipe R.N. (offgoing nurse). Report included the following information SBAR, Kardex, Intake/Output, MAR, Accordion, Recent Results and Med Rec Status.

## 2020-12-12 NOTE — PROGRESS NOTES
0720: Assumed patient care from off going [address] [facility]    6601: Written order noted by Dr. Robert Whitehead requesting patient a diabetic shake with meals, because patient has difficulty with neck brace.  This nurse ordered patient Glucerna with meals

## 2020-12-12 NOTE — PROGRESS NOTES
Problem: Pressure Injury - Risk of  Goal: *Prevention of pressure injury  Description: Document Vance Scale and appropriate interventions in the flowsheet. Outcome: Progressing Towards Goal  Note: Pressure Injury Interventions:  Sensory Interventions: Assess changes in LOC, Assess need for specialty bed, Discuss PT/OT consult with provider, Keep linens dry and wrinkle-free, Maintain/enhance activity level, Minimize linen layers, Monitor skin under medical devices         Activity Interventions: PT/OT evaluation, Pressure redistribution bed/mattress(bed type), Increase time out of bed, Assess need for specialty bed    Mobility Interventions: Assess need for specialty bed, HOB 30 degrees or less, Pressure redistribution bed/mattress (bed type), PT/OT evaluation    Nutrition Interventions: Document food/fluid/supplement intake                     Problem: Patient Education: Go to Patient Education Activity  Goal: Patient/Family Education  Outcome: Progressing Towards Goal     Problem: Falls - Risk of  Goal: *Absence of Falls  Description: Document Rebecca Nell Fall Risk and appropriate interventions in the flowsheet.   Outcome: Progressing Towards Goal  Note: Fall Risk Interventions:  Mobility Interventions: Assess mobility with egress test, OT consult for ADLs, Patient to call before getting OOB, PT Consult for mobility concerns, PT Consult for assist device competence         Medication Interventions: Teach patient to arise slowly, Patient to call before getting OOB    Elimination Interventions: Call light in reach, Patient to call for help with toileting needs, Toileting schedule/hourly rounds              Problem: Pain  Goal: *Control of Pain  Outcome: Progressing Towards Goal  Goal: *PALLIATIVE CARE:  Alleviation of Pain  Outcome: Progressing Towards Goal     Problem: Patient Education: Go to Patient Education Activity  Goal: Patient/Family Education  Outcome: Progressing Towards Goal     Problem: Diabetes Self-Management  Goal: *Disease process and treatment process  Description: Define diabetes and identify own type of diabetes; list 3 options for treating diabetes. Outcome: Progressing Towards Goal  Goal: *Incorporating nutritional management into lifestyle  Description: Describe effect of type, amount and timing of food on blood glucose; list 3 methods for planning meals. Outcome: Progressing Towards Goal  Goal: *Incorporating physical activity into lifestyle  Description: State effect of exercise on blood glucose levels. Outcome: Progressing Towards Goal  Goal: *Developing strategies to promote health/change behavior  Description: Define the ABC's of diabetes; identify appropriate screenings, schedule and personal plan for screenings. Outcome: Progressing Towards Goal  Goal: *Using medications safely  Description: State effect of diabetes medications on diabetes; name diabetes medication taking, action and side effects. Outcome: Progressing Towards Goal  Goal: *Monitoring blood glucose, interpreting and using results  Description: Identify recommended blood glucose targets  and personal targets.   Outcome: Progressing Towards Goal

## 2020-12-12 NOTE — PROGRESS NOTES
Problem: Mobility Impaired (Adult and Pediatric)  Goal: *Acute Goals and Plan of Care (Insert Text)  Description: Physical Therapy Goals  Initiated 12/11/2020 and to be accomplished within 3-7 day(s)  1. Patient will move from supine to sit and sit to supine  in bed with supervision/set-up. 2.  Patient will transfer from bed to chair and chair to bed with minimal assistance/contact guard assist using the least restrictive device. 3.  Patient will perform sit to stand with minimal assistance/contact guard assist.  4.  Patient will ambulate with minimal assistance/contact guard assist for 100 feet with the least restrictive device. Outcome: Progressing Towards Goal   []  Patient has met MD mobilization kemal for d/c home   []  Recommend HH with 24 hour adult care   [x]  Benefit from additional acute PT session to address:  all aspects of functional mobility, rehab placement    PHYSICAL THERAPY TREATMENT    Patient: Shaun Sorto (69 y.o. male)  Date: 12/12/2020  Diagnosis: Hypokalemia [E87.6]   Left hemiparesis (HCC)  Procedure(s) (LRB):  CERVICAL 3- CERVICAL 7 SPINE POSTERIOR CERVICAL DECOMPRESSION AND FUSION WITH C-ARM (N/A) 2 Days Post-Op  Precautions: Fall, Spinal  PLOF:     ASSESSMENT:  Log roll performed with Cole, modA to sit up EOB. Increased posterior lean requiring pt to hold bed rail throughout session and manual cues for shifting COG. Bed elevated to assist with sit to stand and modA needed. Manual assist to keep L hand on  of RW. Posterior lean with LEs against bed. Side steps performed, manual wt shifting required in order to progress LEs. Seated rest break then 2nd attempt for standing and side steps. MaxA for balance and RW mgmt when taking side steps, decreased step height of (B) LEs L>R. Performed seated (B)LE strengthening exercises. ModA back to supine, totA to scoot up to [facility].   Progression toward goals:   []      Improving appropriately and progressing toward goals  [x]      Improving slowly and progressing toward goals  []      Not making progress toward goals and plan of care will be adjusted     PLAN:  Patient continues to benefit from skilled intervention to address the above impairments. Continue treatment per established plan of care. Discharge Recommendations:  Rehab  Further Equipment Recommendations for Discharge:  rolling walker     SUBJECTIVE:   Patient stated No pain.     OBJECTIVE DATA SUMMARY:   Critical Behavior:  Neurologic State: Alert  Orientation Level: Oriented X4  Cognition: Follows commands  Safety/Judgement: Awareness of environment  Functional Mobility Training:  Bed Mobility:  Rolling: Minimum assistance  Supine to Sit: Moderate assistance  Sit to Supine: Moderate assistance  Scooting: Maximum assistance; Total assistance  Transfers:  Sit to Stand: Moderate assistance  Stand to Sit: Moderate assistance  Balance:  Sitting: Impaired; Without support  Sitting - Static: Fair (occasional)(-)  Sitting - Dynamic: Poor (constant support)  Standing: Impaired; With support  Standing - Static: Poor  Standing - Dynamic : Poor   Ambulation/Gait Training:  Distance (ft): 2 Feet (ft)  Assistive Device: Brace/Splint;Gait belt;Walker, rolling  Ambulation - Level of Assistance: Maximum assistance  Gait Abnormalities: Ataxic;Decreased step clearance    Base of Support: Widened    Speed/Roslyn: Delayed; Shuffled  Step Length: Right shortened;Left shortened  Therapeutic Exercises:   (B)LEs      EXERCISE   Sets   Reps   Active Active Assist   Passive Self ROM   Comments   Ankle Pumps    [] [] [] []    Quad Sets/Glut Sets    [] [] [] [] Hold for 5 secs   Hamstring Sets   [] [] [] []    Short Arc Quads   [] [] [] []    Heel Slides   [] [] [] []    Straight Leg Raises   [] [] [] []    Hip Add   [] [] [] [] Hold for 5 secs, w/ pillow squeeze   Long Arc Quads  10 [x] [] [] []    Seated Marching  10 [x] [] [] []    Standing Marching   [] [] [] []       [] [] [] [] Pain:  Pain level pre-treatment: 0/10  Pain level post-treatment: 0/10   Pain Intervention(s): Medication (see MAR); Rest, Ice, Repositioning   Response to intervention: Nurse notified, See doc flow    Activity Tolerance:   Fair-  Please refer to the flowsheet for vital signs taken during this treatment. After treatment:   [] Patient left in no apparent distress sitting up in chair  [x] Patient left in no apparent distress in bed  [x] Call bell left within reach  [] Nursing notified  [] Caregiver present  [] Bed alarm activated  [] SCDs applied      COMMUNICATION/EDUCATION:   [x]         Role of Physical Therapy in the acute care setting. [x]         Fall prevention education was provided and the patient/caregiver indicated understanding. [x]         Patient/family have participated as able in working toward goals and plan of care. [x]         Patient/family agree to work toward stated goals and plan of care. []         Patient understands intent and goals of therapy, but is neutral about his/her participation.   []         Patient is unable to participate in stated goals/plan of care: ongoing with therapy staff.  []         Other:        Rebecca Ortez PTA   Time Calculation: 24 mins

## 2020-12-12 NOTE — ROUTINE PROCESS
Pt had an uneventful night. 0720 Bedside and Verbal shift change report given to MARITZA Baldwin RN (oncoming nurse) by Conrad Egan. Holly Peña RN (offgoing nurse).  Report included the following information SBAR, Kardex, Accordion and Cardiac Rhythm SR.

## 2020-12-12 NOTE — PROGRESS NOTES
Cardiology Progress Note      12/12/2020 3:25 PM    Admit Date: 12/7/2020    Admit Diagnosis: Hypokalemia [E87.6]      Subjective:     Alexi Gomez denies chest pain, chest pressure/discomfort.     Visit Vitals  BP (!) 121/53   Pulse 88   Temp 100 °F (37.8 °C)   Resp 17   Ht 5' 9\" (1.753 m)   Wt 113.7 kg (250 lb 11.2 oz)   SpO2 99%   BMI 37.02 kg/m²     Current Facility-Administered Medications   Medication Dose Route Frequency    potassium chloride (K-DUR, KLOR-CON) SR tablet 40 mEq  40 mEq Oral BID    oxyCODONE-acetaminophen (PERCOCET 7.5) 7.5-325 mg per tablet 1 Tab  1 Tab Oral Q4H PRN    insulin lispro (HUMALOG) injection   SubCUTAneous AC&HS    insulin glargine (LANTUS) injection 10 Units  10 Units SubCUTAneous QHS    insulin lispro (HUMALOG) injection 3 Units  3 Units SubCUTAneous TIDAC    sodium chloride (NS) flush 5-40 mL  5-40 mL IntraVENous Q8H    sodium chloride (NS) flush 5-40 mL  5-40 mL IntraVENous PRN    acetaminophen (TYLENOL) tablet 650 mg  650 mg Oral Q4H PRN    oxyCODONE-acetaminophen (PERCOCET) 5-325 mg per tablet 1 Tab  1 Tab Oral Q4H PRN    ondansetron (ZOFRAN ODT) tablet 4 mg  4 mg Oral Q6H PRN    phenol throat spray (CHLORASEPTIC) 1 Spray  1 Spray Oral PRN    benzocaine-menthoL (CEPACOL) lozenge 1 Lozenge  1 Lozenge Oral PRN    diazePAM (VALIUM) tablet 2.5 mg  2.5 mg Oral TID PRN    docusate sodium (COLACE) capsule 100 mg  100 mg Oral BID    diphenhydrAMINE (BENADRYL) injection 12.5 mg  12.5 mg IntraVENous Q4H PRN    rivaroxaban (XARELTO) tablet 10 mg  10 mg Oral Q24H    dexAMETHasone (DECADRON) tablet 20 mg  20 mg Oral Q7D    calcium-vitamin D (OS-NUVIA +D3) 500 mg-200 unit per tablet 1 Tab  1 Tab Oral DAILY    felodipine (PLENDIL SR) 24 hr tablet 10 mg  10 mg Oral DAILY    furosemide (LASIX) tablet 40 mg  40 mg Oral DAILY    lisinopriL (PRINIVIL, ZESTRIL) tablet 5 mg  5 mg Oral DAILY    metOLazone (ZAROXOLYN) tablet 5 mg  5 mg Oral DAILY    spironolactone (ALDACTONE) tablet 25 mg  25 mg Oral DAILY    glucose chewable tablet 16 g  4 Tab Oral PRN    glucagon (GLUCAGEN) injection 1 mg  1 mg IntraMUSCular PRN    cyanocobalamin tablet 1,000 mcg  1,000 mcg Oral DAILY    gabapentin (NEURONTIN) capsule 800 mg  800 mg Oral BID    isosorbide mononitrate ER (IMDUR) tablet 30 mg  30 mg Oral DAILY    lenalidomide cap 25 mg (Patient Supplied)  25 mg Oral DAILY    metoprolol succinate (TOPROL-XL) XL tablet 25 mg  25 mg Oral DAILY    pravastatin (PRAVACHOL) tablet 40 mg  40 mg Oral QHS    pyridoxine (vitamin B6) (VITAMIN B-6) tablet 50 mg  50 mg Oral DAILY    valACYclovir (VALTREX) tablet 500 mg  500 mg Oral BID         Objective:      Physical Exam:  GEN: Alert and oriented times three NAD  EYES: conjunctiva normal, lids with out lesions  HEENT: MMM, No thyromegaly, no lymphadenopathy  HEART: RRR +S1 +S2, no JVD, pulses 2+ distally  LUNGS: CTA B/L no wheezes, rales or rhonchi  ABDOMEN: + BS, soft NT/ND no organomegaly,  No rebound  EXTREMITIES: No edema cyanosis, cap refill normal   SKIN: no rashes or skin breakdown, no nodules, normal turgor  NEURO: moves all 4 extremities      Data Review:   Labs:    Recent Results (from the past 24 hour(s))   GLUCOSE, POC    Collection Time: 12/11/20  4:18 PM   Result Value Ref Range    Glucose (POC) 161 (H) 70 - 110 mg/dL   GLUCOSE, POC    Collection Time: 12/11/20  9:11 PM   Result Value Ref Range    Glucose (POC) 172 (H) 70 - 110 mg/dL   MAGNESIUM    Collection Time: 12/12/20  5:15 AM   Result Value Ref Range    Magnesium 1.9 1.6 - 2.6 mg/dL   CBC W/O DIFF    Collection Time: 12/12/20  5:15 AM   Result Value Ref Range    WBC 7.3 4.6 - 13.2 K/uL    RBC 3.60 (L) 4.70 - 5.50 M/uL    HGB 10.6 (L) 13.0 - 16.0 g/dL    HCT 30.0 (L) 36.0 - 48.0 %    MCV 83.3 74.0 - 97.0 FL    MCH 29.4 24.0 - 34.0 PG    MCHC 35.3 31.0 - 37.0 g/dL    RDW 15.3 (H) 11.6 - 14.5 %    PLATELET 077 195 - 661 K/uL    MPV 10.2 9.2 - 46.8 FL   METABOLIC PANEL, BASIC    Collection Time: 12/12/20  5:15 AM   Result Value Ref Range    Sodium 131 (L) 136 - 145 mmol/L    Potassium 2.8 (LL) 3.5 - 5.5 mmol/L    Chloride 91 (L) 100 - 111 mmol/L    CO2 31 21 - 32 mmol/L    Anion gap 9 3.0 - 18 mmol/L    Glucose 146 (H) 74 - 99 mg/dL    BUN 12 7.0 - 18 MG/DL    Creatinine 0.87 0.6 - 1.3 MG/DL    BUN/Creatinine ratio 14 12 - 20      GFR est AA >60 >60 ml/min/1.73m2    GFR est non-AA >60 >60 ml/min/1.73m2    Calcium 8.7 8.5 - 10.1 MG/DL   GLUCOSE, POC    Collection Time: 12/12/20  6:26 AM   Result Value Ref Range    Glucose (POC) 180 (H) 70 - 110 mg/dL   GLUCOSE, POC    Collection Time: 12/12/20 11:42 AM   Result Value Ref Range    Glucose (POC) 196 (H) 70 - 110 mg/dL       Telemetry: normal sinus rhythm      Assessment:     Principal Problem:    Left hemiparesis (HCC) (12/8/2020)    Active Problems:    Type 2 diabetes mellitus ([facility] [address] (2/12/2020)      Multiple myeloma ([facility] [address] (2/16/2020)      Morbid obesity (HCC) (2/17/2020)      Hypokalemia (12/7/2020)      Neuropathy ()      Hypomagnesemia (12/7/2020)      Elevated troponin (12/7/2020)      Weakness (12/7/2020)      Spinal stenosis (12/11/2020)        Plan:     Stable from cardiac standpoint. Continue measures per Hospital Medicine.       Matteo Coulter MD

## 2020-12-12 NOTE — PROGRESS NOTES
PT DAILY TREATMENT NOTE 10-18 Patient Name: Marley Hannah Date:2019 : 1929 [x]  Patient  Verified Payor: [place] / Plan: [place] y / Product Type: Medicare / In time:11:29A  Out time:12:10P Total Treatment Time (min): 38 min Visit #: 96 QU 51 Medicare/BCBS Only Total Timed Codes (min):  43 1:1 Treatment Time:  37 Treatment Area: Left shoulder pain [M25.512] At risk for falls [Z91.81] SUBJECTIVE Pain Level (0-10 scale): 0/10 in the shoulder, but does have back pain. Any medication changes, allergies to medications, adverse drug reactions, diagnosis change, or new procedure performed?: [x] No    [] Yes (see summary sheet for update) Subjective functional status/changes:   [] No changes reported Patient states her doctor contacted her telling her that PT for her back would be a good idea. OBJECTIVE Modality rationale: decrease edema, decrease inflammation and increase tissue extensibility to improve the patients ability to incrase activity tolerance. Min Type Additional Details  
 [] Estim:  []Unatt       []IFC  []Premod []Other:  []w/ice   []w/heat Position: Location:  
 [] Estim: []Att    []TENS instruct  []NMES []Other:  []w/US   []w/ice   []w/heat Position: Location:  
 []  Traction: [] Cervical       []Lumbar 
                     [] Prone          []Supine []Intermittent   []Continuous Lbs: 
[] before manual 
[] after manual  
 []  Ultrasound: []Continuous   [] Pulsed []1MHz   []3MHz W/cm2: 
Location:  
 []  Iontophoresis with dexamethasone Location: [] Take home patch  
[] In clinic  
 []  Ice     [x]  heat 
[]  Ice massage 
[]  Laser  
[]  Anodyne Position: sitting Location:neck and shoulders  
 []  Laser with stim 
[]  Other:  Position: Location:  
 []  Vasopneumatic Device Pressure:       [] lo [] med [] hi  
 Progress Note      Patient: Becca Hollis               Sex: male          DOA: 12/7/2020       YOB: 1940      Age:  68 y.o.        LOS:  LOS: 5 days             Assessment/Plan     Principal Problem:    Left hemiparesis ([address] (12/8/2020)    Active Problems:    Type 2 diabetes mellitus ([address] (2/12/2020)      Multiple myeloma ([address] (2/16/2020)      Morbid obesity ([address] (2/17/2020)      Hypokalemia (12/7/2020)      Neuropathy ()      Hypomagnesemia (12/7/2020)      Elevated troponin (12/7/2020)      Weakness (12/7/2020)      Spinal stenosis (12/11/2020)    1. Stable orthopaedically. Anticoagulation per medicine. 2. OOB with PT  3. D/C Planning  4. D/C PCA  5. Drain out, dressing dry. 6. Discharge per medicine  7. Follow-up in 10 days at [facility] with Dr. Kenna Urena. Subjective:   Patient seen today for follow up. Says feeling better, hard to eat in the c collar.   No shortness of breath, pain better    Medications:     Current Facility-Administered Medications:     potassium chloride (K-DUR, KLOR-CON) SR tablet 40 mEq, 40 mEq, Oral, BID, Finesse Pham DO    oxyCODONE-acetaminophen (PERCOCET 7.5) 7.5-325 mg per tablet 1 Tab, 1 Tab, Oral, Q4H PRN, Jessy Cheung PA    insulin lispro (HUMALOG) injection, , SubCUTAneous, AC&HS, Anbaell Richard MD, 3 Units at 12/12/20 0640    insulin glargine (LANTUS) injection 10 Units, 10 Units, SubCUTAneous, QHS, Anabell Richard MD, 10 Units at 12/11/20 2208    insulin lispro (HUMALOG) injection 3 Units, 3 Units, SubCUTAneous, TIDAC, Anabell Richard MD, 3 Units at 12/12/20 6755    sodium chloride (NS) flush 5-40 mL, 5-40 mL, IntraVENous, Q8H, Jessy Cheung PA, 10 mL at 12/12/20 0641    sodium chloride (NS) flush 5-40 mL, 5-40 mL, IntraVENous, PRN, Skye, Jessy, PA    acetaminophen (TYLENOL) tablet 650 mg, 650 mg, Oral, Q4H PRN, Skye, Jessy, PA, 650 mg at 12/11/20 0842    oxyCODONE-acetaminophen (PERCOCET) 5-325 mg per tablet 1 Tab, 1 Tab, Oral, Q4H PRN, Skye, Jessy, PA, 1 Tab Temperature: [] lo [] med [] hi  
[] Skin assessment post-treatment:  []intact []redness- no adverse reaction 
  []redness  adverse reaction:  
 
15 min Therapeutic Exercise:  [x] See flow sheet :  
Rationale: increase ROM and increase strength to improve the patients ability to increase A/ROM and decrease pain with movement. 10 min Therapeutic Activity:  [x]  See flow sheet :  
Rationale: increase strength and improve coordination  to improve the patients ability to Tolerate basic ADLs and household-related tasks without pain. 16 min Neuromuscular Re-education:  [x]  See flow sheet :  
Rationale: improve coordination, improve balance and increase proprioception  to improve the patients ability to perform activities with good form and proprioception with tactile and verbal cuing appropriately. With 
 [x] TE 
 [x] TA [x] neuro 
 [] other: Patient Education: [x] Review HEP [x] Progressed/Changed HEP based on:  
[x] positioning   [x] body mechanics   [] transfers   [] heat/ice application   
[] other:   
 
Other Objective/Functional Measures: A/ROM of L shoulder: 160 degs, MMT 4+/5 Pain Level (0-10 scale) post treatment: 0/10 ASSESSMENT/Changes in Function: Patient continues to progress towards goals of decreased pain and improved ability to move the L UE. Will discharge order for L shoulder next visit; patient requesting new order for back pain on Friday. Patient will continue to benefit from skilled PT services to modify and progress therapeutic interventions, address functional mobility deficits, address ROM deficits, address strength deficits, analyze and address soft tissue restrictions and assess and modify postural abnormalities to attain remaining goals. [x]  See Plan of Care 
[]  See progress note/recertification 
[]  See Discharge Summary Progress towards goals / Updated goals: 
Progress towards Goals: Short Term Goals: To be accomplished in 6 treatments: at 12/11/20 1836    ondansetron (ZOFRAN ODT) tablet 4 mg, 4 mg, Oral, Q6H PRN, Jessy Cheung PA    phenol throat spray (CHLORASEPTIC) 1 Spray, 1 Spray, Oral, PRN, Jessy Cheung PA    benzocaine-menthoL (CEPACOL) lozenge 1 Lozenge, 1 Lozenge, Oral, PRN, Jessy Cheung, PA    diazePAM (VALIUM) tablet 2.5 mg, 2.5 mg, Oral, TID PRN, Jessy Cheung, PA, 2.5 mg at 12/11/20 1008    docusate sodium (COLACE) capsule 100 mg, 100 mg, Oral, BID, Jessy Cheung, PA, 100 mg at 12/12/20 0921    diphenhydrAMINE (BENADRYL) injection 12.5 mg, 12.5 mg, IntraVENous, Q4H PRN, Jessy Cheung, PA    rivaroxaban (XARELTO) tablet 10 mg, 10 mg, Oral, Q24H, Jessy Cheung PA    dexAMETHasone (DECADRON) tablet 20 mg, 20 mg, Oral, Q7D, Jovany Block MD, 20 mg at 12/09/20 1159    calcium-vitamin D (OS-NUVIA +D3) 500 mg-200 unit per tablet 1 Tab, 1 Tab, Oral, DAILY, Max Frost MD, 1 Tab at 12/12/20 0167    felodipine (PLENDIL SR) 24 hr tablet 10 mg, 10 mg, Oral, DAILY, Max Frost MD, 10 mg at 12/12/20 0920    furosemide (LASIX) tablet 40 mg, 40 mg, Oral, DAILY, Max Frost MD, 40 mg at 12/12/20 2568    lisinopriL (PRINIVIL, ZESTRIL) tablet 5 mg, 5 mg, Oral, DAILY, Max Frost MD, 5 mg at 12/12/20 6589    metOLazone (ZAROXOLYN) tablet 5 mg, 5 mg, Oral, DAILY, Max Frost MD, 5 mg at 12/12/20 0920    spironolactone (ALDACTONE) tablet 25 mg, 25 mg, Oral, DAILY, Max Frost MD, 25 mg at 12/12/20 8884    glucose chewable tablet 16 g, 4 Tab, Oral, PRN, Max Frost MD    glucagon [facility]) injection 1 mg, 1 mg, IntraMUSCular, PRN, Max Frost MD    cyanocobalamin tablet 1,000 mcg, 1,000 mcg, Oral, DAILY, Max Frost MD, 1,000 mcg at 12/12/20 7046    gabapentin (NEURONTIN) capsule 800 mg, 800 mg, Oral, BID, Max Frost MD, 800 mg at 12/12/20 0920    isosorbide mononitrate ER (IMDUR) tablet 30 mg, 30 mg, Oral, DAILY, Max Frost MD, 30 mg at 12/12/20 1851    lenalidomide cap 25 mg (Patient Supplied), 25 mg, Oral, DAILY, Max Frost MD, Stopped at 12/10/20 0900    metoprolol 1. Patient will decrease shoulder pain during aggravating activities by 2 points on Verbal Analog Scale (Eval: 10/10) to increase participation on Activities of Daily Living as lifting, carrying, and reaching. Nancy Fonda 4/10/19, 4/17/19, occasional \"twinge\" but no pain 4/22/19;   Back soreness 4/18/19, 4/22/19,  
2. Patient will demonstrate increased strength by ½ grade on MMT in B UEs (Eval: 3-/5) to improve functional participation in such tasks as prolonged standing and sitting.  
CURRENT MET 3+/5 4/17/19 
      3.   Patient will demonstrate ability to perform deep squat in preparation for reaching to low surfaces (Eval: unable).   
            CURRENT: Progressing able to touch the floor and perform a half squat. 4/17/19, 4/22/19 Long Term Goals: To be accomplished in 12 treatments: 4. Patient will decrease pain during aggravating activities by 4 points on Verbal Analog Scale (Eval: 10/10) to increase participation on Activities of Daily Living such as ifting, carrying, and reaching.  
CURRENT MET 0 4/10/19, 4/22/19 5. Patient will demonstrate increased strength by 1 grade on MMT in B UEs (Eval:3-/5) to improve functional participation in such tasks as working overhead for >2 min. 
      CURRENT Progressing at 4-/5 4/22/19 6. Patient will demonstrate ability to reach to low surfaces, lift item, and stand up, as well as lift 10lbs down from overhead shelf (Eval:unable).  
CURRENT: Progressing: able to lift 6lbs down from shelf overhead 4/22/19 
      7. Patient will achieve predicted FOTO scores (63, eval 59) to demonstrate decreased functional impairment to facilitate improved participation in activities of daily living, improve overall quality of life             CURRENT: MET at 66 4/22/19 PLAN [x]  Upgrade activities as tolerated     []  Continue plan of care 
[]  Update interventions per flow sheet      
[]  Discharge due to:_ 
[]  Other:_ Nilda Mcadams, PT 4/24/2019  11:39 AM 
 
 succinate (TOPROL-XL) XL tablet 25 mg, 25 mg, Oral, DAILY, Robinson Mauricio MD, 25 mg at 12/11/20 1518    pravastatin (PRAVACHOL) tablet 40 mg, 40 mg, Oral, QHS, Robinson Mauricio MD, 40 mg at 12/11/20 2208    pyridoxine (vitamin B6) (VITAMIN B-6) tablet 50 mg, 50 mg, Oral, DAILY, Robinson Mauricio MD, 50 mg at 12/12/20 2858    valACYclovir (VALTREX) tablet 500 mg, 500 mg, Oral, BID, Robinson Mauricio MD, 500 mg at 12/12/20 4440            Objective:      Visit Vitals  BP (!) 147/62   Pulse 84   Temp 99.6 °F (37.6 °C)   Resp 16   Ht 5' 9\" (1.753 m)   Wt 113.7 kg (250 lb 11.2 oz)   SpO2 99%   BMI 37.02 kg/m²       Physical Exam:  Moves shoulders well  NVI distal LE  Upper moves well      Labs:  Recent Results (from the past 24 hour(s))   GLUCOSE, POC    Collection Time: 12/11/20 11:29 AM   Result Value Ref Range    Glucose (POC) 222 (H) 70 - 110 mg/dL   SARS-COV-2    Collection Time: 12/11/20  1:00 PM   Result Value Ref Range    SARS-CoV-2 PENDING     Specimen source Nasopharyngeal      COVID-19 rapid test Not detected NOTD      Specimen type NP Swab      Health status UNKNOWN      COVID-19 PENDING    GLUCOSE, POC    Collection Time: 12/11/20  4:18 PM   Result Value Ref Range    Glucose (POC) 161 (H) 70 - 110 mg/dL   GLUCOSE, POC    Collection Time: 12/11/20  9:11 PM   Result Value Ref Range    Glucose (POC) 172 (H) 70 - 110 mg/dL   MAGNESIUM    Collection Time: 12/12/20  5:15 AM   Result Value Ref Range    Magnesium 1.9 1.6 - 2.6 mg/dL   CBC W/O DIFF    Collection Time: 12/12/20  5:15 AM   Result Value Ref Range    WBC 7.3 4.6 - 13.2 K/uL    RBC 3.60 (L) 4.70 - 5.50 M/uL    HGB 10.6 (L) 13.0 - 16.0 g/dL    HCT 30.0 (L) 36.0 - 48.0 %    MCV 83.3 74.0 - 97.0 FL    MCH 29.4 24.0 - 34.0 PG    MCHC 35.3 31.0 - 37.0 g/dL    RDW 15.3 (H) 11.6 - 14.5 %    PLATELET 642 832 - 098 K/uL    MPV 10.2 9.2 - 37.1 FL   METABOLIC PANEL, BASIC    Collection Time: 12/12/20  5:15 AM   Result Value Ref Range    Sodium 131 (L) 136 - 145 mmol/L    Potassium 2.8 (LL) 3.5 - 5.5 mmol/L    Chloride 91 (L) 100 - 111 mmol/L    CO2 31 21 - 32 mmol/L    Anion gap 9 3.0 - 18 mmol/L    Glucose 146 (H) 74 - 99 mg/dL    BUN 12 7.0 - 18 MG/DL    Creatinine 0.87 0.6 - 1.3 MG/DL    BUN/Creatinine ratio 14 12 - 20      GFR est AA >60 >60 ml/min/1.73m2    GFR est non-AA >60 >60 ml/min/1.73m2    Calcium 8.7 8.5 - 10.1 MG/DL   GLUCOSE, POC    Collection Time: 12/12/20  6:26 AM   Result Value Ref Range    Glucose (POC) 180 (H) 70 - 110 mg/dL Future Appointments Date Time Provider Roverto Susana 4/25/2019 11:00 AM Persian Saranya, PT [facility]  
4/29/2019  1:30 PM Persian Saranya, PT [facility]  
5/1/2019 12:00 PM Persian Saranya, PT [facility]  
5/2/2019 11:30 AM Persian Saranya, PT [facility]  
5/23/2019  1:15 PM MD SANJEEV [facility] [address]

## 2020-12-12 NOTE — PROGRESS NOTES
Problem: Pressure Injury - Risk of  Goal: *Prevention of pressure injury  Description: Document Vance Scale and appropriate interventions in the flowsheet.   Outcome: Progressing Towards Goal  Note: Pressure Injury Interventions:  Sensory Interventions: Assess changes in LOC         Activity Interventions: PT/OT evaluation    Mobility Interventions: Pressure redistribution bed/mattress (bed type)    Nutrition Interventions: Document food/fluid/supplement intake    Friction and Shear Interventions: HOB 30 degrees or less

## 2020-12-12 NOTE — ROUTINE PROCESS
Bedside and Verbal shift change report given to Trina Lieberman RN (oncoming nurse) by Maeve Saha RN (offgoing nurse). Report included the following information SBAR, Kardex, Procedure Summary, Intake/Output, MAR and Recent Results.

## 2020-12-12 NOTE — PROGRESS NOTES
Hospitalist Progress Note    Patient: Shaun Sorto MRN: [id number]  CSN: [id number]    YOB: 1940  Age: 68 y.o. Sex: male    DOA: 12/7/2020 LOS:  LOS: 5 days            Assessment/Plan   1. Cervical stenosis sp decompression & fusion  2. Multiple myeloma  3. Chronic LV systolic CHF, EF 54%, compensated  4. Hypokalemia      Plan:  - continue current antihypertensives  - replete K  - PT/OT  - basal bolus insulin  - needs placement  0- start bowel regimen    Patient Active Problem List   Diagnosis Code    Open wound of right index finger without damage to nail S61.200A    Chest pain R07.9    Lower extremity edema R60.0    Type 2 diabetes mellitus (HCC) V66.8    Systolic CHF, chronic (HCC) Z17.41    Metabolic encephalopathy V86.42    Cellulitis L03.90    LEANDRA (acute kidney injury) ([facility] [address] N17.9    Olecranon fracture S52.023A    Multiple myeloma ([facility]) C90.00    Hypertension I10    Morbid obesity ([facility] [address] E66.01    Morbid obesity with BMI of 40.0-44.9, adult ([facility] [address] E66.01, Z68.41    Hypokalemia E87.6    Neuropathy G62.9    Hypomagnesemia E83.42    Elevated troponin R77.8    Weakness R53.1    Left hemiparesis ([facility]) G81.94    Spinal stenosis M48.00               Subjective:    cc: \" I feel ok\"  No acute events overnight  Denies pain  Eating breakfast  no acute issues per RN      REVIEW OF SYSTEMS:  General: No fevers or chills. Cardiovascular: No chest pain or pressure. No palpitations. Pulmonary: No shortness of breath. Gastrointestinal: No nausea, vomiting.      Objective:        Vital signs/Intake and Output:  Visit Vitals  BP (!) 147/62   Pulse 84   Temp 99.6 °F (37.6 °C)   Resp 16   Ht 5' 9\" (1.753 m)   Wt 113.7 kg (250 lb 11.2 oz)   SpO2 99%   BMI 37.02 kg/m²     Current Shift:  12/12 0701 - 12/12 1900  In: -   Out: 295 [Urine:295]  Last three shifts:  12/10 1901 - 12/12 0700  In: 1379.2 [P.O.:480; I.V.:899.2]  Out: 3035 [Urine:2800; Drains:185]    Body mass index is 37.02 kg/m².     Physical Exam:  GEN: Alert and oriented times three NAD  EYES: conjunctiva normal, lids with out lesions  HEENT: MMM, No thyromegaly, no lymphadenopathy  HEART: RRR +S1 +S2, no JVD, pulses 2+ distally  LUNGS: CTA B/L no wheezes, rales or rhonchi  ABDOMEN: + BS, soft NT/ND no organomegaly,  No rebound  EXTREMITIES: No edema cyanosis, cap refill normal   SKIN: no rashes or skin breakdown, no nodules, normal turgor  NEURO: moves all 4 extremities  Current Facility-Administered Medications   Medication Dose Route Frequency    potassium chloride (K-DUR, KLOR-CON) SR tablet 40 mEq  40 mEq Oral BID    oxyCODONE-acetaminophen (PERCOCET 7.5) 7.5-325 mg per tablet 1 Tab  1 Tab Oral Q4H PRN    insulin lispro (HUMALOG) injection   SubCUTAneous AC&HS    insulin glargine (LANTUS) injection 10 Units  10 Units SubCUTAneous QHS    insulin lispro (HUMALOG) injection 3 Units  3 Units SubCUTAneous TIDAC    sodium chloride (NS) flush 5-40 mL  5-40 mL IntraVENous Q8H    sodium chloride (NS) flush 5-40 mL  5-40 mL IntraVENous PRN    acetaminophen (TYLENOL) tablet 650 mg  650 mg Oral Q4H PRN    oxyCODONE-acetaminophen (PERCOCET) 5-325 mg per tablet 1 Tab  1 Tab Oral Q4H PRN    ondansetron (ZOFRAN ODT) tablet 4 mg  4 mg Oral Q6H PRN    phenol throat spray (CHLORASEPTIC) 1 Spray  1 Spray Oral PRN    benzocaine-menthoL (CEPACOL) lozenge 1 Lozenge  1 Lozenge Oral PRN    diazePAM (VALIUM) tablet 2.5 mg  2.5 mg Oral TID PRN    docusate sodium (COLACE) capsule 100 mg  100 mg Oral BID    diphenhydrAMINE (BENADRYL) injection 12.5 mg  12.5 mg IntraVENous Q4H PRN    rivaroxaban (XARELTO) tablet 10 mg  10 mg Oral Q24H    dexAMETHasone (DECADRON) tablet 20 mg  20 mg Oral Q7D    calcium-vitamin D (OS-NUVIA +D3) 500 mg-200 unit per tablet 1 Tab  1 Tab Oral DAILY    felodipine (PLENDIL SR) 24 hr tablet 10 mg  10 mg Oral DAILY    furosemide (LASIX) tablet 40 mg  40 mg Oral DAILY    lisinopriL (PRINIVIL, ZESTRIL) tablet 5 mg  5 mg Oral DAILY    metOLazone (ZAROXOLYN) tablet 5 mg  5 mg Oral DAILY    spironolactone (ALDACTONE) tablet 25 mg  25 mg Oral DAILY    glucose chewable tablet 16 g  4 Tab Oral PRN    glucagon (GLUCAGEN) injection 1 mg  1 mg IntraMUSCular PRN    cyanocobalamin tablet 1,000 mcg  1,000 mcg Oral DAILY    gabapentin (NEURONTIN) capsule 800 mg  800 mg Oral BID    isosorbide mononitrate ER (IMDUR) tablet 30 mg  30 mg Oral DAILY    lenalidomide cap 25 mg (Patient Supplied)  25 mg Oral DAILY    metoprolol succinate (TOPROL-XL) XL tablet 25 mg  25 mg Oral DAILY    pravastatin (PRAVACHOL) tablet 40 mg  40 mg Oral QHS    pyridoxine (vitamin B6) (VITAMIN B-6) tablet 50 mg  50 mg Oral DAILY    valACYclovir (VALTREX) tablet 500 mg  500 mg Oral BID         All the patient's labs over the past 24 hours were reviewed both during my initial daily workflow process and at the time notated as \"note time\" in [address] [facility]. (It is not time stamped separately in this workflow.)  Select labs are listed below.         Labs: Results:       Chemistry Recent Labs     12/12/20  0515 12/10/20  0406   * 249*   * 132*   K 2.8* 3.5   CL 91* 96*   CO2 31 28   BUN 12 12   CREA 0.87 0.91   CA 8.7 8.9   AGAP 9 8   BUCR 14 13      CBC w/Diff Recent Labs     12/12/20  0515 12/11/20  0525 12/10/20  0406   WBC 7.3 7.0 4.7   RBC 3.60* 3.50* 3.37*   HGB 10.6* 10.3* 9.8*   HCT 30.0* 29.3* 28.0*    269 225   GRANS  --   --  72   LYMPH  --   --  18*   EOS  --   --  0              Lipid Panel Lab Results   Component Value Date/Time    Cholesterol, total 89 12/08/2020 01:12 AM    HDL Cholesterol 46 12/08/2020 01:12 AM    LDL, calculated 28.6 12/08/2020 01:12 AM    VLDL, calculated 14.4 12/08/2020 01:12 AM    Triglyceride 72 12/08/2020 01:12 AM    CHOL/HDL Ratio 1.9 12/08/2020 01:12 AM              Thyroid Studies Lab Results   Component Value Date/Time    TSH 1.16 12/07/2020 12:25 PM    TSH 0.74 12/07/2020 12:25 PM        Procedures/imaging: see electronic medical records for all procedures/Xrays and details which were not copied into this note but were reviewed prior to creation of Plan    Imaging personally reviewed:              Mitchell Mcpherson DO  Internal Medicine/Geriatrics

## 2020-12-13 LAB
ANION GAP SERPL CALC-SCNC: 6 MMOL/L (ref 3–18)
BUN SERPL-MCNC: 21 MG/DL (ref 7–18)
BUN/CREAT SERPL: 18 (ref 12–20)
CALCIUM SERPL-MCNC: 8.5 MG/DL (ref 8.5–10.1)
CHLORIDE SERPL-SCNC: 90 MMOL/L (ref 100–111)
CO2 SERPL-SCNC: 34 MMOL/L (ref 21–32)
CREAT SERPL-MCNC: 1.16 MG/DL (ref 0.6–1.3)
ERYTHROCYTE [DISTWIDTH] IN BLOOD BY AUTOMATED COUNT: 15.8 % (ref 11.6–14.5)
GLUCOSE BLD STRIP.AUTO-MCNC: 143 MG/DL (ref 70–110)
GLUCOSE BLD STRIP.AUTO-MCNC: 173 MG/DL (ref 70–110)
GLUCOSE BLD STRIP.AUTO-MCNC: 218 MG/DL (ref 70–110)
GLUCOSE BLD STRIP.AUTO-MCNC: 233 MG/DL (ref 70–110)
GLUCOSE SERPL-MCNC: 152 MG/DL (ref 74–99)
HCT VFR BLD AUTO: 29.5 % (ref 36–48)
HGB BLD-MCNC: 10.3 G/DL (ref 13–16)
MAGNESIUM SERPL-MCNC: 2 MG/DL (ref 1.6–2.6)
MCH RBC QN AUTO: 29.3 PG (ref 24–34)
MCHC RBC AUTO-ENTMCNC: 34.9 G/DL (ref 31–37)
MCV RBC AUTO: 83.8 FL (ref 74–97)
PLATELET # BLD AUTO: 297 K/UL (ref 135–420)
PMV BLD AUTO: 9.9 FL (ref 9.2–11.8)
POTASSIUM SERPL-SCNC: 3.1 MMOL/L (ref 3.5–5.5)
RBC # BLD AUTO: 3.52 M/UL (ref 4.7–5.5)
SODIUM SERPL-SCNC: 130 MMOL/L (ref 136–145)
WBC # BLD AUTO: 8.4 K/UL (ref 4.6–13.2)

## 2020-12-13 PROCEDURE — 2709999900 HC NON-CHARGEABLE SUPPLY

## 2020-12-13 PROCEDURE — 82962 GLUCOSE BLOOD TEST: CPT

## 2020-12-13 PROCEDURE — 65660000000 HC RM CCU STEPDOWN

## 2020-12-13 PROCEDURE — 74011250637 HC RX REV CODE- 250/637: Performed by: HOSPITALIST

## 2020-12-13 PROCEDURE — 97530 THERAPEUTIC ACTIVITIES: CPT

## 2020-12-13 PROCEDURE — 74011636637 HC RX REV CODE- 636/637: Performed by: HOSPITALIST

## 2020-12-13 PROCEDURE — 83735 ASSAY OF MAGNESIUM: CPT

## 2020-12-13 PROCEDURE — 74011250637 HC RX REV CODE- 250/637: Performed by: PHYSICIAN ASSISTANT

## 2020-12-13 PROCEDURE — 36415 COLL VENOUS BLD VENIPUNCTURE: CPT

## 2020-12-13 PROCEDURE — 74011250637 HC RX REV CODE- 250/637: Performed by: INTERNAL MEDICINE

## 2020-12-13 PROCEDURE — 85027 COMPLETE CBC AUTOMATED: CPT

## 2020-12-13 PROCEDURE — 80048 BASIC METABOLIC PNL TOTAL CA: CPT

## 2020-12-13 RX ORDER — POTASSIUM CHLORIDE 20 MEQ/1
40 TABLET, EXTENDED RELEASE ORAL 2 TIMES DAILY
Status: COMPLETED | OUTPATIENT
Start: 2020-12-13 | End: 2020-12-13

## 2020-12-13 RX ADMIN — METOLAZONE 5 MG: 5 TABLET ORAL at 09:30

## 2020-12-13 RX ADMIN — INSULIN LISPRO 6 UNITS: 100 INJECTION, SOLUTION INTRAVENOUS; SUBCUTANEOUS at 22:03

## 2020-12-13 RX ADMIN — VALACYCLOVIR HYDROCHLORIDE 500 MG: 500 TABLET, FILM COATED ORAL at 22:03

## 2020-12-13 RX ADMIN — INSULIN LISPRO 3 UNITS: 100 INJECTION, SOLUTION INTRAVENOUS; SUBCUTANEOUS at 06:40

## 2020-12-13 RX ADMIN — PRAVASTATIN SODIUM 40 MG: 20 TABLET ORAL at 22:02

## 2020-12-13 RX ADMIN — SPIRONOLACTONE 25 MG: 25 TABLET ORAL at 09:31

## 2020-12-13 RX ADMIN — GABAPENTIN 800 MG: 400 CAPSULE ORAL at 09:30

## 2020-12-13 RX ADMIN — INSULIN LISPRO 3 UNITS: 100 INJECTION, SOLUTION INTRAVENOUS; SUBCUTANEOUS at 17:00

## 2020-12-13 RX ADMIN — INSULIN LISPRO 3 UNITS: 100 INJECTION, SOLUTION INTRAVENOUS; SUBCUTANEOUS at 12:18

## 2020-12-13 RX ADMIN — INSULIN LISPRO 3 UNITS: 100 INJECTION, SOLUTION INTRAVENOUS; SUBCUTANEOUS at 07:30

## 2020-12-13 RX ADMIN — DOCUSATE SODIUM 100 MG: 100 CAPSULE, LIQUID FILLED ORAL at 22:02

## 2020-12-13 RX ADMIN — INSULIN GLARGINE 10 UNITS: 100 INJECTION, SOLUTION SUBCUTANEOUS at 22:03

## 2020-12-13 RX ADMIN — Medication 10 ML: at 14:00

## 2020-12-13 RX ADMIN — POTASSIUM CHLORIDE 40 MEQ: 1500 TABLET, EXTENDED RELEASE ORAL at 22:02

## 2020-12-13 RX ADMIN — CYANOCOBALAMIN TAB 1000 MCG 1000 MCG: 1000 TAB at 09:31

## 2020-12-13 RX ADMIN — POTASSIUM CHLORIDE 40 MEQ: 1500 TABLET, EXTENDED RELEASE ORAL at 09:31

## 2020-12-13 RX ADMIN — Medication 50 MG: at 09:30

## 2020-12-13 RX ADMIN — GABAPENTIN 800 MG: 400 CAPSULE ORAL at 22:02

## 2020-12-13 RX ADMIN — INSULIN LISPRO 6 UNITS: 100 INJECTION, SOLUTION INTRAVENOUS; SUBCUTANEOUS at 12:19

## 2020-12-13 RX ADMIN — FELODIPINE 10 MG: 5 TABLET, EXTENDED RELEASE ORAL at 09:30

## 2020-12-13 RX ADMIN — CALCIUM CARBONATE-VITAMIN D TAB 500 MG-200 UNIT 1 TABLET: 500-200 TAB at 09:00

## 2020-12-13 RX ADMIN — VALACYCLOVIR HYDROCHLORIDE 500 MG: 500 TABLET, FILM COATED ORAL at 09:32

## 2020-12-13 RX ADMIN — LISINOPRIL 5 MG: 5 TABLET ORAL at 09:31

## 2020-12-13 RX ADMIN — OXYCODONE HYDROCHLORIDE AND ACETAMINOPHEN 1 TABLET: 5; 325 TABLET ORAL at 15:16

## 2020-12-13 RX ADMIN — Medication 10 ML: at 22:04

## 2020-12-13 RX ADMIN — Medication 10 ML: at 06:00

## 2020-12-13 RX ADMIN — DOCUSATE SODIUM 100 MG: 100 CAPSULE, LIQUID FILLED ORAL at 09:31

## 2020-12-13 NOTE — PROGRESS NOTES
Progress Note      Patient: Floridalma Hernandez               Sex: male          DOA: 12/7/2020       YOB: 1940      Age:  68 y.o.        LOS:  LOS: 6 days       Assessment and Plan. 1. Stable orthopaedically.  Anticoagulation per medicine. 2. OOB with PT, can remove c collar to eat and take meds  3. D/C Planning  4. Drain to be removed, dressing dry. 5. Discharge per medicine  6. Follow-up in 10 days at [facility] with Dr. Brianne Mckeon. Subjective:   Patient seen today for follow up. Pt feeling better.      Medications:     Current Facility-Administered Medications:     potassium chloride (K-DUR, KLOR-CON) SR tablet 40 mEq, 40 mEq, Oral, BID, Dorclotilde Mosherton, DO, 40 mEq at 12/13/20 0931    oxyCODONE-acetaminophen (PERCOCET 7.5) 7.5-325 mg per tablet 1 Tab, 1 Tab, Oral, Q4H PRN, Skye, Jessy, PA    insulin lispro (HUMALOG) injection, , SubCUTAneous, AC&HS, Lorie Newman MD, 3 Units at 12/13/20 0640    insulin glargine (LANTUS) injection 10 Units, 10 Units, SubCUTAneous, QHS, Lorie Newman MD, 10 Units at 12/12/20 2303    insulin lispro (HUMALOG) injection 3 Units, 3 Units, SubCUTAneous, TIDAC, Lorie Newman MD, 3 Units at 12/13/20 0730    sodium chloride (NS) flush 5-40 mL, 5-40 mL, IntraVENous, Q8H, Skye, Jessy, PA, 10 mL at 12/12/20 2304    sodium chloride (NS) flush 5-40 mL, 5-40 mL, IntraVENous, PRN, Skye, Jessy, PA    acetaminophen (TYLENOL) tablet 650 mg, 650 mg, Oral, Q4H PRN, Skye, Jessy, PA, 650 mg at 12/12/20 1826    oxyCODONE-acetaminophen (PERCOCET) 5-325 mg per tablet 1 Tab, 1 Tab, Oral, Q4H PRN, Skye, Jessy, PA, 1 Tab at 12/11/20 1836    ondansetron (ZOFRAN ODT) tablet 4 mg, 4 mg, Oral, Q6H PRN, Skye, Jessy, PA    phenol throat spray (CHLORASEPTIC) 1 Spray, 1 Spray, Oral, PRN, Skye, Jessy, PA    benzocaine-menthoL (CEPACOL) lozenge 1 Lozenge, 1 Lozenge, Oral, PRN, Skye, Jessy, PA    diazePAM (VALIUM) tablet 2.5 mg, 2.5 mg, Oral, TID PRN, Skye, Jessy, PA, 2.5 mg at 12/11/20 1008    docusate sodium (COLACE) capsule 100 mg, 100 mg, Oral, BID, Jessy Cheung PA, 100 mg at 12/13/20 0931    diphenhydrAMINE (BENADRYL) injection 12.5 mg, 12.5 mg, IntraVENous, Q4H PRN, Jessy Cheung, PA    rivaroxaban (XARELTO) tablet 10 mg, 10 mg, Oral, Q24H, Jessy Cheung PA    dexAMETHasone (DECADRON) tablet 20 mg, 20 mg, Oral, Q7D, Julita Abdul MD, 20 mg at 12/09/20 1159    calcium-vitamin D (OS-NUVIA +D3) 500 mg-200 unit per tablet 1 Tab, 1 Tab, Oral, DAILY, Ruma Bustos MD, 1 Tab at 12/13/20 0900    felodipine (PLENDIL SR) 24 hr tablet 10 mg, 10 mg, Oral, DAILY, Ruma Bustos MD, 10 mg at 12/13/20 0930    [Held by provider] furosemide (LASIX) tablet 40 mg, 40 mg, Oral, DAILY, Ruma Bustos MD, 40 mg at 12/12/20 0502    lisinopriL (PRINIVIL, ZESTRIL) tablet 5 mg, 5 mg, Oral, DAILY, Ruma Bustos MD, 5 mg at 12/13/20 0931    metOLazone (ZAROXOLYN) tablet 5 mg, 5 mg, Oral, DAILY, Ruma Bustos MD, 5 mg at 12/13/20 0930    spironolactone (ALDACTONE) tablet 25 mg, 25 mg, Oral, DAILY, Ruma Bustos MD, 25 mg at 12/13/20 0931    glucose chewable tablet 16 g, 4 Tab, Oral, PRN, Ruma Bustos MD    glucagon [facility]) injection 1 mg, 1 mg, IntraMUSCular, PRN, Ruma Bustos MD    cyanocobalamin tablet 1,000 mcg, 1,000 mcg, Oral, DAILY, Ruma Bustos MD, 1,000 mcg at 12/13/20 8398    gabapentin (NEURONTIN) capsule 800 mg, 800 mg, Oral, BID, Ruma Bustos MD, 800 mg at 12/13/20 0930    isosorbide mononitrate ER (IMDUR) tablet 30 mg, 30 mg, Oral, DAILY, Ruma Bustos MD, 30 mg at 12/12/20 5394    lenalidomide cap 25 mg (Patient Supplied), 25 mg, Oral, DAILY, Ruma Bustos MD, Stopped at 12/10/20 0900    metoprolol succinate (TOPROL-XL) XL tablet 25 mg, 25 mg, Oral, DAILY, Ruma Bustos MD, 25 mg at 12/12/20 1235    pravastatin (PRAVACHOL) tablet 40 mg, 40 mg, Oral, QHS, Ruma Bustos MD, 40 mg at 12/12/20 2302    pyridoxine (vitamin B6) (VITAMIN B-6) tablet 50 mg, 50 mg, Oral, DAILY, Ruma Bustos MD, 50 mg at 12/13/20 0930    valACYclovir (VALTREX) tablet 500 mg, 500 mg, Oral, BID, Drew Pinon MD, 500 mg at 12/13/20 4012    Review of Systems  Negative for chest pain and shortness of breath        Objective:      Visit Vitals  BP (!) 117/57   Pulse 71   Temp 99.6 °F (37.6 °C)   Resp 16   Ht 5' 9\" (1.753 m)   Wt 112.9 kg (249 lb)   SpO2 100%   BMI 36.77 kg/m²       Physical Exam:  Alert and cooperative  NAD  Moves all extremities wo complaints    Labs:  Recent Results (from the past 24 hour(s))   GLUCOSE, POC    Collection Time: 12/12/20 11:42 AM   Result Value Ref Range    Glucose (POC) 196 (H) 70 - 110 mg/dL   GLUCOSE, POC    Collection Time: 12/12/20  6:00 PM   Result Value Ref Range    Glucose (POC) 195 (H) 70 - 110 mg/dL   GLUCOSE, POC    Collection Time: 12/12/20  9:12 PM   Result Value Ref Range    Glucose (POC) 197 (H) 70 - 110 mg/dL   GLUCOSE, POC    Collection Time: 12/12/20 10:50 PM   Result Value Ref Range    Glucose (POC) 227 (H) 70 - 110 mg/dL   MAGNESIUM    Collection Time: 12/13/20  1:55 AM   Result Value Ref Range    Magnesium 2.0 1.6 - 2.6 mg/dL   CBC W/O DIFF    Collection Time: 12/13/20  1:55 AM   Result Value Ref Range    WBC 8.4 4.6 - 13.2 K/uL    RBC 3.52 (L) 4.70 - 5.50 M/uL    HGB 10.3 (L) 13.0 - 16.0 g/dL    HCT 29.5 (L) 36.0 - 48.0 %    MCV 83.8 74.0 - 97.0 FL    MCH 29.3 24.0 - 34.0 PG    MCHC 34.9 31.0 - 37.0 g/dL    RDW 15.8 (H) 11.6 - 14.5 %    PLATELET 796 861 - 968 K/uL    MPV 9.9 9.2 - 37.3 FL   METABOLIC PANEL, BASIC    Collection Time: 12/13/20  1:55 AM   Result Value Ref Range    Sodium 130 (L) 136 - 145 mmol/L    Potassium 3.1 (L) 3.5 - 5.5 mmol/L    Chloride 90 (L) 100 - 111 mmol/L    CO2 34 (H) 21 - 32 mmol/L    Anion gap 6 3.0 - 18 mmol/L    Glucose 152 (H) 74 - 99 mg/dL    BUN 21 (H) 7.0 - 18 MG/DL    Creatinine 1.16 0.6 - 1.3 MG/DL    BUN/Creatinine ratio 18 12 - 20      GFR est AA >60 >60 ml/min/1.73m2    GFR est non-AA >60 >60 ml/min/1.73m2    Calcium 8.5 8.5 - 10.1 MG/DL   GLUCOSE, POC    Collection Time: 12/13/20  6:05 AM   Result Value Ref Range    Glucose (POC) 173 (H) 70 - 110 mg/dL           Assessment/Plan

## 2020-12-13 NOTE — PROGRESS NOTES
Problem: Pressure Injury - Risk of  Goal: *Prevention of pressure injury  Description: Document Vance Scale and appropriate interventions in the flowsheet. Outcome: Progressing Towards Goal  Note: Pressure Injury Interventions:  Sensory Interventions: Assess changes in LOC, Monitor skin under medical devices, Minimize linen layers, Pad between skin to skin, Pressure redistribution bed/mattress (bed type), Maintain/enhance activity level, Keep linens dry and wrinkle-free         Activity Interventions: Pressure redistribution bed/mattress(bed type), PT/OT evaluation    Mobility Interventions: Pressure redistribution bed/mattress (bed type), PT/OT evaluation    Nutrition Interventions: Document food/fluid/supplement intake    Friction and Shear Interventions: HOB 30 degrees or less, Lift sheet, Lift team/patient mobility team, Minimize layers                Problem: Patient Education: Go to Patient Education Activity  Goal: Patient/Family Education  Outcome: Progressing Towards Goal     Problem: Falls - Risk of  Goal: *Absence of Falls  Description: Document Evangelist Fall Risk and appropriate interventions in the flowsheet.   Outcome: Progressing Towards Goal  Note: Fall Risk Interventions:  Mobility Interventions: Communicate number of staff needed for ambulation/transfer, Patient to call before getting OOB         Medication Interventions: Patient to call before getting OOB, Teach patient to arise slowly    Elimination Interventions: Call light in reach, Patient to call for help with toileting needs, Urinal in reach              Problem: Patient Education: Go to Patient Education Activity  Goal: Patient/Family Education  Outcome: Progressing Towards Goal     Problem: Patient Education: Go to Patient Education Activity  Goal: Patient/Family Education  Outcome: Progressing Towards Goal     Problem: Patient Education: Go to Patient Education Activity  Goal: Patient/Family Education  Outcome: Progressing Towards Goal

## 2020-12-13 NOTE — PROGRESS NOTES
Problem: Mobility Impaired (Adult and Pediatric)  Goal: *Acute Goals and Plan of Care (Insert Text)  Description: Physical Therapy Goals  Initiated 12/11/2020 and to be accomplished within 3-7 day(s)  1. Patient will move from supine to sit and sit to supine  in bed with supervision/set-up. 2.  Patient will transfer from bed to chair and chair to bed with minimal assistance/contact guard assist using the least restrictive device. 3.  Patient will perform sit to stand with minimal assistance/contact guard assist.  4.  Patient will ambulate with minimal assistance/contact guard assist for 100 feet with the least restrictive device. Note:     PHYSICAL THERAPY TREATMENT    Patient: Paloma Tate (46 y.o. male)  Date: 12/13/2020  Diagnosis: Hypokalemia [E87.6]   Left hemiparesis (HCC)  Procedure(s) (LRB):  CERVICAL 3- CERVICAL 7 SPINE POSTERIOR CERVICAL DECOMPRESSION AND FUSION WITH C-ARM (N/A) 3 Days Post-Op  Precautions: Fall, Spinal  PLOF: Pt was no longer able to ascend/descend stairs, was noticing difficulty with his balance PTA. ASSESSMENT:  Pt supine in bed on PT entry, reported pain rated 4/10 in upper back. Pt continues to require incr assist, mod/maxA for bed mobility. Pt performed sit <> stand transfers with RW and modA. In standing, pt with excessive posterior weight shift, requiring vc and manual facilitation to correct. Pt sidestepped along EOB for 3 ft with RW and maxA for appropriate weight shift and to prevent posterior LOB. Pt returned to sitting. After seated rest break, pt initially interested in another standing trial, then reporting he is too tired. Pt assisted back to supine in bed, left with all needs met. Pt continues to benefit from skilled therapy and is motivated to participate. Recommend IPR on discharge in order to maximize gains in functional mobility.    Progression toward goals:   []      Improving appropriately and progressing toward goals  [x]      Improving slowly and progressing toward goals  []      Not making progress toward goals and plan of care will be adjusted     PLAN:  Patient continues to benefit from skilled intervention to address the above impairments. Continue treatment per established plan of care. Discharge Recommendations:  Inpatient Rehab  Further Equipment Recommendations for Discharge:  N/A     SUBJECTIVE:   Patient stated I want to try to get up.     OBJECTIVE DATA SUMMARY:   Critical Behavior:  Neurologic State: Alert, Appropriate for age  Orientation Level: Oriented X4  Cognition: Follows commands  Safety/Judgement: Fall prevention  Functional Mobility Training:  Bed Mobility:  Supine to Sit: Moderate assistance  Sit to Supine: Moderate assistance  Scooting: Maximum assistance  Transfers:  Sit to Stand: Moderate assistance  Stand to Sit: Moderate assistance  Balance:  Sitting: Impaired; Without support  Sitting - Static: Fair (occasional)  Standing: Impaired; With support  Standing - Static: Poor  Standing - Dynamic : Poor  Ambulation/Gait Training:  Distance (ft): 3 Feet (ft)  Assistive Device: Brace/Splint;Gait belt;Walker, rollator(cervical collar)  Ambulation - Level of Assistance: Maximum assistance  Gait Abnormalities: Ataxic;Decreased step clearance  Base of Support: Widened  Speed/Roslyn: Delayed; Shuffled  Step Length: Left shortened;Right shortened  Pain:  Pain level pre-treatment: 4/10  Pain level post-treatment: 4/10   Pain Intervention(s): Medication (see MAR); Rest, Ice, Repositioning   Response to intervention: Nurse notified, See doc flow  Activity Tolerance:   Pt with impaired balance requiring maxA to sidestep along EOB, steps away from bed deferred at this time in order to maintain pt safety. Please refer to the flowsheet for vital signs taken during this treatment.   After treatment:   [] Patient left in no apparent distress sitting up in chair  [x] Patient left in no apparent distress in bed  [x] Call bell left within reach  [x] Nursing notified  [] Caregiver present  [] Bed alarm activated  [] SCDs applied      COMMUNICATION/EDUCATION:   [x]         Role of Physical Therapy in the acute care setting. [x]         Fall prevention education was provided and the patient/caregiver indicated understanding. [x]         Patient/family have participated as able in working toward goals and plan of care. [x]         Patient/family agree to work toward stated goals and plan of care. []         Patient understands intent and goals of therapy, but is neutral about his/her participation. []         Patient is unable to participate in stated goals/plan of care: ongoing with therapy staff.  []         Other:         Michell Bernal   Time Calculation: 23 mins

## 2020-12-13 NOTE — ROUTINE PROCESS
Bedside and Verbal shift change report given to Justino Sharma RN (oncoming nurse) by Melchor Fox RN (offgoing nurse). Report included the following information SBAR, Kardex, ED Summary, Procedure Summary, Intake/Output, MAR and Recent Results.

## 2020-12-13 NOTE — PROGRESS NOTES
Hospitalist Progress Note    Patient: Kingston Chen MRN: [id number]  CSN: [id number]    YOB: 1940  Age: 68 y.o. Sex: male    DOA: 12/7/2020 LOS:  LOS: 6 days            Assessment/Plan   1. Cervical stenosis sp decompression & fusion  2. Multiple myeloma  3. Chronic LV systolic CHF, EF 17%, compensated  4. Hypokalemia    Plan:  - hold lasix this AM  - replete K  - continue medicaiton regimen as ordered  - placement monday      Patient Active Problem List   Diagnosis Code    Open wound of right index finger without damage to nail S61.200A    Chest pain R07.9    Lower extremity edema R60.0    Type 2 diabetes mellitus ([facility]) N06.3    Systolic CHF, chronic ([facility]) L78.45    Metabolic encephalopathy E61.22    Cellulitis L03.90    LEANDRA (acute kidney injury) ([facility]) N17.9    Olecranon fracture S52.023A    Multiple myeloma ([facility]) C90.00    Hypertension I10    Morbid obesity ([facility] [address] E66.01    Morbid obesity with BMI of 40.0-44.9, adult ([facility]) E66.01, Z68.41    Hypokalemia E87.6    Neuropathy G62.9    Hypomagnesemia E83.42    Elevated troponin R77.8    Weakness R53.1    Left hemiparesis ([facility]) G81.94    Spinal stenosis M48.00               Subjective:    cc: \" I feel fine\"  No acute events overnight        REVIEW OF SYSTEMS:  General: No fevers or chills. Cardiovascular: No chest pain or pressure. No palpitations. Pulmonary: No shortness of breath. Gastrointestinal: No nausea, vomiting. Objective:        Vital signs/Intake and Output:  Visit Vitals  BP (!) 117/57   Pulse 71   Temp 99.6 °F (37.6 °C)   Resp 16   Ht 5' 9\" (1.753 m)   Wt 112.9 kg (249 lb)   SpO2 100%   BMI 36.77 kg/m²     Current Shift:  No intake/output data recorded. Last three shifts:  12/11 1901 - 12/13 0700  In: -   Out: 2010 [CWBJW:[id number]; Drains:40]    Body mass index is 36.77 kg/m².     Physical Exam:  GEN: Alert and oriented times three NAD  EYES: conjunctiva normal, lids with out lesions  HEENT: MMM, No thyromegaly, no lymphadenopathy  HEART: RRR +S1 +S2, no JVD, pulses 2+ distally  LUNGS: CTA B/L no wheezes, rales or rhonchi  ABDOMEN: + BS, soft NT/ND no organomegaly,  No rebound  EXTREMITIES: No edema cyanosis, cap refill normal   SKIN: no rashes or skin breakdown, no nodules, normal turgor  Current Facility-Administered Medications   Medication Dose Route Frequency    potassium chloride (K-DUR, KLOR-CON) SR tablet 40 mEq  40 mEq Oral BID    oxyCODONE-acetaminophen (PERCOCET 7.5) 7.5-325 mg per tablet 1 Tab  1 Tab Oral Q4H PRN    insulin lispro (HUMALOG) injection   SubCUTAneous AC&HS    insulin glargine (LANTUS) injection 10 Units  10 Units SubCUTAneous QHS    insulin lispro (HUMALOG) injection 3 Units  3 Units SubCUTAneous TIDAC    sodium chloride (NS) flush 5-40 mL  5-40 mL IntraVENous Q8H    sodium chloride (NS) flush 5-40 mL  5-40 mL IntraVENous PRN    acetaminophen (TYLENOL) tablet 650 mg  650 mg Oral Q4H PRN    oxyCODONE-acetaminophen (PERCOCET) 5-325 mg per tablet 1 Tab  1 Tab Oral Q4H PRN    ondansetron (ZOFRAN ODT) tablet 4 mg  4 mg Oral Q6H PRN    phenol throat spray (CHLORASEPTIC) 1 Spray  1 Spray Oral PRN    benzocaine-menthoL (CEPACOL) lozenge 1 Lozenge  1 Lozenge Oral PRN    diazePAM (VALIUM) tablet 2.5 mg  2.5 mg Oral TID PRN    docusate sodium (COLACE) capsule 100 mg  100 mg Oral BID    diphenhydrAMINE (BENADRYL) injection 12.5 mg  12.5 mg IntraVENous Q4H PRN    rivaroxaban (XARELTO) tablet 10 mg  10 mg Oral Q24H    dexAMETHasone (DECADRON) tablet 20 mg  20 mg Oral Q7D    calcium-vitamin D (OS-NUVIA +D3) 500 mg-200 unit per tablet 1 Tab  1 Tab Oral DAILY    felodipine (PLENDIL SR) 24 hr tablet 10 mg  10 mg Oral DAILY    [Held by provider] furosemide (LASIX) tablet 40 mg  40 mg Oral DAILY    lisinopriL (PRINIVIL, ZESTRIL) tablet 5 mg  5 mg Oral DAILY    metOLazone (ZAROXOLYN) tablet 5 mg  5 mg Oral DAILY    spironolactone (ALDACTONE) tablet 25 mg  25 mg Oral DAILY    glucose chewable tablet 16 g  4 Tab Oral PRN    glucagon (GLUCAGEN) injection 1 mg  1 mg IntraMUSCular PRN    cyanocobalamin tablet 1,000 mcg  1,000 mcg Oral DAILY    gabapentin (NEURONTIN) capsule 800 mg  800 mg Oral BID    isosorbide mononitrate ER (IMDUR) tablet 30 mg  30 mg Oral DAILY    lenalidomide cap 25 mg (Patient Supplied)  25 mg Oral DAILY    metoprolol succinate (TOPROL-XL) XL tablet 25 mg  25 mg Oral DAILY    pravastatin (PRAVACHOL) tablet 40 mg  40 mg Oral QHS    pyridoxine (vitamin B6) (VITAMIN B-6) tablet 50 mg  50 mg Oral DAILY    valACYclovir (VALTREX) tablet 500 mg  500 mg Oral BID         All the patient's labs over the past 24 hours were reviewed both during my initial daily workflow process and at the time notated as \"note time\" in [facility]. (It is not time stamped separately in this workflow.)  Select labs are listed below.         Labs: Results:       Chemistry Recent Labs     12/13/20  0155 12/12/20  0515   * 146*   * 131*   K 3.1* 2.8*   CL 90* 91*   CO2 34* 31   BUN 21* 12   CREA 1.16 0.87   CA 8.5 8.7   AGAP 6 9   BUCR 18 14      CBC w/Diff Recent Labs     12/13/20  0155 12/12/20  0515 12/11/20  0525   WBC 8.4 7.3 7.0   RBC 3.52* 3.60* 3.50*   HGB 10.3* 10.6* 10.3*   HCT 29.5* 30.0* 29.3*    282 269              Lipid Panel Lab Results   Component Value Date/Time    Cholesterol, total 89 12/08/2020 01:12 AM    HDL Cholesterol 46 12/08/2020 01:12 AM    LDL, calculated 28.6 12/08/2020 01:12 AM    VLDL, calculated 14.4 12/08/2020 01:12 AM    Triglyceride 72 12/08/2020 01:12 AM    CHOL/HDL Ratio 1.9 12/08/2020 01:12 AM              Thyroid Studies Lab Results   Component Value Date/Time    TSH 1.16 12/07/2020 12:25 PM    TSH 0.74 12/07/2020 12:25 PM        Procedures/imaging: see electronic medical records for all procedures/Xrays and details which were not copied into this note but were reviewed prior to creation of [address] [place] [address] Joyce Cole, DO  Internal Medicine/Geriatrics

## 2020-12-13 NOTE — PROGRESS NOTES
Cardiology Progress Note      12/13/2020 1:53 PM    Admit Date: 12/7/2020    Admit Diagnosis: Hypokalemia [E87.6]      Subjective:     Shahnaz Barnes denies chest pain.     Visit Vitals  /61   Pulse 87   Temp 98.6 °F (37 °C)   Resp 16   Ht 5' 9\" (1.753 m)   Wt 112.9 kg (249 lb)   SpO2 100%   BMI 36.77 kg/m²     Current Facility-Administered Medications   Medication Dose Route Frequency    potassium chloride (K-DUR, KLOR-CON) SR tablet 40 mEq  40 mEq Oral BID    oxyCODONE-acetaminophen (PERCOCET 7.5) 7.5-325 mg per tablet 1 Tab  1 Tab Oral Q4H PRN    insulin lispro (HUMALOG) injection   SubCUTAneous AC&HS    insulin glargine (LANTUS) injection 10 Units  10 Units SubCUTAneous QHS    insulin lispro (HUMALOG) injection 3 Units  3 Units SubCUTAneous TIDAC    sodium chloride (NS) flush 5-40 mL  5-40 mL IntraVENous Q8H    sodium chloride (NS) flush 5-40 mL  5-40 mL IntraVENous PRN    acetaminophen (TYLENOL) tablet 650 mg  650 mg Oral Q4H PRN    oxyCODONE-acetaminophen (PERCOCET) 5-325 mg per tablet 1 Tab  1 Tab Oral Q4H PRN    ondansetron (ZOFRAN ODT) tablet 4 mg  4 mg Oral Q6H PRN    phenol throat spray (CHLORASEPTIC) 1 Spray  1 Spray Oral PRN    benzocaine-menthoL (CEPACOL) lozenge 1 Lozenge  1 Lozenge Oral PRN    diazePAM (VALIUM) tablet 2.5 mg  2.5 mg Oral TID PRN    docusate sodium (COLACE) capsule 100 mg  100 mg Oral BID    diphenhydrAMINE (BENADRYL) injection 12.5 mg  12.5 mg IntraVENous Q4H PRN    rivaroxaban (XARELTO) tablet 10 mg  10 mg Oral Q24H    dexAMETHasone (DECADRON) tablet 20 mg  20 mg Oral Q7D    calcium-vitamin D (OS-NUVIA +D3) 500 mg-200 unit per tablet 1 Tab  1 Tab Oral DAILY    felodipine (PLENDIL SR) 24 hr tablet 10 mg  10 mg Oral DAILY    [Held by provider] furosemide (LASIX) tablet 40 mg  40 mg Oral DAILY    lisinopriL (PRINIVIL, ZESTRIL) tablet 5 mg  5 mg Oral DAILY    metOLazone (ZAROXOLYN) tablet 5 mg  5 mg Oral DAILY    spironolactone (ALDACTONE) tablet 25 mg  25 mg Oral DAILY    glucose chewable tablet 16 g  4 Tab Oral PRN    glucagon (GLUCAGEN) injection 1 mg  1 mg IntraMUSCular PRN    cyanocobalamin tablet 1,000 mcg  1,000 mcg Oral DAILY    gabapentin (NEURONTIN) capsule 800 mg  800 mg Oral BID    isosorbide mononitrate ER (IMDUR) tablet 30 mg  30 mg Oral DAILY    lenalidomide cap 25 mg (Patient Supplied)  25 mg Oral DAILY    metoprolol succinate (TOPROL-XL) XL tablet 25 mg  25 mg Oral DAILY    pravastatin (PRAVACHOL) tablet 40 mg  40 mg Oral QHS    pyridoxine (vitamin B6) (VITAMIN B-6) tablet 50 mg  50 mg Oral DAILY    valACYclovir (VALTREX) tablet 500 mg  500 mg Oral BID         Objective:      Physical Exam:  GEN: Alert and oriented times three NAD  EYES: conjunctiva normal, lids with out lesions  HEENT: MMM, No thyromegaly, no lymphadenopathy  HEART: RRR +S1 +S2, no JVD, pulses 2+ distally  LUNGS: CTA B/L no wheezes, rales or rhonchi  ABDOMEN: + BS, soft NT/ND no organomegaly,  No rebound  EXTREMITIES: No edema cyanosis, cap refill normal   SKIN: no rashes or skin breakdown, no nodules, normal turgor    Data Review:   Labs:    Recent Results (from the past 24 hour(s))   GLUCOSE, POC    Collection Time: 12/12/20  6:00 PM   Result Value Ref Range    Glucose (POC) 195 (H) 70 - 110 mg/dL   GLUCOSE, POC    Collection Time: 12/12/20  9:12 PM   Result Value Ref Range    Glucose (POC) 197 (H) 70 - 110 mg/dL   GLUCOSE, POC    Collection Time: 12/12/20 10:50 PM   Result Value Ref Range    Glucose (POC) 227 (H) 70 - 110 mg/dL   MAGNESIUM    Collection Time: 12/13/20  1:55 AM   Result Value Ref Range    Magnesium 2.0 1.6 - 2.6 mg/dL   CBC W/O DIFF    Collection Time: 12/13/20  1:55 AM   Result Value Ref Range    WBC 8.4 4.6 - 13.2 K/uL    RBC 3.52 (L) 4.70 - 5.50 M/uL    HGB 10.3 (L) 13.0 - 16.0 g/dL    HCT 29.5 (L) 36.0 - 48.0 %    MCV 83.8 74.0 - 97.0 FL    MCH 29.3 24.0 - 34.0 PG    MCHC 34.9 31.0 - 37.0 g/dL    RDW 15.8 (H) 11.6 - 14.5 %    PLATELET 801 441 - 166 K/uL MPV 9.9 9.2 - 15.2 FL   METABOLIC PANEL, BASIC    Collection Time: 12/13/20  1:55 AM   Result Value Ref Range    Sodium 130 (L) 136 - 145 mmol/L    Potassium 3.1 (L) 3.5 - 5.5 mmol/L    Chloride 90 (L) 100 - 111 mmol/L    CO2 34 (H) 21 - 32 mmol/L    Anion gap 6 3.0 - 18 mmol/L    Glucose 152 (H) 74 - 99 mg/dL    BUN 21 (H) 7.0 - 18 MG/DL    Creatinine 1.16 0.6 - 1.3 MG/DL    BUN/Creatinine ratio 18 12 - 20      GFR est AA >60 >60 ml/min/1.73m2    GFR est non-AA >60 >60 ml/min/1.73m2    Calcium 8.5 8.5 - 10.1 MG/DL   GLUCOSE, POC    Collection Time: 12/13/20  6:05 AM   Result Value Ref Range    Glucose (POC) 173 (H) 70 - 110 mg/dL   GLUCOSE, POC    Collection Time: 12/13/20 12:04 PM   Result Value Ref Range    Glucose (POC) 218 (H) 70 - 110 mg/dL       Telemetry: Sinus      Assessment:     Principal Problem:    Left hemiparesis ([facility] [address] (12/8/2020)    Active Problems:    Type 2 diabetes mellitus ([address] (2/12/2020)      Multiple myeloma ([facility] [address] (2/16/2020)      Morbid obesity ([facility]) (2/17/2020)      Hypokalemia (12/7/2020)      Neuropathy ()      Hypomagnesemia (12/7/2020)      Elevated troponin (12/7/2020)      Weakness (12/7/2020)      Spinal stenosis (12/11/2020)        Plan:     Continue current measures. Follow .   Dr. Jose Leal returns tomorrow,     Matteo Coulter MD

## 2020-12-13 NOTE — PROGRESS NOTES
1915  Assumed care of pt   2020  Shift assessment complete  2300  Reassessment complete  0400  Reassessment complete    Shift uneventful    [facility] Night Shift Chart Audit    Chart Audit completed? YES    Bedside and Verbal shift change report given to JOHN Hernández (oncoming nurse) by Clif Branch RN (offgoing nurse). Report included the following information SBAR, Kardex, ED Summary, Intake/Output, MAR, Recent Results, Med Rec Status and Cardiac Rhythm NSR.

## 2020-12-13 NOTE — PROGRESS NOTES
0700: Assumed patient care from off going nurse Blanca Lopez.     1700: Per Dr. Matteo Cooney, stating he would like hemovac drain pulled out today due to little to non drainage. At this time this nurse and another nurse Katey RN removed Hemovac drain . Held gauze to site for 5 minutes and applied new guaze with foam pressure dressing. Also at this time dressing covering staple incision changed. Mepilex applied and was dated with this nurses initials. * Patient allowed to have front of neck brace removed during meal time and medication administration. Dr. Matteo Cooney stated it was ok and also nursing order placed by Dr. Matteo Cooney stating it was it is acceptable.  *

## 2020-12-13 NOTE — PROGRESS NOTES
Problem: Pressure Injury - Risk of  Goal: *Prevention of pressure injury  Description: Document Vance Scale and appropriate interventions in the flowsheet.   Outcome: Progressing Towards Goal  Note: Pressure Injury Interventions:  Sensory Interventions: Pressure redistribution bed/mattress (bed type)         Activity Interventions: Pressure redistribution bed/mattress(bed type), PT/OT evaluation    Mobility Interventions: Pressure redistribution bed/mattress (bed type), PT/OT evaluation    Nutrition Interventions: Document food/fluid/supplement intake, Offer support with meals,snacks and hydration    Friction and Shear Interventions: HOB 30 degrees or less

## 2020-12-14 VITALS
BODY MASS INDEX: 36.88 KG/M2 | RESPIRATION RATE: 16 BRPM | TEMPERATURE: 99.4 F | HEART RATE: 85 BPM | WEIGHT: 249 LBS | SYSTOLIC BLOOD PRESSURE: 124 MMHG | DIASTOLIC BLOOD PRESSURE: 58 MMHG | OXYGEN SATURATION: 97 % | HEIGHT: 69 IN

## 2020-12-14 LAB
ANION GAP SERPL CALC-SCNC: 8 MMOL/L (ref 3–18)
BUN SERPL-MCNC: 23 MG/DL (ref 7–18)
BUN/CREAT SERPL: 21 (ref 12–20)
CALCIUM SERPL-MCNC: 9.1 MG/DL (ref 8.5–10.1)
CHLORIDE SERPL-SCNC: 89 MMOL/L (ref 100–111)
CO2 SERPL-SCNC: 34 MMOL/L (ref 21–32)
CREAT SERPL-MCNC: 1.1 MG/DL (ref 0.6–1.3)
GLUCOSE BLD STRIP.AUTO-MCNC: 161 MG/DL (ref 70–110)
GLUCOSE BLD STRIP.AUTO-MCNC: 165 MG/DL (ref 70–110)
GLUCOSE BLD STRIP.AUTO-MCNC: 168 MG/DL (ref 70–110)
GLUCOSE SERPL-MCNC: 154 MG/DL (ref 74–99)
MAGNESIUM SERPL-MCNC: 2.2 MG/DL (ref 1.6–2.6)
POTASSIUM SERPL-SCNC: 3.3 MMOL/L (ref 3.5–5.5)
SODIUM SERPL-SCNC: 131 MMOL/L (ref 136–145)

## 2020-12-14 PROCEDURE — 74011250637 HC RX REV CODE- 250/637: Performed by: PHYSICIAN ASSISTANT

## 2020-12-14 PROCEDURE — 36415 COLL VENOUS BLD VENIPUNCTURE: CPT

## 2020-12-14 PROCEDURE — 82962 GLUCOSE BLOOD TEST: CPT

## 2020-12-14 PROCEDURE — 74011250637 HC RX REV CODE- 250/637: Performed by: HOSPITALIST

## 2020-12-14 PROCEDURE — 97535 SELF CARE MNGMENT TRAINING: CPT

## 2020-12-14 PROCEDURE — 97530 THERAPEUTIC ACTIVITIES: CPT

## 2020-12-14 PROCEDURE — 80048 BASIC METABOLIC PNL TOTAL CA: CPT

## 2020-12-14 PROCEDURE — 74011636637 HC RX REV CODE- 636/637: Performed by: HOSPITALIST

## 2020-12-14 PROCEDURE — 83735 ASSAY OF MAGNESIUM: CPT

## 2020-12-14 RX ORDER — GABAPENTIN 400 MG/1
800 CAPSULE ORAL 2 TIMES DAILY
Qty: 4 CAP | Refills: 0 | Status: SHIPPED | OUTPATIENT
Start: 2020-12-14 | End: 2020-12-15

## 2020-12-14 RX ORDER — POTASSIUM CHLORIDE 20 MEQ/1
40 TABLET, EXTENDED RELEASE ORAL
Status: COMPLETED | OUTPATIENT
Start: 2020-12-14 | End: 2020-12-14

## 2020-12-14 RX ADMIN — METOLAZONE 5 MG: 5 TABLET ORAL at 10:42

## 2020-12-14 RX ADMIN — INSULIN LISPRO 3 UNITS: 100 INJECTION, SOLUTION INTRAVENOUS; SUBCUTANEOUS at 12:49

## 2020-12-14 RX ADMIN — INSULIN LISPRO 3 UNITS: 100 INJECTION, SOLUTION INTRAVENOUS; SUBCUTANEOUS at 06:37

## 2020-12-14 RX ADMIN — CYANOCOBALAMIN TAB 1000 MCG 1000 MCG: 1000 TAB at 10:41

## 2020-12-14 RX ADMIN — ISOSORBIDE MONONITRATE 30 MG: 30 TABLET, EXTENDED RELEASE ORAL at 10:42

## 2020-12-14 RX ADMIN — POTASSIUM CHLORIDE 40 MEQ: 1500 TABLET, EXTENDED RELEASE ORAL at 10:41

## 2020-12-14 RX ADMIN — METOPROLOL SUCCINATE 25 MG: 25 TABLET, EXTENDED RELEASE ORAL at 10:42

## 2020-12-14 RX ADMIN — DOCUSATE SODIUM 100 MG: 100 CAPSULE, LIQUID FILLED ORAL at 10:41

## 2020-12-14 RX ADMIN — INSULIN LISPRO 3 UNITS: 100 INJECTION, SOLUTION INTRAVENOUS; SUBCUTANEOUS at 17:33

## 2020-12-14 RX ADMIN — ACETAMINOPHEN 650 MG: 325 TABLET ORAL at 17:28

## 2020-12-14 RX ADMIN — VALACYCLOVIR HYDROCHLORIDE 500 MG: 500 TABLET, FILM COATED ORAL at 10:42

## 2020-12-14 RX ADMIN — GABAPENTIN 800 MG: 400 CAPSULE ORAL at 10:41

## 2020-12-14 RX ADMIN — FELODIPINE 10 MG: 5 TABLET, EXTENDED RELEASE ORAL at 10:41

## 2020-12-14 RX ADMIN — Medication 50 MG: at 10:42

## 2020-12-14 RX ADMIN — SPIRONOLACTONE 25 MG: 25 TABLET ORAL at 10:41

## 2020-12-14 RX ADMIN — CALCIUM CARBONATE-VITAMIN D TAB 500 MG-200 UNIT 1 TABLET: 500-200 TAB at 10:42

## 2020-12-14 RX ADMIN — LISINOPRIL 5 MG: 5 TABLET ORAL at 10:42

## 2020-12-14 RX ADMIN — INSULIN LISPRO 3 UNITS: 100 INJECTION, SOLUTION INTRAVENOUS; SUBCUTANEOUS at 17:28

## 2020-12-14 NOTE — PROGRESS NOTES
Problem: Pressure Injury - Risk of  Goal: *Prevention of pressure injury  Description: Document Vance Scale and appropriate interventions in the flowsheet. Outcome: Progressing Towards Goal  Note: Pressure Injury Interventions:  Sensory Interventions: Assess changes in LOC, Keep linens dry and wrinkle-free, Maintain/enhance activity level, Minimize linen layers, Monitor skin under medical devices, Pad between skin to skin, Pressure redistribution bed/mattress (bed type)         Activity Interventions: Pressure redistribution bed/mattress(bed type), PT/OT evaluation    Mobility Interventions: HOB 30 degrees or less, Pressure redistribution bed/mattress (bed type), PT/OT evaluation    Nutrition Interventions: Document food/fluid/supplement intake    Friction and Shear Interventions: HOB 30 degrees or less, Lift sheet, Lift team/patient mobility team, Minimize layers                Problem: Patient Education: Go to Patient Education Activity  Goal: Patient/Family Education  Outcome: Progressing Towards Goal     Problem: Falls - Risk of  Goal: *Absence of Falls  Description: Document Evangelist Fall Risk and appropriate interventions in the flowsheet.   Outcome: Progressing Towards Goal  Note: Fall Risk Interventions:  Mobility Interventions: Communicate number of staff needed for ambulation/transfer, Patient to call before getting OOB         Medication Interventions: Patient to call before getting OOB, Teach patient to arise slowly    Elimination Interventions: Bed/chair exit alarm, Call light in reach, Urinal in reach              Problem: Patient Education: Go to Patient Education Activity  Goal: Patient/Family Education  Outcome: Progressing Towards Goal     Problem: Patient Education: Go to Patient Education Activity  Goal: Patient/Family Education  Outcome: Progressing Towards Goal     Problem: Pain  Goal: *Control of Pain  Outcome: Progressing Towards Goal  Goal: *PALLIATIVE CARE:  Alleviation of Pain  Outcome: Progressing Towards Goal     Problem: Patient Education: Go to Patient Education Activity  Goal: Patient/Family Education  Outcome: Progressing Towards Goal

## 2020-12-14 NOTE — DISCHARGE SUMMARY
Discharge Summary    Patient: Daniel Interiano MRN: [id number]  CSN: [id number]    YOB: 1940  Age: 68 y.o. Sex: male    DOA: 12/7/2020 LOS:  LOS: 7 days   Discharge Date:      Primary Care Provider:  Azucena Banegas MD    Admission Diagnoses: Hypokalemia [E87.6]    Discharge Diagnoses:    Hospital Problems  Date Reviewed: 2/17/2020          Codes Class Noted POA    Spinal stenosis ICD-10-CM: M48.00  ICD-9-CM: 724.00  12/11/2020 Unknown        * (Principal) Left hemiparesis ([facility] ICD-10-CM: G81.94  ICD-9-CM: 342.90  12/8/2020 Unknown        Hypokalemia ICD-10-CM: E87.6  ICD-9-CM: 276.8  12/7/2020 Unknown        Neuropathy ICD-10-CM: G62.9  ICD-9-CM: 355.9  Unknown Unknown        Hypomagnesemia ICD-10-CM: E83.42  ICD-9-CM: 275.2  12/7/2020 Unknown        Elevated troponin ICD-10-CM: R77.8  ICD-9-CM: 790.6  12/7/2020 Unknown        Weakness ICD-10-CM: R53.1  ICD-9-CM: 780.79  12/7/2020 Unknown        Morbid obesity ([facility] ICD-10-CM: E66.01  ICD-9-CM: 278.01  2/17/2020 Yes        Multiple myeloma ([facility] ICD-10-CM: C90.00  ICD-9-CM: 203.00  2/16/2020 Yes        Type 2 diabetes mellitus ([facility] ICD-10-CM: E11.9  ICD-9-CM: 250.00  2/12/2020 Yes              Discharge Condition: stable     Discharge Medications:     Current Discharge Medication List      START taking these medications    Details   oxyCODONE-acetaminophen (PERCOCET) 5-325 mg per tablet Take 1.5 Tabs by mouth every four (4) hours as needed for Pain for up to 10 days. Max Daily Amount: 9 Tabs. Qty: 84 Tab, Refills: 0    Associated Diagnoses: Left hemiparesis (HCC)      diazePAM (Valium) 5 mg tablet Take 0.5-1 Tabs by mouth every eight (8) hours as needed for Muscle Spasm(s) (muscle spasms). Max Daily Amount: 15 mg.  Qty: 60 Tab, Refills: 0    Associated Diagnoses: Left hemiparesis (HCC)      naloxone (NARCAN) 4 mg/actuation nasal spray Use 1 spray intranasally, then discard.  Repeat with new spray every 2 min as needed for opioid overdose symptoms, alternating nostrils. Qty: 1 Each, Refills: 0         CONTINUE these medications which have CHANGED    Details   gabapentin (Neurontin) 400 mg capsule Take 2 Caps by mouth two (2) times a day for 1 day. Max Daily Amount: 1,600 mg. Qty: 4 Cap, Refills: 0    Associated Diagnoses: Left hemiparesis ([address]         CONTINUE these medications which have NOT CHANGED    Details   LENALIDOMIDE PO Take 25 mg by mouth. felodipine (PLENDIL SR) 10 mg 24 hr tablet Take 10 mg by mouth daily. spironolactone (ALDACTONE) 25 mg tablet Take  by mouth daily. pyridoxine, vitamin B6, (VITAMIN B-6) 50 mg tablet Take 50 mg by mouth daily. metOLazone (ZAROXOLYN) 10 mg tablet Take 5 mg by mouth daily. rivaroxaban (XARELTO PO) Take 10 mg by mouth. POTASSIUM CHLORIDE 20 mEq by Does Not Apply route. valacyclovir HCl (VALTREX PO) Take  by mouth. omeprazole (PRILOSEC) 20 mg capsule Take 20 mg by mouth daily. DEXAMETHASONE, BULK, by Does Not Apply route. SITagliptin-metFORMIN (JANUMET) 50-1,000 mg per tablet Take 1 Tab by mouth two (2) times daily (with meals). cholecalciferol (Vitamin D3) (1000 Units /25 mcg) tablet Take 1,000 Units by mouth daily. cyanocobalamin (VITAMIN B12) 500 mcg tablet Take 1,000 mcg by mouth daily. lisinopril (PRINIVIL, ZESTRIL) 5 mg tablet Take 1 Tab by mouth daily. Qty: 30 Tab, Refills: 0      isosorbide mononitrate ER (IMDUR) 30 mg tablet Take 1 Tab by mouth daily. Qty: 30 Tab, Refills: 0      metoprolol succinate (TOPROL-XL) 25 mg XL tablet Take 1 Tab by mouth daily. Qty: 30 Tab, Refills: 0      pravastatin (PRAVACHOL) 40 mg tablet Take 1 Tab by mouth nightly. Qty: 30 Tab, Refills: 0      metFORMIN (GLUCOPHAGE) 1,000 mg tablet Take 1,000 mg by mouth two (2) times daily (with meals). SITagliptin (JANUVIA) 100 mg tablet Take 100 mg by mouth daily. calcium-cholecalciferol, d3, (CALCIUM 600 + D) 600-125 mg-unit tab Take  by mouth daily. pioglitazone (ACTOS) 30 mg tablet Take 30 mg by mouth daily. STOP taking these medications       furosemide (LASIX) 40 mg tablet Comments:   Reason for Stopping:         aspirin 81 mg chewable tablet Comments:   Reason for Stopping:               Procedures : CERVICAL 3- CERVICAL 7 SPINE POSTERIOR CERVICAL DECOMPRESSION AND FUSION WITH C-ARM  Consults: Cardiology, Hematology/Oncology, Neurology and Orthopedics      PHYSICAL EXAM   Visit Vitals  BP (!) 104/57 (BP 1 Location: Right arm, BP Patient Position: At rest)   Pulse 81   Temp 99.1 °F (37.3 °C)   Resp 16   Ht 5' 9\" (1.753 m)   Wt 112.9 kg (249 lb)   SpO2 97%   BMI 36.77 kg/m²     General: Awake, cooperative, no acute distress    HEENT: NC, Atraumatic. PERRLA, EOMI. Anicteric sclerae. Neck collar noted   Lungs:  CTA Bilaterally. No Wheezing/Rhonchi/Rales. Heart:  Regular  rhythm,  No murmur, No Rubs, No Gallops  Abdomen: Soft, Non distended, Non tender. +Bowel sounds,   Extremities: No c/c/e  Psych:   Not anxious or agitated. Neurologic:  No acute neurological deficits except left side weakness                                  Admission HPI :   Wm Paul is a 68 y.o. male with, CHF, MM came to ER to weakness and numbness for several days. He follow-up with [facility] for his multiple myeloma patient was a started about 2 weeks ago. He has a left weakness days ago. He also noted his speech becomes slurred worsening in the morning. Improving with the day. He was found K 2.9. mg 1.2, ekg lbb, st seg change, K and mg were administrated and ekg got improving. No chest pain. Daughter was at the bedside.    Dr. Ness Ahuja was consulted for abnormal ekg and elevated trop, stat echo done      Admission and treatment discussed with patient and family, all agree and indicated a verbal understanding     Hospital Course :   Wm Paul is a 68 y.o. male with, CHF was admitted due to left side weakness, neurologist was on board and stroke was r/o per brain mri. Mri spine indicated spinal stenosis, orthopedics was on board. Dr. Luis A Saunders did CERVICAL 3- CERVICAL 7 SPINE POSTERIOR CERVICAL DECOMPRESSION AND FUSION WITH C-ARM. He tolerated the procedure very well. He also received mg and K replacement for his hypokalemia and hypomagnesemia. Oncologist was on board for his MM. He received one time dose zometa and decodrone 4 mg once a week was continued. We also controlled his dm per insulin. He was cleared to be d/c per orthopedics. Cardiologist was on board for his elevated troponin, no acs indicated. Discharge planning discussed with patient, pt agrees  with the plan and no questions and concerns at this point. Activity: Activity as tolerated    Diet: Cardiac Diet and Diabetic Diet    Follow-up: PCP and oncologist and Dr. Luis A Saunders     Disposition: rehab     Minutes spent on discharge: 45 min       Labs: Results:       Chemistry Recent Labs     12/14/20  0130 12/13/20  0155 12/12/20  0515   * 152* 146*   * 130* 131*   K 3.3* 3.1* 2.8*   CL 89* 90* 91*   CO2 34* 34* 31   BUN 23* 21* 12   CREA 1.10 1.16 0.87   CA 9.1 8.5 8.7   AGAP 8 6 9   BUCR 21* 18 14      CBC w/Diff Recent Labs     12/13/20  0155 12/12/20  0515   WBC 8.4 7.3   RBC 3.52* 3.60*   HGB 10.3* 10.6*   HCT 29.5* 30.0*    282      Cardiac Enzymes No results for input(s): CPK, CKND1, YEFRI in the last 72 hours. No lab exists for component: CKRMB, TROIP   Coagulation No results for input(s): PTP, INR, APTT, INREXT, INREXT in the last 72 hours. Lipid Panel Lab Results   Component Value Date/Time    Cholesterol, total 89 12/08/2020 01:12 AM    HDL Cholesterol 46 12/08/2020 01:12 AM    LDL, calculated 28.6 12/08/2020 01:12 AM    VLDL, calculated 14.4 12/08/2020 01:12 AM    Triglyceride 72 12/08/2020 01:12 AM    CHOL/HDL Ratio 1.9 12/08/2020 01:12 AM      BNP No results for input(s): BNPP in the last 72 hours.    Liver Enzymes No results for input(s): TP, ALB, TBIL, AP in the last 72 hours. No lab exists for component: SGOT, GPT, DBIL   Thyroid Studies Lab Results   Component Value Date/Time    TSH 1.16 12/07/2020 12:25 PM    TSH 0.74 12/07/2020 12:25 PM          @micro    Significant Diagnostic Studies: Xr Shoulder Lt Ap/lat Min 2 V    Result Date: 12/7/2020  EXAM:  XR SHOULDER LT AP/LAT MIN 2 V INDICATION: Left shoulder pain COMPARISON: None. FINDINGS: Two views of the left shoulder demonstrate no acute finding. A few scattered and confluent lucencies are seen in the scapula, proximal humerus and clavicle. Impression: No acute findings or significant degenerative change appreciated. Scattered small bone lucencies noted compatible with history of multiple myeloma. Mri Brain Wo Cont    Result Date: 12/8/2020  EXAM: MRI brain without gadolinium CLINICAL INDICATION/HISTORY: Extremity weakness, possible CVA   > Additional: None. COMPARISON: 2/16/2020   > Reference Exam: CT head 12/7/2020 TECHNIQUE: Sagittal T1, axial T1, T2, FLAIR, T2 gradient, diffusion and coronal T1 and T2 sequences obtained of the brain. _______________ FINDINGS: Diffusion:  There are no areas of restricted diffusion, no evidence of an acute infarction. Brain parenchyma:  Stable very mild periventricular white matter and central pontine increased T2 and FLAIR signal. No new cortical or white matter signal abnormality with no evidence of mass hemorrhage edema or mass effect. Ventricles and sulci:  Mild diffuse central and cortical volume loss. Extra-axial:  No extra-axial fluid collection or mass is noted. Brain vasculature:  No vascular abnormality is appreciated on this routine brain MR examination. Craniocervical junction:  Cervical medullary junction unremarkable. Interval increased development of likely degenerative Schmorl's node endplate defect inferior C2 vertebral body.  Suggestion of possible worsening of ligamentum flavum thickening C2 and C3 with some questionable cord flattening and canal stenosis, limited evaluation on this study. Skull base, extracranial and calvarium:  Previous bilateral lens implants with stable bilateral proptosis with no retrobulbar abnormality. Sinuses and IACs unremarkable. Minimal increased T2 signal inferior right mastoid. Sella unremarkable. Multiple small low T1 and increased T2 signal foci throughout the intradiploic space of the skull correlating to small lytic lesions on CT. _______________     IMPRESSION: 1. No evidence of acute infarct or other acute intracranial finding. 2. Stable very mild white matter signal changes nonspecific probable chronic microvascular ischemic disease. 3. Questionable interval increased acquired canal stenosis and cord flattening C2 and C3 levels limited evaluation on this study. If there is clinical concern for cervical myelopathy, further evaluation could be performed with MRI dedicated to cervical spine. 4. Multiple small T2 hyperintense skull lesions concerning for metastatic disease or multiple myeloma. Mri Cerv Spine Wo Cont    Result Date: 12/8/2020  EXAM: MRI OF THE CERVICAL SPINE, WITHOUT IV CONTRAST CLINICAL INDICATION/HISTORY: Left extremity weakness, hemiparesis COMPARISON: Brain MRI 12/7/2020, lumbar spine MRI 12/8/2020 TECHNIQUE: T1 weighted sagittal and axial, STIR and T2 FSE sagittal, T2 FSE axial. _______________ FINDINGS: OSSEOUS, CERVICAL ALIGNMENT, CRANIOCERVICAL JUNCTION: There is gradual reversal of the normal cervical lordosis, apex at C3-C4. No listhesis. Extensive abnormal marrow signal intensity throughout all the visualized cervical spine and upper thoracic vertebral levels. There is also heterogeneous signal abnormality throughout visualized skull base. No clearly acute pathologic fracture. Striated edema is seen throughout paraspinal musculature extending between C2 and C5 posterior spinous processes. Moderate degenerative osteoarthropathy of the atlantoaxial articulation.   CERVICAL CORD, VISUALIZED POSTERIOR FOSSA: Visualized posterior fossa is unremarkable. No Chiari I malformation. Cord morphology and signal intensity are unremarkable throughout. PARASPINOUS SOFT TISSUES, VISUALIZED SOFT TISSUE NECK: Visualized soft tissues are unremarkable. C2-C3: Mild broad-based disc bulge effaces ventral thecal sac CSF and abuts but does not deform the adjacent ventral cervical cord. Mild right and moderate left facet arthropathy. Moderate overall canal stenosis, largest AP dimension of the thecal sac measuring 7 mm. Mild bilateral foraminal stenoses. C3-C4: Mild broad-based disc bulge effaces ventral thecal sac CSF and causes minimal concave deformation of the ventral cervical cord, eccentric to the left. Moderate bilateral facet arthropathy. Left greater than right uncovertebral proliferative changes with moderately severe left and moderate right foraminal stenoses. C4-C5: Moderate broad-based disc osteophyte complex, eccentric to the right with moderately severe concave deformation of the ventral cervical cord, most prominent along right lateral aspect. No cervical cord signal abnormality. Moderate bilateral facet arthropathy. Prominent uncovertebral proliferative changes with severe right and left foraminal stenoses. C5-C6: Moderate broad-based disc osteophyte complex effaces ventral thecal sac CSF and causes moderate concave deformation of the ventral cervical cord. Moderate bilateral facet arthropathy. Prominent uncovertebral proliferative changes with severe left and right foraminal stenoses. C6-C7: Moderate broad-based disc bulge, eccentric to the right with abutment of ventral right paracentral cervical cord but no definite cord deformation. Moderate bilateral facet arthropathy. Prominent uncovertebral proliferative changes with severe left and right foraminal stenoses. C7-T1: No significant disc pathology. Mild right and severe left facet arthropathy. Moderately severe left foraminal stenosis.  Canal and right foramen remain adequate. _______________     IMPRESSION: 1. Multilevel degenerative disc disease and facet arthropathy with associated minimal C3-C4, moderately severe C4-C5, and moderate C5-C6 cervical cord impingement. There is cervical cord abutment without deformation at C2-C3 and C6-C7 levels. 2. Facet arthropathy and uncovertebral proliferative changes cause several high-grade foraminal stenoses, as delineated level by level. 3. Diffusely abnormal marrow signal intensity throughout cervical and upper thoracic spine, osseous metastatic disease versus multiple myeloma. Mri Lumb Spine Wo Cont    Result Date: 12/8/2020  EXAM: MRI OF THE LUMBAR SPINE, WITHOUT IV CONTRAST CLINICAL INDICATION/HISTORY: Left lower extremity weakness, hemiparesis COMPARISON: 12/13/2019 TECHNIQUE: T1 weighted sagittal and axial, STIR and T2 FSE sagittal, T2 FSE axial. _______________ FINDINGS: OSSEOUS, LUMBAR ALIGNMENT: Hypointense T1 and T2 signal intensity throughout the L1 vertebral body related to previous vertebral augmentation. Moderate L1 vertebral body height loss. Diffusely heterogeneous marrow signal intensity is seen throughout all of the lower thoracic and lumbar spine vertebra as well as throughout visualized portions of the osseous pelvis. Most of the tiny small foci of signal abnormality measure 2-6 mm in size. Signal abnormality also extends throughout middle and posterior column, to include several slightly larger regions of osseous metastatic disease involving the posterior spinous processes and transverse processes. No clearly acute pathologic fracture. Moderate T11 wedge compression deformity with asymmetric anterior vertebral body height loss, unchanged compared to previous MRI. There are mild degenerative changes of the left and right sacroiliac joints. PARASPINAL AND RETROPERITONEAL SOFT TISSUES: Simple appearing subcentimeter right renal cysts. No acute abnormality throughout the visualized retroperitoneum. OTHER: Conus medullaris ends at the L2 vertebral body level. Correlation of axial and sagittal images reveals the following: T11-T12: Visualized only on sagittal sequences. Moderate broad-based disc bulge and endplate spondylosis efface ventral thecal sac CSF. There is abutment but no definite depression of the adjacent thoracic cord. Moderate overall canal stenosis, largest AP dimension of the thecal sac measuring just over 6 mm. Moderate bilateral facet arthropathy. Moderate bilateral foraminal stenoses. T12-L1: Minimal broad-based disc bulge and subtle unchanged retropulsion of the L1 posterior wall flattened ventral thecal sac. Moderate bilateral facet arthropathy and ligamentum flavum buckling. Moderate canal stenosis. Mild right and left foraminal stenoses. L1-L2: Minimal broad-based disc bulge flattens ventral thecal sac. Moderate bilateral facet arthropathy and ligamentum flavum buckling. Canal remains adequate. Mild left and right foraminal stenoses. L2-L3: Moderate broad-based disc bulge flattens ventral thecal sac. Moderately severe bilateral facet arthropathy and ligamentum flavum buckling. Moderate overall canal stenosis, largest AP dimension of the thecal sac measuring just under 6 mm. No high-grade lateral recess effacement. Mild right and moderate left foraminal stenoses. L3-L4: Moderate broad-based disc bulge, slightly eccentric to the right, with partial effacement of left and right lateral recesses. Moderately severe bilateral facet arthropathy and ligamentum flavum buckling. Moderate overall canal stenosis, largest AP dimension of the thecal sac measuring just under 7 mm. Mild right and moderate left foraminal stenoses. L4-L5: Mild broad-based disc bulge flattens ventral thecal sac. Moderately severe bilateral facet arthropathy and ligamentum flavum buckling. Mild to moderate overall canal stenosis, largest AP dimension of the thecal sac measuring just over 7 mm.  Mild bilateral foraminal stenoses. L5-S1: Minimal broad-based disc bulge abuts ventral thecal sac. Severe bilateral facet arthropathy. Canal remains adequate. Mild right and moderate left foraminal stenoses, neither with associated foraminal nerve root impingement. _______________     IMPRESSION: 1. Numerous osseous lesions throughout visualized lower thoracic spine, lumbar spine, and osseous pelvis. Indeterminant to what extent these findings represent diffuse osseous metastatic disease versus multiple myeloma. No evident acute pathologic fracture. Unchanged chronic T11 and L1 wedge compression deformities. Previous L1 vertebral augmentation. 2. Multilevel degenerative disc disease and facet arthrosis with mild to moderate canal and stenoses. There is abutment of the ventral distal thoracic cord at T11-T12. No high-grade stenosis or definite nerve root impingement elsewhere. Ct Head Wo Cont    Result Date: 12/7/2020  EXAM: CT head HISTORY: -Provided with order: left sided weakness, stroke? -Additional: None COMPARISON: 02/16/20 TECHNIQUE: Axial CT imaging of the head was performed without intravenous contrast. Sagittal and coronal reconstructions were created from the axial data. One or more dose reduction techniques were used on this CT: automated exposure control, adjustment of the mAs and/or kVp according to patient size, and iterative reconstruction techniques. The specific techniques used on this CT exam have been documented in the patient's electronic medical record. Digital Imaging and Communications in Medicine (DICOM) format image data are available to nonaffiliated external healthcare facilities or entities on a secure, media free, reciprocally searchable basis with patient authorization for at least a 12-month period after this study. _______________ FINDINGS: BRAIN, POSTERIOR FOSSA, EXTRA-AXIAL SPACES AND MENINGES:  The sulci, folia, ventricles and basal cisterns are   within normal limits for the patient's age.  There are no areas of abnormal parenchymal attenuation. There is no intracranial hemorrhage, mass effect, or midline shift. There are no abnormal extra-axial fluid collections. CALVARIUM: Progressive lucent lesions in the calvarium. SINUSES/MASTOIDS: Slight left maxillary sinus mucosal thickening. OTHER: None. _______________     IMPRESSION: 1. No acute intracranial abnormalities are identified. 2. Progressive lucent/lytic lesions in the calvarium, which can be seen in the setting of metastatic disease or myeloma. Please note that head CT may be negative in the acute setting and MRI could best further evaluate for acute/subacute ischemia/infarct in the high/persistent index of clinical suspicion. Ct Chest Abd Pelv W Cont    Result Date: 12/10/2020  EXAM: CT chest, abdomen, and pelvis INDICATION: 74-year-old patient with multiple myeloma. Currently admitted for posterior cervical decompression and fusion COMPARISON: MRI of the cervical and lumbar spine and both performed 12/8/2020; no prior CT imaging of the chest, abdomen, or pelvis is available for review TECHNIQUE: Axial CT imaging of the chest, abdomen, and pelvis was performed after IV contrast administration. Multiplanar reformats were generated. One or more dose reduction techniques were used on this CT: automated exposure control, adjustment of the mAs and/or kVp according to patient size, and iterative reconstruction techniques. The specific techniques used on this CT exam have been documented in the patient's electronic medical record. Digital Imaging and Communications in Medicine (DICOM) format image data are available to nonaffiliated external healthcare facilities or entities on a secure, media free, reciprocally searchable basis with patient authorization for at least a 12-month period after this study. _______________ FINDINGS: CHEST: MEDIASTINUM: There is a right IJ approach Mediport catheter with tip present in the SVC.  Thoracic aorta is normal in course and caliber. Cardiac size is normal. No pericardial effusion. Multivessel coronary arterial atherosclerosis. LYMPH NODES: No supraclavicular, axillary, mediastinal, or hilar lymph node enlargement. PLEURA: No pneumothorax or pleural effusion. LUNGS/AIRWAY: Central airways are patent. Minor left basilar atelectasis. No alveolar consolidation. No significant groundglass abnormality or abnormal septal line thickening. No suspicious appearing pulmonary nodule or mass. OTHER: None. ABDOMEN/PELVIS: LIVER, BILIARY: Liver is unremarkable. No abnormal biliary dilation. Prior cholecystectomy. PANCREAS: Unremarkable. SPLEEN: Unremarkable. ADRENALS: Left adrenal gland is normal. Right adrenal gland contains a roughly 11 mm nodule (image 75). KIDNEYS: Symmetric renal enhancement. Bilateral perinephric stranding. Nonobstructing lower pole left renal stone. LYMPH NODES: No pathologically enlarged lymph nodes. GASTROINTESTINAL TRACT: No abnormal bowel dilation or wall thickening. Appendix is within normal limits. No morphology of bowel obstruction or free intraperitoneal gas. PELVIC ORGANS: Unremarkable. VASCULATURE: Diffuse aortobiiliac atherosclerotic vascular calcification is present without evidence of aneurysmal dilatation. Artifact noted to propagate the abdominal aorta just adjacent to the L1 kyphoplasty. OSSEOUS: There is diffuse patchy demineralization throughout the imaged axial and appendicular skeleton. Multiple chronic appearing rib fracture deformities present bilaterally. Evidence of prior L1 kyphoplasty. Diffuse subtle lytic lesions noted throughout the sternum, thoracic, and lumbar spine. Compression deformities of the T4, T5, T8, T11 vertebral bodies noted. OTHER: None. _______________     IMPRESSION: 1.   Diffusely abnormal bony mineralization with diffuse subtle lytic lesions in keeping with underlying myelomatous involvement of the skeleton.   > Several thoracic compression deformities noted with evidence of prior L1 vertebral augmentation. 2. Tiny right adrenal gland nodule; statistically benign and favored to reflect an adenoma. Comparison prior imaging recommended. Xr Chest Port    Result Date: 12/7/2020  HISTORY: -Provided with order: left shoulder pain -Additional: None Technique : AP PORTABLE CHEST Comparison : 02/16/20 FINDINGS: HEART AND MEDIASTINUM: Right IJ central venous catheter tip projects over expected SVC. External monitoring wires leads and pads partially obscures visibility. LUNGS AND PLEURAL SPACES: No consolidation, congestive heart failure, pleural effusion or pneumothorax. BONY THORAX AND SOFT TISSUES: No acute osseous abnormality. Impression: No plain film evidence of acute cardiopulmonary disease, allowing for technique. Carry Founds Technologist Service    Result Date: 12/11/2020  See impression. Impression:  Fluoroscopy was provided for this procedure under the supervision and/or direction of the attending provider. ? For further information regarding this procedure, see patient medical record.              [facility]     CC: Christofer Lanier MD

## 2020-12-14 NOTE — PROGRESS NOTES
0730:Received verbal bedside report from off going nurse MARITZA Mccrary R.N. Patient care received. Patient alert and oriented x 4. Patient resting in bed denies pain. Patient stable. Call light with in reach bed in lowest position. 1511:Called [facility] and gave report to Jenn Javier R.N. Informed her patient transport time is 1730.     1935:Left floor with [facility].

## 2020-12-14 NOTE — PROGRESS NOTES
Cardiology Progress Note        Patient: Christina Age        Sex: male          DOA: 12/7/2020  YOB: 1940      Age:  68 y.o.        LOS:  LOS: 7 days   Assessment/Plan     Principal Problem:    Left hemiparesis ([address] (12/8/2020)    Active Problems:    Type 2 diabetes mellitus ([address] (2/12/2020)      Multiple myeloma ([address] (2/16/2020)      Morbid obesity ([address] (2/17/2020)      Hypokalemia (12/7/2020)      Neuropathy ()      Hypomagnesemia (12/7/2020)      Elevated troponin (12/7/2020)      Weakness (12/7/2020)      Spinal stenosis (12/11/2020)        Plan:  Cardiac telemetry reviewed and stable patient is being discharged to cardiac rehab. Continue with current medical treatment. Discussed with the patient. Subjective:    cc:  Bundle branch block  Cardiomyopathy  Hypertension  Minimally elevated troponin  Spinal stenosis        REVIEW OF SYSTEMS:     General: No fevers or chills. Cardiovascular: No chest pain or pressure. No palpitations. No ankle swelling  Pulmonary: No SOB, orthopnea, PND  Gastrointestinal: No nausea, vomiting or diarrhea      Objective:      Visit Vitals  BP (!) 124/58 (BP 1 Location: Right arm, BP Patient Position: At rest)   Pulse 85   Temp 99.4 °F (37.4 °C)   Resp 16   Ht 5' 9\" (1.753 m)   Wt 112.9 kg (249 lb)   SpO2 97%   BMI 36.77 kg/m²     Body mass index is 36.77 kg/m². Physical Exam:  cervica neck collar in placed  General Appearance: Comfortable, not using accessory muscles of respiration. NECK: No JVD, no thyroidomeglay. LUNGS: Clear bilaterally. HEART: S1+S2 audible,    ABD: Non-tender, BS Audible    EXT: No edema, and no cysnosis. VASCULAR EXAM: Pulses are intact. PSYCHIATRIC EXAM: Mood is appropriate.     Medication:  Current Facility-Administered Medications   Medication Dose Route Frequency    oxyCODONE-acetaminophen (PERCOCET 7.5) 7.5-325 mg per tablet 1 Tab  1 Tab Oral Q4H PRN    insulin lispro (HUMALOG) injection   SubCUTAneous AC&HS    insulin glargine (LANTUS) injection 10 Units  10 Units SubCUTAneous QHS    insulin lispro (HUMALOG) injection 3 Units  3 Units SubCUTAneous TIDAC    sodium chloride (NS) flush 5-40 mL  5-40 mL IntraVENous Q8H    sodium chloride (NS) flush 5-40 mL  5-40 mL IntraVENous PRN    acetaminophen (TYLENOL) tablet 650 mg  650 mg Oral Q4H PRN    oxyCODONE-acetaminophen (PERCOCET) 5-325 mg per tablet 1 Tab  1 Tab Oral Q4H PRN    ondansetron (ZOFRAN ODT) tablet 4 mg  4 mg Oral Q6H PRN    phenol throat spray (CHLORASEPTIC) 1 Spray  1 Spray Oral PRN    benzocaine-menthoL (CEPACOL) lozenge 1 Lozenge  1 Lozenge Oral PRN    diazePAM (VALIUM) tablet 2.5 mg  2.5 mg Oral TID PRN    docusate sodium (COLACE) capsule 100 mg  100 mg Oral BID    diphenhydrAMINE (BENADRYL) injection 12.5 mg  12.5 mg IntraVENous Q4H PRN    rivaroxaban (XARELTO) tablet 10 mg  10 mg Oral Q24H    dexAMETHasone (DECADRON) tablet 20 mg  20 mg Oral Q7D    calcium-vitamin D (OS-NUVIA +D3) 500 mg-200 unit per tablet 1 Tab  1 Tab Oral DAILY    felodipine (PLENDIL SR) 24 hr tablet 10 mg  10 mg Oral DAILY    [Held by provider] furosemide (LASIX) tablet 40 mg  40 mg Oral DAILY    lisinopriL (PRINIVIL, ZESTRIL) tablet 5 mg  5 mg Oral DAILY    metOLazone (ZAROXOLYN) tablet 5 mg  5 mg Oral DAILY    spironolactone (ALDACTONE) tablet 25 mg  25 mg Oral DAILY    glucose chewable tablet 16 g  4 Tab Oral PRN    glucagon (GLUCAGEN) injection 1 mg  1 mg IntraMUSCular PRN    cyanocobalamin tablet 1,000 mcg  1,000 mcg Oral DAILY    gabapentin (NEURONTIN) capsule 800 mg  800 mg Oral BID    isosorbide mononitrate ER (IMDUR) tablet 30 mg  30 mg Oral DAILY    lenalidomide cap 25 mg (Patient Supplied)  25 mg Oral DAILY    metoprolol succinate (TOPROL-XL) XL tablet 25 mg  25 mg Oral DAILY    pravastatin (PRAVACHOL) tablet 40 mg  40 mg Oral QHS    pyridoxine (vitamin B6) (VITAMIN B-6) tablet 50 mg  50 mg Oral DAILY    valACYclovir (VALTREX) tablet 500 mg  500 mg Oral BID               Lab/Data Reviewed:  Procedures/imaging: see electronic medical records for all procedures/Xrays   and details which were not copied into this note but were reviewed prior to creation of Plan       All lab results for the last 24 hours reviewed.      Recent Labs     12/13/20 0155 12/12/20 0515   WBC 8.4 7.3   HGB 10.3* 10.6*   HCT 29.5* 30.0*    282     Recent Labs     12/14/20  0130 12/13/20 0155 12/12/20 0515   * 130* 131*   K 3.3* 3.1* 2.8*   CL 89* 90* 91*   CO2 34* 34* 31   * 152* 146*   BUN 23* 21* 12   CREA 1.10 1.16 0.87   CA 9.1 8.5 8.7       RADIOLOGY:  CT Results  (Last 48 hours)    None        CXR Results  (Last 48 hours)    None            Cardiology Procedures:   Results for orders placed or performed during the hospital encounter of 12/07/20   EKG, 12 LEAD, INITIAL   Result Value Ref Range    Ventricular Rate 83 BPM    Atrial Rate 83 BPM    P-R Interval 180 ms    QRS Duration 126 ms    Q-T Interval 378 ms    QTC Calculation (Bezet) 444 ms    Calculated P Axis 53 degrees    Calculated R Axis 23 degrees    Calculated T Axis 174 degrees    Diagnosis       Sinus rhythm with ventricular fusion beats  paroxysmal LBBB  T wave abnormality, consider lateral ischemia  Abnormal ECG  When compared with ECG of 16-FEB-2020 06:15,  Significant changes have occurred  Confirmed by Lucero Rosenbaum MD. ([id number]) on 12/7/2020 2:17:40 PM        Echo Results  (Last 48 hours)    None       Cardiolite (Tc-99m Sestamibi) stress test    Signed By: Jacquelyn Ireland MD     December 14, 2020

## 2020-12-14 NOTE — DIABETES MGMT
GLYCEMIC CONTROL PROGRESS NOTE:    - discussed in rounds, known h/o T2DM, HbA1C within recommended range for age + comorbids  - BG out of target range non-ICU: < 180 mg/dL  - TDD = 34 (10 Lantus +9 (3) MTI + 15 - Humalog Very Insulin Resistant Corrective Coverage)  - PPG out of target range recommend increase mealtime insulin   *Humalog 6 qac      Recent Glucose Results:   Lab Results   Component Value Date/Time     (H) 12/14/2020 01:30 AM    GLUCPOC 161 (H) 12/14/2020 06:25 AM    GLUCPOC 233 (H) 12/13/2020 09:10 PM    GLUCPOC 143 (H) 12/13/2020 04:08 PM       Alfredo Loving MS, RN, CDE  Glycemic Control Team  [phone]  Pager [phone] (M-TH 8:00-4:30P)  *After Hours pager [phone]

## 2020-12-14 NOTE — PROGRESS NOTES
1915  Assumed care of pt  2030  Shift assessment complete - pt resting quietly watching TV   COVID test negative   0000  Reassessment complete  0355  Reassessment complete    [facility] Night Shift Chart Audit    Chart Audit completed? YES    Bedside and Verbal shift change report given to Ruth Freire RN (oncoming nurse) by Severo Sables, RN (offgoing nurse). Report included the following information SBAR, Kardex, ED Summary, Intake/Output, MAR, Recent Results, Med Rec Status and Cardiac Rhythm NSR.

## 2020-12-14 NOTE — PROGRESS NOTES
Problem: Pressure Injury - Risk of  Goal: *Prevention of pressure injury  Description: Document Vance Scale and appropriate interventions in the flowsheet. Outcome: Progressing Towards Goal  Note: Pressure Injury Interventions:  Sensory Interventions: Assess changes in LOC, Discuss PT/OT consult with provider, Keep linens dry and wrinkle-free, Minimize linen layers, Pressure redistribution bed/mattress (bed type)         Activity Interventions: Pressure redistribution bed/mattress(bed type), PT/OT evaluation    Mobility Interventions: PT/OT evaluation, Pressure redistribution bed/mattress (bed type)    Nutrition Interventions: Document food/fluid/supplement intake    Friction and Shear Interventions: Apply protective barrier, creams and emollients, Lift sheet, Minimize layers                Problem: Patient Education: Go to Patient Education Activity  Goal: Patient/Family Education  Outcome: Progressing Towards Goal     Problem: Falls - Risk of  Goal: *Absence of Falls  Description: Document Evangelist Fall Risk and appropriate interventions in the flowsheet.   Outcome: Progressing Towards Goal  Note: Fall Risk Interventions:  Mobility Interventions: Bed/chair exit alarm, Communicate number of staff needed for ambulation/transfer         Medication Interventions: Patient to call before getting OOB, Teach patient to arise slowly    Elimination Interventions: Call light in reach, Patient to call for help with toileting needs              Problem: Patient Education: Go to Patient Education Activity  Goal: Patient/Family Education  Outcome: Progressing Towards Goal

## 2020-12-14 NOTE — PROGRESS NOTES
Problem: Self Care Deficits Care Plan (Adult)  Goal: *Acute Goals and Plan of Care (Insert Text)  Description: Initial Occupational Therapy Goals (12/11/2020) Within 7 day(s):    1. Patient will perform toilet transfer with min A in preparation for bowel and bladder management. 2. Patient will perform bowel and bladder management with CGA for increased independence with ADLs. 3. Patient will perform UB dressing with CGA for increased independence with ADLs. 4. Patient will perform LB dressing with mod A & A/E PRN for increased independence with ADLs. 5. Patient will perform self grooming tasks in seated position with supervision for increased independence with ADLs. 6. Patient will utilize energy conservation techniques with 1 verbal cue(s) for increased independence with ADLs. PLOF: Pt was grossly mod I for ADLs w/ RW; pt ascended stairs w/ assist QW for bathing in tub/shower. Outcome: Progressing Towards Goal     OCCUPATIONAL THERAPY TREATMENT    Patient: Floridalma Hernandez (15 y.o. male)  Date: 12/14/2020  Diagnosis: Hypokalemia [E87.6]   Left hemiparesis (HCC)  Procedure(s) (LRB):  CERVICAL 3- CERVICAL 7 SPINE POSTERIOR CERVICAL DECOMPRESSION AND FUSION WITH C-ARM (N/A) 4 Days Post-Op  Precautions: Fall, Spinal([place] can be off for meals & meds)  PLOF: Pt was grossly mod I for ADLs w/ RW; pt ascended stairs w/ assist QW for bathing in tub/shower. Chart, occupational therapy assessment, plan of care, and goals were reviewed. ASSESSMENT:  Patient seen with PT for second set of skilled hands to maximize independence and safety. Pt continues to be highly motivated for increased independence. Pt modA for sitting EOB w/ posterior lean w/ increased time to gain balance. Pt CGA for drinking and maxA for managing pills d/t limited [address]. Pt eager to attempt standing. Pt assisted into standing w/ LLE buckling during wt/shifting. Pt stood 3x.  Pt noted to have rounded posture w/ decreased pelvic and scap mobility. Pt tolerated manual techniques w/ improved functional reaching in sitting and sitting balance. Pt assist back into supine. Pt asking if he is going to Rehab today as pt eager to regain function. Progression toward goals:  []          Improving appropriately and progressing toward goals  [x]          Improving slowly and progressing toward goals  []          Not making progress toward goals and plan of care will be adjusted     PLAN:  Patient continues to benefit from skilled intervention to address the above impairments. Continue treatment per established plan of care. Discharge Recommendations:  Inpatient Rehab  Further Equipment Recommendations for Discharge: To Be Determined (TBD) at next level of care (was supposed to get a stair lift from [facility] when admitted to [facility])     SUBJECTIVE:   Patient stated I didn't do so hot in standing yesterday.     OBJECTIVE DATA SUMMARY:   Cognitive/Behavioral Status:  Neurologic State: Alert  Orientation Level: Oriented X4  Cognition: Follows commands, Appropriate for age attention/concentration  Safety/Judgement: Fall prevention    Functional Mobility and Transfers for ADLs:   Bed Mobility:  Sit to Supine: Moderate assistance; Additional time;Assist x1;Assist x2(vc)  Scooting: Moderate assistance;Maximum assistance; Additional time(vc)   Transfers:  Sit to Stand: Minimum assistance; Moderate assistance;Assist x1;Assist x2(vc)    Balance:  Sitting: Impaired; Without support  Sitting - Static: Fair (occasional)  Sitting - Dynamic: Fair (occasional); Poor (constant support)  Standing: Impaired; With support  Standing - Static: Poor;Constant support  Standing - Dynamic : Poor;Constant support    ADL Intervention:  Feeding  Drink to Mouth: Contact guard assistance    Grooming  Washing Hands: Set-up(hand wipes)    Upper Body Dressing Assistance  Orthotics(Brace): Total assistance (dependent)(Giselle PANCHAL)    Lower Body Dressing Assistance  Socks:  Total assistance (dependent)    Toileting  Bladder Hygiene: Contact guard assistance;Minimum assistance; Compensatory technique training    Cognitive Retraining  Problem Solving: Inductive reason; Identifying the task; Identifying the problem;General alternative solution;Deductive reason; Awareness of environment    Neuro Re-Education:  Scap mobilization   facilitation of attempts with lumbar extension    UE Therapeutic Exercises:   Functional reaching across body for pelvic mobility and ADLs    Pain:  Pain level pre-treatment: 2/10   Pain level post-treatment: 2/10  Pain Intervention(s): Medication administered by RN (see MAR); Rest, Ice, Repositioning   Response to intervention: Nurse notified, See doc flow sheet    Please refer to the flowsheet for vital signs taken during this treatment. After treatment:   []  Patient left in no apparent distress sitting up in chair  [x]  Patient left in no apparent distress in bed  [x]  Call bell left within reach  [x]  Nursing notified  []  Caregiver present  []  Bed alarm activated    COMMUNICATION/EDUCATION:   [x] Role of Occupational Therapy in the acute care setting  [x] Home safety education was provided and the patient/caregiver indicated understanding. [x] Patient/family have participated as able in working towards goals and plan of care. [x] Patient/family agree to work toward stated goals and plan of care. [] Patient understands intent and goals of therapy, but is neutral about his/her participation. [] Patient is unable to participate in goal setting and plan of care.       Thank you for this referral.  Vicki Melchor, OTR/L, CSRS, CDCS, CFPS  Time Calculation: 40 mins

## 2020-12-14 NOTE — PROGRESS NOTES
D/C Plan: Chart reviewed plan is for possible rehab when discharged, cardiology remains on case at this time, cm will attend IDR's and discuss d/c plan with pt, cm can be reached at x [phone]. Transition of care to Acute Rehab: [facility]    Communication to Patient/Family:  Met with patient and family, and they are agreeable to the transition plan. The Plan for Transition of Care is related to the following treatment goals: The Patient and/or patient representative Nadia Cabrera  was provided with a choice of provider and agrees   with the discharge plan. Yes [x] No []    Freedom of choice list was provided with basic dialogue that supports the patient's individualized plan of care/goals and shares the quality data associated with the providers. Yes [x] No []    Acute Rehab Transition:  Patient has been accepted to [facility] for acute rehab at [address] and meets criteria for admission. Patient will transported by medical  transport and expected to leave at 530pm    Communication to Rehab:  Bedside RN Zaina Vann, has been notified to update the transition plan to the facility and call report [phone]     Discharge information has been updated on the AVS and sent via [facility] and/or CC link. Discharge instructions to be fax'd to facility at [phone]. Please include all hard scripts for controlled substances, med rec and dc summary, and AVS in packet. Please medicate for pain prior to dc if possible and needed to help offset delay when patient first arrives to facility. Reviewed and confirmed with facility, [facility], who can manage the patient care needs for the following:     Kam Noriega with (X) only those applicable:  Medication:  [x]Medications are available at the facility  []IV Antibiotics    []Controlled Substance - hard copies available sent.   []Weekly Labs    Equipment:  []CPAP/BiPAP  []Wound Vacuum  []Roberts or Urinary Device  []PICC/Central Line  []Nebulizer  []Ventilator    Treatment:  []Isolation (for MRSA, VRE, etc.)  []Surgical Drain Management  []Tracheostomy Care  []Dressing Changes  []Dialysis with transportation  []PEG Care  []Oxygen  []Daily Weights for Heart Failure    Dietary:  []Any diet limitations  []Tube Feedings   []Total Parenteral Management (TPN)    Financial Resources:  [x]UAI Not required for Acute Rehab Transfer  []Medicaid Application Completed  []UAI Completed  []Level II Completed  [] Private pay individual who will not become financially eligible for Medicaid within 6 months from admission to a [address] [place] [address] facility. [] Individual refused to have screening conducted. Advanced Care Plan:  []Surrogate Decision Maker of Care  []POA  []Communicated Code Status and copy sent.     Other:

## 2020-12-14 NOTE — PROGRESS NOTES
Problem: Mobility Impaired (Adult and Pediatric)  Goal: *Acute Goals and Plan of Care (Insert Text)  Description: Physical Therapy Goals  Initiated 12/11/2020 and to be accomplished within 3-7 day(s)  1. Patient will move from supine to sit and sit to supine  in bed with supervision/set-up. 2.  Patient will transfer from bed to chair and chair to bed with minimal assistance/contact guard assist using the least restrictive device. 3.  Patient will perform sit to stand with minimal assistance/contact guard assist.  4.  Patient will ambulate with minimal assistance/contact guard assist for 100 feet with the least restrictive device. Note: []  Patient has met MD justice sommer for d/c home   []  Recommend HH with 24 hour adult care   [x]  Benefit from additional acute PT session to address:  transfer training, core strengthening, balance traininggt training    PHYSICAL THERAPY TREATMENT    Patient: Nadine Godfrey (34 y.o. male)  Date: 12/14/2020  Diagnosis: Hypokalemia [E87.6]   Left hemiparesis (HCC)  Procedure(s) (LRB):  CERVICAL 3- CERVICAL 7 SPINE POSTERIOR CERVICAL DECOMPRESSION AND FUSION WITH C-ARM (N/A) 4 Days Post-Op  Precautions: Fall, Spinal      ASSESSMENT:  Pt sitting EOB upon arrival working w/ OT. Pt able to participate in core exercises for improvement of kyphotic forward shoulder posture and weak core impacting sitting balance. Pt responded well to manual cues and techniques to improve B scapular movement, pelvic mobility and back musculature. Pt required min/mod A x2 for sit<>stand w/ bed elevated slightly. Upon standing w/ RW, GB and cervical collar pt required min Ax2 for wt shifts laterally and for improved distribution of COG. Pt has tendency for heel WB, L knee bracing against bed and posterior pelvic tilt w/ forward shoulder and UB.   W/ cont manual techniques second trial of sit<>stand and standing improved showing wt shifts laterally and improved COG w/ improved upright posture. Pt fatigued after session but remained motivated throughout session. Recommend cont core, posture exercises and transfer training for improved standing and pre gt activities. Strongly recommend acute rehab upon hospital d/c. Pt returned to bed w/ all needs within reach, Nurse Maza aware of session. Progression toward goals:   []      Improving appropriately and progressing toward goals  [x]      Improving slowly and progressing toward goals  []      Not making progress toward goals and plan of care will be adjusted     PLAN:  Patient continues to benefit from skilled intervention to address the above impairments. Continue treatment per established plan of care. Discharge Recommendations:  Rehab  Further Equipment Recommendations for Discharge:  N/A     SUBJECTIVE:   Patient stated I'm sore.     OBJECTIVE DATA SUMMARY:   Critical Behavior:  Neurologic State: Alert  Orientation Level: Oriented X4  Cognition: Follows commands, Appropriate for age attention/concentration  Safety/Judgement: Fall prevention  Functional Mobility Training:  Bed Mobility:  Sit to Supine: Moderate assistance; Additional time;Assist x1;Assist x2(vc)  Scooting: Moderate assistance;Maximum assistance; Additional time(vc)  Transfers:  Sit to Stand: Minimum assistance; Moderate assistance;Assist x1;Assist x2(vc)  Stand to Sit: Moderate assistance(vc)  Balance:  Sitting: Impaired; Without support  Sitting - Static: Fair (occasional)  Sitting - Dynamic: Fair (occasional); Poor (constant support)  Standing: Impaired; With support  Standing - Static: Poor;Constant support  Standing - Dynamic : Poor;Constant support   Ambulation/Gait Training:  Assistive Device: Brace/Splint;Gait belt;Walker, rolling  Base of Support: Widened  Neuro Re-Education:  Therapeutic Exercises:         EXERCISE   Sets   Reps   Active Active Assist   Passive Self ROM   Comments   Ankle Pumps 1 10  [x] [] [] []    Quad Sets/Glut Sets    [] [] [] [] Hold for 5 secs   Hamstring Sets   [] [] [] []    Short Arc Quads   [] [] [] []    Heel Slides   [] [] [] []    Straight Leg Raises   [] [] [] []    Hip Add   [] [] [] [] Hold for 5 secs, w/ pillow squeeze   Long Arc Quads 1 10 [x] [] [] []    Seated Marching 1 10 [x] [] [] []    Standing Marching   [] [] [] []       [] [] [] []        Pain:  Pain level pre-treatment: 2/10  Pain level post-treatment: 0/10   Pain Intervention(s): Medication (see MAR); Rest, Ice, Repositioning   Response to intervention: Nurse notified, See doc flow    Activity Tolerance:   Fair   Please refer to the flowsheet for vital signs taken during this treatment. After treatment:   [] Patient left in no apparent distress sitting up in chair  [x] Patient left in no apparent distress in bed  [x] Call bell left within reach  [x] Nursing notified  [] Caregiver present  [] Bed alarm activated  [] SCDs applied      COMMUNICATION/EDUCATION:   [x]         Role of Physical Therapy in the acute care setting. [x]         Fall prevention education was provided and the patient/caregiver indicated understanding. [x]         Patient/family have participated as able in working toward goals and plan of care. []         Patient/family agree to work toward stated goals and plan of care. []         Patient understands intent and goals of therapy, but is neutral about his/her participation.   []         Patient is unable to participate in stated goals/plan of care: ongoing with therapy staff.  []         Other:        Jamie Person PT   Time Calculation: 33 mins

## 2020-12-14 NOTE — PROGRESS NOTES
ARU/IPR REFERRAL CONTACT NOTE  [id number] [place] [address] for Physical Rehabilitation    RE:  Duane Walton     Thank you for the opportunity to review this patient's case for admission to [id number] [place] [address] for Physical Rehabilitation. Based on our pre-admission screening:     Seun ] Our Team/Medical Director is following this case. Comments:  Please provide updated therapy notes.  left at 3736. Please be advised admission to ARU will be based on meeting admission requirements, and a negative COVID screen. COVID screen date. Thank you for this referral.   Please do not hesitate to call if you need additional information or have any questions.      Regards,  RAFAEL Ward PTA for [facility] Physical Rehabilitation  [phone]

## 2020-12-15 ENCOUNTER — PATIENT OUTREACH (OUTPATIENT)
Dept: CASE MANAGEMENT | Age: 80
End: 2020-12-15

## 2020-12-15 LAB
COVID-19 RAPID TEST, COVR: NOT DETECTED
HEALTH STATUS, XMCV2T: NORMAL
SARS-COV-2, COV2NT: NOT DETECTED
SOURCE, COVRS: NORMAL
SPECIMEN TYPE, XMCV1T: NORMAL

## 2020-12-15 NOTE — PROGRESS NOTES
Call placed to mobil number on file. This is the number for the patient's daughter. There is no current PHI on file and was not able to get permission to speak to daughter due to patient is admitted to [facility] acute rehab at this time. CTN was only able to ask about the patient's wellbeing and to give general HIPPA compliant information. Daughter voiced concern that the patient will not be functional enough to come home after rehab has completed. At time of discharge daughter states that patient still had no ability to move left side despite cervical fusion. CTN asked if they had ever applied for long term care medicaid and explained that if he was approved this could cover some assistance in home or placement in long term care facility if needed. Transition of Care outreached postponed for 30 days due to patients discharge to acute rehab facility.

## 2021-01-01 ENCOUNTER — PATIENT OUTREACH (OUTPATIENT)
Dept: CASE MANAGEMENT | Age: 81
End: 2021-01-01

## 2021-01-04 ENCOUNTER — PATIENT OUTREACH (OUTPATIENT)
Dept: CASE MANAGEMENT | Age: 81
End: 2021-01-04

## 2021-01-05 NOTE — PROGRESS NOTES
Patient in acute rehab.   Patient has not had any readmissions or ed visits per chart review episode closed

## 2021-05-19 ENCOUNTER — APPOINTMENT (OUTPATIENT)
Dept: GENERAL RADIOLOGY | Age: 81
End: 2021-05-19
Attending: EMERGENCY MEDICINE
Payer: MEDICARE

## 2021-05-19 ENCOUNTER — HOSPITAL ENCOUNTER (EMERGENCY)
Age: 81
Discharge: HOME | End: 2021-05-19
Attending: EMERGENCY MEDICINE
Payer: MEDICARE

## 2021-05-19 VITALS
TEMPERATURE: 98.8 F | DIASTOLIC BLOOD PRESSURE: 63 MMHG | OXYGEN SATURATION: 100 % | WEIGHT: 249 LBS | RESPIRATION RATE: 18 BRPM | HEART RATE: 84 BPM | SYSTOLIC BLOOD PRESSURE: 146 MMHG | BODY MASS INDEX: 36.77 KG/M2

## 2021-05-19 DIAGNOSIS — M19.012 ARTHRITIS OF LEFT SHOULDER REGION: Primary | ICD-10-CM

## 2021-05-19 DIAGNOSIS — M25.511 ACUTE PAIN OF RIGHT SHOULDER: ICD-10-CM

## 2021-05-19 LAB
ALBUMIN SERPL-MCNC: 3.3 G/DL (ref 3.4–5)
ALBUMIN/GLOB SERPL: 0.7 {RATIO} (ref 0.8–1.7)
ALP SERPL-CCNC: 54 U/L (ref 45–117)
ALT SERPL-CCNC: 13 U/L (ref 16–61)
ANION GAP SERPL CALC-SCNC: 4 MMOL/L (ref 3–18)
AST SERPL-CCNC: 9 U/L (ref 10–38)
ATRIAL RATE: 73 BPM
BASOPHILS # BLD: 0 K/UL (ref 0–0.1)
BASOPHILS NFR BLD: 1 % (ref 0–2)
BILIRUB SERPL-MCNC: 0.3 MG/DL (ref 0.2–1)
BUN SERPL-MCNC: 18 MG/DL (ref 7–18)
BUN/CREAT SERPL: 20 (ref 12–20)
CALCIUM SERPL-MCNC: 10 MG/DL (ref 8.5–10.1)
CALCULATED P AXIS, ECG09: 31 DEGREES
CALCULATED R AXIS, ECG10: 8 DEGREES
CALCULATED T AXIS, ECG11: 100 DEGREES
CHLORIDE SERPL-SCNC: 104 MMOL/L (ref 100–111)
CO2 SERPL-SCNC: 30 MMOL/L (ref 21–32)
CREAT SERPL-MCNC: 0.89 MG/DL (ref 0.6–1.3)
DIAGNOSIS, 93000: NORMAL
DIFFERENTIAL METHOD BLD: ABNORMAL
EOSINOPHIL # BLD: 0.1 K/UL (ref 0–0.4)
EOSINOPHIL NFR BLD: 2 % (ref 0–5)
ERYTHROCYTE [DISTWIDTH] IN BLOOD BY AUTOMATED COUNT: 15 % (ref 11.6–14.5)
GLOBULIN SER CALC-MCNC: 4.6 G/DL (ref 2–4)
GLUCOSE SERPL-MCNC: 140 MG/DL (ref 74–99)
HCT VFR BLD AUTO: 31.3 % (ref 36–48)
HGB BLD-MCNC: 10 G/DL (ref 13–16)
LYMPHOCYTES # BLD: 2.5 K/UL (ref 0.9–3.6)
LYMPHOCYTES NFR BLD: 30 % (ref 21–52)
MCH RBC QN AUTO: 29.5 PG (ref 24–34)
MCHC RBC AUTO-ENTMCNC: 31.9 G/DL (ref 31–37)
MCV RBC AUTO: 92.3 FL (ref 74–97)
MONOCYTES # BLD: 0.8 K/UL (ref 0.05–1.2)
MONOCYTES NFR BLD: 10 % (ref 3–10)
NEUTS SEG # BLD: 4.6 K/UL (ref 1.8–8)
NEUTS SEG NFR BLD: 56 % (ref 40–73)
P-R INTERVAL, ECG05: 168 MS
PLATELET # BLD AUTO: 155 K/UL (ref 135–420)
PMV BLD AUTO: 11.5 FL (ref 9.2–11.8)
POTASSIUM SERPL-SCNC: 4.5 MMOL/L (ref 3.5–5.5)
PROT SERPL-MCNC: 7.9 G/DL (ref 6.4–8.2)
Q-T INTERVAL, ECG07: 402 MS
QRS DURATION, ECG06: 134 MS
QTC CALCULATION (BEZET), ECG08: 442 MS
RBC # BLD AUTO: 3.39 M/UL (ref 4.35–5.65)
SODIUM SERPL-SCNC: 138 MMOL/L (ref 136–145)
VENTRICULAR RATE, ECG03: 73 BPM
WBC # BLD AUTO: 8.1 K/UL (ref 4.6–13.2)

## 2021-05-19 PROCEDURE — 93005 ELECTROCARDIOGRAM TRACING: CPT

## 2021-05-19 PROCEDURE — 99284 EMERGENCY DEPT VISIT MOD MDM: CPT

## 2021-05-19 PROCEDURE — 85025 COMPLETE CBC W/AUTO DIFF WBC: CPT

## 2021-05-19 PROCEDURE — 80053 COMPREHEN METABOLIC PANEL: CPT

## 2021-05-19 PROCEDURE — 73030 X-RAY EXAM OF SHOULDER: CPT

## 2021-05-19 PROCEDURE — 74011250637 HC RX REV CODE- 250/637: Performed by: EMERGENCY MEDICINE

## 2021-05-19 PROCEDURE — 71045 X-RAY EXAM CHEST 1 VIEW: CPT

## 2021-05-19 RX ORDER — HYDROCODONE BITARTRATE AND ACETAMINOPHEN 5; 325 MG/1; MG/1
1 TABLET ORAL
Status: COMPLETED | OUTPATIENT
Start: 2021-05-19 | End: 2021-05-19

## 2021-05-19 RX ORDER — HYDROCODONE BITARTRATE AND ACETAMINOPHEN 5; 325 MG/1; MG/1
1 TABLET ORAL
Qty: 8 TABLET | Refills: 0 | Status: SHIPPED | OUTPATIENT
Start: 2021-05-19 | End: 2021-05-22

## 2021-05-19 RX ADMIN — HYDROCODONE BITARTRATE AND ACETAMINOPHEN 1 TABLET: 5; 325 TABLET ORAL at 10:25

## 2021-05-19 NOTE — ED PROVIDER NOTES
EMERGENCY DEPARTMENT HISTORY AND PHYSICAL EXAM    Date: 5/19/2021  Patient Name: Gorge Mayer    History of Presenting Illness     Chief Complaint   Patient presents with    Shoulder Pain         History Provided By: Patient and Patient's Daughter    9:23 AM  Gorge Mayer is a 68 y.o. male with PMHX of multiple myeloma status post stem cell transplant, CHF, obesity, hypertension, chronic kidney disease who presents to the emergency department C/O of shoulder pain. Patient reports the pain started about a week ago. He states he puts a lot of pressure on his shoulder when he is transitioning positions and thinks he strained something. He denies any other falls or injuries. He states the pain is worse when he moves his left shoulder and radiates from his left shoulder into his left anterior chest.  He denies any fever, cough, shortness of breath, nausea, vomiting. He reports the left arm is chronically weak and is unchanged from baseline. PCP: Carter Ibrahim MD    Current Facility-Administered Medications   Medication Dose Route Frequency Provider Last Rate Last Admin    HYDROcodone-acetaminophen (NORCO) 5-325 mg per tablet 1 Tablet  1 Tablet Oral NOW AnestHalina MD         Current Outpatient Medications   Medication Sig Dispense Refill    HYDROcodone-acetaminophen (Norco) 5-325 mg per tablet Take 1 Tablet by mouth every six (6) hours as needed for Pain for up to 3 days. Max Daily Amount: 4 Tablets. 8 Tablet 0    naloxone (NARCAN) 4 mg/actuation nasal spray Use 1 spray intranasally, then discard. Repeat with new spray every 2 min as needed for opioid overdose symptoms, alternating nostrils. 1 Each 0    LENALIDOMIDE PO Take 25 mg by mouth.  felodipine (PLENDIL SR) 10 mg 24 hr tablet Take 10 mg by mouth daily.  spironolactone (ALDACTONE) 25 mg tablet Take  by mouth daily.  pyridoxine, vitamin B6, (VITAMIN B-6) 50 mg tablet Take 50 mg by mouth daily.       metOLazone (ZAROXOLYN) 10 mg tablet Take 5 mg by mouth daily.  rivaroxaban (XARELTO PO) Take 10 mg by mouth.  POTASSIUM CHLORIDE 20 mEq by Does Not Apply route.  valacyclovir HCl (VALTREX PO) Take  by mouth.  omeprazole (PRILOSEC) 20 mg capsule Take 20 mg by mouth daily.  DEXAMETHASONE, BULK, by Does Not Apply route.  SITagliptin-metFORMIN (JANUMET) 50-1,000 mg per tablet Take 1 Tab by mouth two (2) times daily (with meals).  cholecalciferol (Vitamin D3) (1000 Units /25 mcg) tablet Take 1,000 Units by mouth daily.  cyanocobalamin (VITAMIN B12) 500 mcg tablet Take 1,000 mcg by mouth daily.  lisinopril (PRINIVIL, ZESTRIL) 5 mg tablet Take 1 Tab by mouth daily. (Patient taking differently: Take 20 mg by mouth daily.) 30 Tab 0    isosorbide mononitrate ER (IMDUR) 30 mg tablet Take 1 Tab by mouth daily. 30 Tab 0    metoprolol succinate (TOPROL-XL) 25 mg XL tablet Take 1 Tab by mouth daily. 30 Tab 0    pravastatin (PRAVACHOL) 40 mg tablet Take 1 Tab by mouth nightly. 30 Tab 0    metFORMIN (GLUCOPHAGE) 1,000 mg tablet Take 1,000 mg by mouth two (2) times daily (with meals).  SITagliptin (JANUVIA) 100 mg tablet Take 100 mg by mouth daily.  calcium-cholecalciferol, d3, (CALCIUM 600 + D) 600-125 mg-unit tab Take  by mouth daily.  pioglitazone (ACTOS) 30 mg tablet Take 30 mg by mouth daily. Past History     Past Medical History:  Past Medical History:   Diagnosis Date    [facility] [address] [facility]     Diabetes ([facility] [address] [facility]     FH: chemotherapy     H/O [facility] cell transplant ([facility] [address] [facility]     Hypertension     Multiple myeloma ([facility] [address] [facility]     Neuropathy        Past Surgical History:  Past Surgical History:   Procedure Laterality Date    HX CHOLECYSTECTOMY      HX KYPHOPLASTY         Family History:  History reviewed. No pertinent family history.     Social History:  Social History     Tobacco Use    Smoking status: Former Smoker    Smokeless tobacco: Never Used   Substance Use Topics    Alcohol use: Never Comment: rare    Drug use: No       Allergies: Allergies   Allergen Reactions    Iodine Hives         Review of Systems   Review of Systems   Constitutional: Negative for fever. Respiratory: Negative for shortness of breath. Cardiovascular: Positive for chest pain. Gastrointestinal: Negative for abdominal pain. Musculoskeletal: Positive for arthralgias. Neurological: Positive for weakness. All other systems reviewed and are negative.         Physical Exam     Vitals:    05/19/21 0913 05/19/21 0914   BP:  (!) 146/63   Pulse: 84    Resp: 18    Temp: 98.8 °F (37.1 °C)    SpO2:  100%   Weight:  112.9 kg (249 lb)     Physical Exam    Nursing notes and vital signs reviewed    Constitutional: Non toxic appearing, moderate distress  Head: Normocephalic, Atraumatic  Eyes: EOMI  Neck: Supple  Cardiovascular: Regular rate and rhythm, no murmurs, rubs, or gallops  Chest: Normal work of breathing and chest excursion bilaterally  Lungs: Clear to ausculation bilaterally  Abdomen: Soft, non tender, non distended  Back: No evidence of trauma or deformity  Extremities: No tenderness over left shoulder, pain with active and passive range of motion of left shoulder, strength is slightly less in the left arm compared to the right arm but otherwise distal neurovascular intact  Skin: Warm and dry, normal cap refill  Neuro: Alert and appropriate  Psychiatric: Normal mood and affect      Diagnostic Study Results     Labs -     Recent Results (from the past 12 hour(s))   EKG, 12 LEAD, INITIAL    Collection Time: 05/19/21  9:35 AM   Result Value Ref Range    Ventricular Rate 73 BPM    Atrial Rate 73 BPM    P-R Interval 168 ms    QRS Duration 134 ms    Q-T Interval 402 ms    QTC Calculation (Bezet) 442 ms    Calculated P Axis 31 degrees    Calculated R Axis 8 degrees    Calculated T Axis 100 degrees    Diagnosis       Normal sinus rhythm  Left bundle branch block  Abnormal ECG  When compared with ECG of 07-DEC-2020 10:53,  Left bundle branch block is now present     CBC WITH AUTOMATED DIFF    Collection Time: 05/19/21  9:43 AM   Result Value Ref Range    WBC 8.1 4.6 - 13.2 K/uL    RBC 3.39 (L) 4.35 - 5.65 M/uL    HGB 10.0 (L) 13.0 - 16.0 g/dL    HCT 31.3 (L) 36.0 - 48.0 %    MCV 92.3 74.0 - 97.0 FL    MCH 29.5 24.0 - 34.0 PG    MCHC 31.9 31.0 - 37.0 g/dL    RDW 15.0 (H) 11.6 - 14.5 %    PLATELET 045 687 - 133 K/uL    MPV 11.5 9.2 - 11.8 FL    NEUTROPHILS 56 40 - 73 %    LYMPHOCYTES 30 21 - 52 %    MONOCYTES 10 3 - 10 %    EOSINOPHILS 2 0 - 5 %    BASOPHILS 1 0 - 2 %    ABS. NEUTROPHILS 4.6 1.8 - 8.0 K/UL    ABS. LYMPHOCYTES 2.5 0.9 - 3.6 K/UL    ABS. MONOCYTES 0.8 0.05 - 1.2 K/UL    ABS. EOSINOPHILS 0.1 0.0 - 0.4 K/UL    ABS. BASOPHILS 0.0 0.0 - 0.1 K/UL    DF AUTOMATED     METABOLIC PANEL, COMPREHENSIVE    Collection Time: 05/19/21  9:43 AM   Result Value Ref Range    Sodium 138 136 - 145 mmol/L    Potassium 4.5 3.5 - 5.5 mmol/L    Chloride 104 100 - 111 mmol/L    CO2 30 21 - 32 mmol/L    Anion gap 4 3.0 - 18 mmol/L    Glucose 140 (H) 74 - 99 mg/dL    BUN 18 7.0 - 18 MG/DL    Creatinine 0.89 0.6 - 1.3 MG/DL    BUN/Creatinine ratio 20 12 - 20      GFR est AA >60 >60 ml/min/1.73m2    GFR est non-AA >60 >60 ml/min/1.73m2    Calcium 10.0 8.5 - 10.1 MG/DL    Bilirubin, total 0.3 0.2 - 1.0 MG/DL    ALT (SGPT) 13 (L) 16 - 61 U/L    AST (SGOT) 9 (L) 10 - 38 U/L    Alk. phosphatase 54 45 - 117 U/L    Protein, total 7.9 6.4 - 8.2 g/dL    Albumin 3.3 (L) 3.4 - 5.0 g/dL    Globulin 4.6 (H) 2.0 - 4.0 g/dL    A-G Ratio 0.7 (L) 0.8 - 1.7         Radiologic Studies -   XR SHOULDER LT AP/LAT MIN 2 V   Final Result      No evidence of acute fracture or dislocation. Glenohumeral and AC joint arthrosis. XR CHEST PORT   Final Result      No acute cardiopulmonary abnormality.         CT Results  (Last 48 hours)    None        CXR Results  (Last 48 hours)               05/19/21 1002  XR CHEST PORT Final result    Impression:      No acute cardiopulmonary abnormality. Narrative:  EXAM: XR CHEST PORT       CLINICAL INDICATION/HISTORY: shoulder pain   -Additional: None       COMPARISON: 12/9/2020       TECHNIQUE: Portable frontal view of the chest       _______________       FINDINGS:       SUPPORT DEVICES: Right chest wall port tip at the SVC. HEART AND MEDIASTINUM: Cardiomediastinal silhouette within normal limits. LUNGS AND PLEURAL SPACES: No dense consolidation, large effusion or   pneumothorax. Left basilar atelectasis. _______________                 Medications given in the ED-  Medications   HYDROcodone-acetaminophen (NORCO) 5-325 mg per tablet 1 Tablet (has no administration in time range)         Medical Decision Making   I am the first provider for this patient. I reviewed the vital signs, available nursing notes, past medical history, past surgical history, family history and social history. Vital Signs-Reviewed the patient's vital signs. Pulse Oximetry Analysis - 100% on room air, not hypoxic     Cardiac Monitor:  Rate: 76 bpm  Rhythm: Normal sinus    EKG interpretation: (Preliminary)  EKG read by Dr. Siomara Doran at 9:49 AM  Normal sinus rhythm and rate of 73 bpm, GA interval 160 ms, QRS duration 134 ms, similar when compared to prior from December 2020    Records Reviewed: Nursing Notes, Old Medical Records and Previous electrocardiograms    Provider Notes (Medical Decision Making): Logan Schneider is a 68 y.o. male presenting with left shoulder pain. History and exam and imaging consistent with musculoskeletal etiology. Labs, EKG, chest x-ray benign. Neurovascular intact with strength at patient's baseline. Will provide short course of pain control and referral to patient's orthopedist with plan for follow-up with strict return precautions. Patient and his daughter understand and agree with this plan.     Procedures:  Procedures    ED Course:   10:38 AM  Updated patient and his daughter on all results and plan. All questions answered. Diagnosis and Disposition     Critical Care: None    DISCHARGE NOTE:    Martinez Hero  results have been reviewed with him. He has been counseled regarding his diagnosis, treatment, and plan. He verbally conveys understanding and agreement of the signs, symptoms, diagnosis, treatment and prognosis and additionally agrees to follow up as discussed. He also agrees with the care-plan and conveys that all of his questions have been answered. I have also provided discharge instructions for him that include: educational information regarding their diagnosis and treatment, and list of reasons why they would want to return to the ED prior to their follow-up appointment, should his condition change. He has been provided with education for proper emergency department utilization. CLINICAL IMPRESSION:    1. Arthritis of left shoulder region    2. Acute pain of right shoulder        PLAN:  1. D/C Home  2. Current Discharge Medication List      START taking these medications    Details   HYDROcodone-acetaminophen (Norco) 5-325 mg per tablet Take 1 Tablet by mouth every six (6) hours as needed for Pain for up to 3 days. Max Daily Amount: 4 Tablets. Qty: 8 Tablet, Refills: 0  Start date: 5/19/2021, End date: 5/22/2021    Associated Diagnoses: Arthritis of left shoulder region; Acute pain of right shoulder           3.    Follow-up Information     Follow up With Specialties Details Why Contact Info    Alanna Steele MD Internal Medicine Schedule an appointment as soon as possible for a visit   [address] [facility] [address] [facility]      Nidhi Moya MD Orthopedic Surgery Schedule an appointment as soon as possible for a visit   [address]      [facility] EMERGENCY DEPT Emergency Medicine  If symptoms worsen [address] [id number]  [phone] _______________________________      Please note that this dictation was completed with [employer], the computer voice recognition software. Quite often unanticipated grammatical, syntax, homophones, and other interpretive errors are inadvertently transcribed by the computer software. Please disregard these errors. Please excuse any errors that have escaped final proofreading.

## 2021-06-04 ENCOUNTER — TRANSCRIBE ORDER (OUTPATIENT)
Dept: SCHEDULING | Age: 81
End: 2021-06-04

## 2021-06-04 DIAGNOSIS — M25.512 LEFT SHOULDER PAIN: Primary | ICD-10-CM

## 2021-06-11 ENCOUNTER — APPOINTMENT (OUTPATIENT)
Dept: GENERAL RADIOLOGY | Age: 81
DRG: 177 | End: 2021-06-11
Attending: EMERGENCY MEDICINE
Payer: MEDICARE

## 2021-06-11 ENCOUNTER — APPOINTMENT (OUTPATIENT)
Dept: NUCLEAR MEDICINE | Age: 81
DRG: 177 | End: 2021-06-11
Attending: EMERGENCY MEDICINE
Payer: MEDICARE

## 2021-06-11 ENCOUNTER — HOSPITAL ENCOUNTER (INPATIENT)
Age: 81
LOS: 3 days | Discharge: HOME | DRG: 177 | End: 2021-06-14
Attending: EMERGENCY MEDICINE | Admitting: FAMILY MEDICINE
Payer: MEDICARE

## 2021-06-11 DIAGNOSIS — U07.1 PNEUMONIA DUE TO COVID-19 VIRUS: ICD-10-CM

## 2021-06-11 DIAGNOSIS — I10 ESSENTIAL HYPERTENSION: ICD-10-CM

## 2021-06-11 DIAGNOSIS — J12.82 PNEUMONIA DUE TO COVID-19 VIRUS: ICD-10-CM

## 2021-06-11 DIAGNOSIS — C90.00 MULTIPLE MYELOMA, REMISSION STATUS UNSPECIFIED (HCC): ICD-10-CM

## 2021-06-11 DIAGNOSIS — R07.9 ACUTE CHEST PAIN: Primary | ICD-10-CM

## 2021-06-11 DIAGNOSIS — Z92.29 COVID-19 VACCINE SERIES COMPLETED: ICD-10-CM

## 2021-06-11 DIAGNOSIS — E11.9 TYPE 2 DIABETES MELLITUS WITHOUT COMPLICATION, UNSPECIFIED WHETHER LONG TERM INSULIN USE (HCC): ICD-10-CM

## 2021-06-11 PROBLEM — I48.91 ATRIAL FIBRILLATION (HCC): Status: ACTIVE | Noted: 2021-06-11

## 2021-06-11 LAB
ALBUMIN SERPL-MCNC: 3.5 G/DL (ref 3.4–5)
ALBUMIN/GLOB SERPL: 0.7 {RATIO} (ref 0.8–1.7)
ALP SERPL-CCNC: 59 U/L (ref 45–117)
ALT SERPL-CCNC: 13 U/L (ref 16–61)
ANION GAP SERPL CALC-SCNC: 9 MMOL/L (ref 3–18)
AST SERPL-CCNC: 12 U/L (ref 10–38)
ATRIAL RATE: 78 BPM
ATRIAL RATE: 95 BPM
BASOPHILS # BLD: 0 K/UL (ref 0–0.1)
BASOPHILS NFR BLD: 1 % (ref 0–2)
BILIRUB SERPL-MCNC: 0.3 MG/DL (ref 0.2–1)
BNP SERPL-MCNC: 63 PG/ML (ref 0–1800)
BUN SERPL-MCNC: 23 MG/DL (ref 7–18)
BUN/CREAT SERPL: 26 (ref 12–20)
CALCIUM SERPL-MCNC: 10.8 MG/DL (ref 8.5–10.1)
CALCULATED P AXIS, ECG09: 21 DEGREES
CALCULATED P AXIS, ECG09: 63 DEGREES
CALCULATED R AXIS, ECG10: 13 DEGREES
CALCULATED R AXIS, ECG10: 2 DEGREES
CALCULATED T AXIS, ECG11: 133 DEGREES
CALCULATED T AXIS, ECG11: 138 DEGREES
CHLORIDE SERPL-SCNC: 101 MMOL/L (ref 100–111)
CK MB CFR SERPL CALC: 4.6 % (ref 0–4)
CK MB CFR SERPL CALC: 5.2 % (ref 0–4)
CK MB SERPL-MCNC: 2.7 NG/ML (ref 5–25)
CK MB SERPL-MCNC: 3.1 NG/ML (ref 5–25)
CK SERPL-CCNC: 59 U/L (ref 39–308)
CK SERPL-CCNC: 60 U/L (ref 39–308)
CO2 SERPL-SCNC: 25 MMOL/L (ref 21–32)
CREAT SERPL-MCNC: 0.88 MG/DL (ref 0.6–1.3)
D DIMER PPP FEU-MCNC: 0.79 UG/ML(FEU)
DIAGNOSIS, 93000: NORMAL
DIAGNOSIS, 93000: NORMAL
DIFFERENTIAL METHOD BLD: ABNORMAL
EOSINOPHIL # BLD: 0.1 K/UL (ref 0–0.4)
EOSINOPHIL NFR BLD: 1 % (ref 0–5)
ERYTHROCYTE [DISTWIDTH] IN BLOOD BY AUTOMATED COUNT: 14.9 % (ref 11.6–14.5)
GLOBULIN SER CALC-MCNC: 4.7 G/DL (ref 2–4)
GLUCOSE BLD STRIP.AUTO-MCNC: 91 MG/DL (ref 70–110)
GLUCOSE SERPL-MCNC: 117 MG/DL (ref 74–99)
HCT VFR BLD AUTO: 30.3 % (ref 36–48)
HGB BLD-MCNC: 10 G/DL (ref 13–16)
LYMPHOCYTES # BLD: 2.2 K/UL (ref 0.9–3.6)
LYMPHOCYTES NFR BLD: 31 % (ref 21–52)
MCH RBC QN AUTO: 29.3 PG (ref 24–34)
MCHC RBC AUTO-ENTMCNC: 33 G/DL (ref 31–37)
MCV RBC AUTO: 88.9 FL (ref 74–97)
MONOCYTES # BLD: 0.6 K/UL (ref 0.05–1.2)
MONOCYTES NFR BLD: 9 % (ref 3–10)
NEUTS SEG # BLD: 4.2 K/UL (ref 1.8–8)
NEUTS SEG NFR BLD: 59 % (ref 40–73)
P-R INTERVAL, ECG05: 174 MS
P-R INTERVAL, ECG05: 206 MS
PLATELET # BLD AUTO: 149 K/UL (ref 135–420)
PMV BLD AUTO: 11.4 FL (ref 9.2–11.8)
POTASSIUM SERPL-SCNC: 3.6 MMOL/L (ref 3.5–5.5)
PROT SERPL-MCNC: 8.2 G/DL (ref 6.4–8.2)
Q-T INTERVAL, ECG07: 356 MS
Q-T INTERVAL, ECG07: 380 MS
QRS DURATION, ECG06: 136 MS
QRS DURATION, ECG06: 140 MS
QTC CALCULATION (BEZET), ECG08: 433 MS
QTC CALCULATION (BEZET), ECG08: 447 MS
RBC # BLD AUTO: 3.41 M/UL (ref 4.35–5.65)
SODIUM SERPL-SCNC: 135 MMOL/L (ref 136–145)
TROPONIN I SERPL-MCNC: <0.02 NG/ML (ref 0–0.04)
TROPONIN I SERPL-MCNC: <0.02 NG/ML (ref 0–0.04)
VENTRICULAR RATE, ECG03: 78 BPM
VENTRICULAR RATE, ECG03: 95 BPM
WBC # BLD AUTO: 7.1 K/UL (ref 4.6–13.2)

## 2021-06-11 PROCEDURE — 65660000000 HC RM CCU STEPDOWN

## 2021-06-11 PROCEDURE — 74011250636 HC RX REV CODE- 250/636: Performed by: EMERGENCY MEDICINE

## 2021-06-11 PROCEDURE — 80053 COMPREHEN METABOLIC PANEL: CPT

## 2021-06-11 PROCEDURE — 74011250637 HC RX REV CODE- 250/637: Performed by: FAMILY MEDICINE

## 2021-06-11 PROCEDURE — 93005 ELECTROCARDIOGRAM TRACING: CPT

## 2021-06-11 PROCEDURE — 74011000250 HC RX REV CODE- 250: Performed by: FAMILY MEDICINE

## 2021-06-11 PROCEDURE — 82962 GLUCOSE BLOOD TEST: CPT

## 2021-06-11 PROCEDURE — 85379 FIBRIN DEGRADATION QUANT: CPT

## 2021-06-11 PROCEDURE — 94762 N-INVAS EAR/PLS OXIMTRY CONT: CPT

## 2021-06-11 PROCEDURE — 99285 EMERGENCY DEPT VISIT HI MDM: CPT

## 2021-06-11 PROCEDURE — 82553 CREATINE MB FRACTION: CPT

## 2021-06-11 PROCEDURE — 83880 ASSAY OF NATRIURETIC PEPTIDE: CPT

## 2021-06-11 PROCEDURE — 71045 X-RAY EXAM CHEST 1 VIEW: CPT

## 2021-06-11 PROCEDURE — 85025 COMPLETE CBC W/AUTO DIFF WBC: CPT

## 2021-06-11 PROCEDURE — 87635 SARS-COV-2 COVID-19 AMP PRB: CPT

## 2021-06-11 PROCEDURE — 83036 HEMOGLOBIN GLYCOSYLATED A1C: CPT

## 2021-06-11 PROCEDURE — 74011250636 HC RX REV CODE- 250/636: Performed by: FAMILY MEDICINE

## 2021-06-11 PROCEDURE — 96374 THER/PROPH/DIAG INJ IV PUSH: CPT

## 2021-06-11 RX ORDER — ACETAMINOPHEN 650 MG/1
650 SUPPOSITORY RECTAL
Status: DISCONTINUED | OUTPATIENT
Start: 2021-06-11 | End: 2021-06-15 | Stop reason: HOSPADM

## 2021-06-11 RX ORDER — ACETAMINOPHEN 325 MG/1
650 TABLET ORAL
Status: DISCONTINUED | OUTPATIENT
Start: 2021-06-11 | End: 2021-06-15 | Stop reason: HOSPADM

## 2021-06-11 RX ORDER — ISOSORBIDE MONONITRATE 30 MG/1
30 TABLET, EXTENDED RELEASE ORAL DAILY
Status: DISCONTINUED | OUTPATIENT
Start: 2021-06-12 | End: 2021-06-15 | Stop reason: HOSPADM

## 2021-06-11 RX ORDER — METOPROLOL SUCCINATE 25 MG/1
25 TABLET, EXTENDED RELEASE ORAL DAILY
Status: DISCONTINUED | OUTPATIENT
Start: 2021-06-12 | End: 2021-06-11

## 2021-06-11 RX ORDER — MORPHINE SULFATE 4 MG/ML
4 INJECTION INTRAVENOUS
Status: COMPLETED | OUTPATIENT
Start: 2021-06-11 | End: 2021-06-11

## 2021-06-11 RX ORDER — SPIRONOLACTONE 25 MG/1
25 TABLET ORAL DAILY
Status: DISCONTINUED | OUTPATIENT
Start: 2021-06-12 | End: 2021-06-15 | Stop reason: HOSPADM

## 2021-06-11 RX ORDER — FAMOTIDINE 20 MG/1
20 TABLET, FILM COATED ORAL 2 TIMES DAILY
Status: DISCONTINUED | OUTPATIENT
Start: 2021-06-11 | End: 2021-06-15 | Stop reason: HOSPADM

## 2021-06-11 RX ORDER — FUROSEMIDE 40 MG/1
40 TABLET ORAL DAILY
Status: DISCONTINUED | OUTPATIENT
Start: 2021-06-12 | End: 2021-06-15 | Stop reason: HOSPADM

## 2021-06-11 RX ORDER — ENOXAPARIN SODIUM 100 MG/ML
1 INJECTION SUBCUTANEOUS ONCE
Status: DISCONTINUED | OUTPATIENT
Start: 2021-06-11 | End: 2021-06-11

## 2021-06-11 RX ORDER — ONDANSETRON 4 MG/1
4 TABLET, ORALLY DISINTEGRATING ORAL
Status: DISCONTINUED | OUTPATIENT
Start: 2021-06-11 | End: 2021-06-15 | Stop reason: HOSPADM

## 2021-06-11 RX ORDER — FELODIPINE 5 MG/1
10 TABLET, EXTENDED RELEASE ORAL DAILY
Status: DISCONTINUED | OUTPATIENT
Start: 2021-06-12 | End: 2021-06-15 | Stop reason: HOSPADM

## 2021-06-11 RX ORDER — MAGNESIUM SULFATE 100 %
16 CRYSTALS MISCELLANEOUS AS NEEDED
Status: DISCONTINUED | OUTPATIENT
Start: 2021-06-11 | End: 2021-06-15 | Stop reason: HOSPADM

## 2021-06-11 RX ORDER — ENOXAPARIN SODIUM 150 MG/ML
1 INJECTION SUBCUTANEOUS ONCE
Status: COMPLETED | OUTPATIENT
Start: 2021-06-11 | End: 2021-06-11

## 2021-06-11 RX ORDER — ENOXAPARIN SODIUM 150 MG/ML
1 INJECTION SUBCUTANEOUS EVERY 12 HOURS
Status: DISCONTINUED | OUTPATIENT
Start: 2021-06-12 | End: 2021-06-15 | Stop reason: HOSPADM

## 2021-06-11 RX ORDER — INSULIN LISPRO 100 [IU]/ML
INJECTION, SOLUTION INTRAVENOUS; SUBCUTANEOUS
Status: DISCONTINUED | OUTPATIENT
Start: 2021-06-11 | End: 2021-06-15 | Stop reason: HOSPADM

## 2021-06-11 RX ORDER — ONDANSETRON 2 MG/ML
4 INJECTION INTRAMUSCULAR; INTRAVENOUS
Status: DISCONTINUED | OUTPATIENT
Start: 2021-06-11 | End: 2021-06-15 | Stop reason: HOSPADM

## 2021-06-11 RX ORDER — METOPROLOL SUCCINATE 25 MG/1
25 TABLET, EXTENDED RELEASE ORAL DAILY
Status: DISCONTINUED | OUTPATIENT
Start: 2021-06-11 | End: 2021-06-15 | Stop reason: HOSPADM

## 2021-06-11 RX ORDER — DEXTROSE 50 % IN WATER (D50W) INTRAVENOUS SYRINGE
25-50 AS NEEDED
Status: DISCONTINUED | OUTPATIENT
Start: 2021-06-11 | End: 2021-06-15 | Stop reason: HOSPADM

## 2021-06-11 RX ORDER — OXYCODONE HYDROCHLORIDE 5 MG/1
10 TABLET ORAL
Status: DISCONTINUED | OUTPATIENT
Start: 2021-06-11 | End: 2021-06-15 | Stop reason: HOSPADM

## 2021-06-11 RX ORDER — LISINOPRIL 5 MG/1
5 TABLET ORAL DAILY
Status: DISCONTINUED | OUTPATIENT
Start: 2021-06-12 | End: 2021-06-15 | Stop reason: HOSPADM

## 2021-06-11 RX ORDER — SODIUM CHLORIDE 0.9 % (FLUSH) 0.9 %
5-40 SYRINGE (ML) INJECTION AS NEEDED
Status: DISCONTINUED | OUTPATIENT
Start: 2021-06-11 | End: 2021-06-15 | Stop reason: HOSPADM

## 2021-06-11 RX ORDER — PRAVASTATIN SODIUM 20 MG/1
40 TABLET ORAL
Status: DISCONTINUED | OUTPATIENT
Start: 2021-06-11 | End: 2021-06-15 | Stop reason: HOSPADM

## 2021-06-11 RX ORDER — FUROSEMIDE 20 MG/1
TABLET ORAL DAILY
COMMUNITY

## 2021-06-11 RX ORDER — SODIUM CHLORIDE 0.9 % (FLUSH) 0.9 %
5-40 SYRINGE (ML) INJECTION EVERY 8 HOURS
Status: DISCONTINUED | OUTPATIENT
Start: 2021-06-11 | End: 2021-06-15 | Stop reason: HOSPADM

## 2021-06-11 RX ORDER — POLYETHYLENE GLYCOL 3350 17 G/17G
17 POWDER, FOR SOLUTION ORAL DAILY PRN
Status: DISCONTINUED | OUTPATIENT
Start: 2021-06-11 | End: 2021-06-15 | Stop reason: HOSPADM

## 2021-06-11 RX ADMIN — ENOXAPARIN SODIUM 120 MG: 120 INJECTION SUBCUTANEOUS at 23:01

## 2021-06-11 RX ADMIN — PRAVASTATIN SODIUM 40 MG: 20 TABLET ORAL at 23:54

## 2021-06-11 RX ADMIN — METOPROLOL SUCCINATE 25 MG: 25 TABLET, EXTENDED RELEASE ORAL at 23:54

## 2021-06-11 RX ADMIN — CEFTRIAXONE 1 G: 1 INJECTION, POWDER, FOR SOLUTION INTRAMUSCULAR; INTRAVENOUS at 23:03

## 2021-06-11 RX ADMIN — FAMOTIDINE 20 MG: 20 TABLET ORAL at 21:29

## 2021-06-11 RX ADMIN — MORPHINE SULFATE 4 MG: 4 INJECTION INTRAVENOUS at 21:30

## 2021-06-11 RX ADMIN — OXYCODONE 10 MG: 5 TABLET ORAL at 23:54

## 2021-06-11 NOTE — ED TRIAGE NOTES
Pt arrives via ems stretcher from home with c\o LEFT sided CP that started this AM, pt sts pain is worse with breathing, pt is able to make needs known speaking in complete sentences, pt in nad at this time

## 2021-06-11 NOTE — ED PROVIDER NOTES
EMERGENCY DEPARTMENT HISTORY AND PHYSICAL EXAM    Date: 6/11/2021  Patient Name: Ludmila Carter    History of Presenting Illness     Chief Complaint   Patient presents with    Chest Pain         History Provided By: Patient and EMS    3:15 PM  Ludmila Carter is a 68 y.o. male with PMHX of diabetes, CHF, multiple myeloma, hypertension, obesity, spinal stenosis status post neck surgery with Dr. Nai Mcwilliams who presents to the emergency department C/O left-sided chest pain. Patient reports he started having the pain today just under his left armpit over his left ribs. He reports is worse with movement or taking a deep breath. He reports is associated with some shortness of breath. He denies any fever, cough, lower extremity edema, nausea, vomiting, sick contacts, recent travel. Reports has been fully vaccinated against COVID-19. He does not smoke or use any substances. Denies any history of heart disease or blood clots. PCP: Desiree Parekh MD    Current Facility-Administered Medications   Medication Dose Route Frequency Provider Last Rate Last Admin    morphine injection 4 mg  4 mg IntraVENous NOW Nelda Mott MD         Current Outpatient Medications   Medication Sig Dispense Refill    naloxone (NARCAN) 4 mg/actuation nasal spray Use 1 spray intranasally, then discard. Repeat with new spray every 2 min as needed for opioid overdose symptoms, alternating nostrils. 1 Each 0    LENALIDOMIDE PO Take 25 mg by mouth.  felodipine (PLENDIL SR) 10 mg 24 hr tablet Take 10 mg by mouth daily.  spironolactone (ALDACTONE) 25 mg tablet Take  by mouth daily.  pyridoxine, vitamin B6, (VITAMIN B-6) 50 mg tablet Take 50 mg by mouth daily.  metOLazone (ZAROXOLYN) 10 mg tablet Take 5 mg by mouth daily.  rivaroxaban (XARELTO PO) Take 10 mg by mouth.  POTASSIUM CHLORIDE 20 mEq by Does Not Apply route.  valacyclovir HCl (VALTREX PO) Take  by mouth.       omeprazole (PRILOSEC) 20 mg capsule Take 20 mg by mouth daily.  DEXAMETHASONE, BULK, by Does Not Apply route.  SITagliptin-metFORMIN (JANUMET) 50-1,000 mg per tablet Take 1 Tab by mouth two (2) times daily (with meals).  cholecalciferol (Vitamin D3) (1000 Units /25 mcg) tablet Take 1,000 Units by mouth daily.  cyanocobalamin (VITAMIN B12) 500 mcg tablet Take 1,000 mcg by mouth daily.  lisinopril (PRINIVIL, ZESTRIL) 5 mg tablet Take 1 Tab by mouth daily. (Patient taking differently: Take 20 mg by mouth daily.) 30 Tab 0    isosorbide mononitrate ER (IMDUR) 30 mg tablet Take 1 Tab by mouth daily. 30 Tab 0    metoprolol succinate (TOPROL-XL) 25 mg XL tablet Take 1 Tab by mouth daily. 30 Tab 0    pravastatin (PRAVACHOL) 40 mg tablet Take 1 Tab by mouth nightly. 30 Tab 0    metFORMIN (GLUCOPHAGE) 1,000 mg tablet Take 1,000 mg by mouth two (2) times daily (with meals).  SITagliptin (JANUVIA) 100 mg tablet Take 100 mg by mouth daily.  calcium-cholecalciferol, d3, (CALCIUM 600 + D) 600-125 mg-unit tab Take  by mouth daily.  pioglitazone (ACTOS) 30 mg tablet Take 30 mg by mouth daily. Past History     Past Medical History:  Past Medical History:   Diagnosis Date    [facility]     Diabetes ([facility]     FH: chemotherapy     H/O stem cell transplant ([facility]     Hypertension     Multiple myeloma ([facility]     Neuropathy        Past Surgical History:  Past Surgical History:   Procedure Laterality Date    HX CHOLECYSTECTOMY      HX KYPHOPLASTY         Family History:  No family history on file. Social History:  Social History     Tobacco Use    Smoking status: Former Smoker    Smokeless tobacco: Never Used   Substance Use Topics    Alcohol use: Never     Comment: rare    Drug use: No       Allergies: Allergies   Allergen Reactions    Iodine Hives         Review of Systems   Review of Systems   Constitutional: Negative for fever. Respiratory: Positive for shortness of breath. Negative for cough. Cardiovascular: Positive for chest pain. Negative for leg swelling. Gastrointestinal: Negative for nausea and vomiting. Genitourinary: Negative for difficulty urinating. All other systems reviewed and are negative.         Physical Exam     Vitals:    06/11/21 1615 06/11/21 1730 06/11/21 1737 06/11/21 1815   BP: (!) 144/65 (!) 129/57  (!) 125/58   Pulse: 97 78  80   Resp:  16  15   Temp:       SpO2: 96%  100% 98%     Physical Exam    Nursing notes and vital signs reviewed    Constitutional: Non toxic appearing, obese, moderate distress  Head: Normocephalic, Atraumatic  Eyes: EOMI  Neck: Supple  Cardiovascular: Tachycardic and regular rhythm, no murmurs, rubs, or gallops  Chest: Normal work of breathing and chest excursion bilaterally, tender over the left lateral chest wall without overlying skin changes  Lungs: Clear to ausculation bilaterally  Abdomen: Soft, non tender, non distended  Back: No evidence of trauma or deformity  Extremities: No evidence of trauma or deformity, mild bilateral LE edema  Skin: Warm and dry, normal cap refill  Neuro: Alert and appropriate  Psychiatric: Normal mood and affect      Diagnostic Study Results     Labs -     Recent Results (from the past 12 hour(s))   EKG, 12 LEAD, INITIAL    Collection Time: 06/11/21  3:20 PM   Result Value Ref Range    Ventricular Rate 95 BPM    Atrial Rate 95 BPM    P-R Interval 206 ms    QRS Duration 140 ms    Q-T Interval 356 ms    QTC Calculation (Bezet) 447 ms    Calculated P Axis 63 degrees    Calculated R Axis 2 degrees    Calculated T Axis 133 degrees    Diagnosis       Normal sinus rhythm  Left ventricular hypertrophy with QRS widening and repolarization abnormality  Abnormal ECG  When compared with ECG of 19-MAY-2021 09:35,  Left bundle branch block is no longer present     CBC WITH AUTOMATED DIFF    Collection Time: 06/11/21  4:00 PM   Result Value Ref Range    WBC 7.1 4.6 - 13.2 K/uL    RBC 3.41 (L) 4.35 - 5.65 M/uL    HGB 10.0 (L) 13.0 - 16.0 g/dL    HCT 30.3 (L) 36.0 - 48.0 %    MCV 88.9 74.0 - 97.0 FL    MCH 29.3 24.0 - 34.0 PG    MCHC 33.0 31.0 - 37.0 g/dL    RDW 14.9 (H) 11.6 - 14.5 %    PLATELET 448 585 - 456 K/uL    MPV 11.4 9.2 - 11.8 FL    NEUTROPHILS 59 40 - 73 %    LYMPHOCYTES 31 21 - 52 %    MONOCYTES 9 3 - 10 %    EOSINOPHILS 1 0 - 5 %    BASOPHILS 1 0 - 2 %    ABS. NEUTROPHILS 4.2 1.8 - 8.0 K/UL    ABS. LYMPHOCYTES 2.2 0.9 - 3.6 K/UL    ABS. MONOCYTES 0.6 0.05 - 1.2 K/UL    ABS. EOSINOPHILS 0.1 0.0 - 0.4 K/UL    ABS. BASOPHILS 0.0 0.0 - 0.1 K/UL    DF AUTOMATED     METABOLIC PANEL, COMPREHENSIVE    Collection Time: 06/11/21  4:00 PM   Result Value Ref Range    Sodium 135 (L) 136 - 145 mmol/L    Potassium 3.6 3.5 - 5.5 mmol/L    Chloride 101 100 - 111 mmol/L    CO2 25 21 - 32 mmol/L    Anion gap 9 3.0 - 18 mmol/L    Glucose 117 (H) 74 - 99 mg/dL    BUN 23 (H) 7.0 - 18 MG/DL    Creatinine 0.88 0.6 - 1.3 MG/DL    BUN/Creatinine ratio 26 (H) 12 - 20      GFR est AA >60 >60 ml/min/1.73m2    GFR est non-AA >60 >60 ml/min/1.73m2    Calcium 10.8 (H) 8.5 - 10.1 MG/DL    Bilirubin, total 0.3 0.2 - 1.0 MG/DL    ALT (SGPT) 13 (L) 16 - 61 U/L    AST (SGOT) 12 10 - 38 U/L    Alk.  phosphatase 59 45 - 117 U/L    Protein, total 8.2 6.4 - 8.2 g/dL    Albumin 3.5 3.4 - 5.0 g/dL    Globulin 4.7 (H) 2.0 - 4.0 g/dL    A-G Ratio 0.7 (L) 0.8 - 1.7     CARDIAC PANEL,(CK, CKMB & TROPONIN)    Collection Time: 06/11/21  4:00 PM   Result Value Ref Range    CK - MB 2.7 <3.6 ng/ml    CK-MB Index 4.6 (H) 0.0 - 4.0 %    CK 59 39 - 308 U/L    Troponin-I, QT <0.02 0.0 - 0.045 NG/ML   D DIMER    Collection Time: 06/11/21  4:00 PM   Result Value Ref Range    D DIMER 0.79 (H) <0.46 ug/ml(FEU)   NT-PRO BNP    Collection Time: 06/11/21  4:00 PM   Result Value Ref Range    NT pro-BNP 63 0 - 1,800 PG/ML   EKG, 12 LEAD, SUBSEQUENT    Collection Time: 06/11/21  5:24 PM   Result Value Ref Range    Ventricular Rate 78 BPM    Atrial Rate 78 BPM    P-R Interval 174 ms    QRS Duration 136 ms    Q-T Interval 380 ms    QTC Calculation (Bezet) 433 ms    Calculated P Axis 21 degrees    Calculated R Axis 13 degrees    Calculated T Axis 138 degrees    Diagnosis       Sinus rhythm with premature supraventricular complexes  Left bundle branch block  Abnormal ECG  When compared with ECG of 11-JUN-2021 15:20,  premature supraventricular complexes are now present  Left bundle branch block is now present     CARDIAC PANEL,(CK, CKMB & TROPONIN)    Collection Time: 06/11/21  5:50 PM   Result Value Ref Range    CK - MB 3.1 <3.6 ng/ml    CK-MB Index 5.2 (H) 0.0 - 4.0 %    CK 60 39 - 308 U/L    Troponin-I, QT <0.02 0.0 - 0.045 NG/ML       Radiologic Studies -   NM LUNG PERFUSION W VENT   Final Result      PE absent. XR CHEST PORT   Final Result      Findings suspicious for progressive bilateral atelectasis or infiltrates. CT Results  (Last 48 hours)    None        CXR Results  (Last 48 hours)               06/11/21 1534  XR CHEST PORT Final result    Impression:      Findings suspicious for progressive bilateral atelectasis or infiltrates. Narrative:  HISTORY:    -Provided with order: chest pain   -Additional: None       Technique : AP PORTABLE CHEST       Comparison : 05/19/21. FINDINGS:       HEART AND MEDIASTINUM: Right IJ central venous catheter tip projects over   expected cavoatrial junction. LUNGS AND PLEURAL SPACES: Penetration is limited by body habitus. Slight   ill-defined opacities are questioned bilaterally, which may reflect developing   atelectasis or infiltrate. No consolidation, pleural effusion or pneumothorax is   seen. BONY THORAX AND SOFT TISSUES: No acute osseous abnormality.                  Medications given in the ED-  Medications   morphine injection 4 mg (has no administration in time range)   technetium albumin aggregated (MAA) solution 6 millicurie (6 millicuries IntraVENous Given 6/11/21 1856)         Medical Decision Making   I am the first provider for this patient. I reviewed the vital signs, available nursing notes, past medical history, past surgical history, family history and social history. Vital Signs-Reviewed the patient's vital signs. Pulse Oximetry Analysis - 96% on room air, not hypoxic     Cardiac Monitor:  Rate: 86 bpm  Rhythm: Normal sinus    EKG interpretation: (Preliminary)  EKG read by Dr. Elissa Márquez at 3:30 PM  Normal sinus rhythm at a rate of 95 bpm, WI interval of 206 ms, QRS duration 140 ms, similar when compared to prior from May 19, 2021    EKG interpretation: (Preliminary)  EKG read by Dr. Elissa Márquez at 5:29 PM  Sinus rhythm with PVCs at a rate of 78 bpm, WI interval 174 ms, QRS duration 136 ms    Records Reviewed: Nursing Notes, Old Medical Records and Previous electrocardiograms    Provider Notes (Medical Decision Making): Thiago Hartman is a 68 y.o. male presenting for left-sided chest pain that started today. Patient has significant comorbidities. Wells low risk, PERC positive, V/Q negative for PE. Cardiac enzymes are negative x2 but patient is having persistent chest pain here requiring IV opiates for management. Discussed with cardiology who recommends starting Lovenox 24 hours after patient's last Xarelto dose and will consult on the patient. Plan discussed with hospitalist for further in-hospital management. Patient understands and agrees with this plan. Procedures:  Procedures    ED Course:   7:44 PM  Updated patient on all results and plan. All questions answered. Patient reports that chest pain is still an 8 out of 10. CONSULT NOTE:   7:46 PM  Dr. Elissa Márquez spoke with Dr. Cruzito Harris   Specialty: Cardiology  Discussed pt's hx, disposition, and available diagnostic and imaging results over the telephone. Reviewed care plans. Admit to hospitalist, will consult, start Lovenox if kidney function 24 hours after last Xarelto. 7:50 PM  Updated patient on all results and plan.   All questions answered. Patient reports his last dose of Xarelto was 10:30 PM.      Diagnosis and Disposition     Critical Care Time: None    Core Measures:  For Hospitalized Patients:    1. Hospitalization Decision Time:  The decision to hospitalize the patient was made by Dr. Chun Thibodeaux at 7:46 PM on 6/11/2021    2. Aspirin: Aspirin was not given because the patient did not present with a stroke at the time of their Emergency Department evaluation    7:47 PM  Patient is being admitted to the hospital by Dr. Aakash Corley. The results of their tests and reasons for their admission have been discussed with them and/or available family. They convey agreement and understanding for the need to be admitted and for their admission diagnosis. CONDITIONS ON ADMISSION:  Sepsis is not present at the time of admission. Deep Vein Thrombosis is not present at the time of admission. Thrombosis is not present at the time of admission. Urinary Tract Infection is not present at the time of admission. Pneumonia is not present at the time of admission. MRSA is not present at the time of admission. Wound infection is not present at the time of admission. Pressure Ulcer is not present at the time of admission. CLINICAL IMPRESSION:    1. Acute chest pain      _______________________________      Please note that this dictation was completed with [employer], the computer voice recognition software. Quite often unanticipated grammatical, syntax, homophones, and other interpretive errors are inadvertently transcribed by the computer software. Please disregard these errors. Please excuse any errors that have escaped final proofreading.

## 2021-06-12 ENCOUNTER — APPOINTMENT (OUTPATIENT)
Dept: VASCULAR SURGERY | Age: 81
DRG: 177 | End: 2021-06-12
Attending: INTERNAL MEDICINE
Payer: MEDICARE

## 2021-06-12 PROBLEM — J18.9 BILATERAL PNEUMONIA: Status: ACTIVE | Noted: 2021-06-12

## 2021-06-12 PROBLEM — J12.82 PNEUMONIA DUE TO COVID-19 VIRUS: Status: ACTIVE | Noted: 2021-06-12

## 2021-06-12 PROBLEM — M25.512 LEFT SHOULDER PAIN: Status: ACTIVE | Noted: 2021-06-12

## 2021-06-12 PROBLEM — U07.1 PNEUMONIA DUE TO COVID-19 VIRUS: Status: ACTIVE | Noted: 2021-06-12

## 2021-06-12 PROBLEM — E66.9 OBESITY (BMI 30-39.9): Status: ACTIVE | Noted: 2021-06-12

## 2021-06-12 PROBLEM — Z79.01 ANTICOAGULATED: Status: ACTIVE | Noted: 2021-06-12

## 2021-06-12 PROBLEM — Z92.29 COVID-19 VACCINE SERIES COMPLETED: Status: ACTIVE | Noted: 2021-06-12

## 2021-06-12 LAB
ALBUMIN SERPL-MCNC: 3.3 G/DL (ref 3.4–5)
ALBUMIN/GLOB SERPL: 0.7 {RATIO} (ref 0.8–1.7)
ALP SERPL-CCNC: 63 U/L (ref 45–117)
ALT SERPL-CCNC: 16 U/L (ref 16–61)
ANION GAP SERPL CALC-SCNC: 8 MMOL/L (ref 3–18)
AST SERPL-CCNC: 15 U/L (ref 10–38)
BILIRUB SERPL-MCNC: 0.3 MG/DL (ref 0.2–1)
BUN SERPL-MCNC: 18 MG/DL (ref 7–18)
BUN/CREAT SERPL: 25 (ref 12–20)
CALCIUM SERPL-MCNC: 10.2 MG/DL (ref 8.5–10.1)
CHLORIDE SERPL-SCNC: 101 MMOL/L (ref 100–111)
CK MB CFR SERPL CALC: 3.6 % (ref 0–4)
CK MB SERPL-MCNC: 2.3 NG/ML (ref 5–25)
CK SERPL-CCNC: 64 U/L (ref 39–308)
CO2 SERPL-SCNC: 26 MMOL/L (ref 21–32)
COVID-19 RAPID TEST, COVR: DETECTED
CREAT SERPL-MCNC: 0.71 MG/DL (ref 0.6–1.3)
ERYTHROCYTE [DISTWIDTH] IN BLOOD BY AUTOMATED COUNT: 14.7 % (ref 11.6–14.5)
GLOBULIN SER CALC-MCNC: 4.8 G/DL (ref 2–4)
GLUCOSE BLD STRIP.AUTO-MCNC: 117 MG/DL (ref 70–110)
GLUCOSE BLD STRIP.AUTO-MCNC: 124 MG/DL (ref 70–110)
GLUCOSE BLD STRIP.AUTO-MCNC: 160 MG/DL (ref 70–110)
GLUCOSE SERPL-MCNC: 122 MG/DL (ref 74–99)
HBA1C MFR BLD: 6 % (ref 4.2–5.6)
HCT VFR BLD AUTO: 30.3 % (ref 36–48)
HGB BLD-MCNC: 10.1 G/DL (ref 13–16)
MCH RBC QN AUTO: 29.5 PG (ref 24–34)
MCHC RBC AUTO-ENTMCNC: 33.3 G/DL (ref 31–37)
MCV RBC AUTO: 88.6 FL (ref 74–97)
PLATELET # BLD AUTO: 144 K/UL (ref 135–420)
PMV BLD AUTO: 11.6 FL (ref 9.2–11.8)
POTASSIUM SERPL-SCNC: 4 MMOL/L (ref 3.5–5.5)
PROT SERPL-MCNC: 8.1 G/DL (ref 6.4–8.2)
RBC # BLD AUTO: 3.42 M/UL (ref 4.35–5.65)
SODIUM SERPL-SCNC: 135 MMOL/L (ref 136–145)
SOURCE, COVRS: ABNORMAL
TROPONIN I SERPL-MCNC: <0.02 NG/ML (ref 0–0.04)
WBC # BLD AUTO: 6.7 K/UL (ref 4.6–13.2)

## 2021-06-12 PROCEDURE — 74011636637 HC RX REV CODE- 636/637: Performed by: FAMILY MEDICINE

## 2021-06-12 PROCEDURE — 65660000000 HC RM CCU STEPDOWN

## 2021-06-12 PROCEDURE — 82962 GLUCOSE BLOOD TEST: CPT

## 2021-06-12 PROCEDURE — 85027 COMPLETE CBC AUTOMATED: CPT

## 2021-06-12 PROCEDURE — 36415 COLL VENOUS BLD VENIPUNCTURE: CPT

## 2021-06-12 PROCEDURE — 80053 COMPREHEN METABOLIC PANEL: CPT

## 2021-06-12 PROCEDURE — 93970 EXTREMITY STUDY: CPT

## 2021-06-12 PROCEDURE — 74011250637 HC RX REV CODE- 250/637: Performed by: FAMILY MEDICINE

## 2021-06-12 PROCEDURE — 82553 CREATINE MB FRACTION: CPT

## 2021-06-12 PROCEDURE — 74011250636 HC RX REV CODE- 250/636: Performed by: FAMILY MEDICINE

## 2021-06-12 RX ORDER — DEXAMETHASONE 4 MG/1
6 TABLET ORAL DAILY
Status: DISCONTINUED | OUTPATIENT
Start: 2021-06-12 | End: 2021-06-15 | Stop reason: HOSPADM

## 2021-06-12 RX ADMIN — FUROSEMIDE 40 MG: 40 TABLET ORAL at 10:05

## 2021-06-12 RX ADMIN — Medication 10 ML: at 21:23

## 2021-06-12 RX ADMIN — DEXAMETHASONE 6 MG: 4 TABLET ORAL at 01:17

## 2021-06-12 RX ADMIN — PRAVASTATIN SODIUM 40 MG: 20 TABLET ORAL at 21:23

## 2021-06-12 RX ADMIN — ENOXAPARIN SODIUM 120 MG: 120 INJECTION SUBCUTANEOUS at 21:23

## 2021-06-12 RX ADMIN — ENOXAPARIN SODIUM 120 MG: 120 INJECTION SUBCUTANEOUS at 10:04

## 2021-06-12 RX ADMIN — FAMOTIDINE 20 MG: 20 TABLET ORAL at 21:23

## 2021-06-12 RX ADMIN — INSULIN LISPRO 2 UNITS: 100 INJECTION, SOLUTION INTRAVENOUS; SUBCUTANEOUS at 13:22

## 2021-06-12 RX ADMIN — SPIRONOLACTONE 25 MG: 25 TABLET ORAL at 10:05

## 2021-06-12 RX ADMIN — ISOSORBIDE MONONITRATE 30 MG: 30 TABLET, EXTENDED RELEASE ORAL at 10:05

## 2021-06-12 RX ADMIN — OXYCODONE 10 MG: 5 TABLET ORAL at 13:23

## 2021-06-12 RX ADMIN — LISINOPRIL 5 MG: 5 TABLET ORAL at 10:05

## 2021-06-12 RX ADMIN — FAMOTIDINE 20 MG: 20 TABLET ORAL at 10:04

## 2021-06-12 RX ADMIN — FELODIPINE 10 MG: 5 TABLET, EXTENDED RELEASE ORAL at 10:04

## 2021-06-12 NOTE — PROGRESS NOTES
Hospitalist Progress Note    Patient: Jennifer Devlin MRN: [id number]  CSN: [id number]    YOB: 1940  Age: 68 y.o. Sex: male    DOA: 6/11/2021 LOS:  LOS: 1 day            Assessment/Plan   1. Atypical chest pain due to COVID pneumonia +/- chronic neck/shoulder pain  2. chroinc CHF, systolic + diastolic compensated  3. HTN  4. COVID pneumonia    Plan:  - PVL , VQ scan  - if above negative can DC home  - MRI ordered, but can complete as outpt      Patient Active Problem List   Diagnosis Code    Open wound of right index finger without damage to nail S61.200A    Chest pain R07.9    Lower extremity edema R60.0    Type 2 diabetes mellitus (HCC) Y55.9    Systolic CHF, chronic (HCC) A08.70    Metabolic encephalopathy T68.74    Cellulitis L03.90    LEANDRA (acute kidney injury) ([facility] N17.9    Olecranon fracture S52.023A    Multiple myeloma ([facility]) C90.00    Hypertension I10    Morbid obesity ([facility] E66.01    Morbid obesity with BMI of 40.0-44.9, adult ([facility]) E66.01, Z68.41    Hypokalemia E87.6    Neuropathy G62.9    Hypomagnesemia E83.42    Elevated troponin R77.8    Weakness R53.1    Left hemiparesis ([facility]) G81.94    Spinal stenosis M48.00    HTN (hypertension) I10    DM (diabetes mellitus) ([facility]) E11.9    Anticoagulated Z79.01    Left shoulder pain M25.512    Obesity (BMI 30-39. 9) E66.9    Pneumonia due to COVID-19 virus U07.1, J12.82    COVID-19 vaccine series completed Z92.29               Subjective:    cc: chest pain  Pt continues w atypical chest pain near shoulder & neck. Radiation noted  No dyspnea  Remains with out hypoxia      REVIEW OF SYSTEMS:  General: No fevers or chills. Cardiovascular: No chest pain or pressure. No palpitations. Pulmonary: No shortness of breath. Gastrointestinal: No nausea, vomiting.      Objective:        Vital signs/Intake and Output:  Visit Vitals  BP (!) 114/58   Pulse 72   Temp 98.2 °F (36.8 °C)   Resp 16   Wt 112.9 kg (248 lb 14.4 oz)   SpO2 100%   BMI 36.76 kg/m²     Current Shift:  No intake/output data recorded. Last three shifts:  No intake/output data recorded. Body mass index is 36.76 kg/m².     Physical Exam:  GEN: Alert and oriented times three NAD  EYES: conjunctiva normal, lids with out lesions  HEENT: MMM, No thyromegaly, no lymphadenopathy  HEART: RRR +S1 +S2, no JVD, pulses 2+ distally  LUNGS: CTA B/L no wheezes, rales or rhonchi  ABDOMEN: + BS, soft NT/ND no organomegaly,  No rebound  EXTREMITIES: No edema cyanosis, cap refill normal   SKIN: no rashes or skin breakdown, no nodules, normal turgor  Current Facility-Administered Medications   Medication Dose Route Frequency    dexAMETHasone (DECADRON) tablet 6 mg  6 mg Oral DAILY    sodium chloride (NS) flush 5-40 mL  5-40 mL IntraVENous Q8H    sodium chloride (NS) flush 5-40 mL  5-40 mL IntraVENous PRN    acetaminophen (TYLENOL) tablet 650 mg  650 mg Oral Q6H PRN    Or    acetaminophen (TYLENOL) suppository 650 mg  650 mg Rectal Q6H PRN    polyethylene glycol (MIRALAX) packet 17 g  17 g Oral DAILY PRN    ondansetron (ZOFRAN ODT) tablet 4 mg  4 mg Oral Q8H PRN    Or    ondansetron (ZOFRAN) injection 4 mg  4 mg IntraVENous Q6H PRN    famotidine (PEPCID) tablet 20 mg  20 mg Oral BID    insulin lispro (HUMALOG) injection   SubCUTAneous AC&HS    glucose chewable tablet 16 g  16 g Oral PRN    glucagon (GLUCAGEN) injection 1 mg  1 mg IntraMUSCular PRN    dextrose (D50W) injection syrg 12.5-25 g  25-50 mL IntraVENous PRN    felodipine (PLENDIL SR) 24 hr tablet 10 mg  10 mg Oral DAILY    isosorbide mononitrate ER (IMDUR) tablet 30 mg  30 mg Oral DAILY    lisinopriL (PRINIVIL, ZESTRIL) tablet 5 mg  5 mg Oral DAILY    pravastatin (PRAVACHOL) tablet 40 mg  40 mg Oral QHS    spironolactone (ALDACTONE) tablet 25 mg  25 mg Oral DAILY    enoxaparin (LOVENOX) injection 120 mg  1 mg/kg SubCUTAneous Q12H    oxyCODONE IR (ROXICODONE) tablet 10 mg  10 mg Oral Q6H PRN    furosemide (LASIX) tablet 40 mg  40 mg Oral DAILY    metoprolol succinate (TOPROL-XL) XL tablet 25 mg  25 mg Oral DAILY         All the patient's labs over the past 24 hours were reviewed both during my initial daily workflow process and at the time notated as \"note time\" in [facility]. (It is not time stamped separately in this workflow.)  Select labs are listed below.         Labs: Results:       Chemistry Recent Labs     06/12/21  0750 06/11/21  1600   * 117*   * 135*   K 4.0 3.6    101   CO2 26 25   BUN 18 23*   CREA 0.71 0.88   CA 10.2* 10.8*   AGAP 8 9   BUCR 25* 26*   AP 63 59   TP 8.1 8.2   ALB 3.3* 3.5   GLOB 4.8* 4.7*   AGRAT 0.7* 0.7*      CBC w/Diff Recent Labs     06/12/21  0750 06/11/21  1600   WBC 6.7 7.1   RBC 3.42* 3.41*   HGB 10.1* 10.0*   HCT 30.3* 30.3*    149   GRANS  --  59   LYMPH  --  31   EOS  --  1      Cardiac Enzymes Recent Labs     06/12/21  0750 06/11/21  1750   CPK 64 60   CKND1 3.6 5.2*          Lipid Panel Lab Results   Component Value Date/Time    Cholesterol, total 89 12/08/2020 01:12 AM    HDL Cholesterol 46 12/08/2020 01:12 AM    LDL, calculated 28.6 12/08/2020 01:12 AM    VLDL, calculated 14.4 12/08/2020 01:12 AM    Triglyceride 72 12/08/2020 01:12 AM    CHOL/HDL Ratio 1.9 12/08/2020 01:12 AM          Liver Enzymes Recent Labs     06/12/21  0750   TP 8.1   ALB 3.3*   AP 63      Thyroid Studies Lab Results   Component Value Date/Time    TSH 1.16 12/07/2020 12:25 PM    TSH 0.74 12/07/2020 12:25 PM        Procedures/imaging: see electronic medical records for all procedures/Xrays and details which were not copied into this note but were reviewed prior to creation of Elvis Mendez DO  Internal Medicine/Geriatrics

## 2021-06-12 NOTE — PROGRESS NOTES
2330 The patient arrived via stretcher from the ED to 334. He is alert, oriented and denies any CP. Reports left shoulder pain rating 7/10 which he has had in the past. He has an old shoulder injury. Oriented him to the room, call bell and unit routines. Tele box #4 on reading SR BBB.     2354 Administered 10mg PO oxycodone for 8/10 left shoulder pain as ordered prn for severe pain. 0030 Lab called and the patient tested positive for covid. Calling Dr Gita Lopez to notify. Will place him on droplet plus isolation. 1898 Dr Gita Lopez was notified of the covid results. She states she will call into the room and notify the patient. [id number] Shift Summary: Pt rested well overnight with no complaints. No new clinical concerns noted.      Nightshift Chart Audit Completed

## 2021-06-12 NOTE — PROGRESS NOTES
Reason for Admission:   Chart reviewed; per H&P, patient is [de-identified] 68 y.o. male with multiple myeloma in remission, systolic CHF, type 2 diabetes mellitus, hypertension, and obesity who presents to the ED with acutely worsening chronic left shoulder pain with left-sided chest pain today. He also endorses shortness of breath from the pain. He has been feeling well otherwise and denies any fever or cough. He was vaccinated for Covid in December 2020 as he was in a rehabilitation facility at that time. His whole family has also been vaccinated. \"                    RUR Score: Moderate; 22%             PCP: First and Last name:   Javon Ackerman MD     Name of Practice: family medicine   Are you a current patient: Yes/No: yes   Approximate date of last visit: within last 3 weeks   Can you participate in a virtual visit if needed:   yes    Do you (patient/family) have any concerns for transition/discharge? no               Plan for utilizing home health:   TBD    Current Advanced Directive/Advance Care Plan:  Full Code      Healthcare Decision Maker:   Click here to complete [address] including selection of the Healthcare Decision Maker Relationship (ie \"Primary\")            Jose De Jesus Leung, daughter  Transition of Care Plan:    Care manager interviewed patient via phone; per patient he lives with family, has rolling walker and wheelchair as DME; currently is on room air. Stated he received two doses of the Pedro Suarez covid vaccine end of January/beginning of February; stated, \"no one was more surprised than me to hear I had covid! \"  Stated all family members in household have gone to get tested and are awaiting results. Care manager available to assist as needed. Care Management Interventions  PCP Verified by CM:  Yes  Mode of Transport at Discharge: Self  Transition of Care Consult (CM Consult): Discharge Planning  Current Support Network: Own Home (lives with family)  Confirm Follow Up Transport: Family  The Plan for Transition of Care is Related to the Following Treatment Goals : covid 19 pneumonia  The Patient and/or Patient Representative was Provided with a Choice of Provider and Agrees with the Discharge Plan?: Yes  Name of the Patient Representative Who was Provided with a Choice of Provider and Agrees with the Discharge Plan: laura diaz, patient  Discharge Location  Discharge Placement: Home with family assistance

## 2021-06-12 NOTE — H&P
History & Physical    Patient: Annelise Crowder MRN: [id number]  [facility]: [id number]    YOB: 1940  Age: 68 y.o. Sex: male      DOA: 6/11/2021  Primary Care Provider:  Gillian Peabody, MD      Assessment/Plan     Patient Active Problem List   Diagnosis Code    Open wound of right index finger without damage to nail S61.200A    Chest pain R07.9    Lower extremity edema R60.0    Type 2 diabetes mellitus (HCC) X95.2    Systolic CHF, chronic (HCC) S21.77    Metabolic encephalopathy Z59.17    Cellulitis L03.90    LEANDRA (acute kidney injury) ([facility] N17.9    Olecranon fracture S52.023A    Multiple myeloma ([facility]) C90.00    Hypertension I10    Morbid obesity ([facility] E66.01    Morbid obesity with BMI of 40.0-44.9, adult ([facility] E66.01, Z68.41    Hypokalemia E87.6    Neuropathy G62.9    Hypomagnesemia E83.42    Elevated troponin R77.8    Weakness R53.1    Left hemiparesis ([facility]) G81.94    Spinal stenosis M48.00    HTN (hypertension) I10    DM (diabetes mellitus) ([facility]) E11.9    Anticoagulated Z79.01    Left shoulder pain M25.512    Obesity (BMI 30-39. 9) E66.9    Pneumonia due to COVID-19 virus U07.1, J12.82    COVID-19 vaccine series completed      79-year-old male with multiple myeloma in remission, systolic CHF, type 2 diabetes mellitus, hypertension, and obesity is admitted for COVID-19 pneumonia although he has already received his vaccine series. He also has worsening chronic left shoulder pain followed by orthopedics. I was asked to admit for chest pain and a cardiac stress test, but I tested the patient for Covid due to bilateral pneumonia noted on chest x-ray and he was positive.     Pneumonia due to COVID-19 with vaccine series completed-not hypoxic  -Droplet plus isolation  -Dexamethasone 6 mg p.o. daily x10 days  -Not a candidate for remdesivir as he is not hypoxic  -Duplex Dopplers of the lower extremities to evaluate for DVT (V/Q scan neg for PE)    Left shoulder pain, chronic  -I had ordered inpatient MRIs of his neck and shoulder but this is better accomplished as an outpatient given current Covid infection  --Follow up with orthopedics as an outpatient    Chronic systolic CHF-without exacerbation    Multiple myeloma in remission-not on maintenance medication    Anticoagulated on Xarelto for unclear reason (no history of thromboembolus or atrial fibrillation)  -On Lovenox 1 mg/kg while hospitalized, which has greater evidence for clot prevention and COVID    Type 2 diabetes mellitus  -Diabetic diet  -Sliding scale insulin  -Follow-up hemoglobin A1c    Hypertension  -Continue home medications    Obesity-BMI 37  -Aggressive lifestyle modification needed    Full code    His daughter was present at bedside and I discussed the plan of care with her. I left a voicemail after I received her dad's COVID test results. Prophylaxis: Pepcid p.o., therapeutic Lovenox SQ    Estimated length of stay : 1-2 nights    Corina Thompson MD  Nocturnist  ---------------------------------------------------------------------------------------------------------------------------------------------------------------------------------------------------------------  CC: Left shoulder pain, chest pain       HPI:     Patricia President is a 68 y.o. male with multiple myeloma in remission, systolic CHF, type 2 diabetes mellitus, hypertension, and obesity who presents to the ED with acutely worsening chronic left shoulder pain with left-sided chest pain today. He also endorses shortness of breath from the pain. He has been feeling well otherwise and denies any fever or cough. He was vaccinated for Covid in December 2020 as he was in a rehabilitation facility at that time. His whole family has also been vaccinated. He has been in remission for multiple myeloma since 2005 but had weakness and a compression fracture thought to be due to Revlimid so this was stopped.   His left shoulder pain is been worsening over the past month and he is being followed by Dr. Joss Perez. He only takes Tylenol as needed for shoulder pain. He has an MRI of the left shoulder ordered for Monday. Past Medical History:   Diagnosis Date    Cancer ([facility]     Diabetes ([facility]     FH: chemotherapy     H/O stem cell transplant ([facility]     Hypertension     Multiple myeloma ([facility]     Neuropathy     Obesity (BMI 30-39.9) 6/12/2021       Past Surgical History:   Procedure Laterality Date    HX CHOLECYSTECTOMY      HX KYPHOPLASTY         Family History   Problem Relation Age of Onset    Diabetes Father     Heart Disease Father     Hypertension Mother     Diabetes Mother     Diabetes Sister     Diabetes Brother        Social History     Socioeconomic History    Marital status:      Spouse name: Not on file    Number of children: Not on file    Years of education: Not on file    Highest education level: Not on file   Tobacco Use    Smoking status: Former Smoker    Smokeless tobacco: Never Used   Substance and Sexual Activity    Alcohol use: Never     Comment: rare    Drug use: No   Other Topics Concern     Social Determinants of Health     Financial Resource Strain:     Difficulty of Paying Living Expenses:    Food Insecurity:     Worried About Running Out of Food in the Last Year:     [address] in the Last Year:    Transportation Needs:     Lack of Transportation (Medical):  Lack of Transportation (Non-Medical):    Physical Activity:     Days of Exercise per Week:     Minutes of Exercise per Session:    Stress:     Feeling of Stress :    Social Connections:     Frequency of Communication with Friends and Family:     Frequency of Social Gatherings with Friends and Family:     Attends [employer]ars[employer] Services:     Active Member of Clubs or Organizations:     Attends Club or Organization Meetings:     Marital Status:        Prior to Admission medications    Medication Sig Start Date End Date Taking?  Authorizing Provider furosemide (Lasix) 20 mg tablet Take  by mouth daily. Yes Provider, Historical   felodipine (PLENDIL SR) 10 mg 24 hr tablet Take 10 mg by mouth daily. Annie Vazquez MD   spironolactone (ALDACTONE) 25 mg tablet Take  by mouth daily. Annie Vazquez MD   pyridoxine, vitamin B6, (VITAMIN B-6) 50 mg tablet Take 50 mg by mouth daily. Annie Vazquez MD   rivaroxaban (XARELTO PO) Take 10 mg by mouth. Annie Vazquez MD   POTASSIUM CHLORIDE Take 20 mEq by mouth daily. Annie Vazquez MD   omeprazole (PRILOSEC) 20 mg capsule Take 20 mg by mouth daily. Annie Vazquez MD   SITagliptin-metFORMIN (JANUMET) 50-1,000 mg per tablet Take 1 Tab by mouth two (2) times daily (with meals). Provider, Historical   cyanocobalamin (VITAMIN B12) 500 mcg tablet Take 1,000 mcg by mouth daily. Provider, Historical   lisinopril (PRINIVIL, ZESTRIL) 5 mg tablet Take 1 Tab by mouth daily. 2/14/20   Naye Whaley MD   isosorbide mononitrate ER (IMDUR) 30 mg tablet Take 1 Tab by mouth daily. 2/15/20   Liz Reyna MD   metoprolol succinate (TOPROL-XL) 25 mg XL tablet Take 1 Tab by mouth daily. 2/15/20   Liz Reyna MD   pravastatin (PRAVACHOL) 40 mg tablet Take 1 Tab by mouth nightly. 2/14/20   Liz Reyna MD   calcium-cholecalciferol, d3, (CALCIUM 600 + D) 600-125 mg-unit tab Take 1 Tablet by mouth two (2) times a day. Annie Vazquez MD   pioglitazone (ACTOS) 30 mg tablet Take 30 mg by mouth daily. Annie Vazquez MD       Allergies   Allergen Reactions    Iodine Hives       Review of Systems  Gen: No fever, chills, malaise, weight loss/gain. Heent: No headache, rhinorrhea, sore throat. Heart: +chest pain, palpitations, CHARLES, pnd, or orthopnea. Resp: No cough, hemoptysis, wheezing; +shortness of breath. GI: No nausea, vomiting, diarrhea, constipation, melena or hematochezia. : No urinary obstruction, dysuria or hematuria. Derm: No rash, new skin lesion or pruritis.    Musc/skeletal: L shoulder pain  Vasc: No edema, cyanosis or claudication. Endo: No heat/cold intolerance, no polyuria,polydipsia or polyphagia. Neuro: No unilateral weakness, numbness, tingling. No seizures. Heme: No easy bruising or bleeding. Physical Exam:     Physical Exam:  Visit Vitals  BP (!) 131/57   Pulse 83   Temp 98 °F (36.7 °C)   Resp 18   Wt 112.9 kg (248 lb 14.4 oz)   SpO2 99%   BMI 36.76 kg/m²      O2 Device: None (Room air)    Temp (24hrs), Av.1 °F (36.7 °C), Min:98 °F (36.7 °C), Max:98.2 °F (36.8 °C)    No intake/output data recorded. No intake/output data recorded. General:  Awake, cooperative, no distress. Head:  Normocephalic, without obvious abnormality, atraumatic. Eyes:  Conjunctivae/corneas clear, sclera anicteric. Neck: Supple, symmetrical, trachea midline. Lungs:   Clear to auscultation bilaterally. Heart:  Regular rate and rhythm, S1, S2 normal, no murmur, click, rub or gallop. Abdomen: Soft, non-tender. Bowel sounds normal. No masses,  No organomegaly. Extremities: Extremities normal, atraumatic, no cyanosis or edema. L shoulder with pain on forward flexion with limited range of motion due to pain. Capillary refill normal.   Pulses: 2+ and symmetric all extremities. Skin: Skin color pink, turgor normal. No rashes or lesions   Neurologic: No focal motor or sensory deficit. Labs Reviewed: All lab results for the last 24 hours reviewed.   Recent Results (from the past 24 hour(s))   EKG, 12 LEAD, INITIAL    Collection Time: 21  3:20 PM   Result Value Ref Range    Ventricular Rate 95 BPM    Atrial Rate 95 BPM    P-R Interval 206 ms    QRS Duration 140 ms    Q-T Interval 356 ms    QTC Calculation (Bezet) 447 ms    Calculated P Axis 63 degrees    Calculated R Axis 2 degrees    Calculated T Axis 133 degrees    Diagnosis       Normal sinus rhythm  Left bundle branch block  Abnormal ECG  When compared with ECG of 19-MAY-2021 09:35,  No significant change was found  Confirmed by Rosario Chance MD. ([id number]) on 6/11/2021 8:45:13 PM     CBC WITH AUTOMATED DIFF    Collection Time: 06/11/21  4:00 PM   Result Value Ref Range    WBC 7.1 4.6 - 13.2 K/uL    RBC 3.41 (L) 4.35 - 5.65 M/uL    HGB 10.0 (L) 13.0 - 16.0 g/dL    HCT 30.3 (L) 36.0 - 48.0 %    MCV 88.9 74.0 - 97.0 FL    MCH 29.3 24.0 - 34.0 PG    MCHC 33.0 31.0 - 37.0 g/dL    RDW 14.9 (H) 11.6 - 14.5 %    PLATELET 937 189 - 266 K/uL    MPV 11.4 9.2 - 11.8 FL    NEUTROPHILS 59 40 - 73 %    LYMPHOCYTES 31 21 - 52 %    MONOCYTES 9 3 - 10 %    EOSINOPHILS 1 0 - 5 %    BASOPHILS 1 0 - 2 %    ABS. NEUTROPHILS 4.2 1.8 - 8.0 K/UL    ABS. LYMPHOCYTES 2.2 0.9 - 3.6 K/UL    ABS. MONOCYTES 0.6 0.05 - 1.2 K/UL    ABS. EOSINOPHILS 0.1 0.0 - 0.4 K/UL    ABS. BASOPHILS 0.0 0.0 - 0.1 K/UL    DF AUTOMATED     METABOLIC PANEL, COMPREHENSIVE    Collection Time: 06/11/21  4:00 PM   Result Value Ref Range    Sodium 135 (L) 136 - 145 mmol/L    Potassium 3.6 3.5 - 5.5 mmol/L    Chloride 101 100 - 111 mmol/L    CO2 25 21 - 32 mmol/L    Anion gap 9 3.0 - 18 mmol/L    Glucose 117 (H) 74 - 99 mg/dL    BUN 23 (H) 7.0 - 18 MG/DL    Creatinine 0.88 0.6 - 1.3 MG/DL    BUN/Creatinine ratio 26 (H) 12 - 20      GFR est AA >60 >60 ml/min/1.73m2    GFR est non-AA >60 >60 ml/min/1.73m2    Calcium 10.8 (H) 8.5 - 10.1 MG/DL    Bilirubin, total 0.3 0.2 - 1.0 MG/DL    ALT (SGPT) 13 (L) 16 - 61 U/L    AST (SGOT) 12 10 - 38 U/L    Alk.  phosphatase 59 45 - 117 U/L    Protein, total 8.2 6.4 - 8.2 g/dL    Albumin 3.5 3.4 - 5.0 g/dL    Globulin 4.7 (H) 2.0 - 4.0 g/dL    A-G Ratio 0.7 (L) 0.8 - 1.7     CARDIAC PANEL,(CK, CKMB & TROPONIN)    Collection Time: 06/11/21  4:00 PM   Result Value Ref Range    CK - MB 2.7 <3.6 ng/ml    CK-MB Index 4.6 (H) 0.0 - 4.0 %    CK 59 39 - 308 U/L    Troponin-I, QT <0.02 0.0 - 0.045 NG/ML   D DIMER    Collection Time: 06/11/21  4:00 PM   Result Value Ref Range    D DIMER 0.79 (H) <0.46 ug/ml(FEU)   NT-PRO BNP    Collection Time: 06/11/21  4:00 PM   Result Value Ref Range    NT pro-BNP 63 0 - 1,800 PG/ML   EKG, 12 LEAD, SUBSEQUENT    Collection Time: 06/11/21  5:24 PM   Result Value Ref Range    Ventricular Rate 78 BPM    Atrial Rate 78 BPM    P-R Interval 174 ms    QRS Duration 136 ms    Q-T Interval 380 ms    QTC Calculation (Bezet) 433 ms    Calculated P Axis 21 degrees    Calculated R Axis 13 degrees    Calculated T Axis 138 degrees    Diagnosis       Sinus rhythm with premature supraventricular complexes  Left bundle branch block  Abnormal ECG  When compared with ECG of 11-JUN-2021 15:20,  premature supraventricular complexes are now present  Left bundle branch block is now present  Confirmed by Lissette Andrew MD. ([id number]) on 6/11/2021 8:50:41 PM     CARDIAC PANEL,(CK, CKMB & TROPONIN)    Collection Time: 06/11/21  5:50 PM   Result Value Ref Range    CK - MB 3.1 <3.6 ng/ml    CK-MB Index 5.2 (H) 0.0 - 4.0 %    CK 60 39 - 308 U/L    Troponin-I, QT <0.02 0.0 - 0.045 NG/ML   GLUCOSE, POC    Collection Time: 06/11/21  9:38 PM   Result Value Ref Range    Glucose (POC) 91 70 - 110 mg/dL   COVID-19 RAPID TEST    Collection Time: 06/11/21 11:05 PM   Result Value Ref Range    Specimen source Nasopharyngeal      COVID-19 rapid test Detected (AA) NOTD       Results  NM LUNG PERFUSION W VENT (Accession [id number]) (Order [id number])  Allergies     Not Specified: Iodine   Exam Information    Status Exam Begun  Exam Ended    Final [99] 6/11/2021 18:08 6/11/2021  6:56 PM [id number]  6:56 PM   Result Information    Status: Final result (Exam End: 6/11/2021 18:56) Provider Status: Open   Study Result    Narrative & Impression   EXAM: Perfusion only scan     INDICATION: Evaluate for PE     COMPARISON: Same day chest radiograph correlation.     TECHNIQUE: .  6 mCi of Tc 99m MAA was administered for perfusion. Injection site  was right chest port.     _______________     FINDINGS:     PERFUSION: No segmental perfusion defects seen.     _______________     IMPRESSION     PE absent.      Results  XR CHEST PORT (Accession [id number]) (Order [id number])  Allergies     Not Specified: Iodine   Exam Information    Status Exam Begun  Exam Ended    Final [99] 6/11/2021 15:23 6/11/2021  3:34 PM [id number]  3:34 PM   Result Information    Status: Final result (Exam End: 6/11/2021 15:34) Provider Status: Open   Study Result    Narrative & Impression   HISTORY:   -Provided with order: chest pain  -Additional: None     Technique : AP PORTABLE CHEST     Comparison : 05/19/21.      FINDINGS:     HEART AND MEDIASTINUM: Right IJ central venous catheter tip projects over  expected cavoatrial junction.     LUNGS AND PLEURAL SPACES: Penetration is limited by body habitus. Slight  ill-defined opacities are questioned bilaterally, which may reflect developing  atelectasis or infiltrate.  No consolidation, pleural effusion or pneumothorax is  seen.     BONY THORAX AND SOFT TISSUES: No acute osseous abnormality.     IMPRESSION     Findings suspicious for progressive bilateral atelectasis or infiltrates.

## 2021-06-12 NOTE — ED NOTES
TRANSFER - OUT REPORT:    Verbal report given to Edith Reed RN(name) on Rustam Holbrook  being transferred to  Tele(unit) for routine progression of care       Report consisted of patients Situation, Background, Assessment and   Recommendations(SBAR). Information from the following report(s) SBAR, Kardex, ED Summary, [facility] and Recent Results was reviewed with the receiving nurse. Lines:   Single Lumen Venous Catheter 06/11/21 Right Subclavian (Active)        Opportunity for questions and clarification was provided.       Patient transported with:   Registered Nurse

## 2021-06-12 NOTE — PROGRESS NOTES
0730 Bedside and Verbal shift change report given to MILVIA Daniels, JOHN (oncoming nurse) by MARITZA Barahona RN (offgoing nurse). Report included the following information SBAR, Kardex, Intake/Output, and MAR.     0758 Pt assessed. No signs of acute distress. Pt in bed. [address] [facility] paged for duplex of lower extremities that was changed to stat. 1320 Pt assessed. No signs of acute distress. Pt in bed. Pt given Roxicodone for pain. 1735 Pt assessed. No signs of acute distress. Pt in bed. 1930 Bedside and Verbal shift change report given to MERVAT Leonard RN (oncoming nurse) by Harleen Monday. Yuliya Daniels RN (offgoing nurse). Report included the following information SBAR, Kardex, Intake/Output and MAR. Pt in bed, call light in reach.

## 2021-06-12 NOTE — CONSULTS
[facility] Consult Note      Patient: Danny Garcia MRN: [id number]  SSN: [id number]    YOB: 1940  Age: 68 y.o. Sex: male        Date of Consultation: 6/12/2021  Referring Physician: Dr. Baldev Truogn  Reason for Consultation: chest pain    HPI:  I was asked by  for chest pain. Danny Garcia is a 77-year-old gentleman came to the hospital with chest pain and left shoulder pain. Patient's past medical history significant for chronic left bundle branch block, hypertension, multiple myeloma, obesity, neuropathy, congestive heart failure. Patient came to the hospital with progressive symptoms had similar kind of symptoms in the past with negative cardiac work-up. Now patient is diagnosed with COVID-19 pneumonia.     Past Medical History:   Diagnosis Date    Cancer [facility]     Diabetes ([facility]     FH: chemotherapy     H/O stem cell transplant ([facility]     Hypertension     Multiple myeloma ([facility] [facility]     Neuropathy     Obesity (BMI 30-39.9) 6/12/2021     Past Surgical History:   Procedure Laterality Date    HX CHOLECYSTECTOMY      HX KYPHOPLASTY       Current Facility-Administered Medications   Medication Dose Route Frequency    dexAMETHasone (DECADRON) tablet 6 mg  6 mg Oral DAILY    sodium chloride (NS) flush 5-40 mL  5-40 mL IntraVENous Q8H    sodium chloride (NS) flush 5-40 mL  5-40 mL IntraVENous PRN    acetaminophen (TYLENOL) tablet 650 mg  650 mg Oral Q6H PRN    Or    acetaminophen (TYLENOL) suppository 650 mg  650 mg Rectal Q6H PRN    polyethylene glycol (MIRALAX) packet 17 g  17 g Oral DAILY PRN    ondansetron (ZOFRAN ODT) tablet 4 mg  4 mg Oral Q8H PRN    Or    ondansetron (ZOFRAN) injection 4 mg  4 mg IntraVENous Q6H PRN    famotidine (PEPCID) tablet 20 mg  20 mg Oral BID    insulin lispro (HUMALOG) injection   SubCUTAneous AC&HS    glucose chewable tablet 16 g  16 g Oral PRN    glucagon (GLUCAGEN) injection 1 mg  1 mg IntraMUSCular PRN    dextrose (D50W) injection syrg 12.5-25 g 25-50 mL IntraVENous PRN    felodipine (PLENDIL SR) 24 hr tablet 10 mg  10 mg Oral DAILY    isosorbide mononitrate ER (IMDUR) tablet 30 mg  30 mg Oral DAILY    lisinopriL (PRINIVIL, ZESTRIL) tablet 5 mg  5 mg Oral DAILY    pravastatin (PRAVACHOL) tablet 40 mg  40 mg Oral QHS    spironolactone (ALDACTONE) tablet 25 mg  25 mg Oral DAILY    enoxaparin (LOVENOX) injection 120 mg  1 mg/kg SubCUTAneous Q12H    oxyCODONE IR (ROXICODONE) tablet 10 mg  10 mg Oral Q6H PRN    furosemide (LASIX) tablet 40 mg  40 mg Oral DAILY    metoprolol succinate (TOPROL-XL) XL tablet 25 mg  25 mg Oral DAILY       Allergies and Intolerances: Allergies   Allergen Reactions    Iodine Hives       Family History:   Family History   Problem Relation Age of Onset    Diabetes Father     Heart Disease Father     Hypertension Mother     Diabetes Mother     Diabetes Sister     Diabetes Brother        Social History:   He  reports that he has quit smoking. He has never used smokeless tobacco.  He  reports no history of alcohol use. Review of Systems  Gen: No fever, chills, malaise, weight loss/gain. Heent: No headache, rhinorrhea, epistaxis, ear pain, hearing loss, sinus pain, neck pain/stiffness, sore throat. Heart: ++ chest pain, palpitations, CHARLES, pnd, or orthopnea. Resp: No cough, hemoptysis, wheezing and shortness of breath. GI: No nausea, vomiting, diarrhea, constipation, melena or hematochezia. : No urinary obstruction, dysuria or hematuria. Derm: No rash, new skin lesion or pruritis. Musc/skeletal: no bone or joint complains. Vasc: No edema, cyanosis or claudication. Endo: No heat/cold intolerance, no polyuria,polydipsia or polyphagia. Neuro: No unilateral weakness, numbness, tingling. No seizures. Heme: No easy bruising or bleeding.         Physical:   Patient Vitals for the past 6 hrs:   Temp Pulse Resp BP SpO2   06/12/21 1320 98.4 °F (36.9 °C) 86 16 127/60 99 %         Exam:   General Appearance: Comfortable, not using accessory muscles of respiration. HEENT: SHELLY. HEAD: Atraumatic  NECK: No JVD, no thyroidomeglay. LUNGS: Clear bilaterally. HEART: S1+S2     ABD: Non-tender, BS Audible    EXT: No edema, and no cysnosis. VASCULAR EXAM: Pulses are intact. PSYCHIATRIC EXAM: Mood is appropriate. MUSCULOSKELETAL: Grossly no joint deformity. NEUROLOGICAL: Motor and sensory sytem intact and Cranial nerves II-XII intact. Review of Data:   LABS:   Lab Results   Component Value Date/Time    WBC 6.7 06/12/2021 07:50 AM    HGB 10.1 (L) 06/12/2021 07:50 AM    HCT 30.3 (L) 06/12/2021 07:50 AM    PLATELET 677 70/15/6925 07:50 AM     Lab Results   Component Value Date/Time    Sodium 135 (L) 06/12/2021 07:50 AM    Potassium 4.0 06/12/2021 07:50 AM    Chloride 101 06/12/2021 07:50 AM    CO2 26 06/12/2021 07:50 AM    Glucose 122 (H) 06/12/2021 07:50 AM    BUN 18 06/12/2021 07:50 AM    Creatinine 0.71 06/12/2021 07:50 AM     Lab Results   Component Value Date/Time    Cholesterol, total 89 12/08/2020 01:12 AM    HDL Cholesterol 46 12/08/2020 01:12 AM    LDL, calculated 28.6 12/08/2020 01:12 AM    Triglyceride 72 12/08/2020 01:12 AM     No components found for: GPT  Lab Results   Component Value Date/Time    Hemoglobin A1c 6.0 (H) 06/11/2021 04:00 PM       RADIOLOGY:  CT Results  (Last 48 hours)    None        CXR Results  (Last 48 hours)               06/11/21 1534  XR CHEST PORT Final result    Impression:      Findings suspicious for progressive bilateral atelectasis or infiltrates. Narrative:  HISTORY:    -Provided with order: chest pain   -Additional: None       Technique : AP PORTABLE CHEST       Comparison : 05/19/21. FINDINGS:       HEART AND MEDIASTINUM: Right IJ central venous catheter tip projects over   expected cavoatrial junction. LUNGS AND PLEURAL SPACES: Penetration is limited by body habitus.  Slight   ill-defined opacities are questioned bilaterally, which may reflect developing   atelectasis or infiltrate. No consolidation, pleural effusion or pneumothorax is   seen. BONY THORAX AND SOFT TISSUES: No acute osseous abnormality. Cardiology Procedures:   Results for orders placed or performed during the hospital encounter of 06/11/21   EKG, 12 LEAD, INITIAL   Result Value Ref Range    Ventricular Rate 95 BPM    Atrial Rate 95 BPM    P-R Interval 206 ms    QRS Duration 140 ms    Q-T Interval 356 ms    QTC Calculation (Bezet) 447 ms    Calculated P Axis 63 degrees    Calculated R Axis 2 degrees    Calculated T Axis 133 degrees    Diagnosis       Normal sinus rhythm  Left bundle branch block  Abnormal ECG  When compared with ECG of 19-MAY-2021 09:35,  No significant change was found  Confirmed by Osvaldo Willis MD. ([id number]) on 6/11/2021 8:45:13 PM        Echo Results  (Last 48 hours)    None       Cardiolite (Tc-99m Sestamibi) stress test        Impression / Plan:    Patient Active Problem List   Diagnosis Code    Open wound of right index finger without damage to nail S61.200A    Chest pain R07.9    Lower extremity edema R60.0    Type 2 diabetes mellitus ([facility]) E06.6    Systolic CHF, chronic ([facility]) A93.36    Metabolic encephalopathy D27.61    Cellulitis L03.90    LEANDRA (acute kidney injury) ([facility]) N17.9    Olecranon fracture S52.023A    Multiple myeloma ([facility]) C90.00    Hypertension I10    Morbid obesity ([facility]) E66.01    Morbid obesity with BMI of 40.0-44.9, adult ([facility]) E66.01, Z68.41    Hypokalemia E87.6    Neuropathy G62.9    Hypomagnesemia E83.42    Elevated troponin R77.8    Weakness R53.1    Left hemiparesis ([facility]) G81.94    Spinal stenosis M48.00    HTN (hypertension) I10    DM (diabetes mellitus) ([facility]) E11.9    Anticoagulated Z79.01    Left shoulder pain M25.512    Obesity (BMI 30-39. 9) E66.9    Pneumonia due to COVID-19 virus U07.1, J12.82    COVID-19 vaccine series completed Z92.29       A/P      Chest pain  Chronic left bundle branch block  Chronic CHF with history of combined systolic and diastolic heart failure  Hypertension  Hyperlipidemia  Obesity  Patient received COVID vaccine in February  COVID-19 infection        Plan. At this point there is no evidence of ACS. Chest pain is atypical for ACS. Continue with current medical treatment. I will get echocardiogram.  I will continue to follow the patient.       Signed By: Tomi Olszewski, MD     June 12, 2021

## 2021-06-12 NOTE — PROGRESS NOTES
Problem: Pain  Goal: *Control of Pain  Outcome: Progressing Towards Goal  Goal: *PALLIATIVE CARE:  Alleviation of Pain  Outcome: Progressing Towards Goal     Problem: Patient Education: Go to Patient Education Activity  Goal: Patient/Family Education  Outcome: Progressing Towards Goal     Problem: Falls - Risk of  Goal: *Absence of Falls  Description: Document Joshua Uribe Fall Risk and appropriate interventions in the flowsheet.   Outcome: Progressing Towards Goal  Note: Fall Risk Interventions:                                Problem: Patient Education: Go to Patient Education Activity  Goal: Patient/Family Education  Outcome: Progressing Towards Goal

## 2021-06-12 NOTE — ROUTINE PROCESS
Bedside and Verbal shift change report given to Rudolph Bates RN  (oncoming nurse) by Nelson Esteban RN  (offgoing nurse). Report given with SBAR, Kardex, Intake/Output and Recent Results.

## 2021-06-13 LAB
ALBUMIN SERPL-MCNC: 3.1 G/DL (ref 3.4–5)
ALBUMIN/GLOB SERPL: 0.7 {RATIO} (ref 0.8–1.7)
ALP SERPL-CCNC: 55 U/L (ref 45–117)
ALT SERPL-CCNC: 14 U/L (ref 16–61)
ANION GAP SERPL CALC-SCNC: 8 MMOL/L (ref 3–18)
AST SERPL-CCNC: 10 U/L (ref 10–38)
BILIRUB SERPL-MCNC: 0.3 MG/DL (ref 0.2–1)
BUN SERPL-MCNC: 20 MG/DL (ref 7–18)
BUN/CREAT SERPL: 27 (ref 12–20)
CALCIUM SERPL-MCNC: 9.7 MG/DL (ref 8.5–10.1)
CHLORIDE SERPL-SCNC: 102 MMOL/L (ref 100–111)
CO2 SERPL-SCNC: 26 MMOL/L (ref 21–32)
CREAT SERPL-MCNC: 0.73 MG/DL (ref 0.6–1.3)
ERYTHROCYTE [DISTWIDTH] IN BLOOD BY AUTOMATED COUNT: 14.7 % (ref 11.6–14.5)
GLOBULIN SER CALC-MCNC: 4.3 G/DL (ref 2–4)
GLUCOSE BLD STRIP.AUTO-MCNC: 120 MG/DL (ref 70–110)
GLUCOSE BLD STRIP.AUTO-MCNC: 156 MG/DL (ref 70–110)
GLUCOSE BLD STRIP.AUTO-MCNC: 157 MG/DL (ref 70–110)
GLUCOSE BLD STRIP.AUTO-MCNC: 165 MG/DL (ref 70–110)
GLUCOSE BLD STRIP.AUTO-MCNC: 201 MG/DL (ref 70–110)
GLUCOSE SERPL-MCNC: 96 MG/DL (ref 74–99)
HCT VFR BLD AUTO: 28.5 % (ref 36–48)
HGB BLD-MCNC: 9.3 G/DL (ref 13–16)
MCH RBC QN AUTO: 28.9 PG (ref 24–34)
MCHC RBC AUTO-ENTMCNC: 32.6 G/DL (ref 31–37)
MCV RBC AUTO: 88.5 FL (ref 74–97)
PLATELET # BLD AUTO: 124 K/UL (ref 135–420)
PMV BLD AUTO: 11.4 FL (ref 9.2–11.8)
POTASSIUM SERPL-SCNC: 3.6 MMOL/L (ref 3.5–5.5)
PROT SERPL-MCNC: 7.4 G/DL (ref 6.4–8.2)
RBC # BLD AUTO: 3.22 M/UL (ref 4.35–5.65)
SODIUM SERPL-SCNC: 136 MMOL/L (ref 136–145)
WBC # BLD AUTO: 7.7 K/UL (ref 4.6–13.2)

## 2021-06-13 PROCEDURE — 36415 COLL VENOUS BLD VENIPUNCTURE: CPT

## 2021-06-13 PROCEDURE — 74011250636 HC RX REV CODE- 250/636: Performed by: FAMILY MEDICINE

## 2021-06-13 PROCEDURE — 74011636637 HC RX REV CODE- 636/637: Performed by: FAMILY MEDICINE

## 2021-06-13 PROCEDURE — 80053 COMPREHEN METABOLIC PANEL: CPT

## 2021-06-13 PROCEDURE — 74011250637 HC RX REV CODE- 250/637: Performed by: FAMILY MEDICINE

## 2021-06-13 PROCEDURE — 82962 GLUCOSE BLOOD TEST: CPT

## 2021-06-13 PROCEDURE — 65660000000 HC RM CCU STEPDOWN

## 2021-06-13 PROCEDURE — 85027 COMPLETE CBC AUTOMATED: CPT

## 2021-06-13 RX ORDER — HYDROCODONE BITARTRATE AND ACETAMINOPHEN 5; 325 MG/1; MG/1
1 TABLET ORAL
Qty: 30 TABLET | Refills: 0 | Status: SHIPPED | OUTPATIENT
Start: 2021-06-13 | End: 2021-06-18

## 2021-06-13 RX ORDER — HYDROCODONE BITARTRATE AND ACETAMINOPHEN 5; 325 MG/1; MG/1
1 TABLET ORAL
Qty: 30 TABLET | Refills: 0 | Status: SHIPPED | OUTPATIENT
Start: 2021-06-13 | End: 2021-06-13 | Stop reason: SDUPTHER

## 2021-06-13 RX ADMIN — INSULIN LISPRO 2 UNITS: 100 INJECTION, SOLUTION INTRAVENOUS; SUBCUTANEOUS at 11:55

## 2021-06-13 RX ADMIN — Medication 10 ML: at 06:08

## 2021-06-13 RX ADMIN — Medication 10 ML: at 13:00

## 2021-06-13 RX ADMIN — INSULIN LISPRO 4 UNITS: 100 INJECTION, SOLUTION INTRAVENOUS; SUBCUTANEOUS at 22:21

## 2021-06-13 RX ADMIN — FAMOTIDINE 20 MG: 20 TABLET ORAL at 22:21

## 2021-06-13 RX ADMIN — FAMOTIDINE 20 MG: 20 TABLET ORAL at 10:04

## 2021-06-13 RX ADMIN — PRAVASTATIN SODIUM 40 MG: 20 TABLET ORAL at 22:21

## 2021-06-13 RX ADMIN — LISINOPRIL 5 MG: 5 TABLET ORAL at 10:03

## 2021-06-13 RX ADMIN — ISOSORBIDE MONONITRATE 30 MG: 30 TABLET, EXTENDED RELEASE ORAL at 10:02

## 2021-06-13 RX ADMIN — SPIRONOLACTONE 25 MG: 25 TABLET ORAL at 10:03

## 2021-06-13 RX ADMIN — Medication 10 ML: at 22:00

## 2021-06-13 RX ADMIN — OXYCODONE 10 MG: 5 TABLET ORAL at 13:06

## 2021-06-13 RX ADMIN — METOPROLOL SUCCINATE 25 MG: 25 TABLET, EXTENDED RELEASE ORAL at 10:03

## 2021-06-13 RX ADMIN — ENOXAPARIN SODIUM 120 MG: 120 INJECTION SUBCUTANEOUS at 22:21

## 2021-06-13 RX ADMIN — ENOXAPARIN SODIUM 120 MG: 120 INJECTION SUBCUTANEOUS at 09:58

## 2021-06-13 RX ADMIN — FELODIPINE 10 MG: 5 TABLET, EXTENDED RELEASE ORAL at 10:02

## 2021-06-13 RX ADMIN — DEXAMETHASONE 6 MG: 4 TABLET ORAL at 10:02

## 2021-06-13 RX ADMIN — INSULIN LISPRO 2 UNITS: 100 INJECTION, SOLUTION INTRAVENOUS; SUBCUTANEOUS at 16:16

## 2021-06-13 RX ADMIN — OXYCODONE 10 MG: 5 TABLET ORAL at 06:05

## 2021-06-13 RX ADMIN — FUROSEMIDE 40 MG: 40 TABLET ORAL at 10:03

## 2021-06-13 NOTE — PROGRESS NOTES
Bedside and verbal shift change report given to Elizabeth Boogie RN (oncoming nurse) by Stephanie Jackson RN (offgoing nurse). Report included the following information SBAR, Kardex, Intake/Output, MAR and Recent Results.

## 2021-06-13 NOTE — DISCHARGE SUMMARY
Discharge Summary  Subjective:       Admit Date: 6/11/2021  Discharge Date:  6/13/2021, 7:58 AM    Discharging Physician: Madhu Curry pager [phone]  Primary Care Provider:  Robb Piña MD    Patient ID:  Kamran Carmona  68 y.o. male  1940    Reason for Admission:  Acute chest pain [R07.9]  DM (diabetes mellitus) ([facility] [address] [E11.9]  HTN (hypertension) [I10]  Multiple myeloma ([facility] [address] [C90.00]    Discharge Diagnosis:   1. Atypical chest pain due to COVID pneumonia +/- chronic neck/shoulder pain  2. chroinc CHF, systolic + diastolic compensated  3. HTN  4. COVID pneumonia      Patient Active Problem List   Diagnosis Code    Open wound of right index finger without damage to nail S61.200A    Chest pain R07.9    Lower extremity edema R60.0    Type 2 diabetes mellitus (HCC) M57.0    Systolic CHF, chronic ([facility]) H19.22    Metabolic encephalopathy U11.27    Cellulitis L03.90    LEANDRA (acute kidney injury) ([facility] [address] N17.9    Olecranon fracture S52.023A    Multiple myeloma (HCC) C90.00    Hypertension I10    Morbid obesity ([facility] [address] E66.01    Morbid obesity with BMI of 40.0-44.9, adult (HCC) E66.01, Z68.41    Hypokalemia E87.6    Neuropathy G62.9    Hypomagnesemia E83.42    Elevated troponin R77.8    Weakness R53.1    Left hemiparesis (HCC) G81.94    Spinal stenosis M48.00    HTN (hypertension) I10    DM (diabetes mellitus) (HCC) E11.9    Anticoagulated Z79.01    Left shoulder pain M25.512    Obesity (BMI 30-39. 9) E66.9    Pneumonia due to COVID-19 virus U07.1, J12.82    COVID-19 vaccine series completed Z92.29       Consultants:    none    Hospital Course:   Reason for admission ( Admitting HPI): \" Kamran Carmona is a 68 y.o. male with multiple myeloma in remission, systolic CHF, type 2 diabetes mellitus, hypertension, and obesity who presents to the ED with acutely worsening chronic left shoulder pain with left-sided chest pain today. He also endorses shortness of breath from the pain.  He has been feeling well otherwise and denies any fever or cough. He was vaccinated for Covid in December 2020 as he was in a rehabilitation facility at that time. His whole family has also been vaccinated. He has been in remission for multiple myeloma since 2005 but had weakness and a compression fracture thought to be due to Revlimid so this was stopped. His left shoulder pain is been worsening over the past month and he is being followed by Dr. Haley Chavez. He only takes Tylenol as needed for shoulder pain. He has an MRI of the left shoulder ordered for Monday. \"    He was admitted to the hospitalist service. He did not require oxygen. His VQ was  Low prob pr PE an he had negative PVL of lower extremities. He had MRI which has not been read. He is stable for DC w outpt follow up.l red flags regarding when to seek emergent care were discussed. Pertinent Imaging/ Operative procedures:   CTA chest:\" IMPRESSION:     1. Diffusely abnormal bony mineralization with diffuse subtle lytic lesions in  keeping with underlying myelomatous involvement of the skeleton.     > Several thoracic compression deformities noted with evidence of prior L1  vertebral augmentation.     2. Tiny right adrenal gland nodule; statistically benign and favored to reflect  an adenoma. Comparison prior imaging recommended. \"    CXR:\" IMPRESSION     Findings suspicious for progressive bilateral atelectasis or infiltrates. \"    VQ :' FINDINGS:     PERFUSION: No segmental perfusion defects seen.     _______________     IMPRESSION     PE absent.:    PVL: no DVT    Pertinent Results:    CMP:   Lab Results   Component Value Date/Time     06/13/2021 04:45 AM    K 3.6 06/13/2021 04:45 AM     06/13/2021 04:45 AM    CO2 26 06/13/2021 04:45 AM    AGAP 8 06/13/2021 04:45 AM    GLU 96 06/13/2021 04:45 AM    BUN 20 (H) 06/13/2021 04:45 AM    CREA 0.73 06/13/2021 04:45 AM    GFRAA >60 06/13/2021 04:45 AM    GFRNA >60 06/13/2021 04:45 AM    CA 9.7 06/13/2021 04:45 AM    ALB 3.1 (L) 06/13/2021 04:45 AM    TP 7.4 06/13/2021 04:45 AM    GLOB 4.3 (H) 06/13/2021 04:45 AM    AGRAT 0.7 (L) 06/13/2021 04:45 AM    ALT 14 (L) 06/13/2021 04:45 AM     CBC:   Lab Results   Component Value Date/Time    WBC 7.7 06/13/2021 04:45 AM    HGB 9.3 (L) 06/13/2021 04:45 AM    HCT 28.5 (L) 06/13/2021 04:45 AM     (L) 06/13/2021 04:45 AM         Physical Exam on Discharge:  Visit Vitals  BP (!) 128/43 (BP 1 Location: Left upper arm, BP Patient Position: Supine)   Pulse 68   Temp 98.5 °F (36.9 °C)   Resp 16   Wt 112.9 kg (248 lb 14.4 oz)   SpO2 100%   BMI 36.76 kg/m²     Body mass index is 36.76 kg/m². GEN: NAD  HEART: S1 S2  NEURO: nonfocal   Condition at discharge: stable    Discharge Medications  Current Discharge Medication List      START taking these medications    Details   HYDROcodone-acetaminophen (Norco) 5-325 mg per tablet Take 1 Tablet by mouth every four (4) hours as needed for Pain for up to 5 days. Max Daily Amount: 6 Tablets. Qty: 30 Tablet, Refills: 0    Associated Diagnoses: COVID-19 vaccine series completed         CONTINUE these medications which have NOT CHANGED    Details   furosemide (Lasix) 20 mg tablet Take  by mouth daily. felodipine (PLENDIL SR) 10 mg 24 hr tablet Take 10 mg by mouth daily. spironolactone (ALDACTONE) 25 mg tablet Take  by mouth daily. pyridoxine, vitamin B6, (VITAMIN B-6) 50 mg tablet Take 50 mg by mouth daily. rivaroxaban (XARELTO PO) Take 10 mg by mouth. POTASSIUM CHLORIDE Take 20 mEq by mouth daily. omeprazole (PRILOSEC) 20 mg capsule Take 20 mg by mouth daily. SITagliptin-metFORMIN (JANUMET) 50-1,000 mg per tablet Take 1 Tab by mouth two (2) times daily (with meals). cyanocobalamin (VITAMIN B12) 500 mcg tablet Take 1,000 mcg by mouth daily. lisinopril (PRINIVIL, ZESTRIL) 5 mg tablet Take 1 Tab by mouth daily.   Qty: 30 Tab, Refills: 0      isosorbide mononitrate ER (IMDUR) 30 mg tablet Take 1 Tab by mouth daily. Qty: 30 Tab, Refills: 0      metoprolol succinate (TOPROL-XL) 25 mg XL tablet Take 1 Tab by mouth daily. Qty: 30 Tab, Refills: 0      pravastatin (PRAVACHOL) 40 mg tablet Take 1 Tab by mouth nightly. Qty: 30 Tab, Refills: 0      calcium-cholecalciferol, d3, (CALCIUM 600 + D) 600-125 mg-unit tab Take 1 Tablet by mouth two (2) times a day. pioglitazone (ACTOS) 30 mg tablet Take 30 mg by mouth daily.              Disposition: home   Follow up:  Pcp, orthopedics  Restrictions: none    Diagnostic Imaging/Labs pending at discharge: none      Vicki Suarez, [address] [facility]  Internal Medicine/Geriatrics    Time Spent 40 minutes with >50% coordination of care          CC: Alan Anthony MD

## 2021-06-13 NOTE — PROGRESS NOTES
Cardiology Progress Note        Patient: Mihaela Nuñez        Sex: male          DOA: 6/11/2021  YOB: 1940      Age:  68 y.o.        LOS:  LOS: 2 days   Assessment/Plan     Principal Problem:    Pneumonia due to COVID-19 virus (6/12/2021)    Active Problems:    Systolic CHF, chronic ([facility] [address] (2/14/2020)      Multiple myeloma ([facility] [address] (2/16/2020)      HTN (hypertension) (6/11/2021)      DM (diabetes mellitus) ([facility] [address] (6/11/2021)      Anticoagulated (6/12/2021)      Left shoulder pain (6/12/2021)      Obesity (BMI 30-39.9) (6/12/2021)      COVID-19 vaccine series completed (6/12/2021)        Plan:  Chest pain    Atypical chest pain ACS was ruled out  Patient is being possible discharge   Continue with current medical treatment  Chronic left bundle branch block if patient discharged today he can have routine outpatient cardiac follow-up. Discussed with patient. Subjective:    cc:  Chest pain        REVIEW OF SYSTEMS:     General: No fevers or chills. Cardiovascular: No chest pain or pressure. No palpitations. No ankle swelling  Pulmonary: No SOB, orthopnea, PND  Gastrointestinal: No nausea, vomiting or diarrhea      Objective:      Visit Vitals  /60 (BP 1 Location: Left upper arm, BP Patient Position: Semi fowlers)   Pulse 82   Temp 97.8 °F (36.6 °C)   Resp 16   Wt 112.9 kg (248 lb 14.4 oz)   SpO2 100%   BMI 36.76 kg/m²     Body mass index is 36.76 kg/m². Physical Exam:  General Appearance: Comfortable, not using accessory muscles of respiration. NECK: No JVD, no thyroidomeglay. LUNGS: Clear bilaterally. HEART: S1+S2 audible,    ABD: Non-tender, BS Audible    EXT: No edema, and no cysnosis. VASCULAR EXAM: Pulses are intact. PSYCHIATRIC EXAM: Mood is appropriate.     Medication:  Current Facility-Administered Medications   Medication Dose Route Frequency    dexAMETHasone (DECADRON) tablet 6 mg  6 mg Oral DAILY    sodium chloride (NS) flush 5-40 mL  5-40 mL IntraVENous Q8H    sodium chloride (NS) flush 5-40 mL  5-40 mL IntraVENous PRN    acetaminophen (TYLENOL) tablet 650 mg  650 mg Oral Q6H PRN    Or    acetaminophen (TYLENOL) suppository 650 mg  650 mg Rectal Q6H PRN    polyethylene glycol (MIRALAX) packet 17 g  17 g Oral DAILY PRN    ondansetron (ZOFRAN ODT) tablet 4 mg  4 mg Oral Q8H PRN    Or    ondansetron (ZOFRAN) injection 4 mg  4 mg IntraVENous Q6H PRN    famotidine (PEPCID) tablet 20 mg  20 mg Oral BID    insulin lispro (HUMALOG) injection   SubCUTAneous AC&HS    glucose chewable tablet 16 g  16 g Oral PRN    glucagon (GLUCAGEN) injection 1 mg  1 mg IntraMUSCular PRN    dextrose (D50W) injection syrg 12.5-25 g  25-50 mL IntraVENous PRN    felodipine (PLENDIL SR) 24 hr tablet 10 mg  10 mg Oral DAILY    isosorbide mononitrate ER (IMDUR) tablet 30 mg  30 mg Oral DAILY    lisinopriL (PRINIVIL, ZESTRIL) tablet 5 mg  5 mg Oral DAILY    pravastatin (PRAVACHOL) tablet 40 mg  40 mg Oral QHS    spironolactone (ALDACTONE) tablet 25 mg  25 mg Oral DAILY    enoxaparin (LOVENOX) injection 120 mg  1 mg/kg SubCUTAneous Q12H    oxyCODONE IR (ROXICODONE) tablet 10 mg  10 mg Oral Q6H PRN    furosemide (LASIX) tablet 40 mg  40 mg Oral DAILY    metoprolol succinate (TOPROL-XL) XL tablet 25 mg  25 mg Oral DAILY               Lab/Data Reviewed:  Procedures/imaging: see electronic medical records for all procedures/Xrays   and details which were not copied into this note but were reviewed prior to creation of Plan       All lab results for the last 24 hours reviewed.      Recent Labs     06/13/21  0445 06/12/21  0750 06/11/21  1600   WBC 7.7 6.7 7.1   HGB 9.3* 10.1* 10.0*   HCT 28.5* 30.3* 30.3*   * 144 149     Recent Labs     06/13/21  0445 06/12/21  0750 06/11/21  1600    135* 135*   K 3.6 4.0 3.6    101 101   CO2 26 26 25   GLU 96 122* 117*   BUN 20* 18 23*   CREA 0.73 0.71 0.88   CA 9.7 10.2* 10.8*       RADIOLOGY:  CT Results (Last 48 hours)    None        CXR Results  (Last 48 hours)    None            Cardiology Procedures:   Results for orders placed or performed during the hospital encounter of 06/11/21   EKG, 12 LEAD, INITIAL   Result Value Ref Range    Ventricular Rate 95 BPM    Atrial Rate 95 BPM    P-R Interval 206 ms    QRS Duration 140 ms    Q-T Interval 356 ms    QTC Calculation (Bezet) 447 ms    Calculated P Axis 63 degrees    Calculated R Axis 2 degrees    Calculated T Axis 133 degrees    Diagnosis       Normal sinus rhythm  Left bundle branch block  Abnormal ECG  When compared with ECG of 19-MAY-2021 09:35,  No significant change was found  Confirmed by Isauro Krueger MD. ([id number]) on 6/11/2021 8:45:13 PM        Echo Results  (Last 48 hours)    None       Cardiolite (Tc-99m Sestamibi) stress test    Signed By: Shraddha Pierre MD     June 13, 2021

## 2021-06-13 NOTE — ROUTINE PROCESS
Assume care of patient from [address]. Patient received in bed awake. Patient A&Ox4, denies pain and discomfort. No distress noted. Bed locked in low position. Call bell within reach and patient verbalized understanding of use for assistance and needs. 1671- Bedside and Verbal shift change report given to Marilee Iraheta RN (oncoming nurse) by Sugar Lema RN (offgoing nurse). Report included the following information SBAR, Kardex, Intake/Output, MAR and Recent Results. [facility] Night Shift Chart Audit    Chart Audit completed?  YES

## 2021-06-13 NOTE — PROGRESS NOTES
Problem: Pain  Goal: *Control of Pain  Outcome: Progressing Towards Goal  Goal: *PALLIATIVE CARE:  Alleviation of Pain  Outcome: Progressing Towards Goal     Problem: Patient Education: Go to Patient Education Activity  Goal: Patient/Family Education  Outcome: Progressing Towards Goal     Problem: Falls - Risk of  Goal: *Absence of Falls  Description: Document Janinadebbi Santillan Fall Risk and appropriate interventions in the flowsheet. Outcome: Progressing Towards Goal  Note: Fall Risk Interventions:  Mobility Interventions: Bed/chair exit alarm, Communicate number of staff needed for ambulation/transfer, Patient to call before getting OOB, Utilize gait belt for transfers/ambulation         Medication Interventions: Bed/chair exit alarm, Patient to call before getting OOB, Teach patient to arise slowly    Elimination Interventions: Bed/chair exit alarm, Call light in reach, Stay With Me (per policy), Toileting schedule/hourly rounds, Toilet paper/wipes in reach    History of Falls Interventions: Bed/chair exit alarm, Investigate reason for fall, Door open when patient unattended         Problem: Patient Education: Go to Patient Education Activity  Goal: Patient/Family Education  Outcome: Progressing Towards Goal     Problem: Pressure Injury - Risk of  Goal: *Prevention of pressure injury  Description: Document Vance Scale and appropriate interventions in the flowsheet. Outcome: Progressing Towards Goal  Note: Pressure Injury Interventions:             Activity Interventions: Increase time out of bed, Pressure redistribution bed/mattress(bed type), PT/OT evaluation    Mobility Interventions: HOB 30 degrees or less, Pressure redistribution bed/mattress (bed type), PT/OT evaluation    Nutrition Interventions: Document food/fluid/supplement intake, Offer support with meals,snacks and hydration    Friction and Shear Interventions: Apply protective barrier, creams and emollients, HOB 30 degrees or less, Foam dressings/transparent film/skin sealants                Problem: Patient Education: Go to Patient Education Activity  Goal: Patient/Family Education  Outcome: Progressing Towards Goal     Problem: Airway Clearance - Ineffective  Goal: Achieve or maintain patent airway  Outcome: Progressing Towards Goal     Problem: Gas Exchange - Impaired  Goal: Absence of hypoxia  Outcome: Progressing Towards Goal  Goal: Promote optimal lung function  Outcome: Progressing Towards Goal     Problem: Gas Exchange - Impaired  Goal: Absence of hypoxia  Outcome: Progressing Towards Goal  Goal: Promote optimal lung function  Outcome: Progressing Towards Goal     Problem: Breathing Pattern - Ineffective  Goal: Ability to achieve and maintain a regular respiratory rate  Outcome: Progressing Towards Goal     Problem:  Body Temperature -  Risk of, Imbalanced  Goal: Ability to maintain a body temperature within defined limits  Outcome: Progressing Towards Goal  Goal: Will regain or maintain usual level of consciousness  Outcome: Progressing Towards Goal  Goal: Complications related to the disease process, condition or treatment will be avoided or minimized  Outcome: Progressing Towards Goal     Problem: Isolation Precautions - Risk of Spread of Infection  Goal: Prevent transmission of infectious organism to others  Outcome: Progressing Towards Goal     Problem: Nutrition Deficits  Goal: Optimize nutrtional status  Outcome: Progressing Towards Goal     Problem: Risk for Fluid Volume Deficit  Goal: Maintain normal heart rhythm  Outcome: Progressing Towards Goal  Goal: Maintain absence of muscle cramping  Outcome: Progressing Towards Goal  Goal: Maintain normal serum potassium, sodium, calcium, phosphorus, and pH  Outcome: Progressing Towards Goal     Problem: Loneliness or Risk for Loneliness  Goal: Demonstrate positive use of time alone when socialization is not possible  Outcome: Progressing Towards Goal     Problem: Risk for Fluid Volume Deficit  Goal: Maintain normal heart rhythm  Outcome: Progressing Towards Goal  Goal: Maintain absence of muscle cramping  Outcome: Progressing Towards Goal  Goal: Maintain normal serum potassium, sodium, calcium, phosphorus, and pH  Outcome: Progressing Towards Goal     Problem: Fatigue  Goal: Verbalize increase energy and improved vitality  Outcome: Progressing Towards Goal     Problem: Patient Education: Go to Patient Education Activity  Goal: Patient/Family Education  Outcome: Progressing Towards Goal

## 2021-06-13 NOTE — PROGRESS NOTES
Bedside and verbal shift change report received from Romulo Lucas RN (offgoing nurse) by given to Justine Kendall RN (oncoming nurse). Report included the following information SBAR, Kardex, Intake/Output, MAR and Recent Results.

## 2021-06-14 ENCOUNTER — HOSPITAL ENCOUNTER (OUTPATIENT)
Dept: NON INVASIVE DIAGNOSTICS | Age: 81
Discharge: HOME | End: 2021-06-14
Attending: INTERNAL MEDICINE
Payer: MEDICARE

## 2021-06-14 VITALS
HEIGHT: 69 IN | TEMPERATURE: 97.7 F | DIASTOLIC BLOOD PRESSURE: 69 MMHG | BODY MASS INDEX: 36.87 KG/M2 | WEIGHT: 248.9 LBS | SYSTOLIC BLOOD PRESSURE: 125 MMHG | OXYGEN SATURATION: 100 % | HEART RATE: 68 BPM | RESPIRATION RATE: 16 BRPM

## 2021-06-14 VITALS
WEIGHT: 248 LBS | BODY MASS INDEX: 36.73 KG/M2 | DIASTOLIC BLOOD PRESSURE: 69 MMHG | SYSTOLIC BLOOD PRESSURE: 150 MMHG | HEIGHT: 69 IN

## 2021-06-14 LAB
ALBUMIN SERPL-MCNC: 3.1 G/DL (ref 3.4–5)
ALBUMIN/GLOB SERPL: 0.7 {RATIO} (ref 0.8–1.7)
ALP SERPL-CCNC: 58 U/L (ref 45–117)
ALT SERPL-CCNC: 18 U/L (ref 16–61)
ANION GAP SERPL CALC-SCNC: 8 MMOL/L (ref 3–18)
AST SERPL-CCNC: 12 U/L (ref 10–38)
BILIRUB SERPL-MCNC: 0.2 MG/DL (ref 0.2–1)
BUN SERPL-MCNC: 22 MG/DL (ref 7–18)
BUN/CREAT SERPL: 28 (ref 12–20)
CALCIUM SERPL-MCNC: 9.3 MG/DL (ref 8.5–10.1)
CHLORIDE SERPL-SCNC: 102 MMOL/L (ref 100–111)
CO2 SERPL-SCNC: 27 MMOL/L (ref 21–32)
CREAT SERPL-MCNC: 0.79 MG/DL (ref 0.6–1.3)
ECHO AO ASC DIAM: 3.48 CM
ECHO AO ROOT DIAM: 3.5 CM
ECHO AV AREA PEAK VELOCITY: 2.2 CM2
ECHO AV AREA VTI: 2.16 CM2
ECHO AV AREA/BSA PEAK VELOCITY: 1 CM2/M2
ECHO AV AREA/BSA VTI: 1 CM2/M2
ECHO AV MEAN GRADIENT: 3.81 MMHG
ECHO AV PEAK GRADIENT: 8.17 MMHG
ECHO AV PEAK VELOCITY: 142.92 CM/S
ECHO AV VTI: 29.13 CM
ECHO IVC PROX: 1.35 CM
ECHO LA MAJOR AXIS: 3.55 CM
ECHO LA MINOR AXIS: 1.57 CM
ECHO LA VOL 4C: 64.49 ML (ref 18–58)
ECHO LA VOLUME INDEX A4C: 28.54 ML/M2 (ref 16–28)
ECHO LV E' LATERAL VELOCITY: 8 CM/S
ECHO LV E' SEPTAL VELOCITY: 6 CM/S
ECHO LV INTERNAL DIMENSION DIASTOLIC: 5.25 CM (ref 4.2–5.9)
ECHO LV INTERNAL DIMENSION SYSTOLIC: 3.83 CM
ECHO LV IVSD: 1.1 CM (ref 0.6–1)
ECHO LV MASS 2D: 228.4 G (ref 88–224)
ECHO LV MASS INDEX 2D: 101.1 G/M2 (ref 49–115)
ECHO LV POSTERIOR WALL DIASTOLIC: 1.13 CM (ref 0.6–1)
ECHO LVOT DIAM: 2.04 CM
ECHO LVOT PEAK GRADIENT: 3.66 MMHG
ECHO LVOT PEAK VELOCITY: 95.72 CM/S
ECHO LVOT SV: 63 ML
ECHO LVOT VTI: 19.2 CM
ECHO MV A VELOCITY: 89.78 CM/S
ECHO MV AREA PHT: 2.13 CM2
ECHO MV E DECELERATION TIME (DT): 355.79 MS
ECHO MV E VELOCITY: 72.76 CM/S
ECHO MV E/A RATIO: 0.81
ECHO MV E/E' LATERAL: 9.1
ECHO MV E/E' RATIO (AVERAGED): 10.61
ECHO MV E/E' SEPTAL: 12.13
ECHO MV PRESSURE HALF TIME (PHT): 103.18 MS
ECHO TV REGURGITANT MAX VELOCITY: 290.47 CM/S
ECHO TV REGURGITANT PEAK GRADIENT: 33.75 MMHG
ERYTHROCYTE [DISTWIDTH] IN BLOOD BY AUTOMATED COUNT: 14.6 % (ref 11.6–14.5)
GLOBULIN SER CALC-MCNC: 4.7 G/DL (ref 2–4)
GLUCOSE BLD STRIP.AUTO-MCNC: 122 MG/DL (ref 70–110)
GLUCOSE BLD STRIP.AUTO-MCNC: 138 MG/DL (ref 70–110)
GLUCOSE BLD STRIP.AUTO-MCNC: 236 MG/DL (ref 70–110)
GLUCOSE SERPL-MCNC: 112 MG/DL (ref 74–99)
HCT VFR BLD AUTO: 28.7 % (ref 36–48)
HGB BLD-MCNC: 9.6 G/DL (ref 13–16)
LVOT MG: 1.89 MMHG
MCH RBC QN AUTO: 29.2 PG (ref 24–34)
MCHC RBC AUTO-ENTMCNC: 33.4 G/DL (ref 31–37)
MCV RBC AUTO: 87.2 FL (ref 74–97)
PLATELET # BLD AUTO: 147 K/UL (ref 135–420)
PMV BLD AUTO: 11.4 FL (ref 9.2–11.8)
POTASSIUM SERPL-SCNC: 3.6 MMOL/L (ref 3.5–5.5)
PROT SERPL-MCNC: 7.8 G/DL (ref 6.4–8.2)
RBC # BLD AUTO: 3.29 M/UL (ref 4.35–5.65)
SODIUM SERPL-SCNC: 137 MMOL/L (ref 136–145)
WBC # BLD AUTO: 8.8 K/UL (ref 4.6–13.2)

## 2021-06-14 PROCEDURE — C8929 TTE W OR WO FOL WCON,DOPPLER: HCPCS

## 2021-06-14 PROCEDURE — 74011250636 HC RX REV CODE- 250/636: Performed by: INTERNAL MEDICINE

## 2021-06-14 PROCEDURE — 74011250636 HC RX REV CODE- 250/636: Performed by: FAMILY MEDICINE

## 2021-06-14 PROCEDURE — 36415 COLL VENOUS BLD VENIPUNCTURE: CPT

## 2021-06-14 PROCEDURE — 85027 COMPLETE CBC AUTOMATED: CPT

## 2021-06-14 PROCEDURE — 82962 GLUCOSE BLOOD TEST: CPT

## 2021-06-14 PROCEDURE — 80053 COMPREHEN METABOLIC PANEL: CPT

## 2021-06-14 PROCEDURE — 74011250637 HC RX REV CODE- 250/637: Performed by: FAMILY MEDICINE

## 2021-06-14 PROCEDURE — 74011636637 HC RX REV CODE- 636/637: Performed by: FAMILY MEDICINE

## 2021-06-14 RX ORDER — HEPARIN 100 UNIT/ML
500 SYRINGE INTRAVENOUS ONCE
Status: COMPLETED | OUTPATIENT
Start: 2021-06-14 | End: 2021-06-14

## 2021-06-14 RX ADMIN — ISOSORBIDE MONONITRATE 30 MG: 30 TABLET, EXTENDED RELEASE ORAL at 09:44

## 2021-06-14 RX ADMIN — INSULIN LISPRO 4 UNITS: 100 INJECTION, SOLUTION INTRAVENOUS; SUBCUTANEOUS at 18:07

## 2021-06-14 RX ADMIN — SPIRONOLACTONE 25 MG: 25 TABLET ORAL at 09:43

## 2021-06-14 RX ADMIN — DEXAMETHASONE 6 MG: 4 TABLET ORAL at 09:44

## 2021-06-14 RX ADMIN — FUROSEMIDE 40 MG: 40 TABLET ORAL at 09:44

## 2021-06-14 RX ADMIN — ENOXAPARIN SODIUM 120 MG: 120 INJECTION SUBCUTANEOUS at 09:44

## 2021-06-14 RX ADMIN — METOPROLOL SUCCINATE 25 MG: 25 TABLET, EXTENDED RELEASE ORAL at 09:44

## 2021-06-14 RX ADMIN — PERFLUTREN 1 ML: 6.52 INJECTION, SUSPENSION INTRAVENOUS at 10:17

## 2021-06-14 RX ADMIN — LISINOPRIL 5 MG: 5 TABLET ORAL at 09:44

## 2021-06-14 RX ADMIN — FAMOTIDINE 20 MG: 20 TABLET ORAL at 09:44

## 2021-06-14 RX ADMIN — Medication 500 UNITS: at 19:04

## 2021-06-14 RX ADMIN — FELODIPINE 10 MG: 5 TABLET, EXTENDED RELEASE ORAL at 09:44

## 2021-06-14 NOTE — PROGRESS NOTES
Assumed are of pt from [address]. Bedside shift change report given to Deepti Martinez RN (oncoming nurse) by Veronique Daly RN (offgoing nurse). Report included the following information SBAR, Kardex, Intake/Output and MAR.

## 2021-06-14 NOTE — DISCHARGE SUMMARY
Discharge Summary    Patient: Faiza Barrett MRN: [id number]  CSN: [id number]    YOB: 1940  Age: 68 y.o. Sex: male    DOA: 6/11/2021 LOS:  LOS: 3 days   Discharge Date:      Primary Care Provider:  aJvon Ackerman MD    Admission Diagnoses: Acute chest pain [R07.9]  DM (diabetes mellitus) ([facility] [E11.9]  HTN (hypertension) [I10]  Multiple myeloma ([facility] [C90.00]    Discharge Diagnoses:    Hospital Problems  Date Reviewed: 6/12/2021        Codes Class Noted POA    Anticoagulated ICD-10-CM: Z79.01  ICD-9-CM: V58.61  6/12/2021 Yes        Left shoulder pain ICD-10-CM: M25.512  ICD-9-CM: 719.41  6/12/2021 Yes        Obesity (BMI 30-39. 9) ICD-10-CM: E66.9  ICD-9-CM: 278.00  6/12/2021 Yes        * (Principal) Pneumonia due to COVID-19 virus ICD-10-CM: U07.1, J12.82  ICD-9-CM: 480.8, 079.89  6/12/2021 Yes        COVID-19 vaccine series completed ICD-10-CM: Z92.29  ICD-9-CM: V87.49  6/12/2021 Yes        HTN (hypertension) ICD-10-CM: I10  ICD-9-CM: 401.9  6/11/2021 Yes        DM (diabetes mellitus) ([facility] ICD-10-CM: E11.9  ICD-9-CM: 250.00  6/11/2021 Yes        Multiple myeloma ([facility] ICD-10-CM: C90.00  ICD-9-CM: 203.00  2/16/2020 Yes        Systolic CHF, chronic (HCC) ICD-10-CM: I50.22  ICD-9-CM: 428.22, 428.0  2/14/2020 Yes              Discharge Condition: stable     Discharge Medications:     Current Discharge Medication List      START taking these medications    Details   HYDROcodone-acetaminophen (Norco) 5-325 mg per tablet Take 1 Tablet by mouth every four (4) hours as needed for Pain for up to 5 days. Max Daily Amount: 6 Tablets. Qty: 30 Tablet, Refills: 0  Start date: 6/13/2021, End date: 6/18/2021    Associated Diagnoses: COVID-19 vaccine series completed         CONTINUE these medications which have NOT CHANGED    Details   furosemide (Lasix) 20 mg tablet Take  by mouth daily. felodipine (PLENDIL SR) 10 mg 24 hr tablet Take 10 mg by mouth daily.       spironolactone (ALDACTONE) 25 mg tablet Take  by mouth daily. pyridoxine, vitamin B6, (VITAMIN B-6) 50 mg tablet Take 50 mg by mouth daily. rivaroxaban (XARELTO PO) Take 10 mg by mouth. POTASSIUM CHLORIDE Take 20 mEq by mouth daily. omeprazole (PRILOSEC) 20 mg capsule Take 20 mg by mouth daily. SITagliptin-metFORMIN (JANUMET) 50-1,000 mg per tablet Take 1 Tab by mouth two (2) times daily (with meals). cyanocobalamin (VITAMIN B12) 500 mcg tablet Take 1,000 mcg by mouth daily. lisinopril (PRINIVIL, ZESTRIL) 5 mg tablet Take 1 Tab by mouth daily. Qty: 30 Tab, Refills: 0      isosorbide mononitrate ER (IMDUR) 30 mg tablet Take 1 Tab by mouth daily. Qty: 30 Tab, Refills: 0      metoprolol succinate (TOPROL-XL) 25 mg XL tablet Take 1 Tab by mouth daily. Qty: 30 Tab, Refills: 0      pravastatin (PRAVACHOL) 40 mg tablet Take 1 Tab by mouth nightly. Qty: 30 Tab, Refills: 0      calcium-cholecalciferol, d3, (CALCIUM 600 + D) 600-125 mg-unit tab Take 1 Tablet by mouth two (2) times a day. pioglitazone (ACTOS) 30 mg tablet Take 30 mg by mouth daily. Procedures : none     Consults: Cardiology      PHYSICAL EXAM   Visit Vitals  /69 (BP 1 Location: Left arm, BP Patient Position: At rest)   Pulse 68   Temp 97.7 °F (36.5 °C)   Resp 16   Ht 5' 9.02\" (1.753 m)   Wt 112.9 kg (248 lb 14.4 oz)   SpO2 100%   BMI 36.61 kg/m²     General: Awake, cooperative, no acute distress    HEENT: NC, Atraumatic. PERRLA, EOMI. Anicteric sclerae. Lungs:  CTA Bilaterally. No Wheezing/Rhonchi/Rales. Heart:  Regular  rhythm,  No murmur, No Rubs, No Gallops  Abdomen: Soft, Non distended, Non tender. +Bowel sounds,   Extremities: No c/c/e  Psych:   Not anxious or agitated. Neurologic:  No acute neurological deficits.                                      Admission HPI :   Patricia Presaurelio is a 68 y.o. male with multiple myeloma in remission, systolic CHF, type 2 diabetes mellitus, hypertension, and obesity who presents to the ED with acutely worsening chronic left shoulder pain with left-sided chest pain today. He also endorses shortness of breath from the pain. He has been feeling well otherwise and denies any fever or cough. He was vaccinated for Covid in December 2020 as he was in a rehabilitation facility at that time. His whole family has also been vaccinated. He has been in remission for multiple myeloma since 2005 but had weakness and a compression fracture thought to be due to Revlimid so this was stopped. His left shoulder pain is been worsening over the past month and he is being followed by Dr. Sarah Eason. He only takes Tylenol as needed for shoulder pain. He has an MRI of the left shoulder ordered for Monday.         Hospital Course :   Since pt  was admitted, pt  was put on cardiac monitor and acs was ruled out. V/q scan  negative for PE and acs was ruled out. Pt remained chest pain free. Cardiologist was on board and he was cleared to be d.c.  He has shoulder pain, orthopedics recommend out-pt f/u. He also positive covid 19 -pna, no sob, no hypoxia. No steroid indicated, continue home self quarantine. Discharge planning discussed with patient, pt agrees  with the plan and no questions and concerns at this point.        Activity: Activity as tolerated    Diet: Diabetic Diet    Follow-up: PCP , Dr. Sarah Eason     Disposition: home     Minutes spent on discharge: 45 min       Labs: Results:       Chemistry Recent Labs     06/14/21 0625 06/13/21 0445 06/12/21  0750   * 96 122*    136 135*   K 3.6 3.6 4.0    102 101   CO2 27 26 26   BUN 22* 20* 18   CREA 0.79 0.73 0.71   CA 9.3 9.7 10.2*   AGAP 8 8 8   BUCR 28* 27* 25*   AP 58 55 63   TP 7.8 7.4 8.1   ALB 3.1* 3.1* 3.3*   GLOB 4.7* 4.3* 4.8*   AGRAT 0.7* 0.7* 0.7*      CBC w/Diff Recent Labs     06/14/21 0625 06/13/21 0445 06/12/21  0750   WBC 8.8 7.7 6.7   RBC 3.29* 3.22* 3.42*   HGB 9.6* 9.3* 10.1*   HCT 28.7* 28.5* 30.3*    124* 144      Cardiac Enzymes Recent Labs     06/12/21  0750 06/11/21  1750   CPK 64 60   CKND1 3.6 5.2*      Coagulation No results for input(s): PTP, INR, APTT, INREXT in the last 72 hours. Lipid Panel Lab Results   Component Value Date/Time    Cholesterol, total 89 12/08/2020 01:12 AM    HDL Cholesterol 46 12/08/2020 01:12 AM    LDL, calculated 28.6 12/08/2020 01:12 AM    VLDL, calculated 14.4 12/08/2020 01:12 AM    Triglyceride 72 12/08/2020 01:12 AM    CHOL/HDL Ratio 1.9 12/08/2020 01:12 AM      BNP No results for input(s): BNPP in the last 72 hours. Liver Enzymes Recent Labs     06/14/21  0625   TP 7.8   ALB 3.1*   AP 58      Thyroid Studies Lab Results   Component Value Date/Time    TSH 1.16 12/07/2020 12:25 PM    TSH 0.74 12/07/2020 12:25 PM          @micro    Significant Diagnostic Studies: NM LUNG SCAN PERF    Result Date: 6/11/2021  EXAM: Perfusion only scan INDICATION: Evaluate for PE COMPARISON: Same day chest radiograph correlation. TECHNIQUE: .  6 mCi of Tc 99m MAA was administered for perfusion. Injection site was right chest port. _______________ FINDINGS: PERFUSION: No segmental perfusion defects seen. _______________     PE absent. XR SHOULDER LT AP/LAT MIN 2 V    Result Date: 5/19/2021  EXAM: XR SHOULDER LT AP/LAT MIN 2 V CLINICAL INDICATION/HISTORY: shoulder pain -Additional: None COMPARISON: None TECHNIQUE: 2 views of the left shoulder _______________ FINDINGS: BONES: No evidence of acute fracture or dislocation. AC joint arthrosis. Mild glenohumeral joint arthrosis. Osseous demineralization. SOFT TISSUES: Unremarkable. _______________     No evidence of acute fracture or dislocation. Glenohumeral and AC joint arthrosis. XR CHEST PORT    Result Date: 6/11/2021  HISTORY: -Provided with order: chest pain -Additional: None Technique : AP PORTABLE CHEST Comparison : 05/19/21. FINDINGS: HEART AND MEDIASTINUM: Right IJ central venous catheter tip projects over expected cavoatrial junction.  LUNGS AND PLEURAL SPACES: Penetration is limited by body habitus. Slight ill-defined opacities are questioned bilaterally, which may reflect developing atelectasis or infiltrate. No consolidation, pleural effusion or pneumothorax is seen. BONY THORAX AND SOFT TISSUES: No acute osseous abnormality. Findings suspicious for progressive bilateral atelectasis or infiltrates. XR CHEST PORT    Result Date: 5/19/2021  EXAM: XR CHEST PORT CLINICAL INDICATION/HISTORY: shoulder pain -Additional: None COMPARISON: 12/9/2020 TECHNIQUE: Portable frontal view of the chest _______________ FINDINGS: SUPPORT DEVICES: Right chest wall port tip at the SVC. HEART AND MEDIASTINUM: Cardiomediastinal silhouette within normal limits. LUNGS AND PLEURAL SPACES: No dense consolidation, large effusion or pneumothorax. Left basilar atelectasis. _______________     No acute cardiopulmonary abnormality. ECHO ADULT COMPLETE    Result Date: 6/14/2021  · Image quality for this study was technically difficult. Contrast used: DEFINITY. · LV: Estimated LVEF is 50 - 55%. Normal cavity size. Mild concentric hypertrophy. Low normal systolic function. Mild (grade 1) left ventricular diastolic dysfunction E/E' ratio is 10.61. · LA: Mildly dilated left atrium. · RV: Not well visualized. · PA: Mild pulmonary hypertension. Pulmonary arterial systolic pressure is 37 mmHg. DUPLEX LOWER EXT VENOUS BILAT    Result Date: 6/14/2021  · No evidence of deep vein thrombosis in the right lower extremity. · No evidence of deep vein thrombosis in the left lower extremity.               [facility]     CC: Gillian Peabody, MD

## 2021-06-14 NOTE — PROGRESS NOTES
Cardiology Progress Note        Patient: Nikia Lockett        Sex: male          DOA: 6/11/2021  YOB: 1940      Age:  68 y.o.        LOS:  LOS: 3 days   Assessment/Plan     Principal Problem:    Pneumonia due to COVID-19 virus (6/12/2021)    Active Problems:    Systolic CHF, chronic ([facility] (2/14/2020)      Multiple myeloma ([facility] (2/16/2020)      HTN (hypertension) (6/11/2021)      DM (diabetes mellitus) ([facility] (6/11/2021)      Anticoagulated (6/12/2021)      Left shoulder pain (6/12/2021)      Obesity (BMI 30-39.9) (6/12/2021)      COVID-19 vaccine series completed (6/12/2021)        Plan:    Chest pain   atypical chest pain  Musculoskeletal chest pain    ACS ruled out  Echocardiogram stable EF and wall motion  Patient is scheduled to have outpatient orthopedic evaluation of shoulder pain  Discussed with patient   continue with current cardiac medication  Discussed with Dr. Albert Hong.                    Subjective:    cc:  Chest pain chest pain musculoskeletal  Atypical chest pain  ACS ruled out          REVIEW OF SYSTEMS:     General: No fevers or chills. Cardiovascular: No chest pain or pressure. No palpitations. No ankle swelling  Pulmonary: No SOB, orthopnea, PND  Gastrointestinal: No nausea, vomiting or diarrhea      Objective:      Visit Vitals  /69 (BP 1 Location: Left arm, BP Patient Position: At rest)   Pulse 68   Temp 97.7 °F (36.5 °C)   Resp 16   Ht 5' 9.02\" (1.753 m)   Wt 112.9 kg (248 lb 14.4 oz)   SpO2 100%   BMI 36.61 kg/m²     Body mass index is 36.61 kg/m². Physical Exam:  General Appearance: Comfortable, not using accessory muscles of respiration. NECK: No JVD, no thyroidomeglay. LUNGS: Clear bilaterally. HEART: S1+S2 audible,    ABD: Non-tender, BS Audible    EXT: No edema, and no cysnosis. VASCULAR EXAM: Pulses are intact. PSYCHIATRIC EXAM: Mood is appropriate.     Medication:  Current Facility-Administered Medications   Medication Dose Route Frequency    dexAMETHasone (DECADRON) tablet 6 mg  6 mg Oral DAILY    sodium chloride (NS) flush 5-40 mL  5-40 mL IntraVENous Q8H    sodium chloride (NS) flush 5-40 mL  5-40 mL IntraVENous PRN    acetaminophen (TYLENOL) tablet 650 mg  650 mg Oral Q6H PRN    Or    acetaminophen (TYLENOL) suppository 650 mg  650 mg Rectal Q6H PRN    polyethylene glycol (MIRALAX) packet 17 g  17 g Oral DAILY PRN    ondansetron (ZOFRAN ODT) tablet 4 mg  4 mg Oral Q8H PRN    Or    ondansetron (ZOFRAN) injection 4 mg  4 mg IntraVENous Q6H PRN    famotidine (PEPCID) tablet 20 mg  20 mg Oral BID    insulin lispro (HUMALOG) injection   SubCUTAneous AC&HS    glucose chewable tablet 16 g  16 g Oral PRN    glucagon (GLUCAGEN) injection 1 mg  1 mg IntraMUSCular PRN    dextrose (D50W) injection syrg 12.5-25 g  25-50 mL IntraVENous PRN    felodipine (PLENDIL SR) 24 hr tablet 10 mg  10 mg Oral DAILY    isosorbide mononitrate ER (IMDUR) tablet 30 mg  30 mg Oral DAILY    lisinopriL (PRINIVIL, ZESTRIL) tablet 5 mg  5 mg Oral DAILY    pravastatin (PRAVACHOL) tablet 40 mg  40 mg Oral QHS    spironolactone (ALDACTONE) tablet 25 mg  25 mg Oral DAILY    enoxaparin (LOVENOX) injection 120 mg  1 mg/kg SubCUTAneous Q12H    oxyCODONE IR (ROXICODONE) tablet 10 mg  10 mg Oral Q6H PRN    furosemide (LASIX) tablet 40 mg  40 mg Oral DAILY    metoprolol succinate (TOPROL-XL) XL tablet 25 mg  25 mg Oral DAILY               Lab/Data Reviewed:  Procedures/imaging: see electronic medical records for all procedures/Xrays   and details which were not copied into this note but were reviewed prior to creation of Plan       All lab results for the last 24 hours reviewed.      Recent Labs     06/14/21  0625 06/13/21 0445 06/12/21  0750   WBC 8.8 7.7 6.7   HGB 9.6* 9.3* 10.1*   HCT 28.7* 28.5* 30.3*    124* 144     Recent Labs     06/14/21  0625 06/13/21 0445 06/12/21  0750    136 135*   K 3.6 3.6 4.0    102 101   CO2 27 26 26   GLU 112* 96 122*   BUN 22* 20* 18   CREA 0.79 0.73 0.71   CA 9.3 9.7 10.2*       RADIOLOGY:  CT Results  (Last 48 hours)    None        CXR Results  (Last 48 hours)    None            Cardiology Procedures:   Results for orders placed or performed during the hospital encounter of 06/11/21   EKG, 12 LEAD, INITIAL   Result Value Ref Range    Ventricular Rate 95 BPM    Atrial Rate 95 BPM    P-R Interval 206 ms    QRS Duration 140 ms    Q-T Interval 356 ms    QTC Calculation (Bezet) 447 ms    Calculated P Axis 63 degrees    Calculated R Axis 2 degrees    Calculated T Axis 133 degrees    Diagnosis       Normal sinus rhythm  Left bundle branch block  Abnormal ECG  When compared with ECG of 19-MAY-2021 09:35,  No significant change was found  Confirmed by La Eller MD. ([id number]) on 6/11/2021 8:45:13 PM        Echo Results  (Last 48 hours)    None       Cardiolite (Tc-99m Sestamibi) stress test    Signed By: Sam Zazueta MD     June 14, 2021

## 2021-06-14 NOTE — PROGRESS NOTES
Problem: Pain  Goal: *Control of Pain  Outcome: Progressing Towards Goal  Goal: *PALLIATIVE CARE:  Alleviation of Pain  Outcome: Progressing Towards Goal     Problem: Patient Education: Go to Patient Education Activity  Goal: Patient/Family Education  Outcome: Progressing Towards Goal     Problem: Falls - Risk of  Goal: *Absence of Falls  Description: Document Hernando Felder Fall Risk and appropriate interventions in the flowsheet. Outcome: Progressing Towards Goal  Note: Fall Risk Interventions:  Mobility Interventions: Bed/chair exit alarm, Communicate number of staff needed for ambulation/transfer, Patient to call before getting OOB, Utilize walker, cane, or other assistive device         Medication Interventions: Bed/chair exit alarm, Teach patient to arise slowly, Patient to call before getting OOB    Elimination Interventions: Bed/chair exit alarm, Call light in reach, Patient to call for help with toileting needs, Toileting schedule/hourly rounds, Toilet paper/wipes in reach    History of Falls Interventions: Bed/chair exit alarm, Door open when patient unattended, Investigate reason for fall         Problem: Patient Education: Go to Patient Education Activity  Goal: Patient/Family Education  Outcome: Progressing Towards Goal     Problem: Pressure Injury - Risk of  Goal: *Prevention of pressure injury  Description: Document Vance Scale and appropriate interventions in the flowsheet. Outcome: Progressing Towards Goal  Note: Pressure Injury Interventions:             Activity Interventions: Increase time out of bed, Pressure redistribution bed/mattress(bed type), PT/OT evaluation    Mobility Interventions: HOB 30 degrees or less, Pressure redistribution bed/mattress (bed type), PT/OT evaluation    Nutrition Interventions: Document food/fluid/supplement intake, Offer support with meals,snacks and hydration    Friction and Shear Interventions: Apply protective barrier, creams and emollients, HOB 30 degrees or less, Foam dressings/transparent film/skin sealants                Problem: Patient Education: Go to Patient Education Activity  Goal: Patient/Family Education  Outcome: Progressing Towards Goal     Problem: Airway Clearance - Ineffective  Goal: Achieve or maintain patent airway  Outcome: Progressing Towards Goal     Problem: Gas Exchange - Impaired  Goal: Absence of hypoxia  Outcome: Progressing Towards Goal  Goal: Promote optimal lung function  Outcome: Progressing Towards Goal     Problem: Breathing Pattern - Ineffective  Goal: Ability to achieve and maintain a regular respiratory rate  Outcome: Progressing Towards Goal     Problem:  Body Temperature -  Risk of, Imbalanced  Goal: Ability to maintain a body temperature within defined limits  Outcome: Progressing Towards Goal  Goal: Will regain or maintain usual level of consciousness  Outcome: Progressing Towards Goal  Goal: Complications related to the disease process, condition or treatment will be avoided or minimized  Outcome: Progressing Towards Goal     Problem: Isolation Precautions - Risk of Spread of Infection  Goal: Prevent transmission of infectious organism to others  Outcome: Progressing Towards Goal     Problem: Nutrition Deficits  Goal: Optimize nutrtional status  Outcome: Progressing Towards Goal     Problem: Risk for Fluid Volume Deficit  Goal: Maintain normal heart rhythm  Outcome: Progressing Towards Goal  Goal: Maintain absence of muscle cramping  Outcome: Progressing Towards Goal  Goal: Maintain normal serum potassium, sodium, calcium, phosphorus, and pH  Outcome: Progressing Towards Goal     Problem: Loneliness or Risk for Loneliness  Goal: Demonstrate positive use of time alone when socialization is not possible  Outcome: Progressing Towards Goal     Problem: Fatigue  Goal: Verbalize increase energy and improved vitality  Outcome: Progressing Towards Goal     Problem: Patient Education: Go to Patient Education Activity  Goal: Patient/Family Education  Outcome: Progressing Towards Goal

## 2021-06-14 NOTE — DISCHARGE INSTRUCTIONS
DISCHARGE SUMMARY from Nurse    PATIENT INSTRUCTIONS:    *  Please give a list of your current medications to your Primary Care Provider. *  Please update this list whenever your medications are discontinued, doses are      changed, or new medications (including over-the-counter products) are added. *  Please carry medication information at all times in case of emergency situations. These are general instructions for a healthy lifestyle:    No smoking/ No tobacco products/ Avoid exposure to second hand smoke  Surgeon General's Warning:  Quitting smoking now greatly reduces serious risk to your health. Obesity, smoking, and sedentary lifestyle greatly increases your risk for illness    A healthy diet, regular physical exercise & weight monitoring are important for maintaining a healthy lifestyle    You may be retaining fluid if you have a history of heart failure or if you experience any of the following symptoms:  Weight gain of 3 pounds or more overnight or 5 pounds in a week, increased swelling in our hands or feet or shortness of breath while lying flat in bed. Please call your doctor as soon as you notice any of these symptoms; do not wait until your next office visit. The discharge information has been reviewed with the patient. The patient verbalized understanding. Discharge medications reviewed with the patient and appropriate educational materials and side effects teaching were provided.     Patient armband removed and shredded      ___________________________________________________________________________________________________________________________________

## 2021-06-14 NOTE — ROUTINE PROCESS
Assume care of patient from [address]. Patient received in bed awake. Patient A&Ox4, denies pain and discomfort. No distress noted. Bed locked in low position. Call bell within reach and patient verbalized understanding of use for assistance and needs. 0715- Bedside and Verbal shift change report given to JOHN Del Toro (oncoming nurse) by Gavin Borjas RN (offgoing nurse). Report included the following information SBAR, Kardex, Intake/Output, MAR and Recent Results. [facility] Night Shift Chart Audit    Chart Audit completed?  YES

## 2021-06-14 NOTE — PROGRESS NOTES
Case discussed with Terrence ESCOBAR. Recommend shoulder MRI as outpatient. Clinic will call him tomorrow to arrange for the imaging. Case discussed with Dr. Chantal Suh. He cleared to be discharge. Plan discussed with the patient, pt agrees to be d/c home.

## 2021-06-15 ENCOUNTER — PATIENT OUTREACH (OUTPATIENT)
Dept: CASE MANAGEMENT | Age: 81
End: 2021-06-15

## 2021-06-15 NOTE — PROGRESS NOTES
Patient contacted regarding COVID-19 risk, exposure, diagnosis, pulse oximeter ordered at discharge, monoclonal antibody infusion follow up. Discussed COVID-19 related testing which was available at this time. Test results were positive. Patient informed of results, if available? yes. Care Transition Nurse contacted the patient by telephone to perform post discharge assessment. Call within 2 business days of discharge: Yes Verified name and  with patient as identifiers. Provided introduction to self, and explanation of the CTN/ACM role, and reason for call due to risk factors for infection and/or exposure to COVID-19. Symptoms reviewed with patient who verbalized the following symptoms: fatigue, cough and shortness of breath      Due to no new or worsening symptoms encounter was not routed to provider for escalation. Discussed follow-up appointments. If no appointment was previously scheduled, appointment scheduling offered:  yes. Ta[address] Dr follow up appointment(s): No future appointments. Non-[facility] follow up appointment(s): Patient to follow up with PCP    Interventions to address risk factors: Scheduled appointment with [facility] ([facility] Leoncio Lawrence:   Does patient have an Advance Directive: decision makers updated. Educated patient about risk for severe COVID-19 due to risk factors according to CDC guidelines. CTN reviewed discharge instructions, medical action plan and red flag symptoms with the patient who verbalized understanding. Discussed COVID vaccination status: yes. Education provided on COVID-19 vaccination as appropriate. Discussed exposure protocols and quarantine with CDC Guidelines. Patient was given an opportunity to verbalize any questions and concerns and agrees to contact CTN or health care provider for questions related to their healthcare.     Reviewed and educated patient on any new and changed medications related to discharge diagnosis     Was patient discharged with a pulse oximeter? no Discussed and confirmed pulse oximeter discharge instructions and when to notify provider or seek emergency care. CTN provided contact information. Plan for follow-up call in 5-7 days based on severity of symptoms and risk factors. Spoke with patient. He stated that he was feeling better today. He has all meds. He is eating and drinking well. Advised patient to wash his hands frequently and ensure that he has plenty of water due to disease process. Also encouraged patient to wipe things down in the home such as door knobs and faucets to keep germs down. Will follow.

## 2021-06-15 NOTE — PROGRESS NOTES
Physician Progress Note      PATIENT:               Serge Duong  CSN #:                  [id number]  :                       1940  ADMIT DATE:       2021 3:13 PM  [address] [place] DATE:        2021 7:00 PM  RESPONDING  PROVIDER #:        Ariela Buchanan MD          QUERY TEXT:    Dear Dr Jayesh Hampton and/ or  Dr Ameya Bentley  Pt admitted with COVID-19 and noted to have 2/4 SIRS and PNA . If possible, please document in progress notes and discharge summary if you are evaluating and/or treating: The medical record reflects the following:  Risk Factors: COVID -19 PNA  Clinical Indicators: EDVS Pul 101, RR 24 pmh Multiple myeloma in remission ,Positive for shortness of breath ,: Positive for chest pain. Treatment: IV rocephin ,Dexamethasone 6 mg p.o, Labs monitored    Thank you  Campos Joyner RN   [phone]  Options provided:  -- Sepsis not present on admission due to COVID-19 infection  -- Sepsis present on admission due to COVID-19 pneumonia  -- Covid-19 infection without sepsis  -- Covid-19 pneumonia without sepsis  -- Other - I will add my own diagnosis  -- Disagree - Not applicable / Not valid  -- Disagree - Clinically unable to determine / Unknown  -- Refer to Clinical Documentation Reviewer    PROVIDER RESPONSE TEXT:    This patient has sepsis which was not present on admission due to COVID-19 infection. Query created by:  Ravi Sood on 2021 5:11 PM      Electronically signed by:  Ariela Buchanan MD 6/15/2021 8:22 AM

## 2021-06-22 ENCOUNTER — PATIENT OUTREACH (OUTPATIENT)
Dept: CASE MANAGEMENT | Age: 81
End: 2021-06-22

## 2021-06-22 NOTE — PROGRESS NOTES
Patient contacted regarding COVID-19 risk, exposure, diagnosis, pulse oximeter ordered at discharge, monoclonal antibody infusion follow up. Discussed COVID-19 related testing which was available at this time. Test results were positive. Patient informed of results, if available? yes      Care Transition Nurse contacted the patient by telephone to perform follow-up assessment. Verified name and  with patient as identifiers. Patient has following risk factors of: diabetes and HTN. Symptoms reviewed with patient who verbalized the following symptoms: fatigue, cough and shortness of breath. Due to no new or worsening symptoms encounter was not routed to provider for escalation. Interventions to address risk factors: Scheduled appointment with PCP-Romario Mancini Pi patient about risk for severe COVID-19 due to risk factors according to [facility] guidelines. CTN reviewed discharge instructions, medical action plan and red flag symptoms with the patient who verbalized understanding. Discussed COVID vaccination status: no. Education provided on COVID-19 vaccination as appropriate. Discussed exposure protocols and quarantine with CDC Guidelines. Patient was given an opportunity to verbalize any questions and concerns and agrees to contact CTN or health care provider for questions related to their healthcare. Reviewed and educated patient on any new and changed medications related to discharge diagnosis     Was patient discharged with a pulse oximeter? no Discussed and confirmed pulse oximeter discharge instructions and when to notify provider or seek emergency care. CTN provided contact information. Plan for follow-up call in 5-7 days based on severity of symptoms and risk factors.

## 2021-06-29 ENCOUNTER — PATIENT OUTREACH (OUTPATIENT)
Dept: CASE MANAGEMENT | Age: 81
End: 2021-06-29

## 2021-06-29 NOTE — PROGRESS NOTES
Patient resolved from [address] Transitions episode on 6/29/2021. Discussed COVID-19 related testing which was available at this time. Test results were positive. Patient informed of results, if available? yes     Patient/family has been provided the following resources and education related to COVID-19:                         Signs, symptoms and red flags related to COVID-19            [facility] exposure and quarantine guidelines            [employer] exposure contact - [phone]            Contact for their local Department of Health                 Patient currently reports that the following symptoms have improved:  no new symptoms and no worsening symptoms. No further outreach scheduled with this CTN/ACM/LPN/HC/ MA. Episode of Care resolved. Patient has this CTN/ACM/LPN/HC/MA contact information if future needs arise.

## 2021-07-01 ENCOUNTER — HOSPITAL ENCOUNTER (EMERGENCY)
Age: 81
Discharge: HOME | End: 2021-07-01
Attending: EMERGENCY MEDICINE
Payer: MEDICARE

## 2021-07-01 VITALS
HEART RATE: 88 BPM | TEMPERATURE: 98.3 F | BODY MASS INDEX: 34.66 KG/M2 | SYSTOLIC BLOOD PRESSURE: 143 MMHG | OXYGEN SATURATION: 97 % | RESPIRATION RATE: 20 BRPM | HEIGHT: 69 IN | DIASTOLIC BLOOD PRESSURE: 68 MMHG | WEIGHT: 234 LBS

## 2021-07-01 DIAGNOSIS — M54.50 LOW BACK PAIN, UNSPECIFIED BACK PAIN LATERALITY, UNSPECIFIED CHRONICITY, UNSPECIFIED WHETHER SCIATICA PRESENT: Primary | ICD-10-CM

## 2021-07-01 PROCEDURE — 74011250637 HC RX REV CODE- 250/637: Performed by: EMERGENCY MEDICINE

## 2021-07-01 PROCEDURE — 74011000250 HC RX REV CODE- 250: Performed by: EMERGENCY MEDICINE

## 2021-07-01 PROCEDURE — 99284 EMERGENCY DEPT VISIT MOD MDM: CPT

## 2021-07-01 RX ORDER — DOCUSATE SODIUM 100 MG/1
100 CAPSULE, LIQUID FILLED ORAL 2 TIMES DAILY
Qty: 60 CAPSULE | Refills: 2 | Status: SHIPPED | OUTPATIENT
Start: 2021-07-01 | End: 2021-01-01

## 2021-07-01 RX ORDER — LIDOCAINE 4 G/100G
1 PATCH TOPICAL EVERY 24 HOURS
Status: DISCONTINUED | OUTPATIENT
Start: 2021-07-01 | End: 2021-07-01 | Stop reason: HOSPADM

## 2021-07-01 RX ORDER — HYDROCODONE BITARTRATE AND ACETAMINOPHEN 5; 325 MG/1; MG/1
2 TABLET ORAL
Status: COMPLETED | OUTPATIENT
Start: 2021-07-01 | End: 2021-07-01

## 2021-07-01 RX ORDER — LIDOCAINE 50 MG/G
PATCH TOPICAL
Qty: 15 EACH | Refills: 0 | Status: SHIPPED | OUTPATIENT
Start: 2021-07-01

## 2021-07-01 RX ORDER — HYDROCODONE BITARTRATE AND ACETAMINOPHEN 5; 325 MG/1; MG/1
1 TABLET ORAL
Qty: 12 TABLET | Refills: 0 | Status: SHIPPED | OUTPATIENT
Start: 2021-07-01 | End: 2021-07-04

## 2021-07-01 RX ADMIN — HYDROCODONE BITARTRATE AND ACETAMINOPHEN 2 TABLET: 5; 325 TABLET ORAL at 07:12

## 2021-07-01 NOTE — DISCHARGE INSTRUCTIONS
Please call your doctor today.   Please return for increasing pain, weakness, loss of bowel or bladder function, fevers, chills or any other concerning symptoms    Please talk to your doctor about home health and physical therapy evaluation as well as referral to spine specialist for your ongoing low back pain

## 2021-07-01 NOTE — ED NOTES
Spoke with daughter regarding patient DC plan. Reviewed discharge instructions and daughter verbalized understanding.

## 2021-07-01 NOTE — ED PROVIDER NOTES
Patient states that he has had trouble with his back for some time. He no longer is able to walk, he uses a wheelchair and a walker. No change in bowel or bladder habit. No nausea or vomiting. States this morning he used his home urinal and just found that moving around and transferring was incredibly uncomfortable. No better with his home acetaminophen. He often has his legs give out due to pain. States this is not new or different. Denies chest pain or shortness of breath. No change in bowel or bladder function. The history is provided by the patient. Back Pain   This is a recurrent problem. The current episode started more than 2 days ago. The problem has not changed since onset. The quality of the pain is described as aching. The pain is moderate. Pertinent negatives include no chest pain, no fever, no numbness, no abdominal pain, no bowel incontinence, no perianal numbness, no bladder incontinence, no dysuria, no paresthesias, no paresis, no tingling and no weakness. He has tried analgesics for the symptoms.  The patient's surgical history includes spinal fusion and epidural.       Past Medical History:   Diagnosis Date    [facility]     Diabetes ([facility]     FH: chemotherapy     H/O stem cell transplant ([facility]     Hypertension     Multiple myeloma ([facility]     Neuropathy     Obesity (BMI 30-39.9) 6/12/2021       Past Surgical History:   Procedure Laterality Date    HX CHOLECYSTECTOMY      HX KYPHOPLASTY           Family History:   Problem Relation Age of Onset    Diabetes Father     Heart Disease Father     Hypertension Mother     Diabetes Mother     Diabetes Sister     Diabetes Brother        Social History     Socioeconomic History    Marital status:      Spouse name: Not on file    Number of children: Not on file    Years of education: Not on file    Highest education level: Not on file   Occupational History    Not on file   Tobacco Use    Smoking status: Former Smoker  Smokeless tobacco: Never Used   Substance and Sexual Activity    Alcohol use: Never     Comment: rare    Drug use: No    Sexual activity: Not on file   Other Topics Concern     Service Not Asked    Blood Transfusions Not Asked    Caffeine Concern Not Asked    Occupational Exposure Not Asked    Hobby Hazards Not Asked    Sleep Concern Not Asked    Stress Concern Not Asked    Weight Concern Not Asked    Special Diet Not Asked    Back Care Not Asked    Exercise Not Asked    Bike Helmet Not Asked   [address] Not Asked    Self-Exams Not Asked   Social History Narrative    Not on file     Social Determinants of Health     Financial Resource Strain:     Difficulty of Paying Living Expenses:    Food Insecurity:     Worried About Running Out of Food in the Last Year:     [address] in the Last Year:    Transportation Needs:     Lack of Transportation (Medical):  Lack of Transportation (Non-Medical):    Physical Activity:     Days of Exercise per Week:     Minutes of Exercise per Session:    Stress:     Feeling of Stress :    Social Connections:     Frequency of Communication with Friends and Family:     Frequency of Social Gatherings with Friends and Family:     Attends Denominational Services:     Active Member of Clubs or Organizations:     Attends Club or Organization Meetings:     Marital Status:    Intimate Partner Violence:     Fear of Current or Ex-Partner:     Emotionally Abused:     Physically Abused:     Sexually Abused: ALLERGIES: Iodine    Review of Systems   Constitutional: Negative for fatigue and fever. HENT: Negative for congestion. Eyes: Negative for visual disturbance. Respiratory: Negative for shortness of breath. Cardiovascular: Negative for chest pain and leg swelling. Gastrointestinal: Negative for abdominal pain and bowel incontinence. Endocrine: Negative for polyuria.    Genitourinary: Negative for bladder incontinence and dysuria. Musculoskeletal: Positive for back pain. Skin: Negative for rash. Neurological: Negative for tingling, weakness, numbness and paresthesias. Psychiatric/Behavioral: Negative for behavioral problems. Vitals:    07/01/21 0613   BP: (!) 143/68   Pulse: 88   Resp: 20   Temp: 98.3 °F (36.8 °C)   SpO2: 97%   Weight: 106.1 kg (234 lb)   Height: 5' 9\" (1.753 m)            Physical Exam  Vitals reviewed. Constitutional:       Appearance: He is well-developed. HENT:      Head: Normocephalic and atraumatic. Nose: Nose normal.   Eyes:      Conjunctiva/sclera: Conjunctivae normal.   Neck:      Trachea: No tracheal deviation. Cardiovascular:      Rate and Rhythm: Normal rate and regular rhythm. Heart sounds: Normal heart sounds. Pulmonary:      Effort: Pulmonary effort is normal. No respiratory distress. Breath sounds: Normal breath sounds. Abdominal:      General: There is no distension. Palpations: Abdomen is soft. Tenderness: There is no abdominal tenderness. Musculoskeletal:         General: Tenderness present. No swelling, deformity or signs of injury. Normal range of motion. Cervical back: Neck supple. Right lower leg: Edema present. Left lower leg: Edema present. Comments: Paraspinal discomfort bilaterally. Lymphadenopathy:      Cervical: No cervical adenopathy. Skin:     General: Skin is warm and dry. Capillary Refill: Capillary refill takes less than 2 seconds. Findings: No erythema. Neurological:      Mental Status: He is alert and oriented to person, place, and time. Cranial Nerves: No cranial nerve deficit.       Comments: Grossly intact          MDM  Number of Diagnoses or Management Options  Low back pain, unspecified back pain laterality, unspecified chronicity, unspecified whether sciatica present  Diagnosis management comments: Patient is wheelchair-bound, states he is normally able to transfer from chair to wheelchair. However he states that has been more difficult as he is having much more pain in his back over the last week. States he does have a spine specialist he is supposed to get injections, however he has been so busy going to other doctors he has been unable. We will provide stronger pain medication, reviewed with patient importance of follow-up.  as well as case management consulted to assist with follow-up and home care plan    ED Course as of Jul 01 0809   Thu Jul 01, 2021   0044 Per Prior Chart    IMPRESSION:     1. Numerous osseous lesions throughout visualized lower thoracic spine, lumbar  spine, and osseous pelvis. Indeterminant to what extent these findings represent  diffuse osseous metastatic disease versus multiple myeloma. No evident acute  pathologic fracture. Unchanged chronic T11 and L1 wedge compression deformities. Previous L1 vertebral augmentation. 2. Multilevel degenerative disc disease and facet arthrosis with mild to  moderate canal and stenoses. There is abutment of the ventral distal thoracic  cord at T11-T12. No high-grade stenosis or definite nerve root impingement  elsewhere.   Imaging    MRI LUMB SPINE WO CONT (Order: [id number]) - 12/8/2020      [BT]      ED Course User Index  [BT] Delmer Jordan MD       Procedures

## 2021-07-15 ENCOUNTER — HOSPITAL ENCOUNTER (OUTPATIENT)
Dept: MRI IMAGING | Age: 81
Discharge: HOME | End: 2021-07-15
Attending: ORTHOPAEDIC SURGERY
Payer: MEDICARE

## 2021-07-15 DIAGNOSIS — M25.512 LEFT SHOULDER PAIN: ICD-10-CM

## 2021-07-15 PROCEDURE — 73221 MRI JOINT UPR EXTREM W/O DYE: CPT

## 2021-07-24 ENCOUNTER — HOSPITAL ENCOUNTER (INPATIENT)
Age: 81
LOS: 3 days | Discharge: NURSING, REHAB OR LONG-TERM CARE | DRG: 841 | End: 2021-07-27
Attending: STUDENT IN AN ORGANIZED HEALTH CARE EDUCATION/TRAINING PROGRAM | Admitting: FAMILY MEDICINE
Payer: MEDICARE

## 2021-07-24 ENCOUNTER — APPOINTMENT (OUTPATIENT)
Dept: MRI IMAGING | Age: 81
DRG: 841 | End: 2021-07-24
Attending: STUDENT IN AN ORGANIZED HEALTH CARE EDUCATION/TRAINING PROGRAM
Payer: MEDICARE

## 2021-07-24 ENCOUNTER — APPOINTMENT (OUTPATIENT)
Dept: CT IMAGING | Age: 81
DRG: 841 | End: 2021-07-24
Attending: STUDENT IN AN ORGANIZED HEALTH CARE EDUCATION/TRAINING PROGRAM
Payer: MEDICARE

## 2021-07-24 DIAGNOSIS — R26.2 INABILITY TO AMBULATE DUE TO MULTIPLE JOINTS: ICD-10-CM

## 2021-07-24 DIAGNOSIS — C90.00 MULTIPLE MYELOMA, REMISSION STATUS UNSPECIFIED (HCC): Primary | ICD-10-CM

## 2021-07-24 DIAGNOSIS — G62.9 NEUROPATHY: ICD-10-CM

## 2021-07-24 DIAGNOSIS — R60.0 LOWER EXTREMITY EDEMA: ICD-10-CM

## 2021-07-24 DIAGNOSIS — M48.00 SPINAL STENOSIS, UNSPECIFIED SPINAL REGION: ICD-10-CM

## 2021-07-24 DIAGNOSIS — R53.1 WEAKNESS: ICD-10-CM

## 2021-07-24 LAB
ANION GAP SERPL CALC-SCNC: 6 MMOL/L (ref 3–18)
BASOPHILS # BLD: 0 K/UL (ref 0–0.1)
BASOPHILS NFR BLD: 0 % (ref 0–2)
BNP SERPL-MCNC: 355 PG/ML (ref 0–1800)
BUN SERPL-MCNC: 25 MG/DL (ref 7–18)
BUN/CREAT SERPL: 25 (ref 12–20)
CALCIUM SERPL-MCNC: 10.6 MG/DL (ref 8.5–10.1)
CHLORIDE SERPL-SCNC: 103 MMOL/L (ref 100–111)
CO2 SERPL-SCNC: 27 MMOL/L (ref 21–32)
COVID-19 RAPID TEST, COVR: NOT DETECTED
CREAT SERPL-MCNC: 1 MG/DL (ref 0.6–1.3)
DIFFERENTIAL METHOD BLD: ABNORMAL
EOSINOPHIL # BLD: 0.1 K/UL (ref 0–0.4)
EOSINOPHIL NFR BLD: 1 % (ref 0–5)
ERYTHROCYTE [DISTWIDTH] IN BLOOD BY AUTOMATED COUNT: 15.8 % (ref 11.6–14.5)
GLUCOSE BLD STRIP.AUTO-MCNC: 114 MG/DL (ref 70–110)
GLUCOSE SERPL-MCNC: 83 MG/DL (ref 74–99)
HCT VFR BLD AUTO: 28.8 % (ref 36–48)
HGB BLD-MCNC: 9.1 G/DL (ref 13–16)
LYMPHOCYTES # BLD: 2.3 K/UL (ref 0.9–3.6)
LYMPHOCYTES NFR BLD: 30 % (ref 21–52)
MCH RBC QN AUTO: 28.7 PG (ref 24–34)
MCHC RBC AUTO-ENTMCNC: 31.6 G/DL (ref 31–37)
MCV RBC AUTO: 90.9 FL (ref 74–97)
MONOCYTES # BLD: 0.8 K/UL (ref 0.05–1.2)
MONOCYTES NFR BLD: 10 % (ref 3–10)
NEUTS SEG # BLD: 4.6 K/UL (ref 1.8–8)
NEUTS SEG NFR BLD: 58 % (ref 40–73)
PLATELET # BLD AUTO: 187 K/UL (ref 135–420)
PMV BLD AUTO: 11.3 FL (ref 9.2–11.8)
POTASSIUM SERPL-SCNC: 4 MMOL/L (ref 3.5–5.5)
RBC # BLD AUTO: 3.17 M/UL (ref 4.35–5.65)
SODIUM SERPL-SCNC: 136 MMOL/L (ref 136–145)
SOURCE, COVRS: NORMAL
WBC # BLD AUTO: 7.9 K/UL (ref 4.6–13.2)

## 2021-07-24 PROCEDURE — 87635 SARS-COV-2 COVID-19 AMP PRB: CPT

## 2021-07-24 PROCEDURE — 80048 BASIC METABOLIC PNL TOTAL CA: CPT

## 2021-07-24 PROCEDURE — 83880 ASSAY OF NATRIURETIC PEPTIDE: CPT

## 2021-07-24 PROCEDURE — 99285 EMERGENCY DEPT VISIT HI MDM: CPT

## 2021-07-24 PROCEDURE — 74011250637 HC RX REV CODE- 250/637: Performed by: FAMILY MEDICINE

## 2021-07-24 PROCEDURE — 72192 CT PELVIS W/O DYE: CPT

## 2021-07-24 PROCEDURE — 65270000029 HC RM PRIVATE

## 2021-07-24 PROCEDURE — 74011250637 HC RX REV CODE- 250/637: Performed by: STUDENT IN AN ORGANIZED HEALTH CARE EDUCATION/TRAINING PROGRAM

## 2021-07-24 PROCEDURE — 82962 GLUCOSE BLOOD TEST: CPT

## 2021-07-24 PROCEDURE — 85025 COMPLETE CBC W/AUTO DIFF WBC: CPT

## 2021-07-24 PROCEDURE — 72148 MRI LUMBAR SPINE W/O DYE: CPT

## 2021-07-24 RX ORDER — FELODIPINE 5 MG/1
10 TABLET, EXTENDED RELEASE ORAL DAILY
Status: DISCONTINUED | OUTPATIENT
Start: 2021-07-25 | End: 2021-07-27 | Stop reason: HOSPADM

## 2021-07-24 RX ORDER — SODIUM CHLORIDE 0.9 % (FLUSH) 0.9 %
5-40 SYRINGE (ML) INJECTION AS NEEDED
Status: DISCONTINUED | OUTPATIENT
Start: 2021-07-24 | End: 2021-07-27 | Stop reason: HOSPADM

## 2021-07-24 RX ORDER — GABAPENTIN 100 MG/1
200 CAPSULE ORAL ONCE
Status: COMPLETED | OUTPATIENT
Start: 2021-07-24 | End: 2021-07-24

## 2021-07-24 RX ORDER — POLYETHYLENE GLYCOL 3350 17 G/17G
17 POWDER, FOR SOLUTION ORAL DAILY PRN
Status: DISCONTINUED | OUTPATIENT
Start: 2021-07-24 | End: 2021-07-27 | Stop reason: HOSPADM

## 2021-07-24 RX ORDER — FUROSEMIDE 20 MG/1
20 TABLET ORAL DAILY
Status: DISCONTINUED | OUTPATIENT
Start: 2021-07-25 | End: 2021-07-27 | Stop reason: HOSPADM

## 2021-07-24 RX ORDER — LISINOPRIL 5 MG/1
5 TABLET ORAL DAILY
Status: DISCONTINUED | OUTPATIENT
Start: 2021-07-25 | End: 2021-07-27 | Stop reason: HOSPADM

## 2021-07-24 RX ORDER — SPIRONOLACTONE 25 MG/1
25 TABLET ORAL DAILY
Status: DISCONTINUED | OUTPATIENT
Start: 2021-07-25 | End: 2021-07-24

## 2021-07-24 RX ORDER — INSULIN LISPRO 100 [IU]/ML
INJECTION, SOLUTION INTRAVENOUS; SUBCUTANEOUS
Status: DISCONTINUED | OUTPATIENT
Start: 2021-07-24 | End: 2021-07-27 | Stop reason: HOSPADM

## 2021-07-24 RX ORDER — GABAPENTIN 300 MG/1
300 CAPSULE ORAL 2 TIMES DAILY
Status: DISCONTINUED | OUTPATIENT
Start: 2021-07-25 | End: 2021-07-27 | Stop reason: HOSPADM

## 2021-07-24 RX ORDER — ONDANSETRON 2 MG/ML
4 INJECTION INTRAMUSCULAR; INTRAVENOUS
Status: DISCONTINUED | OUTPATIENT
Start: 2021-07-24 | End: 2021-07-27 | Stop reason: HOSPADM

## 2021-07-24 RX ORDER — GABAPENTIN 100 MG/1
100 CAPSULE ORAL ONCE
Status: COMPLETED | OUTPATIENT
Start: 2021-07-24 | End: 2021-07-24

## 2021-07-24 RX ORDER — FAMOTIDINE 20 MG/1
20 TABLET, FILM COATED ORAL 2 TIMES DAILY
Status: DISCONTINUED | OUTPATIENT
Start: 2021-07-24 | End: 2021-07-27 | Stop reason: HOSPADM

## 2021-07-24 RX ORDER — ACETAMINOPHEN 325 MG/1
650 TABLET ORAL
Status: DISCONTINUED | OUTPATIENT
Start: 2021-07-24 | End: 2021-07-27 | Stop reason: HOSPADM

## 2021-07-24 RX ORDER — OXYCODONE HYDROCHLORIDE 5 MG/1
10 TABLET ORAL
Status: DISCONTINUED | OUTPATIENT
Start: 2021-07-24 | End: 2021-07-27 | Stop reason: HOSPADM

## 2021-07-24 RX ORDER — DEXTROSE 50 % IN WATER (D50W) INTRAVENOUS SYRINGE
25-50 AS NEEDED
Status: DISCONTINUED | OUTPATIENT
Start: 2021-07-24 | End: 2021-07-27 | Stop reason: HOSPADM

## 2021-07-24 RX ORDER — GABAPENTIN 400 MG/1
800 CAPSULE ORAL 2 TIMES DAILY
Status: DISCONTINUED | OUTPATIENT
Start: 2021-07-24 | End: 2021-07-24

## 2021-07-24 RX ORDER — PRAVASTATIN SODIUM 20 MG/1
40 TABLET ORAL
Status: DISCONTINUED | OUTPATIENT
Start: 2021-07-24 | End: 2021-07-27 | Stop reason: HOSPADM

## 2021-07-24 RX ORDER — ISOSORBIDE MONONITRATE 30 MG/1
30 TABLET, EXTENDED RELEASE ORAL DAILY
Status: DISCONTINUED | OUTPATIENT
Start: 2021-07-25 | End: 2021-07-27 | Stop reason: HOSPADM

## 2021-07-24 RX ORDER — ONDANSETRON 4 MG/1
4 TABLET, ORALLY DISINTEGRATING ORAL
Status: DISCONTINUED | OUTPATIENT
Start: 2021-07-24 | End: 2021-07-27 | Stop reason: HOSPADM

## 2021-07-24 RX ORDER — MAGNESIUM SULFATE 100 %
16 CRYSTALS MISCELLANEOUS AS NEEDED
Status: DISCONTINUED | OUTPATIENT
Start: 2021-07-24 | End: 2021-07-27 | Stop reason: HOSPADM

## 2021-07-24 RX ORDER — SODIUM CHLORIDE 0.9 % (FLUSH) 0.9 %
5-40 SYRINGE (ML) INJECTION EVERY 8 HOURS
Status: DISCONTINUED | OUTPATIENT
Start: 2021-07-24 | End: 2021-07-27 | Stop reason: HOSPADM

## 2021-07-24 RX ORDER — ACETAMINOPHEN 650 MG/1
650 SUPPOSITORY RECTAL
Status: DISCONTINUED | OUTPATIENT
Start: 2021-07-24 | End: 2021-07-27 | Stop reason: HOSPADM

## 2021-07-24 RX ORDER — DOCUSATE SODIUM 100 MG/1
100 CAPSULE, LIQUID FILLED ORAL 2 TIMES DAILY
Status: DISCONTINUED | OUTPATIENT
Start: 2021-07-24 | End: 2021-07-27 | Stop reason: HOSPADM

## 2021-07-24 RX ORDER — METOPROLOL SUCCINATE 25 MG/1
25 TABLET, EXTENDED RELEASE ORAL DAILY
Status: DISCONTINUED | OUTPATIENT
Start: 2021-07-25 | End: 2021-07-27 | Stop reason: HOSPADM

## 2021-07-24 RX ADMIN — GABAPENTIN 800 MG: 400 CAPSULE ORAL at 22:48

## 2021-07-24 RX ADMIN — GABAPENTIN 100 MG: 100 CAPSULE ORAL at 17:15

## 2021-07-24 RX ADMIN — PRAVASTATIN SODIUM 40 MG: 20 TABLET ORAL at 22:48

## 2021-07-24 RX ADMIN — Medication 10 ML: at 22:00

## 2021-07-24 RX ADMIN — DOCUSATE SODIUM 100 MG: 100 CAPSULE ORAL at 22:48

## 2021-07-24 RX ADMIN — FAMOTIDINE 20 MG: 20 TABLET ORAL at 22:48

## 2021-07-24 RX ADMIN — OXYCODONE 10 MG: 5 TABLET ORAL at 22:48

## 2021-07-24 RX ADMIN — GABAPENTIN 200 MG: 100 CAPSULE ORAL at 18:52

## 2021-07-24 NOTE — ED TRIAGE NOTES
Pt brought in by EMS [facility] for back pain. Pt was seen last week and sent home. Pt states \" I am now unable to walk or bare weight. \"

## 2021-07-24 NOTE — ED PROVIDER NOTES
EMERGENCY DEPARTMENT HISTORY AND PHYSICAL EXAM      Date: 7/24/2021  Patient Name: Kevin Vasquez    History of Presenting Illness     Chief Complaint   Patient presents with    Back Pain       History (Context): Kevin Vasquez is a 68 y.o. with a history of multiple myeloma, CHF, type 2 diabetes, hypertension, obesity, chronic left lower extremity weakness that was improved after prior kyphoplasty in 2019. He states that he is currently under the care of orthopedic surgery at [facility], and has an MRI planned for August 3 however yesterday, he began not being able to walk, limited by bilateral lower extremity pain, weakness of the proximal muscles it seems, he requires a lot of assistance to sit up from a lying position. He denies any falls today or recent trauma. He does not have a walker at home, prior to yesterday he was ambulating without difficulty, however he has had chronic pain in both bilateral hips. He was diagnosed with multiple myeloma in 2005, and states that it was in remission, however he is recently obtained a relationship with Dr. Jordin Knox at [facility]. , for further treatment. On review of systems, the patient denies trauma, fever, chills, IV drug use, fecal incontinence, urinary retention or incontinence, numbness, weakness, tingling, frequent falls.      PCP: Nancy Tolliver MD    Current Facility-Administered Medications   Medication Dose Route Frequency Provider Last Rate Last Admin    sodium chloride (NS) flush 5-40 mL  5-40 mL IntraVENous Q8H Jessy Chatterjee MD   10 mL at 07/25/21 1322    sodium chloride (NS) flush 5-40 mL  5-40 mL IntraVENous PRN Jessy Chatterjee MD        acetaminophen (TYLENOL) tablet 650 mg  650 mg Oral Q6H PRN Jessy Chatterjee MD        Or    acetaminophen (TYLENOL) suppository 650 mg  650 mg Rectal Q6H PRN Jessy Chatterjee MD        polyethylene glycol (MIRALAX) packet 17 g  17 g Oral DAILY PRN Jessy Chatterjee MD   17 g at 07/25/21 1058    ondansetron (ZOFRAN ODT) tablet 4 mg  4 mg Oral Q8H PRN Jorge Morales MD        Or    ondansetron [facility]) injection 4 mg  4 mg IntraVENous Q6H PRN Jorge Morales MD        famotidine (PEPCID) tablet 20 mg  20 mg Oral BID Jorge Morales MD   20 mg at 07/25/21 2100    insulin lispro (HUMALOG) injection   SubCUTAneous AC&HS Jorge Morales MD        glucose chewable tablet 16 g  16 g Oral PRN Jorge Morales MD        glucagon (GLUCAGEN) injection 1 mg  1 mg IntraMUSCular PRN Jorge Morales MD        dextrose (D50W) injection syrg 12.5-25 g  25-50 mL IntraVENous PRN Jorge Morales MD        docusate sodium (COLACE) capsule 100 mg  100 mg Oral BID Jorge Morales MD   100 mg at 07/25/21 2100    felodipine (PLENDIL SR) 24 hr tablet 10 mg  10 mg Oral DAILY Jorge Morales MD   10 mg at 07/25/21 5166    furosemide (LASIX) tablet 20 mg  20 mg Oral DAILY Jorge Morales MD   20 mg at 07/25/21 6301    isosorbide mononitrate ER (IMDUR) tablet 30 mg  30 mg Oral DAILY Jogre Morales MD   30 mg at 07/25/21 0926    lisinopriL (PRINIVIL, ZESTRIL) tablet 5 mg  5 mg Oral DAILY Jorge Morales MD   5 mg at 07/25/21 7008    metoprolol succinate (TOPROL-XL) XL tablet 25 mg  25 mg Oral DAILY Jorge Morales MD   25 mg at 07/25/21 0861    pravastatin (PRAVACHOL) tablet 40 mg  40 mg Oral QHS Nini TRINIDAD MD   40 mg at 07/25/21 2100    [Held by provider] rivaroxaban (XARELTO) tablet 10 mg  10 mg Oral QPM Jorge Morales MD        oxyCODONE IR (ROXICODONE) tablet 10 mg  10 mg Oral Q6H PRN Jorge Morales MD   10 mg at 07/24/21 2248    gabapentin (NEURONTIN) capsule 300 mg  300 mg Oral BID Jorge Morales MD   300 mg at 07/25/21 2100       Past History     Past Medical History:  Past Medical History:   Diagnosis Date    Cancer ([address]     Diabetes ([facility]     FH: chemotherapy     [facility]     Hypertension     [facility] myeloma ([facility]     Neuropathy     Obesity (BMI 30-39.9) 6/12/2021       Past Surgical History:  Past Surgical History:   Procedure Laterality Date    HX CHOLECYSTECTOMY      HX KYPHOPLASTY         Family History:  Family History   Problem Relation Age of Onset    Diabetes Father     Heart Disease Father     Hypertension Mother     Diabetes Mother     Diabetes Sister     Diabetes Brother        Social History:  Social History     Tobacco Use    Smoking status: Former Smoker    Smokeless tobacco: Never Used   Substance Use Topics    Alcohol use: Never     Comment: rare    Drug use: No       Allergies: Allergies   Allergen Reactions    Iodine Hives       PMH, PSH, family history, social history, allergies reviewed with the patient with significant items noted above. Review of Systems   As per HPI, otherwise reviewed and negative. Physical Exam     Vitals:    07/25/21 0739 07/25/21 1132 07/25/21 1616 07/25/21 1900   BP: (!) 150/62 (!) 144/64 (!) 113/53 (!) 120/55   Pulse: 73 (!) 101 88 84   Resp: 17 17 16 18   Temp: 98.1 °F (36.7 °C) 98 °F (36.7 °C) 98.4 °F (36.9 °C) 97.9 °F (36.6 °C)   SpO2: 98% 95% 100% 100%   Weight:       Height:           Gen: Well-appearing, in no acute distress appears uncomfortable, is tender at bilateral hips  Neuro:   has strong plantar and dorsiflexion of his feet. However he has very weak proximal muscles in the legs, he is unable to lift his legs up off the stretcher. Sensation intact, he is able to flex at the waist, flexing the iliopsoas, with assistance. Upper extremities and cranial nerves intact  No loss of bowel or bladder control, no scrotal numbness or tingling. Cardiovascular:  Pulses intact and equal distally. Pulmonary: No respiratory distress. No stridor. Clear lungs. ABD: Soft, nontender, nondistended. Psych: Normal thought content and thought processes.   Head: Normocephalic and atraumatic  Eyes: Extraocular muscles intact, no conjunctival pallor  Ear, nose, throat: Normal external exam  Neck: Normal range of motion  Respiratory: Patient is in no respiratory distress, lungs CTAB  GI: soft, non-tender, non-distended  MSK: No gross deformities appreciated  Extremities: pulses intact with good cap refills, no LE pitting edema or calf tenderness  Skin: Warm, dry, and intact  Psych: Appropriate mood and affect. Diagnostic Study Results     Labs -     Recent Results (from the past 12 hour(s))   GLUCOSE, POC    Collection Time: 07/25/21 11:33 AM   Result Value Ref Range    Glucose (POC) 132 (H) 70 - 110 mg/dL   GLUCOSE, POC    Collection Time: 07/25/21  4:38 PM   Result Value Ref Range    Glucose (POC) 125 (H) 70 - 110 mg/dL       Radiologic Studies -   MRI LUMB SPINE WO CONT   Final Result      No acute findings. 1.  Persistent high-grade central stenosis at T11-T12 with equivocal minimal   myelopathic signal abnormality. 2.  Interval significant improvement in the innumerable multiple myeloma   lesions. 3.  Unchanged multiple chronic compression fractures. 4.  Additional intersegmental analysis, as described. _______________                         CT PELV WO CONT   Final Result      There are diffuse osteolytic lesions throughout the visualized osseous skeleton. Given the age of this patient differential diagnoses includes multiple myeloma   and metastatic disease. Subtle acute nondisplaced right sacral alar fracture. Superior endplate fracture of L4 with mild retropulsion into the central canal   by 5 mm. This may be pathologic or osteoporotic. Age indeterminate. CPPD. CT Results  (Last 48 hours)               07/24/21 1614  CT PELV WO CONT Final result    Impression:      There are diffuse osteolytic lesions throughout the visualized osseous skeleton. Given the age of this patient differential diagnoses includes multiple myeloma   and metastatic disease. Subtle acute nondisplaced right sacral alar fracture. Superior endplate fracture of L4 with mild retropulsion into the central canal   by 5 mm. This may be pathologic or osteoporotic. Age indeterminate. CPPD. Narrative:  EXAM: CT of the pelvis       INDICATION: Unable to bear weight       COMPARISON: None. TECHNIQUE: Axial CT imaging of the pelvis was performed without intravenous   contrast. Multiplanar reformats were generated. One or more dose reduction   techniques were used on this CT: automated exposure control, adjustment of the   mAs and/or kVp according to patient size, and iterative reconstruction   techniques. The specific techniques used on this CT exam have been documented   in the patient's electronic medical record. Digital Imaging and Communications   in Medicine (DICOM) format image data are available to nonaffiliated external   healthcare facilities or entities on a secure, media free, reciprocally   searchable basis with patient authorization for at least a 12-month period after   this study. _______________       FINDINGS:       OSSEOUS STRUCTURES: Severe osteopenia. Extensive lytic lesions are seen   throughout the lower lumbar spine, pelvic bones, sacrum and proximal femurs   suggesting multiple myeloma or metastatic disease. There is age-indeterminate   fracture superior endplate of L4 with retropulsion into the central canal by 5   mm. There is left-sided sacroiliitis. There is step off along the anterior   cortex of the sacral alar on the right best seen on axial images 16 and 17   suspicious for acute nondisplaced sacral fracture. Soft tissues: There is chondrocalcinosis the pubic symphysis suggesting CPPD. Visualized lower pole of both kidneys unremarkable. Moderate calcific   atherosclerotic present. There are no enlarged lymph lymph nodes. Urinary   bladder is unremarkable.  There is no free fluid.       _______________ CXR Results  (Last 48 hours)    None            Medical Decision Making   I am the first provider for this patient. I reviewed the vital signs, available nursing notes, past medical history, past surgical history, family history and social history. Vital Signs-Reviewed the patient's vital signs. EKG: Interpreted by myself. Records Reviewed: Personally, on initial evaluation    MDM:   Patient presents with back pain, bilateral hip pain, inability to ambulate. Exam significant for proximal muscle weakness, pain of both hips    Strong plantar flexion and extension. DDX considered: Degenerative disc disease, OA, sciatica, piriformis syndrome. DDX thought to be less likely based on history and physical exam but also considered due to high risk condition: Epidural hematoma, epidural abscess, acute fracture dislocation of the spine, spinal cord compression, cauda equina. Patient condition on initial evaluation: stable     Plan:   Pain Control    As per orders noted below:  Orders Placed This Encounter    MECHANICAL PROPHYLAXIS IS CONTRAINDICATED Other, please document Already on Anticoagulation    COVID-19 RAPID TEST    MRI LUMB SPINE WO CONT    CT PELV WO CONT    CBC WITH AUTOMATED DIFF    BASIC METABOLIC PANEL    Pro BNP    METABOLIC PANEL, COMPREHENSIVE    CBC W/O DIFF    ADULT DIET Regular; 3 carb choices (45 gm/meal)    CARDIAC MONITORING    VITAL SIGNS    NOTIFY PROVIDER: SPECIFY Notify physician for pulse less than 50 or greater than 120, respiratory rate less than 12 or greater than 25, oral temperature greater than 101.3 F (57.4 C), systolic BP less than 90 or greater than 402, diastolic BP less. ..    ACTIVITY AS TOLERATED W/ASSIST    SOAP SUDS ENEMAS UNTIL CLEAR    FULL CODE    IP CONSULT TO ONCOLOGY    ADULT ORAL NUTRITION SUPPLEMENT Breakfast, Lunch, Dinner; Standard High Calorie/High Protein    IP CONSULT TO PHYSICAL THERAPY    IP CONSULT TO SPEECH THERAPY    GLUCOSE, POC    GLUCOSE, POC    GLUCOSE, POC    DME - DURABLE MEDICAL EQUIPMENT    gabapentin (NEURONTIN) capsule 100 mg    gabapentin (NEURONTIN) capsule 200 mg    sodium chloride (NS) flush 5-40 mL    sodium chloride (NS) flush 5-40 mL    OR Linked Order Group     acetaminophen (TYLENOL) tablet 650 mg     acetaminophen (TYLENOL) suppository 650 mg    polyethylene glycol (MIRALAX) packet 17 g    OR Linked Order Group     ondansetron (ZOFRAN ODT) tablet 4 mg     ondansetron (ZOFRAN) injection 4 mg    famotidine (PEPCID) tablet 20 mg    insulin lispro (HUMALOG) injection    glucose chewable tablet 16 g    glucagon (GLUCAGEN) injection 1 mg    dextrose (D50W) injection syrg 12.5-25 g    docusate sodium (COLACE) capsule 100 mg    felodipine (PLENDIL SR) 24 hr tablet 10 mg    furosemide (LASIX) tablet 20 mg    isosorbide mononitrate ER (IMDUR) tablet 30 mg    lisinopriL (PRINIVIL, ZESTRIL) tablet 5 mg    metoprolol succinate (TOPROL-XL) XL tablet 25 mg    pravastatin (PRAVACHOL) tablet 40 mg    rivaroxaban (XARELTO) tablet 10 mg    DISCONTD: spironolactone (ALDACTONE) tablet 25 mg    DISCONTD: gabapentin (NEURONTIN) capsule 800 mg    oxyCODONE IR (ROXICODONE) tablet 10 mg    gabapentin (NEURONTIN) capsule 300 mg    IP CONSULT TO HOSPITALIST    IP CONSULT TO HOSPITALIST    IP CONSULT TO ORTHOPEDIC SURGERY    INITIAL PHYSICIAN ORDER: INPATIENT Medical; Yes; 3. Patient receiving treatment that can only be provided in an inpatient setting (further clarification in H&P documentation)    IP CONSULT TO CASE MANAGEMENT    IP CONSULT TO CASE MANAGEMENT          ED Course:   ED Course as of Jul 24 2114   Sat Jul 24, 2021   1555 Anemia at baseline    [DM]   1947 Page to neurosurgery, [place].     [DM]   2031 Will page again to neurosurgery. [DM]   2056 Dr. Anthony Julien, no indication for emergent surgery. Recommends admission to hospital for symptomatic treatment, MM treatment.      [DM]   2111 9:11 PM  Will plan to admit for further management, inability to ambulate. The patient understands and agrees with the plan. Spoke with Dr. Genevieve Humphries the on call admitting clinician who agrees to admit patient. Appreciate the assistance in the care of this patient. [DM]      ED Course User Index  [DM] Tamir Casillas MD        CERVICAL 3- CERVICAL 7 SPINE POSTERIOR CERVICAL DECOMPRESSION AND FUSION WITH C-ARM    There are diffuse osteolytic lesions throughout the visualized osseous skeleton. Given the age of this patient differential diagnoses includes multiple myeloma and metastatic disease.     Subtle acute nondisplaced right sacral alar fracture. Superior endplate fracture of L4 with mild retropulsion into the central canal  by 5 mm. This may be pathologic or osteoporotic. Age indeterminate    L3-L4:                    Structure:  Low grade degenerative facet arthropathy. Mild disc  Bulging. Impingement:  Moderate central stenosis. Low-grade lateral  recess stenoses. Patent neural foramina. L2-L3:                    Structure:  Low grade degenerative facet arthropathy. Trace disc  bulging. Impingement:  Low grade central stenosis and moderate bilateral lateral recess stenosis. Patent neural foramina. L1-L2:                    Structure:  Low grade degenerative facet arthropathy. Minimal  disc bulging. Impingement:  None detected. T12-L1:                    Structure:  Low grade degenerative facet arthropathy. Trace disc  bulging                  Impingement:  Low grade central stenosis. Patent neural  foramina. T11-T12:                  Structure:  Marked degenerative facet arthropathy bilaterally. Mild disc bulging. Impingement:  High-grade central stenosis with circumferential closure of the spinal subarachnoid space and moderate low grade mass effect upon the spinal cord.     7:36 PM  Dr. Saintclair Peek, recommend transfer to neurosurgical coverage. Diagnostic Study Results     Orders Placed This Encounter    MECHANICAL PROPHYLAXIS IS CONTRAINDICATED Other, please document Already on Anticoagulation     Already on Anticoagulation     Standing Status:   Standing     Number of Occurrences:   1     Order Specific Question:   Please Indicate Reason     Answer: Other, please document    COVID-19 RAPID TEST     Standing Status:   Standing     Number of Occurrences:   1     Order Specific Question:   Is this test for diagnosis or screening? Answer:   Screening     Order Specific Question:   Symptomatic for COVID-19 as defined by CDC? Answer:   No     Order Specific Question:   Hospitalized for COVID-19? Answer:   No     Order Specific Question:   Admitted to ICU for COVID-19? Answer:   No     Order Specific Question:   Employed in healthcare setting? Answer:   No     Order Specific Question:   Resident in a congregate (group) care setting? Answer:   No     Order Specific Question:   Previously tested for COVID-19? Answer:   No    MRI LUMB SPINE WO CONT     Standing Status:   Standing     Number of Occurrences:   1     Order Specific Question:   Transport     Answer:   BED [2]     Order Specific Question:   Reason for Exam     Answer:   weakness     Order Specific Question:   Decision Support Exception     Answer:   Emergency Medical Condition (MA) [1]    CT PELV WO CONT     Please include both hips to upper thigh     Standing Status:   Standing     Number of Occurrences:   1     Order Specific Question:   Transport     Answer:   BED [2]     Order Specific Question:   Reason for Exam     Answer:   pain     Order Specific Question:   Type of contrast.  PLEASE NOTE: IV contrast is NOT utilized with this order.      Answer:   Per Radiologist Protocol     Order Specific Question:   Decision Support Exception     Answer:   Emergency Medical Condition (MA) [1]    CBC WITH AUTOMATED DIFF     Standing Status:   Standing     Number of Occurrences:   1    BASIC METABOLIC PANEL     Standing Status:   Standing     Number of Occurrences:   1    Pro BNP     Standing Status:   Standing     Number of Occurrences:   1    METABOLIC PANEL, COMPREHENSIVE     Standing Status:   Standing     Number of Occurrences:   3    CBC W/O DIFF     Standing Status:   Standing     Number of Occurrences:   3    ADULT DIET Regular; 3 carb choices (45 gm/meal)     Standing Status:   Standing     Number of Occurrences:   1     Order Specific Question:   Primary Diet:     Answer:   Regular     Order Specific Question:   Carbohydrate Control Restriction:     Answer:   3 carb choices (45 gm/meal)    CARDIAC MONITORING     Standing Status:   Standing     Number of Occurrences:   1     Order Specific Question:   Type: Answer:   Remote Telemetry     Order Specific Question:   Patient may go off unit without monitor     Answer:   No    VITAL SIGNS     Per unit routine     Standing Status:   Standing     Number of Occurrences:   1    NOTIFY PROVIDER: SPECIFY Notify physician for pulse less than 50 or greater than 120, respiratory rate less than 12 or greater than 25, oral temperature greater than 101.3 F (73.8 C), systolic BP less than 90 or greater than 891, diastolic BP less. .. Notify physician for pulse less than 50 or greater than 120, respiratory rate less than 12 or greater than 25, oral temperature greater than 101.3 F (88.4 C), systolic BP less than 90 or greater than 523, diastolic BP less than 50 or greater than 100.      Standing Status:   Standing     Number of Occurrences:   1    ACTIVITY AS TOLERATED W/ASSIST     Standing Status:   Standing     Number of Occurrences:   1    SOAP SUDS ENEMAS UNTIL CLEAR     Standing Status:   Standing     Number of Occurrences:   1    FULL CODE     Standing Status:   Standing     Number of Occurrences:   1    IP CONSULT TO ONCOLOGY     Standing Status:   Standing     Number of Occurrences:   1     Order Specific Question:   Reason for Consult: Answer:   multiple myeloma     Order Specific Question:   Did you call or speak to the consulting provider? Answer: Yes    ADULT ORAL NUTRITION SUPPLEMENT Breakfast, Lunch, Dinner; Standard High Calorie/High Protein     Standing Status:   Standing     Number of Occurrences:   1     Order Specific Question:   Frequency:     Answer:   Breakfast     Order Specific Question:   Frequency:     Answer:   Lunch     Order Specific Question:   Frequency:     Answer:   Dinner     Order Specific Question:   Select Supplement:     Answer:   Standard High Calorie/High Protein    IP CONSULT TO PHYSICAL THERAPY     Standing Status:   Standing     Number of Occurrences:   1     Order Specific Question:   Reason for PT? Answer:   unable to ambulate, eval for rehab need    IP CONSULT TO SPEECH THERAPY     Standing Status:   Standing     Number of Occurrences:   1     Order Specific Question:   Reason for SLP? Answer:   swallow study, some dysphagia    GLUCOSE, POC     Standing Status:   Standing     Number of Occurrences:   1    GLUCOSE, POC     Standing Status:   Standing     Number of Occurrences:   1    GLUCOSE, POC     Standing Status:   Standing     Number of Occurrences:   1    [address]     Patient requires positioning in ways not feasible with an ordinary bed in order to relieve pain due to his multiple myeloma     Standing Status:   Standing     Number of Occurrences:   1     Order Specific Question:   Device: Answer:    Fully electric bed    gabapentin (NEURONTIN) capsule 100 mg    gabapentin (NEURONTIN) capsule 200 mg    sodium chloride (NS) flush 5-40 mL    sodium chloride (NS) flush 5-40 mL    OR Linked Order Group     acetaminophen (TYLENOL) tablet 650 mg     acetaminophen (TYLENOL) suppository 650 mg    polyethylene glycol (MIRALAX) packet 17 g    OR Linked Order Group     ondansetron [facility] ODT) tablet 4 mg     ondansetron (ZOFRAN) injection 4 mg    famotidine (PEPCID) tablet 20 mg     Order Specific Question:   H2RA INDICATION     Answer:   SUP Prophylaxis    insulin lispro (HUMALOG) injection    glucose chewable tablet 16 g    glucagon (GLUCAGEN) injection 1 mg    dextrose (D50W) injection syrg 12.5-25 g    docusate sodium (COLACE) capsule 100 mg     OP SIG:Take 1 Capsule by mouth two (2) times a day for 90 days.  felodipine (PLENDIL SR) 24 hr tablet 10 mg     OP SIG:Take 10 mg by mouth daily.  furosemide (LASIX) tablet 20 mg     OP SIG:Take  by mouth daily.  isosorbide mononitrate ER (IMDUR) tablet 30 mg     OP SIG:Take 1 Tab by mouth daily.  lisinopriL (PRINIVIL, ZESTRIL) tablet 5 mg     OP SIG:Take 1 Tab by mouth daily.  metoprolol succinate (TOPROL-XL) XL tablet 25 mg     OP SIG:Take 1 Tab by mouth daily.  pravastatin (PRAVACHOL) tablet 40 mg     OP SIG:Take 1 Tab by mouth nightly.  rivaroxaban (XARELTO) tablet 10 mg    DISCONTD: spironolactone (ALDACTONE) tablet 25 mg     OP SIG:Take  by mouth daily.  DISCONTD: gabapentin (NEURONTIN) capsule 800 mg    oxyCODONE IR (ROXICODONE) tablet 10 mg    gabapentin (NEURONTIN) capsule 300 mg    IP CONSULT TO HOSPITALIST     Standing Status:   Standing     Number of Occurrences:   1     Order Specific Question:   Reason for Consult: Answer:   TO ADMIT     Order Specific Question:   Did you call or speak to the consulting provider? Answer:   No     Order Specific Question:   Consult To     Answer:   hospitalist     Order Specific Question:   Schedule When? Answer:   TODAY    IP CONSULT TO HOSPITALIST     Standing Status:   Standing     Number of Occurrences:   1     Order Specific Question:   Reason for Consult: Answer:   TO ADMIT     Order Specific Question:   Did you call or speak to the consulting provider?      Answer:   No     Order Specific Question:   Consult To     Answer: hospitalist     Order Specific Question:   Schedule When? Answer:   TODAY    IP CONSULT TO ORTHOPEDIC SURGERY     Standing Status:   Standing     Number of Occurrences:   1     Order Specific Question:   Reason for Consult: Answer:   orthopedic surgery     Order Specific Question:   Did you call or speak to the consulting provider? Answer:   No     Order Specific Question:   Consult To     Answer:   orthopedics     Order Specific Question:   Schedule When? Answer:   TODAY    INITIAL PHYSICIAN ORDER: INPATIENT Medical; Yes; 3. Patient receiving treatment that can only be provided in an inpatient setting (further clarification in H&P documentation)     Standing Status:   Standing     Number of Occurrences:   1     Order Specific Question:   Status: Answer:   INPATIENT [101]     Order Specific Question:   Type of Bed     Answer:   Medical [8]     Order Specific Question:   Cardiac Monitoring Required? Answer:   Yes     Order Specific Question:   Inpatient Hospitalization Certified Necessary for the Following Reasons     Answer:   3. Patient receiving treatment that can only be provided in an inpatient setting (further clarification in H&P documentation)     Order Specific Question:   Admitting Diagnosis     Answer:   Inability to ambulate due to hip [[id number]]     Order Specific Question:   Admitting Physician     Answer:   Juan Manuel Copeland [[id number]]     Order Specific Question:   Attending Physician     Answer:   [facility] [[id number]]     Order Specific Question:   Estimated Length of Stay     Answer:   2 Midnights     Order Specific Question:   Discharge Plan:     Answer:   [facility] (e.g. Adult Home, Nursing Home, etc.)   [address] TO CASE MANAGEMENT     Needs rehab for PT     Standing Status:   Standing     Number of Occurrences:   1     Order Specific Question:   Reasons for Consult:      Answer:   Assistance with Discharge Planning    IP CONSULT TO CASE MANAGEMENT Hospital bed for home     Standing Status:   Standing     Number of Occurrences:   1     Order Specific Question:   Reasons for Consult: Answer:   Assistance with [facility] -     Recent Results (from the past 12 hour(s))   GLUCOSE, POC    Collection Time: 07/25/21 11:33 AM   Result Value Ref Range    Glucose (POC) 132 (H) 70 - 110 mg/dL   GLUCOSE, POC    Collection Time: 07/25/21  4:38 PM   Result Value Ref Range    Glucose (POC) 125 (H) 70 - 110 mg/dL       Radiologic Studies -   MRI LUMB SPINE WO CONT   Final Result      No acute findings. 1.  Persistent high-grade central stenosis at T11-T12 with equivocal minimal   myelopathic signal abnormality. 2.  Interval significant improvement in the innumerable multiple myeloma   lesions. 3.  Unchanged multiple chronic compression fractures. 4.  Additional intersegmental analysis, as described. _______________                         CT PELV WO CONT   Final Result      There are diffuse osteolytic lesions throughout the visualized osseous skeleton. Given the age of this patient differential diagnoses includes multiple myeloma   and metastatic disease. Subtle acute nondisplaced right sacral alar fracture. Superior endplate fracture of L4 with mild retropulsion into the central canal   by 5 mm. This may be pathologic or osteoporotic. Age indeterminate. CPPD. CT Results  (Last 48 hours)               07/24/21 1614  CT PELV WO CONT Final result    Impression:      There are diffuse osteolytic lesions throughout the visualized osseous skeleton. Given the age of this patient differential diagnoses includes multiple myeloma   and metastatic disease. Subtle acute nondisplaced right sacral alar fracture. Superior endplate fracture of L4 with mild retropulsion into the central canal   by 5 mm. This may be pathologic or osteoporotic. Age indeterminate. CPPD. Narrative:  EXAM: CT of the pelvis       INDICATION: Unable to bear weight       COMPARISON: None. TECHNIQUE: Axial CT imaging of the pelvis was performed without intravenous   contrast. Multiplanar reformats were generated. One or more dose reduction   techniques were used on this CT: automated exposure control, adjustment of the   mAs and/or kVp according to patient size, and iterative reconstruction   techniques. The specific techniques used on this CT exam have been documented   in the patient's electronic medical record. Digital Imaging and Communications   in Medicine (DICOM) format image data are available to nonaffiliated external   healthcare facilities or entities on a secure, media free, reciprocally   searchable basis with patient authorization for at least a 12-month period after   this study. _______________       FINDINGS:       OSSEOUS STRUCTURES: Severe osteopenia. Extensive lytic lesions are seen   throughout the lower lumbar spine, pelvic bones, sacrum and proximal femurs   suggesting multiple myeloma or metastatic disease. There is age-indeterminate   fracture superior endplate of L4 with retropulsion into the central canal by 5   mm. There is left-sided sacroiliitis. There is step off along the anterior   cortex of the sacral alar on the right best seen on axial images 16 and 17   suspicious for acute nondisplaced sacral fracture. Soft tissues: There is chondrocalcinosis the pubic symphysis suggesting CPPD. Visualized lower pole of both kidneys unremarkable. Moderate calcific   atherosclerotic present. There are no enlarged lymph lymph nodes. Urinary   bladder is unremarkable. There is no free fluid.       _______________               CXR Results  (Last 48 hours)    None          Disposition     Disposition:  Admit    CLINICAL IMPRESSION:    1. Multiple myeloma, remission status unspecified ([facility] [address]    2. Lower extremity edema    3.  Inability to ambulate due to multiple joints    4. Neuropathy    5. Weakness        It should be noted that I will be the provider of record for this patient  Jairo Cabral MD    Follow-up Information    None         Current Discharge Medication List          Please note that this dictation was completed with [employer], the computer voice recognition software. Quite often unanticipated grammatical, syntax, homophones, and other interpretive errors are inadvertently transcribed by the computer software. Please disregard these errors. Please excuse any errors that have escaped final proofreading.

## 2021-07-25 PROBLEM — R53.81 DEBILITY: Status: ACTIVE | Noted: 2020-12-07

## 2021-07-25 LAB
ALBUMIN SERPL-MCNC: 2.8 G/DL (ref 3.4–5)
ALBUMIN/GLOB SERPL: 0.7 {RATIO} (ref 0.8–1.7)
ALP SERPL-CCNC: 48 U/L (ref 45–117)
ALT SERPL-CCNC: 14 U/L (ref 16–61)
ANION GAP SERPL CALC-SCNC: 6 MMOL/L (ref 3–18)
AST SERPL-CCNC: 11 U/L (ref 10–38)
BILIRUB SERPL-MCNC: 0.4 MG/DL (ref 0.2–1)
BUN SERPL-MCNC: 17 MG/DL (ref 7–18)
BUN/CREAT SERPL: 26 (ref 12–20)
CALCIUM SERPL-MCNC: 10.3 MG/DL (ref 8.5–10.1)
CHLORIDE SERPL-SCNC: 104 MMOL/L (ref 100–111)
CO2 SERPL-SCNC: 28 MMOL/L (ref 21–32)
CREAT SERPL-MCNC: 0.65 MG/DL (ref 0.6–1.3)
ERYTHROCYTE [DISTWIDTH] IN BLOOD BY AUTOMATED COUNT: 15.7 % (ref 11.6–14.5)
GLOBULIN SER CALC-MCNC: 4.3 G/DL (ref 2–4)
GLUCOSE BLD STRIP.AUTO-MCNC: 125 MG/DL (ref 70–110)
GLUCOSE BLD STRIP.AUTO-MCNC: 132 MG/DL (ref 70–110)
GLUCOSE SERPL-MCNC: 82 MG/DL (ref 74–99)
HCT VFR BLD AUTO: 26.9 % (ref 36–48)
HGB BLD-MCNC: 8.7 G/DL (ref 13–16)
MCH RBC QN AUTO: 29.1 PG (ref 24–34)
MCHC RBC AUTO-ENTMCNC: 32.3 G/DL (ref 31–37)
MCV RBC AUTO: 90 FL (ref 74–97)
PLATELET # BLD AUTO: 161 K/UL (ref 135–420)
PMV BLD AUTO: 10.9 FL (ref 9.2–11.8)
POTASSIUM SERPL-SCNC: 3.8 MMOL/L (ref 3.5–5.5)
PROT SERPL-MCNC: 7.1 G/DL (ref 6.4–8.2)
RBC # BLD AUTO: 2.99 M/UL (ref 4.35–5.65)
SODIUM SERPL-SCNC: 138 MMOL/L (ref 136–145)
WBC # BLD AUTO: 5.8 K/UL (ref 4.6–13.2)

## 2021-07-25 PROCEDURE — 82962 GLUCOSE BLOOD TEST: CPT

## 2021-07-25 PROCEDURE — 80053 COMPREHEN METABOLIC PANEL: CPT

## 2021-07-25 PROCEDURE — 36415 COLL VENOUS BLD VENIPUNCTURE: CPT

## 2021-07-25 PROCEDURE — 85027 COMPLETE CBC AUTOMATED: CPT

## 2021-07-25 PROCEDURE — 74011250637 HC RX REV CODE- 250/637: Performed by: FAMILY MEDICINE

## 2021-07-25 PROCEDURE — 65270000029 HC RM PRIVATE

## 2021-07-25 RX ADMIN — POLYETHYLENE GLYCOL 3350 17 G: 17 POWDER, FOR SOLUTION ORAL at 10:58

## 2021-07-25 RX ADMIN — DOCUSATE SODIUM 100 MG: 100 CAPSULE ORAL at 09:27

## 2021-07-25 RX ADMIN — Medication 10 ML: at 13:22

## 2021-07-25 RX ADMIN — FELODIPINE 10 MG: 5 TABLET, EXTENDED RELEASE ORAL at 09:26

## 2021-07-25 RX ADMIN — FUROSEMIDE 20 MG: 20 TABLET ORAL at 09:26

## 2021-07-25 RX ADMIN — Medication 10 ML: at 09:26

## 2021-07-25 RX ADMIN — METOPROLOL SUCCINATE 25 MG: 25 TABLET, EXTENDED RELEASE ORAL at 09:26

## 2021-07-25 RX ADMIN — FAMOTIDINE 20 MG: 20 TABLET ORAL at 21:00

## 2021-07-25 RX ADMIN — GABAPENTIN 300 MG: 300 CAPSULE ORAL at 21:00

## 2021-07-25 RX ADMIN — LISINOPRIL 5 MG: 5 TABLET ORAL at 09:26

## 2021-07-25 RX ADMIN — FAMOTIDINE 20 MG: 20 TABLET ORAL at 09:27

## 2021-07-25 RX ADMIN — DOCUSATE SODIUM 100 MG: 100 CAPSULE ORAL at 21:00

## 2021-07-25 RX ADMIN — ISOSORBIDE MONONITRATE 30 MG: 30 TABLET, EXTENDED RELEASE ORAL at 09:26

## 2021-07-25 RX ADMIN — GABAPENTIN 300 MG: 300 CAPSULE ORAL at 09:27

## 2021-07-25 RX ADMIN — PRAVASTATIN SODIUM 40 MG: 20 TABLET ORAL at 21:00

## 2021-07-25 RX ADMIN — Medication 10 ML: at 21:12

## 2021-07-25 NOTE — PROGRESS NOTES
Physical Therapy Evaluation/Treatment Attempt     Chart reviewed. Attempted Physical Therapy Evaluation, however, patient unable to be seen due to:  []  Nausea/vomiting  []  Eating  []  Pain  []  Patient too lethargic  []  Off Unit for testing/procedure  []  Dialysis treatment in progress   []  Telemetry Results  [x]  Other: Pt with progressive back pain, high grade stenosis pending ortho consult. Will hold PT evaluation until after orthopedic consultation and follow up as appropriate.       Thank you for this referral.    Kimberly Sadler, PT, DPT

## 2021-07-25 NOTE — PROGRESS NOTES
Problem: General Medical Care Plan  Goal: *Skin integrity maintained  Outcome: Progressing Towards Goal  Goal: *Optimize nutritional status  Outcome: Progressing Towards Goal     Problem: Falls - Risk of  Goal: *Absence of Falls  Description: Document Evangelist Fall Risk and appropriate interventions in the flowsheet.   Outcome: Progressing Towards Goal  Note: Fall Risk Interventions:  Mobility Interventions: Patient to call before getting OOB         Medication Interventions: Patient to call before getting OOB                   Problem: Patient Education: Go to Patient Education Activity  Goal: Patient/Family Education  Outcome: Progressing Towards Goal

## 2021-07-25 NOTE — ROUTINE PROCESS
TRANSFER - OUT REPORT:    Verbal report given to Prakash Orta RN (name) on Marko Gitelman  being transferred to [address] [place] [address] (unit) for routine progression of care       Report consisted of patients Situation, Background, Assessment and   Recommendations(SBAR). Information from the following report(s) SBAR, ED Summary, [facility] and Recent Results was reviewed with the receiving nurse. Lines:   Peripheral IV 07/24/21 Right Wrist (Active)   Site Assessment Clean, dry, & intact 07/24/21 1500   Phlebitis Assessment 0 07/24/21 1500   Infiltration Assessment 0 07/24/21 1500   Dressing Status Clean, dry, & intact 07/24/21 1500   Dressing Type Tape;Transparent 07/24/21 1500   Hub Color/Line Status Pink;Flushed;Patent 07/24/21 1500   Action Taken Blood drawn 07/24/21 1500        Opportunity for questions and clarification was provided.       Patient transported with:   [employer]

## 2021-07-25 NOTE — CONSULTS
[facility] CONSULT NOTE  Patient Active Problem List   Diagnosis Code    Open wound of right index finger without damage to nail S61.200A    Chest pain R07.9    Lower extremity edema R60.0    Type 2 diabetes mellitus (HCC) L27.0    Systolic CHF, chronic (HCC) Z48.38    Metabolic encephalopathy M57.94    Cellulitis L03.90    LEANDRA (acute kidney injury) ([facility]) N17.9    Olecranon fracture S52.023A    Multiple myeloma ([facility]) C90.00    Hypertension I10    Morbid obesity ([address] E66.01    Morbid obesity with BMI of 40.0-44.9, adult ([facility]) E66.01, Z68.41    Hypokalemia E87.6    Neuropathy G62.9    Hypomagnesemia E83.42    Elevated troponin R77.8    Debility R53.81    Left hemiparesis ([facility]) G81.94    Spinal stenosis M48.00    HTN (hypertension) I10    DM (diabetes mellitus) ([facility]) E11.9    Anticoagulated Z79.01    Left shoulder pain M25.512    Obesity (BMI 30-39. 9) E66.9    Pneumonia due to COVID-19 virus U07.1, J12.82    COVID-19 vaccine series completed Z92.29    Unable to ambulate R26.2       Assessment/Plan:  1) Multiple Myeloma- recently [facility] to  [facility]  on 7/13/2021. S/p ASCT 9/2015. Labs 7/13/2021 revealed IgG 2633 IgA 36, IgM < 10, M-spike 1.97 g/dl IgG lambda. Serum lambda 110.01, kappa 8.82. Hgb 10.0, Cr 1.17 and Ca 11.1. Ca 7/25/2021 is 10.3 and Cr 0.65. 2) DMT2- Lispro Q ac and HS  3) Afib/CHF/HTN- Toprol, Plendil, Lasix, Xarelto (on hold), Prinivil, Imdur  4) HLP- Pravachol  5) GERD- Pepcid 20 mg PO Q Day  6) Back Pain- High grade spinal stenosis, Ortho consult. Oxycodone IR 10 mg PO Q 6 hrs prn pain. 7) PTx- when able. + Pain at this time. CC:  69 yo male with Multiple myeloma seen at the request of Dr. Alba Brown    HPI:  69 yo with MM followed at [facility]  by Dr. Avtar Pratt and established care on 7/13/2021, [facility]. Initially diagnosed in 2005.  He received Thalidomide/Dex and ultimately underwent an Autologous Stem Cell Transplant at [facility] in 9/2015. Maintenance Revlimid was attempted but slurred speech and sluggishness resulted in termination of Revlimid after 1 month of maintenance regimen. Pt Admitted 7/24/2021 secondary to progressive back pain and unable to ambulate/transfer secondary to the pain. He has known high grade spinal stenosis and Hx of Kyphoplasty in 2019. Past Medical History:   Diagnosis Date    Cancer ([facility] [address]      Diabetes ([facility] [address]      FH: chemotherapy      H/O stem cell transplant ([facility]      Hypertension      Multiple myeloma (HCC)      Neuropathy      Obesity (BMI 30-39.9) 6/12/2021               Past Surgical History:   Procedure Laterality Date    HX CHOLECYSTECTOMY        HX KYPHOPLASTY                   Family History   Problem Relation Age of Onset    Diabetes Father      Heart Disease Father      Hypertension Mother      Diabetes Mother      Diabetes Sister      Diabetes Brother        Social History   []Expand by [employer]            Socioeconomic History    Marital status:        Spouse name: Not on file    Number of children: Not on file    Years of education: Not on file    Highest education level: Not on file   Tobacco Use    Smoking status: Former Smoker    Smokeless tobacco: Never Used   Substance and Sexual Activity    Alcohol use: Never       Comment: rare        Prior to Admission medications    Medication Sig Start Date End Date Taking? Authorizing Provider   lidocaine (Lidoderm) 5 % Apply patch to the affected area for 12 hours a day and remove for 12 hours a day. 7/1/21     Luna Pearce MD   docusate sodium (Colace) 100 mg capsule Take 1 Capsule by mouth two (2) times a day for 90 days.  7/1/21 9/29/21   Luna Pearce MD   furosemide (Lasix) 20 mg tablet Take  by mouth daily.       Provider, Historical   felodipine (PLENDIL SR) 10 mg 24 hr tablet Take 10 mg by mouth daily.       Annie Vazquez MD   spironolactone (ALDACTONE) 25 mg tablet Take  by mouth daily.       Annie Vazquez MD   pyridoxine, vitamin B6, (VITAMIN B-6) 50 mg tablet Take 50 mg by mouth daily.       Annie Vazquez MD   rivaroxaban (XARELTO PO) Take 10 mg by mouth.       Annie Vazquez MD   POTASSIUM CHLORIDE Take 20 mEq by mouth daily.       Annie Vazquez MD   omeprazole (PRILOSEC) 20 mg capsule Take 20 mg by mouth daily.       Annie Vazquez MD   SITagliptin-metFORMIN (JANUMET) 50-1,000 mg per tablet Take 1 Tab by mouth two (2) times daily (with meals).       Provider, Historical   cyanocobalamin (VITAMIN B12) 500 mcg tablet Take 1,000 mcg by mouth daily.       Provider, Historical   lisinopril (PRINIVIL, ZESTRIL) 5 mg tablet Take 1 Tab by mouth daily. 2/14/20     Beka Fernandes MD   isosorbide mononitrate ER (IMDUR) 30 mg tablet Take 1 Tab by mouth daily. 2/15/20     Kari Whitten MD   metoprolol succinate (TOPROL-XL) 25 mg XL tablet Take 1 Tab by mouth daily. 2/15/20     Kari Whitten MD   pravastatin (PRAVACHOL) 40 mg tablet Take 1 Tab by mouth nightly. 2/14/20     Kari Whitten MD   calcium-cholecalciferol, d3, (CALCIUM 600 + D) 600-125 mg-unit tab Take 1 Tablet by mouth two (2) times a day.       Annie Vazquez MD   pioglitazone (ACTOS) 30 mg tablet Take 30 mg by mouth daily.       Annie Vazquez MD              Allergies   Allergen Reactions    Iodine Hives      Review of Systems   Constitutional:  Negative for chills and fever. HENT: Negative for hearing loss and sore throat. Respiratory: Negative for cough and shortness of breath. Cardiovascular: Negative for chest pain and leg swelling. Gastrointestinal: Negative for abdominal pain, constipation, diarrhea, nausea and vomiting. Musculoskeletal:  Negative for falls. + Back Pain  Skin: Negative for rash. Dry skin BLE  Neurological: Negative for dizziness. Endo/Heme/Allergies: Does not bruise/bleed easily.      Visit Vitals  BP (!) 150/62 (BP 1 Location: Right upper arm, BP Patient Position: At rest)   Pulse 73   Temp 98.1 °F (36.7 °C)   Resp 17   Ht 5' 8\" (1.727 m)   Wt 103.9 kg (229 lb)   SpO2 98%   BMI 34.82 kg/m²   . Date 07/24/21 0700 - 07/25/21 0659 07/25/21 0700 - 07/26/21 0659   Shift 4599-4878 2778-3429 24 Hour Total 9936-5013 3919-0550 24 Hour Total   INTAKE   Shift Total(mL/kg)         OUTPUT   Urine(mL/kg/hr)  250(0.2) 250(0.1)        Urine Voided  250 250      Shift Total(mL/kg)  250(2.4) 250(2.4)      NET  -250 -250      Weight (kg) 103.9 103.9 103.9 103.9 103.9 103.9   . Constitutional:       General: He is not in acute distress. Appearance: Normal appearance. Non toxic-appearing. HENT:      Head: Normocephalic and atraumatic. Nose: Nose normal.      Mouth/Throat:      Mouth: Mucous membranes are moist.      Pharynx: Oropharynx is clear. Eyes:      Extraocular Movements: Extraocular movements intact. Conjunctiva/sclera: Conjunctivae normal.   Cardiovascular:      Rate and Rhythm: Normal rate and regular rhythm. Pulses: Normal pulses. Heart sounds: No murmur  No friction rub. No gallop. Pulmonary:      Effort: Pulmonary effort is normal. No respiratory distress. Breath sounds: Normal breath sounds. No stridor. No wheezing. Abdominal:      General: Bowel sounds are normal. There is no distension. Palpations: Abdomen is soft. Musculoskeletal:         General: No swelling. Skin:     General: Skin is warm. Dry skin BLE      Capillary Refill: normal  Neurological:      General: No focal deficit present. Mental Status: AAO x 4     Motor: 3/5 MS BLE       Imaging:       MRI  L-spine 7/24/2021-  No acute findings. 1.  Persistent high-grade central stenosis at T11-T12 with equivocal minimal  myelopathic signal abnormality. 2.  Interval significant improvement in the innumerable multiple myeloma  lesions. 3.  Unchanged multiple chronic compression fractures.          CT Pelvis 7/24/2021-  There are diffuse osteolytic lesions throughout the visualized osseous skeleton. Given the age of this patient differential diagnoses includes multiple myeloma  and metastatic disease.     Subtle acute nondisplaced right sacral alar fracture.     Superior endplate fracture of L4 with mild retropulsion into the central canal  by 5 mm. This may be pathologic or osteoporotic. Age indeterminate    Labs:  Recent Results (from the past 24 hour(s))   CBC WITH AUTOMATED DIFF    Collection Time: 07/24/21  2:55 PM   Result Value Ref Range    WBC 7.9 4.6 - 13.2 K/uL    RBC 3.17 (L) 4.35 - 5.65 M/uL    HGB 9.1 (L) 13.0 - 16.0 g/dL    HCT 28.8 (L) 36.0 - 48.0 %    MCV 90.9 74.0 - 97.0 FL    MCH 28.7 24.0 - 34.0 PG    MCHC 31.6 31.0 - 37.0 g/dL    RDW 15.8 (H) 11.6 - 14.5 %    PLATELET 141 832 - 071 K/uL    MPV 11.3 9.2 - 11.8 FL    NEUTROPHILS 58 40 - 73 %    LYMPHOCYTES 30 21 - 52 %    MONOCYTES 10 3 - 10 %    EOSINOPHILS 1 0 - 5 %    BASOPHILS 0 0 - 2 %    ABS. NEUTROPHILS 4.6 1.8 - 8.0 K/UL    ABS. LYMPHOCYTES 2.3 0.9 - 3.6 K/UL    ABS. MONOCYTES 0.8 0.05 - 1.2 K/UL    ABS. EOSINOPHILS 0.1 0.0 - 0.4 K/UL    ABS.  BASOPHILS 0.0 0.0 - 0.1 K/UL    DF AUTOMATED     METABOLIC PANEL, BASIC    Collection Time: 07/24/21  2:55 PM   Result Value Ref Range    Sodium 136 136 - 145 mmol/L    Potassium 4.0 3.5 - 5.5 mmol/L    Chloride 103 100 - 111 mmol/L    CO2 27 21 - 32 mmol/L    Anion gap 6 3.0 - 18 mmol/L    Glucose 83 74 - 99 mg/dL    BUN 25 (H) 7.0 - 18 MG/DL    Creatinine 1.00 0.6 - 1.3 MG/DL    BUN/Creatinine ratio 25 (H) 12 - 20      GFR est AA >60 >60 ml/min/1.73m2    GFR est non-AA >60 >60 ml/min/1.73m2    Calcium 10.6 (H) 8.5 - 10.1 MG/DL   NT-PRO BNP    Collection Time: 07/24/21  2:55 PM   Result Value Ref Range    NT pro- 0 - 1,800 PG/ML   COVID-19 RAPID TEST    Collection Time: 07/24/21  7:55 PM   Result Value Ref Range    Specimen source Nasopharyngeal      COVID-19 rapid test Not detected NOTD     GLUCOSE, POC    Collection Time: 07/24/21 10:35 PM Result Value Ref Range    Glucose (POC) 114 (H) 70 - 280 mg/dL   METABOLIC PANEL, COMPREHENSIVE    Collection Time: 07/25/21  4:55 AM   Result Value Ref Range    Sodium 138 136 - 145 mmol/L    Potassium 3.8 3.5 - 5.5 mmol/L    Chloride 104 100 - 111 mmol/L    CO2 28 21 - 32 mmol/L    Anion gap 6 3.0 - 18 mmol/L    Glucose 82 74 - 99 mg/dL    BUN 17 7.0 - 18 MG/DL    Creatinine 0.65 0.6 - 1.3 MG/DL    BUN/Creatinine ratio 26 (H) 12 - 20      GFR est AA >60 >60 ml/min/1.73m2    GFR est non-AA >60 >60 ml/min/1.73m2    Calcium 10.3 (H) 8.5 - 10.1 MG/DL    Bilirubin, total 0.4 0.2 - 1.0 MG/DL    ALT (SGPT) 14 (L) 16 - 61 U/L    AST (SGOT) 11 10 - 38 U/L    Alk.  phosphatase 48 45 - 117 U/L    Protein, total 7.1 6.4 - 8.2 g/dL    Albumin 2.8 (L) 3.4 - 5.0 g/dL    Globulin 4.3 (H) 2.0 - 4.0 g/dL    A-G Ratio 0.7 (L) 0.8 - 1.7     CBC W/O DIFF    Collection Time: 07/25/21  4:55 AM   Result Value Ref Range    WBC 5.8 4.6 - 13.2 K/uL    RBC 2.99 (L) 4.35 - 5.65 M/uL    HGB 8.7 (L) 13.0 - 16.0 g/dL    HCT 26.9 (L) 36.0 - 48.0 %    MCV 90.0 74.0 - 97.0 FL    MCH 29.1 24.0 - 34.0 PG    MCHC 32.3 31.0 - 37.0 g/dL    RDW 15.7 (H) 11.6 - 14.5 %    PLATELET 026 278 - 842 K/uL    MPV 10.9 9.2 - 11.8 FL       Current Facility-Administered Medications:     sodium chloride (NS) flush 5-40 mL, 5-40 mL, IntraVENous, Q8H, Nanette Maravilla MD, 10 mL at 07/24/21 2200    sodium chloride (NS) flush 5-40 mL, 5-40 mL, IntraVENous, PRN, Amarilis Pederson MD    acetaminophen (TYLENOL) tablet 650 mg, 650 mg, Oral, Q6H PRN **OR** acetaminophen (TYLENOL) suppository 650 mg, 650 mg, Rectal, Q6H PRN, Amarilis Pederson MD    polyethylene glycol (MIRALAX) packet 17 g, 17 g, Oral, DAILY PRN, Amarilis Pederson MD    ondansetron (ZOFRAN ODT) tablet 4 mg, 4 mg, Oral, Q8H PRN **OR** ondansetron (ZOFRAN) injection 4 mg, 4 mg, IntraVENous, Q6H PRN, Amarilis Pederson MD    famotidine (PEPCID) tablet 20 mg, 20 mg, Oral, BID, Bob Shade, Hany Mcknight MD, 20 mg at 07/24/21 2248    insulin lispro (HUMALOG) injection, , SubCUTAneous, AC&HS, Reji Welch MD    glucose chewable tablet 16 g, 16 g, Oral, PRN, Reji Welch MD    glucagon (GLUCAGEN) injection 1 mg, 1 mg, IntraMUSCular, PRN, Reji Welch MD    dextrose (D50W) injection syrg 12.5-25 g, 25-50 mL, IntraVENous, PRN, Reji Welch MD    docusate sodium (COLACE) capsule 100 mg, 100 mg, Oral, BID, Reji Welch MD, 100 mg at 07/24/21 2248    felodipine (PLENDIL SR) 24 hr tablet 10 mg, 10 mg, Oral, DAILY, Reji Welch MD    furosemide (LASIX) tablet 20 mg, 20 mg, Oral, DAILY, Reji Welch MD    isosorbide mononitrate ER (IMDUR) tablet 30 mg, 30 mg, Oral, DAILY, Reji Welch MD    lisinopriL (PRINIVIL, ZESTRIL) tablet 5 mg, 5 mg, Oral, DAILY, Reji Welch MD    metoprolol succinate (TOPROL-XL) XL tablet 25 mg, 25 mg, Oral, DAILY, Reji Welch MD    pravastatin (PRAVACHOL) tablet 40 mg, 40 mg, Oral, QHS, Mina TRINIDAD MD, 40 mg at 07/24/21 2248    [Held by provider] rivaroxaban (XARELTO) tablet 10 mg, 10 mg, Oral, QPM, Reji Welch MD    oxyCODONE IR (ROXICODONE) tablet 10 mg, 10 mg, Oral, Q6H PRN, Reji Welch MD, 10 mg at 07/24/21 2248    gabapentin (NEURONTIN) capsule 300 mg, 300 mg, Oral, BID, MD Yann Zambrano.  Ralf Prescott MD  [facility]  [phone]  Pager: [phone]

## 2021-07-25 NOTE — H&P
History & Physical    Patient: Twan Toney MRN: [id number]  CSN: [id number]    YOB: 1940  Age: 68 y.o. Sex: male      DOA: 7/24/2021  Primary Care Provider:  Tiago Garibay MD    Assessment/Plan     Patient Active Problem List   Diagnosis Code    Open wound of right index finger without damage to nail S61.200A    Chest pain R07.9    Lower extremity edema R60.0    Type 2 diabetes mellitus (HCC) E75.0    Systolic CHF, chronic (HCC) F46.43    Metabolic encephalopathy E48.80    Cellulitis L03.90    LEANDRA (acute kidney injury) ([facility] N17.9    Olecranon fracture S52.023A    Multiple myeloma (HCC) C90.00    Hypertension I10    Morbid obesity ([facility] E66.01    Morbid obesity with BMI of 40.0-44.9, adult ([facility] E66.01, Z68.41    Hypokalemia E87.6    Neuropathy G62.9    Hypomagnesemia E83.42    Elevated troponin R77.8    Debility R53.81    Left hemiparesis (HCC) G81.94    Spinal stenosis M48.00    HTN (hypertension) I10    DM (diabetes mellitus) ([facility]) E11.9    Anticoagulated Z79.01    Left shoulder pain M25.512    Obesity (BMI 30-39. 9) E66.9    Pneumonia due to COVID-19 virus U07.1, J12.82    COVID-19 vaccine series completed Z92.29    Unable to ambulate R322     79-year-old male with multiple myeloma, systolic CHF, type 2 diabetes mellitus, hypertension, and obesity is admitted due to an inability to ambulate. He has had prior kyphoplasty in 2019 which was helpful. Neurosurgery was consulted by the ED who recommended evaluation by Dr. Deven Velasquez.     Unable to ambulate with spinal stenosis, debility, and inability to transfer to walker/wheelchair at home  --ortho consult for possible kyphoplasty  --pain control  --PT consult as he will likely require rehab stay on discharge  --case management consult for assistance with DME (hospital bed for home)    Multiple myeloma-newly followed by Dr. Treva Lockett at [facility]  --heme/onc consult to see if treatment needs to be started while hospitalized    Chronic systolic CHF without exacerbation  --continue home medications    Type 2 diabetes mellitus-well-controlled  --sliding scale insulin  --diabetic diet    Hypertension-at goal  --continue home medications    Anticoagulated-on xarelto for atrial fibrillation  --hold xarelto and order heparin drip depending on the timing of the kyphoplasty    Obesity-BMI 35 with recent weight loss    Full code    Prophylaxis: pepcid PO, xarelto PO    Estimated length of stay : 3-4 nights    MD Riya Chilel  ----------------------------------------------------------------------------------------------------------------------------------------------------------------------------------------------------------------  CC: unable to ambulate/transfer due to back pain       HPI:     Nika Burden is a 68 y.o. male with multiple myeloma, systolic CHF, type 2 diabetes mellitus, hypertension, and obesity who presents with worsening back pain over the past week. He has shoulder pain and arm pain which are limiting his ability to compensate and execute transfers to a walker or a wheelchair. He has so much pain in his back that he has progressed to needing full assistance, but his daughter is recovering from surgery and her  is away right now. He had relief from a kyphoplasty done back in 2019 and is hoping to have a similar procedure done while here. The ED did consult neurosurgery due to high-grade spinal stenosis and they recommended consulting orthopedics here. The patient has been in several rehabilitation facilities for short periods of time to make himself stronger to go back home. He may need this again after the kyphoplasty. He has had some mild constipation but no urinary retention. Additionally, he has recently been seen by Dr. Estuardo Faustin of [facility] and he had discussed possibly starting a new medication for multiple myeloma.   His follow-up is next month and he is not on anything now. He denies any fever, cough, leg swelling, nausea, vomiting, or diarrhea. Past Medical History:   Diagnosis Date    Cancer ([facility]     Diabetes ([facility]     FH: chemotherapy     H/O stem cell transplant ([facility]     Hypertension     Multiple myeloma ([facility]     Neuropathy     Obesity (BMI 30-39.9) 6/12/2021       Past Surgical History:   Procedure Laterality Date    HX CHOLECYSTECTOMY      HX KYPHOPLASTY         Family History   Problem Relation Age of Onset    Diabetes Father     Heart Disease Father     Hypertension Mother     Diabetes Mother     Diabetes Sister     Diabetes Brother        Social History     Socioeconomic History    Marital status:      Spouse name: Not on file    Number of children: Not on file    Years of education: Not on file    Highest education level: Not on file   Tobacco Use    Smoking status: Former Smoker    Smokeless tobacco: Never Used   Substance and Sexual Activity    Alcohol use: Never     Comment: rare    Drug use: No   Other Topics Concern     Social Determinants of Health     Financial Resource Strain:     Difficulty of Paying Living Expenses:    Food Insecurity:     Worried About Running Out of Food in the Last Year:     [address] in the Last Year:    Transportation Needs:     Lack of Transportation (Medical):  Lack of Transportation (Non-Medical):    Physical Activity:     Days of Exercise per Week:     Minutes of Exercise per Session:    Stress:     Feeling of Stress :    Social Connections:     Frequency of Communication with Friends and Family:     Frequency of Social Gatherings with Friends and Family:     Attends [employer] Services:     Active Member of Clubs or Organizations:     Attends Club or Organization Meetings:     Marital Status:        Prior to Admission medications    Medication Sig Start Date End Date Taking?  Authorizing Provider   lidocaine (Lidoderm) 5 % Apply patch to the affected area for 12 hours a day and remove for 12 hours a day. 7/1/21   Xin Reddy MD   docusate sodium (Colace) 100 mg capsule Take 1 Capsule by mouth two (2) times a day for 90 days. 7/1/21 9/29/21  Xin Reddy MD   furosemide (Lasix) 20 mg tablet Take  by mouth daily. Provider, Historical   felodipine (PLENDIL SR) 10 mg 24 hr tablet Take 10 mg by mouth daily. Annie Vazquez MD   spironolactone (ALDACTONE) 25 mg tablet Take  by mouth daily. Annie Vazquez MD   pyridoxine, vitamin B6, (VITAMIN B-6) 50 mg tablet Take 50 mg by mouth daily. Annie Vazquez MD   rivaroxaban (XARELTO PO) Take 10 mg by mouth. Annie Vazquez MD   POTASSIUM CHLORIDE Take 20 mEq by mouth daily. Anine Vazquez MD   omeprazole (PRILOSEC) 20 mg capsule Take 20 mg by mouth daily. Annie Vazquez MD   SITagliptin-metFORMIN (JANUMET) 50-1,000 mg per tablet Take 1 Tab by mouth two (2) times daily (with meals). Provider, Historical   cyanocobalamin (VITAMIN B12) 500 mcg tablet Take 1,000 mcg by mouth daily. Provider, Historical   lisinopril (PRINIVIL, ZESTRIL) 5 mg tablet Take 1 Tab by mouth daily. 2/14/20   Troy Mccray MD   isosorbide mononitrate ER (IMDUR) 30 mg tablet Take 1 Tab by mouth daily. 2/15/20   Daniel Membreno MD   metoprolol succinate (TOPROL-XL) 25 mg XL tablet Take 1 Tab by mouth daily. 2/15/20   Daniel Membreno MD   pravastatin (PRAVACHOL) 40 mg tablet Take 1 Tab by mouth nightly. 2/14/20   Daniel Membreno MD   calcium-cholecalciferol, d3, (CALCIUM 600 + D) 600-125 mg-unit tab Take 1 Tablet by mouth two (2) times a day. Annie Vazquez MD   pioglitazone (ACTOS) 30 mg tablet Take 30 mg by mouth daily. Annie Vazquez MD       Allergies   Allergen Reactions    Iodine Hives     Review of Systems   Constitutional: Positive for weight loss. Negative for chills and fever. HENT: Negative for hearing loss and sore throat. Respiratory: Negative for cough and shortness of breath. Cardiovascular: Negative for chest pain and leg swelling. Gastrointestinal: Negative for abdominal pain, constipation, diarrhea, nausea and vomiting. Musculoskeletal: Positive for back pain. Negative for falls. Skin: Negative for rash. Neurological: Negative for dizziness. Endo/Heme/Allergies: Does not bruise/bleed easily. Physical Exam:     Physical Exam:  Visit Vitals  BP (!) 160/66 (BP 1 Location: Right upper arm, BP Patient Position: At rest)   Pulse 76   Temp 97.9 °F (36.6 °C)   Resp 18   Ht 5' 8\" (1.727 m)   Wt 103.9 kg (229 lb)   SpO2 100%   BMI 34.82 kg/m²      O2 Device: None (Room air)    Temp (24hrs), Av.1 °F (36.7 °C), Min:97.6 °F (36.4 °C), Max:98.4 °F (36.9 °C)    1901 -  0700  In: -   Out: 250 [Urine:250]   No intake/output data recorded. Physical Exam  Constitutional:       General: He is not in acute distress. Appearance: Normal appearance. He is obese. He is not ill-appearing or toxic-appearing. HENT:      Head: Normocephalic and atraumatic. Nose: Nose normal.      Mouth/Throat:      Mouth: Mucous membranes are moist.      Pharynx: Oropharynx is clear. Eyes:      Extraocular Movements: Extraocular movements intact. Conjunctiva/sclera: Conjunctivae normal.   Cardiovascular:      Rate and Rhythm: Normal rate and regular rhythm. Pulses: Normal pulses. Heart sounds: No murmur heard. No friction rub. No gallop. Pulmonary:      Effort: Pulmonary effort is normal. No respiratory distress. Breath sounds: Normal breath sounds. No stridor. No wheezing, rhonchi or rales. Abdominal:      General: Bowel sounds are normal. There is no distension. Palpations: Abdomen is soft. Musculoskeletal:         General: No swelling. Skin:     General: Skin is warm. Capillary Refill: Capillary refill takes less than 2 seconds. Neurological:      General: No focal deficit present. Mental Status: He is alert and oriented to person, place, and time. Motor: Weakness present. Comments: 3/5 strength in bilateral lower extremities, cannot lift up against gravity       Labs Reviewed: All lab results for the last 24 hours reviewed. Recent Results (from the past 24 hour(s))   CBC WITH AUTOMATED DIFF    Collection Time: 07/24/21  2:55 PM   Result Value Ref Range    WBC 7.9 4.6 - 13.2 K/uL    RBC 3.17 (L) 4.35 - 5.65 M/uL    HGB 9.1 (L) 13.0 - 16.0 g/dL    HCT 28.8 (L) 36.0 - 48.0 %    MCV 90.9 74.0 - 97.0 FL    MCH 28.7 24.0 - 34.0 PG    MCHC 31.6 31.0 - 37.0 g/dL    RDW 15.8 (H) 11.6 - 14.5 %    PLATELET 190 273 - 579 K/uL    MPV 11.3 9.2 - 11.8 FL    NEUTROPHILS 58 40 - 73 %    LYMPHOCYTES 30 21 - 52 %    MONOCYTES 10 3 - 10 %    EOSINOPHILS 1 0 - 5 %    BASOPHILS 0 0 - 2 %    ABS. NEUTROPHILS 4.6 1.8 - 8.0 K/UL    ABS. LYMPHOCYTES 2.3 0.9 - 3.6 K/UL    ABS. MONOCYTES 0.8 0.05 - 1.2 K/UL    ABS. EOSINOPHILS 0.1 0.0 - 0.4 K/UL    ABS.  BASOPHILS 0.0 0.0 - 0.1 K/UL    DF AUTOMATED     METABOLIC PANEL, BASIC    Collection Time: 07/24/21  2:55 PM   Result Value Ref Range    Sodium 136 136 - 145 mmol/L    Potassium 4.0 3.5 - 5.5 mmol/L    Chloride 103 100 - 111 mmol/L    CO2 27 21 - 32 mmol/L    Anion gap 6 3.0 - 18 mmol/L    Glucose 83 74 - 99 mg/dL    BUN 25 (H) 7.0 - 18 MG/DL    Creatinine 1.00 0.6 - 1.3 MG/DL    BUN/Creatinine ratio 25 (H) 12 - 20      GFR est AA >60 >60 ml/min/1.73m2    GFR est non-AA >60 >60 ml/min/1.73m2    Calcium 10.6 (H) 8.5 - 10.1 MG/DL   NT-PRO BNP    Collection Time: 07/24/21  2:55 PM   Result Value Ref Range    NT pro- 0 - 1,800 PG/ML   COVID-19 RAPID TEST    Collection Time: 07/24/21  7:55 PM   Result Value Ref Range    Specimen source Nasopharyngeal      COVID-19 rapid test Not detected NOTD     GLUCOSE, POC    Collection Time: 07/24/21 10:35 PM   Result Value Ref Range    Glucose (POC) 114 (H) 70 - 110 mg/dL     Results  CT PELV WO CONT (Accession [id number]) (Order [id number])  Allergies     Not Specified: Iodine   Exam Information    Status Exam Eric Demtegan Exam Ended    Final [99] 7/24/2021 16:13 7/24/2021  4:14 PM [id number]  4:14 PM   Result Information    Status: Final result (Exam End: 7/24/2021 16:14) Provider Status: Open   Study Result    Narrative & Impression   EXAM: CT of the pelvis     INDICATION: Unable to bear weight     COMPARISON: None.     TECHNIQUE: Axial CT imaging of the pelvis was performed without intravenous  contrast. Multiplanar reformats were generated. One or more dose reduction  techniques were used on this CT: automated exposure control, adjustment of the  mAs and/or kVp according to patient size, and iterative reconstruction  techniques. The specific techniques used on this CT exam have been documented  in the patient's electronic medical record. Digital Imaging and Communications  in Medicine (DICOM) format image data are available to nonaffiliated external  healthcare facilities or entities on a secure, media free, reciprocally  searchable basis with patient authorization for at least a 12-month period after  this study.     _______________     FINDINGS:     OSSEOUS STRUCTURES: Severe osteopenia. Extensive lytic lesions are seen  throughout the lower lumbar spine, pelvic bones, sacrum and proximal femurs  suggesting multiple myeloma or metastatic disease. There is age-indeterminate  fracture superior endplate of L4 with retropulsion into the central canal by 5  mm. There is left-sided sacroiliitis. There is step off along the anterior  cortex of the sacral alar on the right best seen on axial images 16 and 17  suspicious for acute nondisplaced sacral fracture.     Soft tissues: There is chondrocalcinosis the pubic symphysis suggesting CPPD.     Visualized lower pole of both kidneys unremarkable. Moderate calcific  atherosclerotic present. There are no enlarged lymph lymph nodes. Urinary  bladder is unremarkable.  There is no free fluid.     _______________     IMPRESSION     There are diffuse osteolytic lesions throughout the visualized osseous skeleton. Given the age of this patient differential diagnoses includes multiple myeloma  and metastatic disease.     Subtle acute nondisplaced right sacral alar fracture.     Superior endplate fracture of L4 with mild retropulsion into the central canal  by 5 mm. This may be pathologic or osteoporotic. Age indeterminate.     CPPD.        Results  MRI LUMB SPINE WO CONT (Accession [id number]) (Order [id number])  Allergies     Not Specified: Iodine   Exam Information    Status Exam Begun  Exam Ended    Final [99] 7/24/2021 16:36 7/24/2021  4:49 PM [id number]  4:49 PM   Result Information    Status: Final result (Exam End: 7/24/2021 16:49) Provider Status: Open   Study Result    Narrative & Impression   EXAM: MRI of the lumbar spine without intravenous contrast.     INDICATION: \"weakness. \"      ADDITIONAL HISTORY:  Multiple myeloma.     COMPARISON: MRI December 8, 2020.     PROTOCOL:  Routine lumbar spine sequences.     _______________     FINDINGS:        IMAGE QUALITY:  The exam is overall mildly degraded by motion artifact.        SEGMENTAL DESIGNATION:  The numbering scheme used in this dictation is based  upon the assumption of 5 lumbar segments.        INTRADURAL:  The conus terminates at L2 and is normal. There is equivocal  trace myelopathic signal abnormality at the high grade T11-T12 central stenosis. The nerve roots have a normal distribution in the thecal sac without evidence of  arachnoiditis.        SPINAL CURVATURE (SUPINE):  Broad low-grade levoconvex lumbar scoliosis.        SPINAL COLUMN AND MUSCULATURE: The innumerable T2 FLAIR hyperintense bone  lesions have decreased in size and intensity. There are no acute (T2 STIR  positive) compression fractures.  There is an unchanged chronic 50% height loss  T11 compression fracture without percutaneous vertebral augmentation cement, and  unchanged less than 30% height loss T12 compression fracture without  percutaneous vertebral augmentation cement, and unchanged L1 50% height loss  compression fracture with percutaneous vertebral augmentation cement, and  unchanged approximately 30% height loss chronic L4 and L5 compression fractures.              DISC DESICCATION: Moderate, diffuse.           L5-S1:                       Structure:  Trace disc bulging. Low-grade degenerative facet  arthropathy.                     Impingement:  None detected.           L4-L5:                       Structure: Moderate degenerative facet arthropathy. Unremarkable disc.                     Impingement:  None detected.           L3-L4:                       Structure:  Low grade degenerative facet arthropathy. Mild disc  bulging.                     Impingement:  Moderate central stenosis. Low-grade lateral  recess stenoses. Patent neural foramina.           L2-L3:                       Structure:  Low grade degenerative facet arthropathy. Trace disc  bulging.                     Impingement:  Low grade central stenosis and moderate bilateral  lateral recess stenosis. Patent neural foramina.           L1-L2:                       Structure:  Low grade degenerative facet arthropathy. Minimal  disc bulging.                     Impingement:  None detected.           T12-L1:                       Structure:  Low grade degenerative facet arthropathy. Trace disc  bulging.                     Impingement:  Low grade central stenosis. Patent neural  foramina.            T11-T12:                     Structure:  Marked degenerative facet arthropathy bilaterally. Mild disc bulging.                     Impingement:  High-grade central stenosis with circumferential  closure of the spinal subarachnoid space and moderate low grade mass effect upon  the spinal cord.     _______________     IMPRESSION     No acute findings.     1.  Persistent high-grade central stenosis at T11-T12 with equivocal minimal  myelopathic signal abnormality.     2.   Interval significant improvement in the innumerable multiple myeloma  lesions.     3. Unchanged multiple chronic compression fractures.     4.   Additional intersegmental analysis, as described.      _______________

## 2021-07-25 NOTE — PROGRESS NOTES
Hospitalist Progress Note    Patient: Marilyn Vincent MRN: [id number]  [facility]: [id number]    YOB: 1940  Age: 68 y.o. Sex: male    DOA: 7/24/2021 LOS:  LOS: 1 day          Chief Complaint:      Assessment/Plan     81-year-old male with multiple myeloma, systolic CHF, type 2 diabetes mellitus, hypertension, and obesity is admitted due to an inability to ambulate. He has had prior kyphoplasty in 2019 which was helpful.  Neurosurgery was consulted by the ED who recommended evaluation by Dr. Manson Kawasaki.     Unable to ambulate with spinal stenosis, debility, and inability to transfer to walker/wheelchair at home  --ortho consult >no fractures, asked spinal surgeon to see tomorrow  --pain control  --PT consult as he will likely require rehab stay on discharge  --case management following      Multiple myeloma-newly followed by Dr. Felix Singer at [facility]  --heme/onc following      Chronic systolic CHF without exacerbation  --continue home medications     Type 2 diabetes mellitus-well-controlled  --sliding scale insulin  --diabetic diet     Hypertension-at goal  --continue home medications     Anticoagulated-on xarelto for atrial fibrillation  --xarelto was held for possible surgery, heparin gtt continued      Full code    Disposition :  Patient Active Problem List   Diagnosis Code    Open wound of right index finger without damage to nail S61.200A    Chest pain R07.9    Lower extremity edema R60.0    Type 2 diabetes mellitus ([facility] G21.9    Systolic CHF, chronic (HCC) F71.36    Metabolic encephalopathy N87.04    Cellulitis L03.90    LEANDRA (acute kidney injury) ([facility] N17.9    Olecranon fracture S52.023A    Multiple myeloma ([facility] C90.00    Hypertension I10    Morbid obesity ([address] E66.01    Morbid obesity with BMI of 40.0-44.9, adult ([address] E66.01, Z68.41    Hypokalemia E87.6    Neuropathy G62.9    Hypomagnesemia E83.42    Elevated troponin R77.8    Debility R53.81    Left hemiparesis ([address] G81.94    Spinal stenosis M48.00    HTN (hypertension) I10    DM (diabetes mellitus) (HCC) E11.9    Anticoagulated Z79.01    Left shoulder pain M25.512    Obesity (BMI 30-39. 9) E66.9    Pneumonia due to COVID-19 virus U07.1, J12.82    COVID-19 vaccine series completed Z92.29    Unable to ambulate R26.2       Subjective:    C/o being constipation. Says laxative did not work. Wants enema. Review of systems:    Constitutional: denies fevers, chills, myalgias  Respiratory: denies SOB, cough  Cardiovascular: denies chest pain, palpitations  Gastrointestinal: denies nausea, vomiting, diarrhea      Vital signs/Intake and Output:  Visit Vitals  BP (!) 150/62 (BP 1 Location: Right upper arm, BP Patient Position: At rest)   Pulse 73   Temp 98.1 °F (36.7 °C)   Resp 17   Ht 5' 8\" (1.727 m)   Wt 103.9 kg (229 lb)   SpO2 98%   BMI 34.82 kg/m²     Current Shift:  No intake/output data recorded.   Last three shifts:  07/23 1901 - 07/25 0700  In: -   Out: 250 [Urine:250]    Exam:    General: Well developed, alert, NAD, OX3  Head/Neck: NCAT, supple, No masses, No lymphadenopathy  CVS:Regular rate and rhythm, no M/R/G, S1/S2 heard, no thrill  Lungs:Clear to auscultation bilaterally, no wheezes, rhonchi, or rales  Abdomen: Soft, Nontender, No distention, Normal Bowel sounds, No hepatomegaly  Extremities: No C/C/E, pulses palpable 2+  Skin:normal texture and turgor, no rashes, no lesions  Neuro:grossly normal , follows commands  Psych:appropriate                Labs: Results:       Chemistry Recent Labs     07/25/21  0455 07/24/21  1455   GLU 82 83    136   K 3.8 4.0    103   CO2 28 27   BUN 17 25*   CREA 0.65 1.00   CA 10.3* 10.6*   AGAP 6 6   BUCR 26* 25*   AP 48  --    TP 7.1  --    ALB 2.8*  --    GLOB 4.3*  --    AGRAT 0.7*  --       CBC w/Diff Recent Labs     07/25/21  0455 07/24/21  1455   WBC 5.8 7.9   RBC 2.99* 3.17*   HGB 8.7* 9.1*   HCT 26.9* 28.8*    187   GRANS  --  58   LYMPH  --  30   EOS  --  1 Cardiac Enzymes No results for input(s): CPK, CKND1, YEFRI in the last 72 hours. No lab exists for component: CKRMB, TROIP   Coagulation No results for input(s): PTP, INR, APTT, INREXT in the last 72 hours. Lipid Panel Lab Results   Component Value Date/Time    Cholesterol, total 89 12/08/2020 01:12 AM    HDL Cholesterol 46 12/08/2020 01:12 AM    LDL, calculated 28.6 12/08/2020 01:12 AM    VLDL, calculated 14.4 12/08/2020 01:12 AM    Triglyceride 72 12/08/2020 01:12 AM    CHOL/HDL Ratio 1.9 12/08/2020 01:12 AM      BNP No results for input(s): BNPP in the last 72 hours.    Liver Enzymes Recent Labs     07/25/21  0455   TP 7.1   ALB 2.8*   AP 48      Thyroid Studies Lab Results   Component Value Date/Time    TSH 1.16 12/07/2020 12:25 PM    TSH 0.74 12/07/2020 12:25 PM        Procedures/imaging: see electronic medical records for all procedures/Xrays and details which were not copied into this note but were reviewed prior to creation of Anupam Ahuja MD

## 2021-07-25 NOTE — PROGRESS NOTES
On-call SLP acknowledging eval and tx order. After review of EMR and d/w RN, Marianne Ureña, Pt tolerating current diet without incident. Accordingly, stat evaluation not indicated this day. Primary SLP will follow up next day.      Thank you for this referral.     Salima Sykes., [id number] [facility]  Speech Language Pathologist

## 2021-07-25 NOTE — PROGRESS NOTES
Reason for Admission:   Chart reviewed; per H&P, patient is a \" a 68 y.o. male with multiple myeloma, systolic CHF, type 2 diabetes mellitus, hypertension, and obesity who presents with worsening back pain over the past week. He has shoulder pain and arm pain which are limiting his ability to compensate and execute transfers to a walker or a wheelchair. He has so much pain in his back that he has progressed to needing full assistance, but his daughter is recovering from surgery and her  is away right now. He had relief from a kyphoplasty done back in 2019 and is hoping to have a similar procedure done while here. The ED did consult neurosurgery due to high-grade spinal stenosis and they recommended consulting orthopedics here. The patient has been in several rehabilitation facilities for short periods of time to make himself stronger to go back home. He may need this again after the kyphoplasty. He has had some mild constipation but no urinary retention. Additionally, he has recently been seen by Dr. Rachel Mayes of [facility] and he had discussed possibly starting a new medication for multiple myeloma. \"                    RUR Score: Moderate; 25%              PCP: First and Last name:   Lupe Qureshi MD     Name of Practice: Family Medicine   Are you a current patient: Yes/No: yes   Approximate date of last visit: within last few weeks   Can you participate in a virtual visit if needed: no    Do you (patient/family) have any concerns for transition/discharge?      Plan is for SNF for rehab, then return home              Plan for utilizing home health:   TBD    Current Advanced Directive/Advance Care Plan:  Full Code      Healthcare Decision Maker:   Click here to complete [address] including selection of the Healthcare Decision Maker Relationship (ie \"Primary\")            Primary Decision MakerCenedinafortunato Roblero - Anand - [phone]    Transition of Care Plan: 35 67 15:  Met with patient at bedside; per patient, since last admission he has seen oncologist, orthopedist and PCP; states that he hoping to go to SNF and then home with hospital bed. Order for bed has been placed but has not been processed to [address] as it is still unclear how long patient will be in a facility. Care manager will continue to follow.

## 2021-07-25 NOTE — PROGRESS NOTES
Bedside and Verbal shift change report given to [facility] GLYNN (oncoming nurse) by  Tova Myles RN (offgoing nurse).  Report included the following information SBAR, Kardex, Intake/Output, MAR, Recent Results

## 2021-07-25 NOTE — CONSULTS
Consult Note    Patient: Jose Mallory               Sex: male          DOA: 7/24/2021         YOB: 1940      Age:  68 y.o.        LOS:  LOS: 1 day              HPI:     Jose Mallory is a 68 y.o. male who has been seen for spinal stenosis and multiple chronic compression fractures. Patient reports following with Dr. Joey Stein in office last week and pending MRI of lumbar spine next week. He presented to ER d/t inability to pain in lower back and lower extremities with inability to weight bear. PMHx significant for multiple myeloma, systolic CHF, Type 2 DM, and HTN. Past Medical History:   Diagnosis Date    [facility]     Diabetes ([facility]     FH: chemotherapy     [facility]     Hypertension     [facility] myeloma ([facility]     Neuropathy     Obesity (BMI 30-39.9) 6/12/2021       Past Surgical History:   Procedure Laterality Date    HX CHOLECYSTECTOMY      HX KYPHOPLASTY         Family History   Problem Relation Age of Onset    Diabetes Father     Heart Disease Father     Hypertension Mother     Diabetes Mother     Diabetes Sister     Diabetes Brother        Social History     Socioeconomic History    Marital status:      Spouse name: Not on file    Number of children: Not on file    Years of education: Not on file    Highest education level: Not on file   Tobacco Use    Smoking status: Former Smoker    Smokeless tobacco: Never Used   Substance and Sexual Activity    Alcohol use: Never     Comment: rare    Drug use: No   Other Topics Concern     Social Determinants of Health     Financial Resource Strain:     Difficulty of Paying Living Expenses:    Food Insecurity:     Worried About Running Out of Food in the Last Year:     [address] in the Last Year:    Transportation Needs:     Lack of Transportation (Medical):      Lack of Transportation (Non-Medical):    Physical Activity:     Days of Exercise per Week:     Minutes of Exercise per Session:    Stress:     Feeling of Stress :    Social Connections:     Frequency of Communication with Friends and Family:     Frequency of Social Gatherings with Friends and Family:     Attends [employer] Services:     Active Member of Clubs or Organizations:     Attends Club or Organization Meetings:     Marital Status:        Prior to Admission medications    Medication Sig Start Date End Date Taking? Authorizing Provider   lidocaine (Lidoderm) 5 % Apply patch to the affected area for 12 hours a day and remove for 12 hours a day. 7/1/21   Elicia Major MD   docusate sodium (Colace) 100 mg capsule Take 1 Capsule by mouth two (2) times a day for 90 days. 7/1/21 9/29/21  Elicia Major MD   furosemide (Lasix) 20 mg tablet Take  by mouth daily. Provider, Historical   felodipine (PLENDIL SR) 10 mg 24 hr tablet Take 10 mg by mouth daily. Annie Vazquez MD   spironolactone (ALDACTONE) 25 mg tablet Take  by mouth daily. Annie Vazquez MD   pyridoxine, vitamin B6, (VITAMIN B-6) 50 mg tablet Take 50 mg by mouth daily. Annie Vazquez MD   rivaroxaban (XARELTO PO) Take 10 mg by mouth. Annie Vazquez MD   POTASSIUM CHLORIDE Take 20 mEq by mouth daily. Annie Vazquez MD   omeprazole (PRILOSEC) 20 mg capsule Take 20 mg by mouth daily. Annie Vazquez MD   SITagliptin-metFORMIN (JANUMET) 50-1,000 mg per tablet Take 1 Tab by mouth two (2) times daily (with meals). Provider, Historical   cyanocobalamin (VITAMIN B12) 500 mcg tablet Take 1,000 mcg by mouth daily. Provider, Historical   lisinopril (PRINIVIL, ZESTRIL) 5 mg tablet Take 1 Tab by mouth daily. 2/14/20   Jaida Mina MD   isosorbide mononitrate ER (IMDUR) 30 mg tablet Take 1 Tab by mouth daily. 2/15/20   Magdaleno Fitzgerald MD   metoprolol succinate (TOPROL-XL) 25 mg XL tablet Take 1 Tab by mouth daily. 2/15/20   Magdaleno Fitzgerald MD   pravastatin (PRAVACHOL) 40 mg tablet Take 1 Tab by mouth nightly.  2/14/20   Magdaleno Fitzgerald MD   calcium-cholecalciferol, d3, (CALCIUM 600 + D) 600-125 mg-unit tab Take 1 Tablet by mouth two (2) times a day. Annie Vazquez MD   pioglitazone (ACTOS) 30 mg tablet Take 30 mg by mouth daily. Annie Vazquez MD       Allergies   Allergen Reactions    Iodine Hives       Review of Systems  Pertinent items are noted in the History of Present Illness. Physical Exam:      Visit Vitals  BP (!) 160/66 (BP 1 Location: Right upper arm, BP Patient Position: At rest)   Pulse 76   Temp 97.9 °F (36.6 °C)   Resp 18   Ht 5' 8\" (1.727 m)   Wt 103.9 kg (229 lb)   SpO2 100%   BMI 34.82 kg/m²       Physical Exam:  General: Alert and Oriented X 3  Lungs: No audible wheezing  Cardiovascular: Regular Rate and Rhythm palpated by peripheral pulse  Abdomen: Soft, nontender  in all four quadrants  Gential/Rectal: deferred  Musculoskeletal: ROM of right hip with gentle log rolling reproduces pain about lateral aspect of hip. ROM of left hip with gentle log rolling is without limitations. Gross sensation intact in bilateral lower extremities to light touch. Gross motor intact. Mild tenderness to palpation lumbar spine.     Labs Reviewed:  BMP:   Lab Results   Component Value Date/Time     07/25/2021 04:55 AM    K 3.8 07/25/2021 04:55 AM     07/25/2021 04:55 AM    CO2 28 07/25/2021 04:55 AM    AGAP 6 07/25/2021 04:55 AM    GLU 82 07/25/2021 04:55 AM    BUN 17 07/25/2021 04:55 AM    CREA 0.65 07/25/2021 04:55 AM    GFRAA >60 07/25/2021 04:55 AM    GFRNA >60 07/25/2021 04:55 AM     CMP:   Lab Results   Component Value Date/Time     07/25/2021 04:55 AM    K 3.8 07/25/2021 04:55 AM     07/25/2021 04:55 AM    CO2 28 07/25/2021 04:55 AM    AGAP 6 07/25/2021 04:55 AM    GLU 82 07/25/2021 04:55 AM    BUN 17 07/25/2021 04:55 AM    CREA 0.65 07/25/2021 04:55 AM    GFRAA >60 07/25/2021 04:55 AM    GFRNA >60 07/25/2021 04:55 AM    CA 10.3 (H) 07/25/2021 04:55 AM    ALB 2.8 (L) 07/25/2021 04:55 AM    TP 7.1 07/25/2021 04:55 AM    GLOB 4.3 (H) 07/25/2021 04:55 AM AGRAT 0.7 (L) 07/25/2021 04:55 AM    ALT 14 (L) 07/25/2021 04:55 AM     CBC:   Lab Results   Component Value Date/Time    WBC 5.8 07/25/2021 04:55 AM    HGB 8.7 (L) 07/25/2021 04:55 AM    HCT 26.9 (L) 07/25/2021 04:55 AM     07/25/2021 04:55 AM     Radiology:  CT Pelvis: IMPRESSION: There are diffuse osteolytic lesions throughout the visualized osseous skeleton. Given the age of this patient differential diagnoses includes multiple myeloma and metastatic disease.     Subtle acute nondisplaced right sacral alar fracture.     Superior endplate fracture of L4 with mild retropulsion into the central canal by 5 mm. This may be pathologic or osteoporotic. Age indeterminate.     CPPD. MRI Lumbar Spine: IMPRESSION: No acute findings.   1.  Persistent high-grade central stenosis at T11-T12 with equivocal minimal myelopathic signal abnormality.   2.  Interval significant improvement in the innumerable multiple myeloma lesions. 3.  Unchanged multiple chronic compression fractures. 4.  Additional intersegmental analysis, as described.        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 No acute findings or concern for acute surgical intervention indicated. Will have spine partner evaluate patient tomorrow for further recommendations. Patient was seen and examined by Dr. Zaki Harris who is in agreement with the above findings.

## 2021-07-26 LAB
ALBUMIN SERPL-MCNC: 2.8 G/DL (ref 3.4–5)
ALBUMIN/GLOB SERPL: 0.6 {RATIO} (ref 0.8–1.7)
ALP SERPL-CCNC: 51 U/L (ref 45–117)
ALT SERPL-CCNC: 14 U/L (ref 16–61)
ANION GAP SERPL CALC-SCNC: 6 MMOL/L (ref 3–18)
AST SERPL-CCNC: 12 U/L (ref 10–38)
BILIRUB SERPL-MCNC: 0.4 MG/DL (ref 0.2–1)
BUN SERPL-MCNC: 13 MG/DL (ref 7–18)
BUN/CREAT SERPL: 18 (ref 12–20)
CALCIUM SERPL-MCNC: 10.2 MG/DL (ref 8.5–10.1)
CHLORIDE SERPL-SCNC: 102 MMOL/L (ref 100–111)
CO2 SERPL-SCNC: 29 MMOL/L (ref 21–32)
CREAT SERPL-MCNC: 0.72 MG/DL (ref 0.6–1.3)
ERYTHROCYTE [DISTWIDTH] IN BLOOD BY AUTOMATED COUNT: 15.6 % (ref 11.6–14.5)
GLOBULIN SER CALC-MCNC: 4.4 G/DL (ref 2–4)
GLUCOSE BLD STRIP.AUTO-MCNC: 103 MG/DL (ref 70–110)
GLUCOSE BLD STRIP.AUTO-MCNC: 120 MG/DL (ref 70–110)
GLUCOSE BLD STRIP.AUTO-MCNC: 137 MG/DL (ref 70–110)
GLUCOSE BLD STRIP.AUTO-MCNC: 156 MG/DL (ref 70–110)
GLUCOSE BLD STRIP.AUTO-MCNC: 184 MG/DL (ref 70–110)
GLUCOSE SERPL-MCNC: 112 MG/DL (ref 74–99)
HCT VFR BLD AUTO: 27.6 % (ref 36–48)
HGB BLD-MCNC: 8.8 G/DL (ref 13–16)
MCH RBC QN AUTO: 28.9 PG (ref 24–34)
MCHC RBC AUTO-ENTMCNC: 31.9 G/DL (ref 31–37)
MCV RBC AUTO: 90.5 FL (ref 74–97)
PLATELET # BLD AUTO: 183 K/UL (ref 135–420)
PMV BLD AUTO: 11.4 FL (ref 9.2–11.8)
POTASSIUM SERPL-SCNC: 4.2 MMOL/L (ref 3.5–5.5)
PROT SERPL-MCNC: 7.2 G/DL (ref 6.4–8.2)
RBC # BLD AUTO: 3.05 M/UL (ref 4.35–5.65)
SODIUM SERPL-SCNC: 137 MMOL/L (ref 136–145)
WBC # BLD AUTO: 6.1 K/UL (ref 4.6–13.2)

## 2021-07-26 PROCEDURE — 74011636637 HC RX REV CODE- 636/637: Performed by: FAMILY MEDICINE

## 2021-07-26 PROCEDURE — 97163 PT EVAL HIGH COMPLEX 45 MIN: CPT

## 2021-07-26 PROCEDURE — 36415 COLL VENOUS BLD VENIPUNCTURE: CPT

## 2021-07-26 PROCEDURE — 82962 GLUCOSE BLOOD TEST: CPT

## 2021-07-26 PROCEDURE — 85027 COMPLETE CBC AUTOMATED: CPT

## 2021-07-26 PROCEDURE — 80053 COMPREHEN METABOLIC PANEL: CPT

## 2021-07-26 PROCEDURE — 97530 THERAPEUTIC ACTIVITIES: CPT

## 2021-07-26 PROCEDURE — 74011250637 HC RX REV CODE- 250/637: Performed by: FAMILY MEDICINE

## 2021-07-26 PROCEDURE — 65270000029 HC RM PRIVATE

## 2021-07-26 PROCEDURE — 92526 ORAL FUNCTION THERAPY: CPT

## 2021-07-26 PROCEDURE — 92610 EVALUATE SWALLOWING FUNCTION: CPT

## 2021-07-26 RX ADMIN — FELODIPINE 10 MG: 5 TABLET, EXTENDED RELEASE ORAL at 08:18

## 2021-07-26 RX ADMIN — DOCUSATE SODIUM 100 MG: 100 CAPSULE ORAL at 22:02

## 2021-07-26 RX ADMIN — INSULIN LISPRO 2 UNITS: 100 INJECTION, SOLUTION INTRAVENOUS; SUBCUTANEOUS at 16:30

## 2021-07-26 RX ADMIN — OXYCODONE 10 MG: 5 TABLET ORAL at 22:02

## 2021-07-26 RX ADMIN — ISOSORBIDE MONONITRATE 30 MG: 30 TABLET, EXTENDED RELEASE ORAL at 08:19

## 2021-07-26 RX ADMIN — GABAPENTIN 300 MG: 300 CAPSULE ORAL at 22:01

## 2021-07-26 RX ADMIN — LISINOPRIL 5 MG: 5 TABLET ORAL at 08:19

## 2021-07-26 RX ADMIN — FAMOTIDINE 20 MG: 20 TABLET ORAL at 22:01

## 2021-07-26 RX ADMIN — FAMOTIDINE 20 MG: 20 TABLET ORAL at 08:19

## 2021-07-26 RX ADMIN — RIVAROXABAN 10 MG: 10 TABLET, FILM COATED ORAL at 17:52

## 2021-07-26 RX ADMIN — Medication 10 ML: at 22:02

## 2021-07-26 RX ADMIN — PRAVASTATIN SODIUM 40 MG: 20 TABLET ORAL at 22:02

## 2021-07-26 RX ADMIN — Medication 10 ML: at 14:00

## 2021-07-26 RX ADMIN — GABAPENTIN 300 MG: 300 CAPSULE ORAL at 08:19

## 2021-07-26 RX ADMIN — METOPROLOL SUCCINATE 25 MG: 25 TABLET, EXTENDED RELEASE ORAL at 08:18

## 2021-07-26 NOTE — PROGRESS NOTES
0700  Bedside and Verbal shift change report given to MILVIA Smith (oncoming nurse) by TAYA Slater RN (offgoing nurse). Report included the following information SBAR, Kardex, OR Summary, Intake/Output and MAR.    [id number]  Shift assessment completed. Pt AOX4 RA. Lung sounds clear, bowel sounds active. Pt has dry, cracked skin to BLLE. Skin is otherwise intact. Pt has 20G Right Wrist CDI SL. Pt is on Telebox 33 Sinus Rhythm w/ Bundle. Pt denies any reports of pain, discomfort, or n/v this am. Per offgoing RN, pt refusing soap suds enema. Pt reports \"large BM\" overnight. 56  Pt denies any reports of pain, discomfort, or n/v at this time. 1500  Pt denies any reports of pain, discomfort, or n/v.    1705  Pt denies any reports of pain, discomfort, or n/v.    1932  Bedside and Verbal shift change report given by MILVIA Walsh RN (off going nurse) to DAVID Neff RN (on coming nurse). Report included the following information SBAR, Kardex, OR Summary, Intake/Output and MAR.

## 2021-07-26 NOTE — PROGRESS NOTES
Bedside and Verbal shift change report given to Je Sanchez RN (oncoming nurse) by  Edison Murphy RN (offgoing nurse).  Report included the following information SBAR, Kardex, Intake/Output, MAR, Recent Results

## 2021-07-26 NOTE — PROGRESS NOTES
Problem: Dysphagia (Adult)  Goal: *Acute Goals and Plan of Care (Insert Text)  Description: Dysphagia Present:   Yes    Recommendations:  Diet: Mechanical soft, thin liquid   Meds: Per patient preference  Aspiration Precautions  Oral Care TID  Other: Single sips/bites, alternate solid/liquid     Goals:  Patient will:  1. Tolerate PO trials with 0 s/s overt distress in 4/5 trials  2. Utilize compensatory swallow strategies/maneuvers (decrease bite/sip, size/rate, alt. liq/sol) with min cues in 4/5 trials. 3. Perform oral-motor/laryngeal exercises to increase oropharyngeal swallow function with min cues. 4. Complete an objective swallow study (i.e., MBSS) to assess swallow integrity, r/o aspiration, and determine of safest LRD, min A. Outcome: Progressing Towards Goal     SPEECH LANGUAGE PATHOLOGY BEDSIDE SWALLOW EVALUATION AND THERAPY     Patient: Dianna Martin (32 y.o. male)  Date: 7/26/2021  Primary Diagnosis: Inability to ambulate due to hip [R26.2]        Precautions: Aspiration  PLOF: Independent     ASSESSMENT :  Clinical beside swallow eval completed per MD orders. Pt A&Ox4. Functional communication. Intelligibility >70% with slow rate. Reports his speech was slurred previously but is now at baseline. Oral-motor exam revealed pt with incomplete dentition; however, all other structures grossly intact for mastication and deglutition. Reports soft solid diet at baseline. Presented with thin liquid, puree and solid trials. Exhibited mildly delayed mastication with slow, incomplete bolus cohesion, manipulation and A-P transit. Further exhibited mildly delayed swallow timing/reflex and hyolaryngeal excursion. Pt able to manipulate and clear with 0 clinical s/s aspiration, though patient reports some coughing at times with use of straw. Pt safe for soft solid, thin liquid diet.   SLP educated pt on role of speech therapist in current setting with re: speech/swallow; verbalized comprehension.   Thank you for this referral.   Aleisha Javier M.Ed, CCC-SLP      PLAN :  Recommendations and Planned Interventions:  Diet: Mechanical soft, thin liquid   Meds: Per patient preference  Aspiration Precautions  Oral Care TID  Other: Single sips/bites, alternate solid/liquid   Discharge Recommendations: SNF      SUBJECTIVE:   Patient stated Sometimes I cough with a straw. \"     OBJECTIVE:           Past Medical History:   Diagnosis Date    [facility]      Diabetes ([facility]      FH: chemotherapy      H/O stem cell transplant ([facility]      Hypertension      Multiple myeloma ([facility]      Neuropathy              Past Surgical History:   Procedure Laterality Date    HX CHOLECYSTECTOMY        HX KYPHOPLASTY          Home Situation:   Home Situation  Home Environment: Private residence  # Steps to Enter: 3  One/Two Story Residence: Two story  # of Interior Steps: 15  Interior Rails: Left  Lift Chair Available: No  Living Alone: No  Support Systems: Child(desiree)  Patient Expects to be Discharged to[de-identified] Private residence  Current DME Used/Available at Home: [address] prior to admission: Soft solids, thin liquid  Current Diet:  Soft solids, thin liquid       Cognitive and Communication Status:  Neurologic State: Alert  Orientation Level: Oriented X4  Cognition: Follows commands  Perception: Appears intact  Perseveration: No perseveration noted  Safety/Judgement: Awareness of environment, Good awareness of safety precautions  Oral Assessment:  Oral Assessment  Labial: No impairment  Dentition: Natural;Extractions  Oral Hygiene: Good  Lingual: No impairment  Velum: No impairment  Mandible: No impairment  P.O. Trials:  Patient Position: HOB 60  Vocal quality prior to P.O.: No impairment  Consistency Presented: Thin liquid;Puree; Solid  How Presented: Self-fed/presented;Straw;Successive swallows  Bolus Acceptance: No impairment  Bolus Formation/Control: Impaired  Type of Impairment: Delayed;Mastication  Propulsion: Delayed  Oral Residue: None  Initiation of Swallow: Delayed; Weak  Laryngeal Elevation: Functional  Aspiration Signs/Symptoms: None  Pharyngeal Phase Characteristics: No impairment, issues, or problems   Effective Modifications: Alternate liquids/solids;Small sips and bites  Cues for Modifications: None     Oral Phase Severity: Mild  Pharyngeal Phase Severity : Mild   PAIN:  Pain level pre-treatment: 0/10   Pain level post-treatment: 0/10      After evaluation:   []? Patient left in no apparent distress sitting up in chair  [x]? Patient left in no apparent distress in bed  [x]? Call bell left within reach  [x]? Nursing notified  []? Family present  []? Caregiver present  []? Bed alarm activated        COMMUNICATION/EDUCATION:   [x]? Aspiration precautions; swallow safety; compensatory techniques. [x]? Patient/family have participated as able in goal setting and plan of care. []? Patient/family agree to work toward stated goals and plan of care. []? Patient understands intent and goals of therapy; neutral about participation. []? Patient unable to participate in goal setting/plan of care; educ ongoing with interdisciplinary staff  []? Posted safety precautions in patient's room.     Thank you for this referral.  VASU Lopez.Rene, CCC-SLP  Time Calculation: 24 mins

## 2021-07-26 NOTE — PROGRESS NOTES
Progress Note POD #      Patient: Marilyn Vincent               Sex: male          DOA: 7/24/2021         YOB: 1940      Surgery:            LOS: 2 days               Subjective:     No new complaints. Bilateral hip pain and difficulty getting up from the bed    Objective:      Visit Vitals  BP (!) 143/58 (BP Patient Position: At rest)   Pulse 76   Temp 98.2 °F (36.8 °C)   Resp 18   Ht 5' 8\" (1.727 m)   Wt 100 kg (220 lb 6.4 oz)   SpO2 100%   BMI 33.51 kg/m²       Physical Exam:  Neurological: motor strength: 5/5 in lower extremities bilaterally                          sensation: intact to light touch   Tender at both greater trochanters    Intake and Output:  Current Shift:  No intake/output data recorded. Last three shifts:  07/24 1901 - 07/26 0700  In: -   Out: 700 [Urine:700]    Lab/Data Reviewed:    Lab/Data Reviewed:  Lab Results   Component Value Date/Time    WBC 6.1 07/26/2021 04:20 AM    HGB 8.8 (L) 07/26/2021 04:20 AM    HCT 27.6 (L) 07/26/2021 04:20 AM    PLATELET 802 49/96/2993 04:20 AM    MCV 90.5 07/26/2021 04:20 AM     Lab Results   Component Value Date/Time    aPTT 27.8 12/07/2020 09:26 AM     Lab Results   Component Value Date/Time    INR 1.3 (H) 12/07/2020 09:26 AM    INR 1.1 02/16/2020 04:45 PM    INR 1.1 02/14/2020 02:45 AM    Prothrombin time 16.0 (H) 12/07/2020 09:26 AM    Prothrombin time 14.2 02/16/2020 04:45 PM    Prothrombin time 14.3 02/14/2020 02:45 AM      MRI Lumbar: No acute findings. No change in stenosis compared to Dec 2020 MRI.     1.  Persistent high-grade central stenosis at T11-T12 with equivocal minimal  myelopathic signal abnormality.     2. Interval significant improvement in the innumerable multiple myeloma  lesions.     3. Unchanged multiple chronic compression fractures.       Assessment/Plan     Principal Problem:    Unable to ambulate (7/24/2021)    Active Problems:    Type 2 diabetes mellitus ([address] (1/12/6522)      Systolic CHF, chronic ([facility] (2/14/2020)      Multiple myeloma ([facility] (2/16/2020)      Hypertension (2/16/2020)      Debility (12/7/2020)      Spinal stenosis (12/11/2020)      Anticoagulated (6/12/2021)      Obesity (BMI 30-39.9) (6/12/2021)        1. Stable, no signs of myelopathy  2. OOB with PT  3. D/C Planning to rehab  4. Follow-up in 10 days at [facility] with Dr. Raúl Hawkins.

## 2021-07-26 NOTE — PROGRESS NOTES
Hospitalist Progress Note    Patient: Ashli Lo MRN: [id number]  [facility]: [id number]    YOB: 1940  Age: 68 y.o. Sex: male    DOA: 7/24/2021 LOS:  LOS: 2 days          Chief Complaint:      weakness    Assessment/Plan   74-year-old male with multiple myeloma, systolic CHF, type 2 diabetes mellitus, hypertension, and obesity is admitted due to an inability to ambulate. He has had prior kyphoplasty in 2019 which was helpful. Neurosurgery was consulted by the ED who recommended evaluation by Dr. Jeyson Marin.     Seen by ortho this am, no indication for surgery noted, recommend PT     Unable to ambulate with spinal stenosis, debility, and inability to transfer to walker/wheelchair at home    pain control  PT consult     Multiple myeloma-newly followed by Dr. Azalea Ramirez at [facility]    Chronic systolic CHF without exacerbation  -continue home medications    Anemia-likley due to MM     Type 2 diabetes mellitus-well-controlled  sliding scale insulin  Soft diet per speech     Hypertension-at goal  home medications     on xarelto for atrial fibrillation    Discharge planning for SNF    Disposition :  Patient Active Problem List   Diagnosis Code    Open wound of right index finger without damage to nail S61.200A    Chest pain R07.9    Lower extremity edema R60.0    Type 2 diabetes mellitus (HCC) P39.4    Systolic CHF, chronic (HCC) J67.31    Metabolic encephalopathy P44.18    Cellulitis L03.90    LEANDRA (acute kidney injury) ([facility] N17.9    Olecranon fracture S52.023A    Multiple myeloma ([facility] C90.00    Hypertension I10    Morbid obesity ([facility] E66.01    Morbid obesity with BMI of 40.0-44.9, adult ([facility] E66.01, Z68.41    Hypokalemia E87.6    Neuropathy G62.9    Hypomagnesemia E83.42    Elevated troponin R77.8    Debility R53.81    Left hemiparesis (HCC) G81.94    Spinal stenosis M48.00    HTN (hypertension) I10    DM (diabetes mellitus) (HCC) E11.9    Anticoagulated Z79.01    Left shoulder pain M25.512    Obesity (BMI 30-39. 9) E66.9    Pneumonia due to COVID-19 virus U07.1, J12.82    COVID-19 vaccine series completed Z92.29    Unable to ambulate R26.2       Subjective:    Denies new issue  No pain at rest  States he will need rehab placement, and will discuss with daughter    Review of systems:    Constitutional: denies fevers, chills  Respiratory: denies SOB  Cardiovascular: denies chest pain  Gastrointestinal: denies nausea, vomiting, diarrhea      Vital signs/Intake and Output:  Visit Vitals  BP (!) 143/58 (BP Patient Position: At rest)   Pulse 76   Temp 98.2 °F (36.8 °C)   Resp 18   Ht 5' 8\" (1.727 m)   Wt 100 kg (220 lb 6.4 oz)   SpO2 100%   BMI 33.51 kg/m²     Current Shift:  No intake/output data recorded. Last three shifts:  07/24 1901 - 07/26 0700  In: -   Out: 700 [Urine:700]    Exam:    General: Well developed, alert, NAD, OX3  CVS:Regular rate and rhythm, no M/R/G, S1/S2 heard, no thrill  Lungs:Clear to auscultation bilaterally, no wheezes, rhonchi, or rales  Abdomen: Soft, Nontender, No distention, Normal Bowel sounds, No hepatomegaly  Extremities: No C/C/E, pulses palpable 2+  Neuro:grossly normal , follows commands  Psych:appropriate                Labs: Results:       Chemistry Recent Labs     07/26/21  0420 07/25/21  0455 07/24/21  1455   * 82 83    138 136   K 4.2 3.8 4.0    104 103   CO2 29 28 27   BUN 13 17 25*   CREA 0.72 0.65 1.00   CA 10.2* 10.3* 10.6*   AGAP 6 6 6   BUCR 18 26* 25*   AP 51 48  --    TP 7.2 7.1  --    ALB 2.8* 2.8*  --    GLOB 4.4* 4.3*  --    AGRAT 0.6* 0.7*  --       CBC w/Diff Recent Labs     07/26/21  0420 07/25/21  0455 07/24/21  1455   WBC 6.1 5.8 7.9   RBC 3.05* 2.99* 3.17*   HGB 8.8* 8.7* 9.1*   HCT 27.6* 26.9* 28.8*    161 187   GRANS  --   --  58   LYMPH  --   --  30   EOS  --   --  1      Cardiac Enzymes No results for input(s): CPK, CKND1, YEFRI in the last 72 hours.     No lab exists for component: CKRMB, TROIP   Coagulation No results for input(s): PTP, INR, APTT, INREXT in the last 72 hours. Lipid Panel Lab Results   Component Value Date/Time    Cholesterol, total 89 12/08/2020 01:12 AM    HDL Cholesterol 46 12/08/2020 01:12 AM    LDL, calculated 28.6 12/08/2020 01:12 AM    VLDL, calculated 14.4 12/08/2020 01:12 AM    Triglyceride 72 12/08/2020 01:12 AM    CHOL/HDL Ratio 1.9 12/08/2020 01:12 AM      BNP No results for input(s): BNPP in the last 72 hours.    Liver Enzymes Recent Labs     07/26/21  0420   TP 7.2   ALB 2.8*   AP 51      Thyroid Studies Lab Results   Component Value Date/Time    TSH 1.16 12/07/2020 12:25 PM    TSH 0.74 12/07/2020 12:25 PM        Procedures/imaging: see electronic medical records for all procedures/Xrays and details which were not copied into this note but were reviewed prior to creation of Toño Cook MD

## 2021-07-26 NOTE — PROGRESS NOTES
Patient resting comfortably. Patient educated on ordered soap suds enema, pt verbalized understanding and declined enema. Patient stated he had a large BM after taking a laxative today and does not feel he needs an enema at this time.

## 2021-07-26 NOTE — PROGRESS NOTES
Problem: Mobility Impaired (Adult and Pediatric)  Goal: *Acute Goals and Plan of Care (Insert Text)  Description: Physical Therapy Goals   Initiated 7/26/2021 and to be accomplished within 7 day(s)  1. Patient will move from supine <> sit with CGA/min A in prep for out of bed activity and change of position. 2.  Patient will perform sit<> stand with Cole/RW in prep for transfers/ambulation. 3.  Patient will transfer from bed <> chair with CGA with  RW for time up in chair for completion of ADL activity. 4.  Patient will ambulate 50 feet with S/LRAD for improved functional mobility at discharge. Outcome: Progressing Towards Goal  PHYSICAL THERAPY EVALUATION    Patient: Linda Weinstein (89 y.o. male)  Date: 7/26/2021  Primary Diagnosis: Inability to ambulate due to hip [R26.2]  Precautions:fall  PLOF: amb with RW    ASSESSMENT :  Based on the objective data described below, the patient is seen on medical unit. Pt presents with pain LB 0/10 at rest supine and increases to >10/10 during attempt to transition to sit EOB. Pt with weakness BLE's, h/o RCT L shoulder and subsequent limitations in functional mobility with regard to bed mobility, transfers, decreased gait quality/balance and decreased overall activity tolerance. Pt demonstrates transition to sit EOB toward L side with HOB elevated and moderate assist.  Unable to tolerated sitting EOB more than 10 seconds and returned to sit with mod/max assist.  Able to roll S<>S with min/mod A and move up to top of bed with total A of 3. Pt expresses appreciation for assistance. Pt left supine with all needs in reach and nurse notified pt unable to tolerate sitting or participate with standing/GT at this time; limited by back pain (MRI results noted). Pt educated on PT POC. Recommend SNF for follow up physical therapy upon discharge to reach maximal level of independence/safety with functional mobility.    Patient will benefit from skilled intervention to address the above impairments. Pt Education: Role of physical therapy in acute care setting, fall prevention and safety/technique during functional mobility tasks    Patient's rehabilitation potential is considered to be Fair  Factors which may influence rehabilitation potential include:   []         None noted  []         Mental ability/status  [x]         Medical condition  []         Home/family situation and support systems  [x]         Safety awareness  [x]         Pain tolerance/management  []         Other:      PLAN :  Recommendations and Planned Interventions:   [x]           Bed Mobility Training             [x]    Neuromuscular Re-Education  [x]           Transfer Training                   []    Orthotic/Prosthetic Training  [x]           Gait Training                          []    Modalities  [x]           Therapeutic Exercises           []    Edema Management/Control  [x]           Therapeutic Activities            []    Family Training/Education  [x]           Patient Education  []           Other (comment):    Frequency/Duration: Patient will be followed by physical therapy 1-2 times per day/4-7 days per week to address goals. Discharge Recommendations: [facility]  Further Equipment Recommendations for Discharge: N/A     SUBJECTIVE:   Patient stated Just waiting for them to tell me where they're gonna move me to fix by back.     OBJECTIVE DATA SUMMARY:     Past Medical History:   Diagnosis Date    [facility]     [facility]     FH: chemotherapy     H/[facility] stem cell transplant ([facility]     Hypertension     Multiple myeloma ([facility]     Neuropathy     Obesity (BMI 30-39.9) 6/12/2021     Past Surgical History:   Procedure Laterality Date    HX CHOLECYSTECTOMY      HX KYPHOPLASTY       Barriers to Learning/Limitations: yes;  physical  Compensate with: Visual Cues, Verbal Cues and Tactile Cues  Home Situation:  Home Situation  Home Environment: Private residence  [place] Residence: Two story, live on 1st floor  Living Alone: No  Support Systems: Child(desiree), Family member(s)  Patient Expects to be Discharged to[de-identified] Skilled nursing facility  Current DME Used/Available at Home: Cane, quad, Raised toilet seat, Walker, rolling, Wheelchair  Critical Behavior:  Neurologic State: Alert  Orientation Level: Oriented X4  Cognition: Follows commands  Safety/Judgement: Awareness of environment; Fall prevention  Psychosocial  Patient Behaviors: Calm; Cooperative  Purposeful Interaction: Yes  Pt Identified Daily Priority: Clinical issues (comment)  Caritas Process: Nurture loving kindness; Teaching/learning; Attend basic human needs;Create healing environment  Caring Interventions: Reassure  Reassure: Therapeutic listening; Informing;Caring rounds  Therapeutic Modalities: Intentional therapeutic touch  Skin Condition/Temp: Dry;Warm  Skin Integrity: Cracked (Cracked skin to BLLE)  Skin Integumentary  Skin Condition/Temp: Dry;Warm  Skin Integrity: Cracked (Cracked skin to BLLE)  Strength:    Strength: Generally decreased, functional  Tone & Sensation:   Tone: Normal  Sensation: Intact  Range Of Motion:  AROM: Generally decreased, functional  PROM: Generally decreased, functional (L shd <R h/o RCT)  Posture:  Functional Mobility:  Bed Mobility:  Rolling: Minimum assistance; Moderate assistance  Supine to Sit: Moderate assistance  Sit to Supine: Moderate assistance;Maximum assistance  Scooting: Total assistance;Assist x2;Bed Modified (to move up in bed)  Balance:   Sitting: Impaired (not tolerated)  Pain:  Pain level pre-treatment: 0/10 at rest   Pain level post-treatment: 10/10 with supine to sit, sitting attempt  Pain Intervention(s) : Medication (see MAR); Rest, Ice, Repositioning  Response to intervention: Nurse notified, See doc flow  Activity Tolerance:   Fair -/Poor  Please refer to the flowsheet for vital signs taken during this treatment.   After treatment:   []         Patient left in no apparent distress sitting up in chair  [x]         Patient left in no apparent distress in bed  [x]         Call bell left within reach  [x]         Nursing notified   []         Caregiver present  []         Bed alarm activated  []         SCDs applied    COMMUNICATION/EDUCATION:   [x]         Role of Physical Therapy in the acute care setting. [x]         Fall prevention education was provided and the patient/caregiver indicated understanding. [x]         Patient/family have participated as able in goal setting and plan of care. [x]         Patient/family agree to work toward stated goals and plan of care. []         Patient understands intent and goals of therapy, but is neutral about his/her participation. []         Patient is unable to participate in goal setting/plan of care: ongoing with therapy staff.  []         Other:     Thank you for this referral.  Lilian Berry, PT   Time Calculation: 38 mins      Eval Complexity: History: HIGH Complexity :3+ comorbidities / personal factors will impact the outcome/ POC Exam:HIGH Complexity : 4+ Standardized tests and measures addressing body structure, function, activity limitation and / or participation in recreation  Presentation: MEDIUM Complexity : Evolving with changing characteristics  Clinical Decision Making:High Complexity  Overall Complexity:HIGH

## 2021-07-26 NOTE — PROGRESS NOTES
Comprehensive Nutrition Assessment    Type and Reason for Visit: Initial, Positive nutrition screen    Nutrition Recommendations/Plan: Other: continue w/ POC    Nutrition Assessment:  pt admitted d/t inability to ambulate, h/o Multiple myeloma. PMHx: CHF, T2DM, HTN, obesity. Malnutrition Assessment:  Malnutrition Status: At risk for malnutrition (specify) (-5.8% in about 1 month)      Estimated Daily Nutrient Needs:  Energy (kcal): 2034; Weight Used for Energy Requirements: Current  Protein (g): ; Weight Used for Protein Requirements: Current (0.8-1g)  Fluid (ml/day): 2034; Method Used for Fluid Requirements: 1 ml/kcal      Nutrition Related Findings:  Labs reviewed. Med: lasix, colace, pepcid, humalog, toprol xl. +BM 7/26. Noted PO this AM per nursing documentation %. Spoke w/ pt- reported eating all of lunch and had a good breakfast. Pt reported no poor or decrease in appetite. When asked about weight loss, pt reported weighing 229lb 2 weeks ago (-3.9% weight). Noted pt had some missing teeth- stated no issues w/ chewing and has dentures. PTA, pt does not drink any ONS on a daily but will have one if it is available. Wounds:    None       Current Nutrition Therapies:  ADULT DIET Regular; 3 carb choices (45 gm/meal)  ADULT ORAL NUTRITION SUPPLEMENT Breakfast, Lunch, Dinner; Standard High Calorie/High Protein    Anthropometric Measures:  · Height:  5' 8\" (172.7 cm)  · Current Body Wt:  100 kg (220 lb 7.4 oz)   · Usual Body Wt:  106.1 kg (234 lb) (7/1/2021)     · Ideal Body Wt:  154 lbs:  143.2 %   · BMI Category:  Obese class 1 (BMI 30.0-34. 9)       Nutrition Diagnosis:   · Predicted inadequate energy intake related to acute injury/trauma as evidenced by weight loss greater than or equal to 5% in 1 month (per chart hx)    Nutrition Interventions:   Food and/or Nutrient Delivery: Continue current diet, Continue oral nutrition supplement  Nutrition Education and Counseling: No recommendations at this time  Coordination of Nutrition Care: Continue to monitor while inpatient    Goals:  PO intake >75% of estimated nutritional needs throughout the next 4 days       Nutrition Monitoring and Evaluation:   Behavioral-Environmental Outcomes: None identified  Food/Nutrient Intake Outcomes: Food and nutrient intake, Supplement intake  Physical Signs/Symptoms Outcomes: Biochemical data, Meal time behavior, Weight, Skin, GI status, Nausea/vomiting    Discharge Planning:    Continue current diet, Continue oral nutrition supplement     Electronically signed by Janna Francis RD on 7/26/2021 at 1:49 PM

## 2021-07-26 NOTE — PROGRESS NOTES
D/C Plan: SNF    CM received a call from pt to discuss transition of care. Pt has indicated he does not wish to return to [facility]. Pt would like to go to [facility]. CM discussed other options in case this facility is not able to accept. Pt is agreeable to having clinical be sent to other facilities in , with the exception of [facility]. Pt has requested CM contact his daughter, Deangelo Ramírez ([phone]) to also discuss transition of care. CM contacted pt's daughter and she has confirmed plan and would also like to have [facility] added as a second choice. CMS has been notified to assist.  Anticipate pt will transition to SNF once an accepting facility has been identified and pt is medically stable.   CM to continue to follow and assist.    Care Management Interventions  Transition of Care Consult (CM Consult): SNF, Discharge Planning  Health Maintenance Reviewed: Yes  Physical Therapy Consult: Yes  Speech Therapy Consult: Yes  Current Support Network: Relative's Home, Family Lives Nearby  Confirm Follow Up Transport: Family  The Plan for Transition of Care is Related to the Following Treatment Goals : SNF  The Patient and/or Patient Representative was Provided with a Choice of Provider and Agrees with the Discharge Plan?: Yes  Name of the Patient Representative Who was Provided with a Choice of Provider and Agrees with the Discharge Plan: pt/daughter  Freedom of Choice List was Provided with Basic Dialogue that Supports the Patient's Individualized Plan of Care/Goals, Treatment Preferences and Shares the Quality Data Associated with the Providers?: Yes  Discharge Location  Discharge Placement: Skilled nursing facility

## 2021-07-27 VITALS
HEART RATE: 71 BPM | DIASTOLIC BLOOD PRESSURE: 60 MMHG | RESPIRATION RATE: 18 BRPM | SYSTOLIC BLOOD PRESSURE: 125 MMHG | WEIGHT: 227.5 LBS | HEIGHT: 68 IN | OXYGEN SATURATION: 97 % | TEMPERATURE: 98.1 F | BODY MASS INDEX: 34.48 KG/M2

## 2021-07-27 LAB
ALBUMIN SERPL-MCNC: 2.8 G/DL (ref 3.4–5)
ALBUMIN/GLOB SERPL: 0.7 {RATIO} (ref 0.8–1.7)
ALP SERPL-CCNC: 50 U/L (ref 45–117)
ALT SERPL-CCNC: 12 U/L (ref 16–61)
ANION GAP SERPL CALC-SCNC: 6 MMOL/L (ref 3–18)
AST SERPL-CCNC: 14 U/L (ref 10–38)
BILIRUB SERPL-MCNC: 0.3 MG/DL (ref 0.2–1)
BUN SERPL-MCNC: 11 MG/DL (ref 7–18)
BUN/CREAT SERPL: 16 (ref 12–20)
CALCIUM SERPL-MCNC: 10.1 MG/DL (ref 8.5–10.1)
CHLORIDE SERPL-SCNC: 104 MMOL/L (ref 100–111)
CO2 SERPL-SCNC: 27 MMOL/L (ref 21–32)
CREAT SERPL-MCNC: 0.69 MG/DL (ref 0.6–1.3)
ERYTHROCYTE [DISTWIDTH] IN BLOOD BY AUTOMATED COUNT: 15.6 % (ref 11.6–14.5)
GLOBULIN SER CALC-MCNC: 4.3 G/DL (ref 2–4)
GLUCOSE BLD STRIP.AUTO-MCNC: 122 MG/DL (ref 70–110)
GLUCOSE BLD STRIP.AUTO-MCNC: 127 MG/DL (ref 70–110)
GLUCOSE BLD STRIP.AUTO-MCNC: 94 MG/DL (ref 70–110)
GLUCOSE SERPL-MCNC: 107 MG/DL (ref 74–99)
HCT VFR BLD AUTO: 26.7 % (ref 36–48)
HGB BLD-MCNC: 8.6 G/DL (ref 13–16)
MCH RBC QN AUTO: 29.1 PG (ref 24–34)
MCHC RBC AUTO-ENTMCNC: 32.2 G/DL (ref 31–37)
MCV RBC AUTO: 90.2 FL (ref 74–97)
PLATELET # BLD AUTO: 169 K/UL (ref 135–420)
PMV BLD AUTO: 10.8 FL (ref 9.2–11.8)
POTASSIUM SERPL-SCNC: 3.8 MMOL/L (ref 3.5–5.5)
PROT SERPL-MCNC: 7.1 G/DL (ref 6.4–8.2)
RBC # BLD AUTO: 2.96 M/UL (ref 4.35–5.65)
SODIUM SERPL-SCNC: 137 MMOL/L (ref 136–145)
WBC # BLD AUTO: 6.3 K/UL (ref 4.6–13.2)

## 2021-07-27 PROCEDURE — 92526 ORAL FUNCTION THERAPY: CPT

## 2021-07-27 PROCEDURE — 36415 COLL VENOUS BLD VENIPUNCTURE: CPT

## 2021-07-27 PROCEDURE — 80053 COMPREHEN METABOLIC PANEL: CPT

## 2021-07-27 PROCEDURE — 82962 GLUCOSE BLOOD TEST: CPT

## 2021-07-27 PROCEDURE — 97530 THERAPEUTIC ACTIVITIES: CPT

## 2021-07-27 PROCEDURE — 74011250637 HC RX REV CODE- 250/637: Performed by: FAMILY MEDICINE

## 2021-07-27 PROCEDURE — 85027 COMPLETE CBC AUTOMATED: CPT

## 2021-07-27 RX ORDER — OXYCODONE HYDROCHLORIDE 10 MG/1
10 TABLET ORAL
Qty: 20 TABLET | Refills: 0 | Status: SHIPPED | OUTPATIENT
Start: 2021-07-27 | End: 2021-07-30

## 2021-07-27 RX ORDER — GABAPENTIN 300 MG/1
300 CAPSULE ORAL 2 TIMES DAILY
Qty: 60 CAPSULE | Refills: 0 | Status: SHIPPED | OUTPATIENT
Start: 2021-07-27

## 2021-07-27 RX ADMIN — METOPROLOL SUCCINATE 25 MG: 25 TABLET, EXTENDED RELEASE ORAL at 09:29

## 2021-07-27 RX ADMIN — OXYCODONE 10 MG: 5 TABLET ORAL at 13:31

## 2021-07-27 RX ADMIN — FUROSEMIDE 20 MG: 20 TABLET ORAL at 09:29

## 2021-07-27 RX ADMIN — Medication 10 ML: at 05:08

## 2021-07-27 RX ADMIN — FELODIPINE 10 MG: 5 TABLET, EXTENDED RELEASE ORAL at 09:28

## 2021-07-27 RX ADMIN — ISOSORBIDE MONONITRATE 30 MG: 30 TABLET, EXTENDED RELEASE ORAL at 09:30

## 2021-07-27 RX ADMIN — LISINOPRIL 5 MG: 5 TABLET ORAL at 09:29

## 2021-07-27 RX ADMIN — DOCUSATE SODIUM 100 MG: 100 CAPSULE ORAL at 09:29

## 2021-07-27 RX ADMIN — FAMOTIDINE 20 MG: 20 TABLET ORAL at 09:29

## 2021-07-27 RX ADMIN — GABAPENTIN 300 MG: 300 CAPSULE ORAL at 09:29

## 2021-07-27 NOTE — PROGRESS NOTES
Bedside and Verbal shift change report given to Rosalia Cornell (oncoming nurse) by [facility]. Junior (offgoing nurse). Report included the following information SBAR, Kardex, Intake/Output, MAR and Recent Results. Shift Summary-   Patient Vitals for the past 12 hrs:   Temp Pulse Resp BP SpO2   07/27/21 0408 98.7 °F (37.1 °C) 68 18 (!) 143/60 100 %   07/26/21 2314 98.4 °F (36.9 °C) 74 18 (!) 147/67 98 %   07/26/21 2200 98 °F (36.7 °C) 71 18 136/64 100 %   Pt given PRN roxicodone x1 overnight for pain to back and LLE    Bedside and Verbal shift change report given to KATHARINA Badillo (oncoming nurse) by Rosalia Cornell (offgoing nurse). Report included the following information SBAR, Kardex, Intake/Output, MAR and Recent Results.

## 2021-07-27 NOTE — DISCHARGE SUMMARY
[facility] SUMMARY    Name:  Santa Dick  MR#:   [id number]  :  1940  ACCOUNT #:  [de-identified]  ADMIT DATE:  2021  DISCHARGE DATE:  2021      DISCHARGE DIAGNOSES:  1.  Multiple myeloma, spinal stenosis, inability to ambulate, lower extremity pain, and neuropathy. 2.  Chronic systolic congestive heart failure. 3.  Anemia. 4.  Type 2 diabetes. 5.  Hypertension. 6.  Chronic atrial fibrillation. CODE STATUS:  The patient has a full code status. DIET:  His diet instruction is regular, three carb choices and high-calorie, high-protein supplements with breakfast, lunch, and dinner. DISCHARGE MEDICATIONS:  The patient's meds on discharge include the following. 1.  Neurontin 300 mg twice daily. 2.  Oxycodone is 10 mg one tablet every six hours as needed for severe pain. 3.  Continue vitamin D and calcium one tablet twice daily. 4.  Vitamin B12 1000 mcg daily. 5.  Lidoderm patches, on for 12 hours a day, remove for 12 hours a day to affected area as needed. 6.  Prilosec 20 mg daily. 7.  Actos 30 mg daily. 8.  Potassium 20 mEq daily. 9.  B6 50 mg daily. 10.  Janumet one tablet twice daily. 11.  Aldactone 25 mg daily. 12.  Plendil 10 mg daily. 13.  Pravachol 40 mg at night. 14.  Colace 100 mg twice daily. 15.  Lasix 20 mg daily. 16.  Imdur 30 mg daily. 17.  Lisinopril 5 mg daily. 18.  Metoprolol XL 25 mg daily. 19.  Xarelto 10 mg daily. FOLLOW UP:  The patient has followup with Dr. Clovis Bryant at [facility] please 1-2 weeks regarding his back and with primary care, Dr. Michael Prince at [facility] after discharge from the [facility]. DISPOSITION:  The patient is going to the nursing facility today. HOSPITAL COURSE:  The patient is a 74-year-old. He is a gentleman, who came into the hospital on the aforementioned date. He was seen in consultation by Oncology as he has multiple myeloma and follows with Dr. Yahir Bean.   He had been diagnosed in 2005. He had been treated at [facility]. He was admitted here on the 24th due to progressive back pain and inability to ambulate, which has been going on for some time. He states basically he is in a wheelchair now. He has known high-grade spinal stenosis and prior kyphoplasty in 2019. He had gotten progressively weaker. He was evaluated here by Orthopedics as well. They have seen him and recommended that there is no surgical intervention at this time recommended. He should be going to rehab and out of bed with PT as tolerated and follow with Dr. David Barrett in 10 days or so. Oncology recommends continued followup as an outpatient with Dr. Smitha Marrero and there are no other acute medical issues. His blood sugar is stable between 94 and 127, H and H is 8.6 and 26.7, renal function is 0.69, and he has had MRI imaging of the lumbar spine, which was reviewed by orthopedic surgery that shows a high-grade stenosis. He had a CT of his pelvis that showed diffuse osteolytic lesions throughout the visualized osseous skeleton consistent with multiple myeloma, a subtle acute nondisplaced right sacral alar fracture, and superior endplate fracture of L4, may be pathologic or osteoporotic, but age indeterminate. With that said, he is stable as far as pain control. He is awake and alert today, in no acute distress. Lungs are clear. Cardiac exam, regular rate and rhythm. Abdomen is benign. Lower extremities, 1+ edema. Blood pressure is 141/58, pulse 89, temperature 98.6, respiratory rate 20, and SaO2 is 93% on room air. His mentation is appropriate. He is oriented x3 and he knows he is going to acute rehab today. PLAN:  Discharge via medical transportation. Follow up as noted. 45 minutes on discharge time. He has a full code status.       Kevin Thornton MD      [id number]  D:  07/27/2021 12:48  T:  07/27/2021 13:27  JOB #:  [id number]

## 2021-07-27 NOTE — PROGRESS NOTES
Problem: Dysphagia (Adult)  Goal: *Acute Goals and Plan of Care (Insert Text)  Description: Recommendations:  Diet: Mechanical soft, thin liquid, no straws  Meds: Per patient preference  Aspiration Precautions  Oral Care TID  Other: Single sips/bites, alternate solid/liquid     Goals:  Patient will:  1. Tolerate PO trials with 0 s/s overt distress in 4/5 trials  2. Utilize compensatory swallow strategies/maneuvers (decrease bite/sip, size/rate, alt. liq/sol) with min cues in 4/5 trials. 3. Perform oral-motor/laryngeal exercises to increase oropharyngeal swallow function with min cues. 4. Complete an objective swallow study (i.e., MBSS) to assess swallow integrity, r/o aspiration, and determine of safest LRD, min A. Outcome: Progressing Towards Goal     SPEECH LANGUAGE PATHOLOGY DYSPHAGIA TREATMENT    Patient: Javy Ro (2 y.o. male)  Date: 7/27/2021  Diagnosis: Inability to ambulate due to hip [R26.2] Unable to ambulate       Precautions: aspiration, Fall  PLOF:Per H&P     ASSESSMENT:  Pt seen for FU dysphagia management, alert and agreeable to tx. Pt continues to report intermittent difficulty with drinking from straws, though reports he tries to remember to take them out. Pt self-feeding therapeutic PO trials thin liquids via cup/straw and regular texture. Pt with delayed cough following thin via straw, pt noted to habitually take large sips. Small piece of regular taken [facility]. Swallow appears timely, adequate laryngeal elevation noted via palpation, no change in vocal/resp quality appreciated via cervical auscultation. Recommend continue regular/easy to chew diet with thin liquids, no straws, meds as tolerated. Diet ordered as regular to liberalize independent pt choices as tolerated per baseline diet of \"soft foods\" d/t dentition. May benefit from pharyngeal strengthening exercises to improve swallow function, agreeable. Will continue to follow per POC.       Progression toward goals:  [x] Improving appropriately and progressing toward goals  []         Improving slowly and progressing toward goals  []         Not making progress toward goals and plan of care will be adjusted     PLAN:  Recommendations and Planned Interventions:  See above. Patient continues to benefit from skilled intervention to address the above impairments. Continue treatment per established plan of care. Discharge Recommendations:  Skilled Nursing Facility     SUBJECTIVE:   Patient stated I still have trouble with straws sometimes. OBJECTIVE:   Cognitive and Communication Status:  Neurologic State: Alert  Orientation Level: Oriented X4  Cognition: Follows commands  Perception: Appears intact  Perseveration: No perseveration noted  Safety/Judgement: Awareness of environment, Fall prevention      PAIN:  Pain level pre-treatment: 0/10   Pain level post-treatment: 0/10     After treatment:   []              Patient left in no apparent distress sitting up in chair  [x]              Patient left in no apparent distress in bed  [x]              Call bell left within reach  [x]              Nursing notified  []              Family present  []              Caregiver present  []              Bed alarm activated      COMMUNICATION/EDUCATION:   [x] Aspiration precautions; swallow safety; compensatory techniques  []        Patient unable to participate in education; education ongoing with staff  []  Posted safety precautions in patient's room.   [] Oral-motor/laryngeal strengthening exercises      Thank you for this referral.    Juliette Edwards M.S., CCC-SLP  Speech-Language Pathologist    Time Calculation: 8 mins

## 2021-07-27 NOTE — PROGRESS NOTES
Problem: Mobility Impaired (Adult and Pediatric)  Goal: *Acute Goals and Plan of Care (Insert Text)  Description: Physical Therapy Goals   Initiated 7/26/2021 and to be accomplished within 7 day(s)  1. Patient will move from supine <> sit with CGA/min A in prep for out of bed activity and change of position. 2.  Patient will perform sit<> stand with Cole/RW in prep for transfers/ambulation. 3.  Patient will transfer from bed <> chair with CGA with  RW for time up in chair for completion of ADL activity. 4.  Patient will ambulate 50 feet with S/LRAD for improved functional mobility at discharge. Outcome: Progressing Towards Goal   PHYSICAL THERAPY TREATMENT    Patient: Jolene Royal (34 y.o. male)  Date: 7/27/2021  Diagnosis: Inability to ambulate due to hip [R26.2] Unable to ambulate    Precautions: Fall  PLOF: ambulatory with a RW    ASSESSMENT:  Pt agreeable to attempt OOB activity. ModA to sit up EOB. Pt unable to tolerate shifting wt to the R hip and leans to the L onto forearm. Attempted scooting to the EOG for change in wt distribution but pt unable to tolerate. ModA back to supine. Utilized hercules bed controls to scoot pt up to [facility]. Progression toward goals:   []      Improving appropriately and progressing toward goals  [x]      Improving slowly and progressing toward goals  []      Not making progress toward goals and plan of care will be adjusted     PLAN:  Patient continues to benefit from skilled intervention to address the above impairments. Continue treatment per established plan of care. Discharge Recommendations:  Skilled Nursing Facility  Further Equipment Recommendations for Discharge:  TBD by next level of care     SUBJECTIVE:   Patient stated Marda Grate will try.     OBJECTIVE DATA SUMMARY:   Critical Behavior:  Neurologic State: Alert  Orientation Level: Oriented X4  Cognition: Follows commands  Safety/Judgement: Awareness of environment, Fall prevention  Functional Mobility Training:  Bed Mobility:    Supine to Sit: Moderate assistance  Sit to Supine: Moderate assistance  Scooting: Maximum assistance; Total assistance  Balance:  Sitting: Impaired  Sitting - Static: Prop sitting         Pain:  Pain level pre-treatment: 0/10  Pain level post-treatment: 10+/10   Pain Intervention(s): Medication (see MAR); Rest, Ice, Repositioning   Response to intervention: Nurse notified, See doc flow    Activity Tolerance:   Poor  Please refer to the flowsheet for vital signs taken during this treatment. After treatment:   [] Patient left in no apparent distress sitting up in chair  [x] Patient left in no apparent distress in bed  [x] Call bell left within reach  [x] Nursing notified  [] Caregiver present  [] Bed alarm activated  [] SCDs applied      COMMUNICATION/EDUCATION:   [x]         Role of Physical Therapy in the acute care setting. [x]         Fall prevention education was provided and the patient/caregiver indicated understanding. [x]         Patient/family have participated as able in working toward goals and plan of care. [x]         Patient/family agree to work toward stated goals and plan of care. []         Patient understands intent and goals of therapy, but is neutral about his/her participation.   []         Patient is unable to participate in stated goals/plan of care: ongoing with therapy staff.  []         Other:        Regan Moore PTA   Time Calculation: 12 mins

## 2021-07-27 NOTE — PROGRESS NOTES
D/C Plan: [facility]    Noted pt has been accepted by [facility] for admission today. CM contacted pt's  daughter, Mirta Tracy ([phone]), and notified her of the above. Family is in agreement with this plan. CM met with pt and he is also aware and in agreement with plan transfer to [facility] today. Transition of Care to SNF: [facility]  Communication to Patient/Family:  Met with patient and family, and they are agreeable to the transition plan. The Plan for Transition of Care is related to the following treatment goals: SNF    The Patient and/or patient representative  was provided with a choice of provider and agrees   with the discharge plan. Yes [x] No []    Freedom of choice list was provided with basic dialogue that supports the patient's individualized plan of care/goals and shares the quality data associated with the providers. Yes [x] No []    SNF/Rehab Transition:  Patient has been accepted to [facility] at [address] [place] [address]. and meets criteria for admission. Patient will transported by medical transport and expected to leave today at 3:30/4:00pm today. Communication to SNF/Rehab:  Bedside RN, has been notified to update the transition plan to the facility and call report [phone]    Discharge information has been updated on the AVS and sent via [facility] and or [facility] Link. Discharge instructions to be fax'd to facility at [phone]     Please include all hard scripts for controlled substances, med rec , AVS, and dc summary in packet. Please medicate for pain prior to dc if possible and needed to help offset delay when patient first arrives to facility.     Reviewed and confirmed with facility, [facility] can manage the patient care needs for the following:     Mary Gomez with (X) only those applicable:  Medication:  []Medications are available at the facility  []IV Antibiotics    []Controlled Substance - hard copies available sent. []Weekly Labs    Equipment:  []CPAP/BiPAP  []Wound Vacuum  []Roberts or Urinary Device  []PICC/Central Line  []Nebulizer  []Ventilator    Treatment:  []Isolation (for MRSA, VRE, etc.)  []Surgical Drain Management  []Tracheostomy Care  []Dressing Changes  []Dialysis with transportation  []PEG Care  []Oxygen  []Daily Weights for Heart Failure    Dietary:  []Any diet limitations  []Tube Feedings   []Total Parenteral Management (TPN)    Financial Resources:  []Medicaid Application Completed    []UAI Completed and copy given to pt/family    [x]A screening has previously been completed. []Level II Completed    [] Private pay individual who will not become   financially eligible for Medicaid within 6 months from admission to a [address] [place] [address]. [] Individual refused to have screening conducted. []Medicaid Application Completed    []The screening denied because it was determined individual did not need/did not qualify for nursing facility level of care. [] Out of state residents seeking direct admission to a [address] facility. [] Individuals who are inpatients of an out of state hospital, or in state or out of state veterans/ hospital and seek direct admission to a [address] facility  [] Individuals who are pateints or residents of a state owned/operated facility that is licensed by Department of [employer] ([facility]) and seek direct admission to [address] [place] [address]  [] A screening not required for enrollment in [address] [address] [address] services as set out in [address] [place] [address] 48-  [] [facility]) staff shall perform screenings of the [facility] clients. Advanced Care Plan:  []Surrogate Decision Maker of Care  []POA  []Communicated Code Status and copy sent.     Other:         Care Management Interventions  Transition of Care Consult (CM Consult): SNF, Discharge Planning  Health Maintenance Reviewed: Yes  Physical Therapy Consult: Yes  Speech Therapy Consult: Yes  Current Support Network: Relative's Home, Family Lives Nearby  Confirm Follow Up Transport: Family  The Plan for Transition of Care is Related to the Following Treatment Goals : SNF  The Patient and/or Patient Representative was Provided with a Choice of Provider and Agrees with the Discharge Plan?: Yes  Name of the Patient Representative Who was Provided with a Choice of Provider and Agrees with the Discharge Plan: pt/daughter  Freedom of Choice List was Provided with Basic Dialogue that Supports the Patient's Individualized Plan of Care/Goals, Treatment Preferences and Shares the Quality Data Associated with the Providers?: Yes  Discharge Location  Discharge Placement: Skilled nursing facility

## 2021-07-27 NOTE — DIABETES MGMT
GLYCEMIC CONTROL PLAN OF CARE        Diabetes Management:      Assessment: known h/o T2DM, HbA1C within recommended range for age + comorbids oral home regimen  admitted due to an inability to ambulate with spinal stenosis, debility, and inability to transfer to walker/wheelchair at home h/o kyphoplasty in 2019     Recommend: continue with corrective coverage    BG in target range (non-ICU\" < 180 ; -180):  [x] Yes  [] No      Steroids: no    Current Insulin regimen: - Humalog Normal Insulin Sensitivity Corrective Coverage    TDD previous day =  2 units - Humalog Normal Insulin Sensitivity Corrective Coverage    Most recent blood glucose results:   Lab Results   Component Value Date/Time     (H) 07/27/2021 02:55 AM    GLUCPOC 94 07/27/2021 07:32 AM    GLUCPOC 137 (H) 07/26/2021 08:56 PM    GLUCPOC 184 (H) 07/26/2021 04:41 PM         Hypo: no      HbA1C: equivalent  to ave BGlucose of 120 mg/dl for 2-3 months prior to admission  Lab Results   Component Value Date/Time    Hemoglobin A1c 6.0 (H) 06/11/2021 04:00 PM       Adequate glycemic control PTA:  [x] Yes  [] No      Home diabetes medications:  Key Antihyperglycemic Medications             SITagliptin-metFORMIN (JANUMET) 50-1,000 mg per tablet Take 1 Tab by mouth two (2) times daily (with meals). pioglitazone (ACTOS) 30 mg tablet Take 30 mg by mouth daily.         Goals:  Blood glucose will be within target range of 70 - 180 mg/dL by: 8/15    Diet:   Active Orders   Diet    ADULT DIET Regular; 3 carb choices (45 gm/meal)       Patient Vitals for the past 100 hrs:   % Diet Eaten   07/26/21 0800 76 - 100%        Education:  _____ Refer to Diabetes Education Record                       __X___ Education not indicated at this time    Mansoor Lewis 89, RN, CDE  Glycemic Control Team  [phone]  Pager [phone] ([facility] 8:00-4:30P)  *After Hours pager [phone]

## 2021-07-27 NOTE — CONSULTS
Plan noted for discharge to rehab. Appreciate Ortho input. No plans for adjustment of MM meds. Following peripherally. Follow with Dr. Rmaon Hanson following rehab. Celsa Haddad.  Breann Rose M.D.  [facility]

## 2021-07-28 ENCOUNTER — PATIENT OUTREACH (OUTPATIENT)
Dept: CASE MANAGEMENT | Age: 81
End: 2021-07-28

## 2021-07-28 NOTE — PROGRESS NOTES
7/28/2021 11:23 AM    CTN reviewed chart and noted that patient was admitted to [facility] 7/24/2021 to 7/27/2021 for inability to ambulate. He was transferred to [facility] in the [facility] for continuation of care. Transition of care outreach postponed for 14 days due to patient's discharge to SNF. Will follow.

## 2021-08-03 PROBLEM — I10 HYPERTENSION: Status: RESOLVED | Noted: 2020-02-16 | Resolved: 2021-08-03

## 2021-08-11 NOTE — PROGRESS NOTES
8/11/2021 8:47 AM    CTN reviewed chart and noted that patient was admitted to THE [facility] 7/24/2021 to 7/27/2021 for inability to ambulate. He was transferred to [facility] in the [facility] for continuation of care. Patient continues to be in SNF at present. No discharge date noted at present time. Transition of care postponed one more week as patient is still in SNF. Will follow.

## 2021-08-18 NOTE — PROGRESS NOTES
8/162207 8:23 AM    CTN reviewed chart and noted that patient was admitted to [facility] 7/24/2021 to 7/27/2021 for inability to ambulate. He was transferred to [facility] in the [facility] for continuation of care. Patient continues to be in SNF at present. No discharge date noted at present time. Transition of care postponed one more week as patient is still in SNF. Will follow.

## 2021-08-27 NOTE — PROGRESS NOTES
Patient has graduated from the Transitions of Care Coordination  program on 8/27/2021. Patient's symptoms are stable at this time per staff at SNF, when  I call to see if patient was still a resident with them. . Patient continues to be inpatient at the SNF so that he can get stronger. Will resolve episode at present as I have followed 30 days and he has stayed out of hospital.      Patient has care transitions nurse contact information for any further questions, concerns, or needs. Patient's upcoming visits:  No future appointments.

## 2022-01-01 ENCOUNTER — APPOINTMENT (OUTPATIENT)
Dept: CT IMAGING | Age: 82
DRG: 178 | End: 2022-01-01
Attending: EMERGENCY MEDICINE
Payer: MEDICARE

## 2022-01-01 ENCOUNTER — APPOINTMENT (OUTPATIENT)
Dept: NON INVASIVE DIAGNOSTICS | Age: 82
DRG: 178 | End: 2022-01-01
Attending: INTERNAL MEDICINE
Payer: MEDICARE

## 2022-01-01 ENCOUNTER — HOSPITAL ENCOUNTER (INPATIENT)
Age: 82
LOS: 3 days | DRG: 178 | End: 2022-08-04
Attending: EMERGENCY MEDICINE | Admitting: INTERNAL MEDICINE
Payer: MEDICARE

## 2022-01-01 ENCOUNTER — APPOINTMENT (OUTPATIENT)
Dept: VASCULAR SURGERY | Age: 82
DRG: 178 | End: 2022-01-01
Attending: INTERNAL MEDICINE
Payer: MEDICARE

## 2022-01-01 ENCOUNTER — APPOINTMENT (OUTPATIENT)
Dept: GENERAL RADIOLOGY | Age: 82
DRG: 178 | End: 2022-01-01
Attending: HOSPITALIST
Payer: MEDICARE

## 2022-01-01 ENCOUNTER — HOSPICE ADMISSION (OUTPATIENT)
Dept: HOSPICE | Facility: HOSPICE | Age: 82
End: 2022-01-01

## 2022-01-01 ENCOUNTER — APPOINTMENT (OUTPATIENT)
Dept: GENERAL RADIOLOGY | Age: 82
DRG: 178 | End: 2022-01-01
Attending: EMERGENCY MEDICINE
Payer: MEDICARE

## 2022-01-01 ENCOUNTER — APPOINTMENT (OUTPATIENT)
Dept: GENERAL RADIOLOGY | Age: 82
DRG: 178 | End: 2022-01-01
Attending: INTERNAL MEDICINE
Payer: MEDICARE

## 2022-01-01 VITALS
DIASTOLIC BLOOD PRESSURE: 52 MMHG | RESPIRATION RATE: 20 BRPM | WEIGHT: 224 LBS | TEMPERATURE: 98.1 F | BODY MASS INDEX: 33.18 KG/M2 | SYSTOLIC BLOOD PRESSURE: 97 MMHG | HEART RATE: 83 BPM | HEIGHT: 69 IN | OXYGEN SATURATION: 98 %

## 2022-01-01 DIAGNOSIS — N10 ACUTE PYELONEPHRITIS: ICD-10-CM

## 2022-01-01 DIAGNOSIS — U07.1 PNEUMONIA DUE TO COVID-19 VIRUS: ICD-10-CM

## 2022-01-01 DIAGNOSIS — E86.0 DEHYDRATION: ICD-10-CM

## 2022-01-01 DIAGNOSIS — C90.02 MULTIPLE MYELOMA IN RELAPSE (HCC): ICD-10-CM

## 2022-01-01 DIAGNOSIS — Z51.5 ENCOUNTER FOR PALLIATIVE CARE: ICD-10-CM

## 2022-01-01 DIAGNOSIS — Z71.89 GOALS OF CARE, COUNSELING/DISCUSSION: ICD-10-CM

## 2022-01-01 DIAGNOSIS — N30.00 ACUTE CYSTITIS WITHOUT HEMATURIA: Primary | ICD-10-CM

## 2022-01-01 DIAGNOSIS — N39.0 URINARY TRACT INFECTION WITHOUT HEMATURIA, SITE UNSPECIFIED: ICD-10-CM

## 2022-01-01 DIAGNOSIS — J12.82 PNEUMONIA DUE TO COVID-19 VIRUS: ICD-10-CM

## 2022-01-01 LAB
ABO + RH BLD: NORMAL
ALBUMIN SERPL ELPH-MCNC: 3.2 G/DL (ref 2.9–4.4)
ALBUMIN SERPL-MCNC: 2.7 G/DL (ref 3.4–5)
ALBUMIN SERPL-MCNC: 2.8 G/DL (ref 3.4–5)
ALBUMIN SERPL-MCNC: 4 G/DL (ref 3.4–5)
ALBUMIN SERPL-MCNC: 4.5 G/DL (ref 3.4–5)
ALBUMIN/GLOB SERPL: 0.8 {RATIO} (ref 0.8–1.7)
ALBUMIN/GLOB SERPL: 0.9 {RATIO} (ref 0.8–1.7)
ALBUMIN/GLOB SERPL: 1.3 {RATIO} (ref 0.7–1.7)
ALBUMIN/GLOB SERPL: 1.3 {RATIO} (ref 0.8–1.7)
ALBUMIN/GLOB SERPL: 1.5 {RATIO} (ref 0.8–1.7)
ALP SERPL-CCNC: 39 U/L (ref 45–117)
ALP SERPL-CCNC: 41 U/L (ref 45–117)
ALP SERPL-CCNC: 44 U/L (ref 45–117)
ALP SERPL-CCNC: 46 U/L (ref 45–117)
ALPHA1 GLOB SERPL ELPH-MCNC: 0.2 G/DL (ref 0–0.4)
ALPHA2 GLOB SERPL ELPH-MCNC: 0.7 G/DL (ref 0.4–1)
ALT SERPL-CCNC: 15 U/L (ref 16–61)
ALT SERPL-CCNC: 17 U/L (ref 16–61)
ANION GAP SERPL CALC-SCNC: 11 MMOL/L (ref 3–18)
ANION GAP SERPL CALC-SCNC: 7 MMOL/L (ref 3–18)
ANION GAP SERPL CALC-SCNC: 7 MMOL/L (ref 3–18)
ANION GAP SERPL CALC-SCNC: 9 MMOL/L (ref 3–18)
APPEARANCE UR: ABNORMAL
APTT PPP: 29.1 SEC (ref 23–36.4)
AST SERPL-CCNC: 21 U/L (ref 10–38)
AST SERPL-CCNC: 24 U/L (ref 10–38)
AST SERPL-CCNC: 24 U/L (ref 10–38)
AST SERPL-CCNC: 26 U/L (ref 10–38)
ATRIAL RATE: 66 BPM
B-GLOBULIN SERPL ELPH-MCNC: 0.6 G/DL (ref 0.7–1.3)
B2 MICROGLOB SERPL-MCNC: 7.9 MG/L (ref 0.6–2.4)
BACTERIA SPEC CULT: ABNORMAL
BACTERIA URNS QL MICRO: ABNORMAL /HPF
BASOPHILS # BLD: 0 K/UL (ref 0–0.1)
BASOPHILS NFR BLD: 0 % (ref 0–2)
BILIRUB SERPL-MCNC: 0.4 MG/DL (ref 0.2–1)
BILIRUB SERPL-MCNC: 0.5 MG/DL (ref 0.2–1)
BILIRUB UR QL: NEGATIVE
BLD PROD TYP BPU: NORMAL
BLOOD GROUP ANTIBODIES SERPL: NORMAL
BNP SERPL-MCNC: 950 PG/ML (ref 0–1800)
BPU ID: NORMAL
BUN SERPL-MCNC: 20 MG/DL (ref 7–18)
BUN SERPL-MCNC: 25 MG/DL (ref 7–18)
BUN SERPL-MCNC: 28 MG/DL (ref 7–18)
BUN SERPL-MCNC: 29 MG/DL (ref 7–18)
BUN/CREAT SERPL: 42 (ref 12–20)
BUN/CREAT SERPL: 43 (ref 12–20)
BUN/CREAT SERPL: 46 (ref 12–20)
BUN/CREAT SERPL: 64 (ref 12–20)
CALCIUM SERPL-MCNC: 9.1 MG/DL (ref 8.5–10.1)
CALCIUM SERPL-MCNC: 9.7 MG/DL (ref 8.5–10.1)
CALCIUM SERPL-MCNC: 9.7 MG/DL (ref 8.5–10.1)
CALCIUM SERPL-MCNC: 9.8 MG/DL (ref 8.5–10.1)
CALCULATED P AXIS, ECG09: 47 DEGREES
CALCULATED R AXIS, ECG10: 44 DEGREES
CALCULATED T AXIS, ECG11: 85 DEGREES
CALLED TO:,BCALL1: NORMAL
CC UR VC: ABNORMAL
CHLORIDE SERPL-SCNC: 103 MMOL/L (ref 100–111)
CHLORIDE SERPL-SCNC: 106 MMOL/L (ref 100–111)
CHLORIDE SERPL-SCNC: 108 MMOL/L (ref 100–111)
CHLORIDE SERPL-SCNC: 108 MMOL/L (ref 100–111)
CO2 SERPL-SCNC: 21 MMOL/L (ref 21–32)
CO2 SERPL-SCNC: 23 MMOL/L (ref 21–32)
CO2 SERPL-SCNC: 23 MMOL/L (ref 21–32)
CO2 SERPL-SCNC: 25 MMOL/L (ref 21–32)
COLOR UR: YELLOW
CREAT SERPL-MCNC: 0.44 MG/DL (ref 0.6–1.3)
CREAT SERPL-MCNC: 0.48 MG/DL (ref 0.6–1.3)
CREAT SERPL-MCNC: 0.58 MG/DL (ref 0.6–1.3)
CREAT SERPL-MCNC: 0.63 MG/DL (ref 0.6–1.3)
CROSSMATCH RESULT,%XM: NORMAL
DIAGNOSIS, 93000: NORMAL
DIFFERENTIAL METHOD BLD: ABNORMAL
EOSINOPHIL # BLD: 0 K/UL (ref 0–0.4)
EOSINOPHIL # BLD: 0 K/UL (ref 0–0.4)
EOSINOPHIL # BLD: 0.2 K/UL (ref 0–0.4)
EOSINOPHIL # BLD: 0.2 K/UL (ref 0–0.4)
EOSINOPHIL NFR BLD: 0 % (ref 0–5)
EOSINOPHIL NFR BLD: 0 % (ref 0–5)
EOSINOPHIL NFR BLD: 2 % (ref 0–5)
EOSINOPHIL NFR BLD: 3 % (ref 0–5)
EPITH CASTS URNS QL MICRO: ABNORMAL /LPF (ref 0–5)
ERYTHROCYTE [DISTWIDTH] IN BLOOD BY AUTOMATED COUNT: 17 % (ref 11.6–14.5)
ERYTHROCYTE [DISTWIDTH] IN BLOOD BY AUTOMATED COUNT: 17.2 % (ref 11.6–14.5)
ERYTHROCYTE [DISTWIDTH] IN BLOOD BY AUTOMATED COUNT: 17.4 % (ref 11.6–14.5)
ERYTHROCYTE [DISTWIDTH] IN BLOOD BY AUTOMATED COUNT: 17.4 % (ref 11.6–14.5)
EST. AVERAGE GLUCOSE BLD GHB EST-MCNC: 117 MG/DL
GAMMA GLOB SERPL ELPH-MCNC: 1.1 G/DL (ref 0.4–1.8)
GLOBULIN SER CALC-MCNC: 3.1 G/DL (ref 2–4)
GLOBULIN SER CALC-MCNC: 3.3 G/DL (ref 2–4)
GLOBULIN SER-MCNC: 2.6 G/DL (ref 2.2–3.9)
GLUCOSE BLD STRIP.AUTO-MCNC: 100 MG/DL (ref 70–110)
GLUCOSE BLD STRIP.AUTO-MCNC: 113 MG/DL (ref 70–110)
GLUCOSE BLD STRIP.AUTO-MCNC: 115 MG/DL (ref 70–110)
GLUCOSE BLD STRIP.AUTO-MCNC: 125 MG/DL (ref 70–110)
GLUCOSE BLD STRIP.AUTO-MCNC: 136 MG/DL (ref 70–110)
GLUCOSE BLD STRIP.AUTO-MCNC: 142 MG/DL (ref 70–110)
GLUCOSE BLD STRIP.AUTO-MCNC: 145 MG/DL (ref 70–110)
GLUCOSE BLD STRIP.AUTO-MCNC: 194 MG/DL (ref 70–110)
GLUCOSE BLD STRIP.AUTO-MCNC: 206 MG/DL (ref 70–110)
GLUCOSE BLD STRIP.AUTO-MCNC: 50 MG/DL (ref 70–110)
GLUCOSE BLD STRIP.AUTO-MCNC: 57 MG/DL (ref 70–110)
GLUCOSE BLD STRIP.AUTO-MCNC: 72 MG/DL (ref 70–110)
GLUCOSE BLD STRIP.AUTO-MCNC: 74 MG/DL (ref 70–110)
GLUCOSE BLD STRIP.AUTO-MCNC: 81 MG/DL (ref 70–110)
GLUCOSE BLD STRIP.AUTO-MCNC: 83 MG/DL (ref 70–110)
GLUCOSE BLD STRIP.AUTO-MCNC: 99 MG/DL (ref 70–110)
GLUCOSE SERPL-MCNC: 125 MG/DL (ref 74–99)
GLUCOSE SERPL-MCNC: 151 MG/DL (ref 74–99)
GLUCOSE SERPL-MCNC: 49 MG/DL (ref 74–99)
GLUCOSE SERPL-MCNC: 68 MG/DL (ref 74–99)
GLUCOSE UR STRIP.AUTO-MCNC: NEGATIVE MG/DL
HBA1C MFR BLD: 5.7 % (ref 4.2–5.6)
HCT VFR BLD AUTO: 20.6 % (ref 36–48)
HCT VFR BLD AUTO: 22 % (ref 36–48)
HCT VFR BLD AUTO: 22.9 % (ref 36–48)
HCT VFR BLD AUTO: 23.8 % (ref 36–48)
HCT VFR BLD AUTO: 24.3 % (ref 36–48)
HGB BLD-MCNC: 6.5 G/DL (ref 13–16)
HGB BLD-MCNC: 7.1 G/DL (ref 13–16)
HGB BLD-MCNC: 7.4 G/DL (ref 13–16)
HGB BLD-MCNC: 7.6 G/DL (ref 13–16)
HGB BLD-MCNC: 7.6 G/DL (ref 13–16)
HGB UR QL STRIP: NEGATIVE
HISTORY CHECKED?,CKHIST: NORMAL
IGA SERPL-MCNC: 9 MG/DL (ref 61–437)
IGA SERPL-MCNC: <31 MG/DL (ref 70–400)
IGG SERPL-MCNC: 1230 MG/DL (ref 700–1600)
IGG SERPL-MCNC: 1271 MG/DL (ref 603–1613)
IGM SERPL-MCNC: 8 MG/DL (ref 15–143)
IGM SERPL-MCNC: <21 MG/DL (ref 40–230)
IMM GRANULOCYTES # BLD AUTO: 0.1 K/UL (ref 0–0.04)
IMM GRANULOCYTES # BLD AUTO: 0.2 K/UL (ref 0–0.04)
IMM GRANULOCYTES NFR BLD AUTO: 1 % (ref 0–0.5)
IMM GRANULOCYTES NFR BLD AUTO: 2 % (ref 0–0.5)
INR PPP: 1.7 (ref 0.8–1.2)
INTERPRETATION SERPL IEP-IMP: ABNORMAL
KAPPA LC FREE SER-MCNC: 6.4 MG/L (ref 3.3–19.4)
KAPPA LC FREE/LAMBDA FREE SER: 0.02 {RATIO} (ref 0.26–1.65)
KETONES UR QL STRIP.AUTO: NEGATIVE MG/DL
LACTATE BLD-SCNC: 2.57 MMOL/L (ref 0.4–2)
LACTATE BLD-SCNC: 2.9 MMOL/L (ref 0.4–2)
LACTATE SERPL-SCNC: 1.6 MMOL/L (ref 0.4–2)
LAMBDA LC FREE SERPL-MCNC: 351.4 MG/L (ref 5.7–26.3)
LDH SERPL L TO P-CCNC: 263 U/L (ref 81–234)
LEUKOCYTE ESTERASE UR QL STRIP.AUTO: ABNORMAL
LYMPHOCYTES # BLD: 0.6 K/UL (ref 0.9–3.6)
LYMPHOCYTES # BLD: 0.7 K/UL (ref 0.9–3.6)
LYMPHOCYTES # BLD: 1 K/UL (ref 0.9–3.6)
LYMPHOCYTES # BLD: 1.1 K/UL (ref 0.9–3.6)
LYMPHOCYTES NFR BLD: 14 % (ref 21–52)
LYMPHOCYTES NFR BLD: 14 % (ref 21–52)
LYMPHOCYTES NFR BLD: 7 % (ref 21–52)
LYMPHOCYTES NFR BLD: 8 % (ref 21–52)
M PROTEIN SERPL ELPH-MCNC: 0.9 G/DL
MAGNESIUM SERPL-MCNC: 1.4 MG/DL (ref 1.6–2.6)
MAGNESIUM SERPL-MCNC: 1.9 MG/DL (ref 1.6–2.6)
MAGNESIUM SERPL-MCNC: 2.1 MG/DL (ref 1.6–2.6)
MAGNESIUM SERPL-MCNC: 2.3 MG/DL (ref 1.6–2.6)
MCH RBC QN AUTO: 27.7 PG (ref 24–34)
MCH RBC QN AUTO: 27.7 PG (ref 24–34)
MCH RBC QN AUTO: 28.2 PG (ref 24–34)
MCH RBC QN AUTO: 28.2 PG (ref 24–34)
MCHC RBC AUTO-ENTMCNC: 31.3 G/DL (ref 31–37)
MCHC RBC AUTO-ENTMCNC: 31.6 G/DL (ref 31–37)
MCHC RBC AUTO-ENTMCNC: 32.3 G/DL (ref 31–37)
MCHC RBC AUTO-ENTMCNC: 32.3 G/DL (ref 31–37)
MCV RBC AUTO: 87.3 FL (ref 78–100)
MCV RBC AUTO: 87.4 FL (ref 78–100)
MCV RBC AUTO: 87.7 FL (ref 78–100)
MCV RBC AUTO: 88.7 FL (ref 78–100)
MONOCYTES # BLD: 0.5 K/UL (ref 0.05–1.2)
MONOCYTES # BLD: 1 K/UL (ref 0.05–1.2)
MONOCYTES # BLD: 1.1 K/UL (ref 0.05–1.2)
MONOCYTES # BLD: 1.3 K/UL (ref 0.05–1.2)
MONOCYTES NFR BLD: 12 % (ref 3–10)
MONOCYTES NFR BLD: 14 % (ref 3–10)
MONOCYTES NFR BLD: 14 % (ref 3–10)
MONOCYTES NFR BLD: 7 % (ref 3–10)
NEUTS SEG # BLD: 4.6 K/UL (ref 1.8–8)
NEUTS SEG # BLD: 5.5 K/UL (ref 1.8–8)
NEUTS SEG # BLD: 6.5 K/UL (ref 1.8–8)
NEUTS SEG # BLD: 9 K/UL (ref 1.8–8)
NEUTS SEG NFR BLD: 67 % (ref 40–73)
NEUTS SEG NFR BLD: 67 % (ref 40–73)
NEUTS SEG NFR BLD: 80 % (ref 40–73)
NEUTS SEG NFR BLD: 83 % (ref 40–73)
NITRITE UR QL STRIP.AUTO: POSITIVE
NRBC # BLD: 0 K/UL (ref 0–0.01)
NRBC BLD-RTO: 0 PER 100 WBC
P-R INTERVAL, ECG05: 180 MS
PH UR STRIP: 5 [PH] (ref 5–8)
PLATELET # BLD AUTO: 168 K/UL (ref 135–420)
PLATELET # BLD AUTO: 188 K/UL (ref 135–420)
PLATELET # BLD AUTO: 190 K/UL (ref 135–420)
PLATELET # BLD AUTO: 214 K/UL (ref 135–420)
PMV BLD AUTO: 11 FL (ref 9.2–11.8)
PMV BLD AUTO: 11.3 FL (ref 9.2–11.8)
PMV BLD AUTO: 11.5 FL (ref 9.2–11.8)
PMV BLD AUTO: 11.7 FL (ref 9.2–11.8)
POTASSIUM SERPL-SCNC: 3.9 MMOL/L (ref 3.5–5.5)
POTASSIUM SERPL-SCNC: 4.1 MMOL/L (ref 3.5–5.5)
POTASSIUM SERPL-SCNC: 4.3 MMOL/L (ref 3.5–5.5)
POTASSIUM SERPL-SCNC: 4.5 MMOL/L (ref 3.5–5.5)
PROT SERPL-MCNC: 5.8 G/DL (ref 6.4–8.2)
PROT SERPL-MCNC: 5.8 G/DL (ref 6–8.5)
PROT SERPL-MCNC: 6.1 G/DL (ref 6.4–8.2)
PROT SERPL-MCNC: 7.1 G/DL (ref 6.4–8.2)
PROT SERPL-MCNC: 7.6 G/DL (ref 6.4–8.2)
PROT UR STRIP-MCNC: ABNORMAL MG/DL
PROTHROMBIN TIME: 20.1 SEC (ref 11.5–15.2)
Q-T INTERVAL, ECG07: 400 MS
QRS DURATION, ECG06: 128 MS
QTC CALCULATION (BEZET), ECG08: 419 MS
RBC # BLD AUTO: 2.35 M/UL (ref 4.35–5.65)
RBC # BLD AUTO: 2.52 M/UL (ref 4.35–5.65)
RBC # BLD AUTO: 2.62 M/UL (ref 4.35–5.65)
RBC # BLD AUTO: 2.74 M/UL (ref 4.35–5.65)
RBC #/AREA URNS HPF: ABNORMAL /HPF (ref 0–5)
SERVICE CMNT-IMP: ABNORMAL
SODIUM SERPL-SCNC: 135 MMOL/L (ref 136–145)
SODIUM SERPL-SCNC: 136 MMOL/L (ref 136–145)
SODIUM SERPL-SCNC: 140 MMOL/L (ref 136–145)
SODIUM SERPL-SCNC: 140 MMOL/L (ref 136–145)
SP GR UR REFRACTOMETRY: 1.01 (ref 1–1.03)
SPECIMEN EXP DATE BLD: NORMAL
STATUS OF UNIT,%ST: NORMAL
TROPONIN-HIGH SENSITIVITY: 9 NG/L (ref 0–78)
UNIT DIVISION, %UDIV: 0
UROBILINOGEN UR QL STRIP.AUTO: 0.2 EU/DL (ref 0.2–1)
VENTRICULAR RATE, ECG03: 66 BPM
WBC # BLD AUTO: 11.2 K/UL (ref 4.6–13.2)
WBC # BLD AUTO: 7 K/UL (ref 4.6–13.2)
WBC # BLD AUTO: 7.8 K/UL (ref 4.6–13.2)
WBC # BLD AUTO: 8.2 K/UL (ref 4.6–13.2)
WBC URNS QL MICRO: ABNORMAL /HPF (ref 0–5)

## 2022-01-01 PROCEDURE — 77010033678 HC OXYGEN DAILY

## 2022-01-01 PROCEDURE — 74011250637 HC RX REV CODE- 250/637: Performed by: HOSPITALIST

## 2022-01-01 PROCEDURE — 82232 ASSAY OF BETA-2 PROTEIN: CPT

## 2022-01-01 PROCEDURE — 36430 TRANSFUSION BLD/BLD COMPNT: CPT

## 2022-01-01 PROCEDURE — 87086 URINE CULTURE/COLONY COUNT: CPT

## 2022-01-01 PROCEDURE — 74011250636 HC RX REV CODE- 250/636: Performed by: EMERGENCY MEDICINE

## 2022-01-01 PROCEDURE — 74011000250 HC RX REV CODE- 250: Performed by: INTERNAL MEDICINE

## 2022-01-01 PROCEDURE — 85610 PROTHROMBIN TIME: CPT

## 2022-01-01 PROCEDURE — 85025 COMPLETE CBC W/AUTO DIFF WBC: CPT

## 2022-01-01 PROCEDURE — 36415 COLL VENOUS BLD VENIPUNCTURE: CPT

## 2022-01-01 PROCEDURE — 80053 COMPREHEN METABOLIC PANEL: CPT

## 2022-01-01 PROCEDURE — 85730 THROMBOPLASTIN TIME PARTIAL: CPT

## 2022-01-01 PROCEDURE — P9047 ALBUMIN (HUMAN), 25%, 50ML: HCPCS | Performed by: INTERNAL MEDICINE

## 2022-01-01 PROCEDURE — 74011000258 HC RX REV CODE- 258: Performed by: EMERGENCY MEDICINE

## 2022-01-01 PROCEDURE — 74230 X-RAY XM SWLNG FUNCJ C+: CPT

## 2022-01-01 PROCEDURE — P9047 ALBUMIN (HUMAN), 25%, 50ML: HCPCS | Performed by: HOSPITALIST

## 2022-01-01 PROCEDURE — 94640 AIRWAY INHALATION TREATMENT: CPT

## 2022-01-01 PROCEDURE — 82962 GLUCOSE BLOOD TEST: CPT

## 2022-01-01 PROCEDURE — 74011000250 HC RX REV CODE- 250: Performed by: HOSPITALIST

## 2022-01-01 PROCEDURE — 83735 ASSAY OF MAGNESIUM: CPT

## 2022-01-01 PROCEDURE — 74011250636 HC RX REV CODE- 250/636: Performed by: INTERNAL MEDICINE

## 2022-01-01 PROCEDURE — 74011250636 HC RX REV CODE- 250/636: Performed by: HOSPITALIST

## 2022-01-01 PROCEDURE — 83521 IG LIGHT CHAINS FREE EACH: CPT

## 2022-01-01 PROCEDURE — C9113 INJ PANTOPRAZOLE SODIUM, VIA: HCPCS | Performed by: INTERNAL MEDICINE

## 2022-01-01 PROCEDURE — 99285 EMERGENCY DEPT VISIT HI MDM: CPT

## 2022-01-01 PROCEDURE — 83880 ASSAY OF NATRIURETIC PEPTIDE: CPT

## 2022-01-01 PROCEDURE — 74011250636 HC RX REV CODE- 250/636: Performed by: STUDENT IN AN ORGANIZED HEALTH CARE EDUCATION/TRAINING PROGRAM

## 2022-01-01 PROCEDURE — 87077 CULTURE AEROBIC IDENTIFY: CPT

## 2022-01-01 PROCEDURE — 86900 BLOOD TYPING SEROLOGIC ABO: CPT

## 2022-01-01 PROCEDURE — 74011250637 HC RX REV CODE- 250/637: Performed by: EMERGENCY MEDICINE

## 2022-01-01 PROCEDURE — 74011250637 HC RX REV CODE- 250/637: Performed by: INTERNAL MEDICINE

## 2022-01-01 PROCEDURE — 74011250636 HC RX REV CODE- 250/636

## 2022-01-01 PROCEDURE — 71045 X-RAY EXAM CHEST 1 VIEW: CPT

## 2022-01-01 PROCEDURE — 51798 US URINE CAPACITY MEASURE: CPT

## 2022-01-01 PROCEDURE — 70450 CT HEAD/BRAIN W/O DYE: CPT

## 2022-01-01 PROCEDURE — 87186 SC STD MICRODIL/AGAR DIL: CPT

## 2022-01-01 PROCEDURE — P9016 RBC LEUKOCYTES REDUCED: HCPCS

## 2022-01-01 PROCEDURE — 82784 ASSAY IGA/IGD/IGG/IGM EACH: CPT

## 2022-01-01 PROCEDURE — 74011000250 HC RX REV CODE- 250: Performed by: EMERGENCY MEDICINE

## 2022-01-01 PROCEDURE — 92611 MOTION FLUOROSCOPY/SWALLOW: CPT

## 2022-01-01 PROCEDURE — 84484 ASSAY OF TROPONIN QUANT: CPT

## 2022-01-01 PROCEDURE — 92610 EVALUATE SWALLOWING FUNCTION: CPT

## 2022-01-01 PROCEDURE — 96365 THER/PROPH/DIAG IV INF INIT: CPT

## 2022-01-01 PROCEDURE — 99232 SBSQ HOSP IP/OBS MODERATE 35: CPT | Performed by: NURSE PRACTITIONER

## 2022-01-01 PROCEDURE — 96375 TX/PRO/DX INJ NEW DRUG ADDON: CPT

## 2022-01-01 PROCEDURE — 87040 BLOOD CULTURE FOR BACTERIA: CPT

## 2022-01-01 PROCEDURE — 65270000029 HC RM PRIVATE

## 2022-01-01 PROCEDURE — 83605 ASSAY OF LACTIC ACID: CPT

## 2022-01-01 PROCEDURE — 92526 ORAL FUNCTION THERAPY: CPT

## 2022-01-01 PROCEDURE — 99222 1ST HOSP IP/OBS MODERATE 55: CPT | Performed by: NURSE PRACTITIONER

## 2022-01-01 PROCEDURE — 99233 SBSQ HOSP IP/OBS HIGH 50: CPT | Performed by: NURSE PRACTITIONER

## 2022-01-01 PROCEDURE — 83615 LACTATE (LD) (LDH) ENZYME: CPT

## 2022-01-01 PROCEDURE — 83036 HEMOGLOBIN GLYCOSYLATED A1C: CPT

## 2022-01-01 PROCEDURE — 86923 COMPATIBILITY TEST ELECTRIC: CPT

## 2022-01-01 PROCEDURE — 93005 ELECTROCARDIOGRAM TRACING: CPT

## 2022-01-01 PROCEDURE — 81001 URINALYSIS AUTO W/SCOPE: CPT

## 2022-01-01 PROCEDURE — 74011636637 HC RX REV CODE- 636/637: Performed by: INTERNAL MEDICINE

## 2022-01-01 PROCEDURE — 85018 HEMOGLOBIN: CPT

## 2022-01-01 PROCEDURE — 30233N1 TRANSFUSION OF NONAUTOLOGOUS RED BLOOD CELLS INTO PERIPHERAL VEIN, PERCUTANEOUS APPROACH: ICD-10-PCS | Performed by: STUDENT IN AN ORGANIZED HEALTH CARE EDUCATION/TRAINING PROGRAM

## 2022-01-01 PROCEDURE — 99356 PR PROLONGED SVC I/P OR OBS SETTING 1ST HOUR: CPT | Performed by: NURSE PRACTITIONER

## 2022-01-01 RX ORDER — MORPHINE SULFATE 20 MG/ML
5 SOLUTION ORAL
Status: DISCONTINUED | OUTPATIENT
Start: 2022-01-01 | End: 2022-01-01

## 2022-01-01 RX ORDER — MORPHINE SULFATE 2 MG/ML
2 INJECTION, SOLUTION INTRAMUSCULAR; INTRAVENOUS
Status: DISCONTINUED | OUTPATIENT
Start: 2022-01-01 | End: 2022-01-01

## 2022-01-01 RX ORDER — LANOLIN ALCOHOL/MO/W.PET/CERES
325 CREAM (GRAM) TOPICAL DAILY
COMMUNITY

## 2022-01-01 RX ORDER — MAGNESIUM SULFATE HEPTAHYDRATE 40 MG/ML
2 INJECTION, SOLUTION INTRAVENOUS
Status: COMPLETED | OUTPATIENT
Start: 2022-01-01 | End: 2022-01-01

## 2022-01-01 RX ORDER — LANSOPRAZOLE 30 MG/1
CAPSULE, DELAYED RELEASE ORAL
COMMUNITY

## 2022-01-01 RX ORDER — IPRATROPIUM BROMIDE AND ALBUTEROL SULFATE 2.5; .5 MG/3ML; MG/3ML
3 SOLUTION RESPIRATORY (INHALATION)
Status: DISCONTINUED | OUTPATIENT
Start: 2022-01-01 | End: 2022-01-01 | Stop reason: HOSPADM

## 2022-01-01 RX ORDER — ASCORBIC ACID 500 MG
500 TABLET ORAL DAILY
COMMUNITY

## 2022-01-01 RX ORDER — DEXAMETHASONE 4 MG/1
4 TABLET ORAL DAILY
Status: DISCONTINUED | OUTPATIENT
Start: 2022-01-01 | End: 2022-01-01 | Stop reason: HOSPADM

## 2022-01-01 RX ORDER — ACETAMINOPHEN 325 MG/1
650 TABLET ORAL ONCE
Status: COMPLETED | OUTPATIENT
Start: 2022-01-01 | End: 2022-01-01

## 2022-01-01 RX ORDER — DEXAMETHASONE 4 MG/1
40 TABLET ORAL
COMMUNITY

## 2022-01-01 RX ORDER — AMLODIPINE BESYLATE 10 MG/1
10 TABLET ORAL DAILY
COMMUNITY

## 2022-01-01 RX ORDER — METOPROLOL TARTRATE 5 MG/5ML
5 INJECTION INTRAVENOUS ONCE
Status: COMPLETED | OUTPATIENT
Start: 2022-01-01 | End: 2022-01-01

## 2022-01-01 RX ORDER — SODIUM CHLORIDE 9 MG/ML
250 INJECTION, SOLUTION INTRAVENOUS ONCE
Status: COMPLETED | OUTPATIENT
Start: 2022-01-01 | End: 2022-01-01

## 2022-01-01 RX ORDER — AMLODIPINE BESYLATE 5 MG/1
10 TABLET ORAL DAILY
Status: DISCONTINUED | OUTPATIENT
Start: 2022-01-01 | End: 2022-01-01

## 2022-01-01 RX ORDER — SENNOSIDES 8.8 MG/5ML
5 LIQUID ORAL
Status: DISCONTINUED | OUTPATIENT
Start: 2022-01-01 | End: 2022-01-01 | Stop reason: HOSPADM

## 2022-01-01 RX ORDER — POLYETHYLENE GLYCOL 3350 17 G/17G
17 POWDER, FOR SOLUTION ORAL AS NEEDED
COMMUNITY

## 2022-01-01 RX ORDER — MORPHINE SULFATE 2 MG/ML
2 INJECTION, SOLUTION INTRAMUSCULAR; INTRAVENOUS
Status: DISCONTINUED | OUTPATIENT
Start: 2022-01-01 | End: 2022-01-01 | Stop reason: HOSPADM

## 2022-01-01 RX ORDER — HYDRALAZINE HYDROCHLORIDE 25 MG/1
25 TABLET, FILM COATED ORAL 2 TIMES DAILY
Status: DISCONTINUED | OUTPATIENT
Start: 2022-01-01 | End: 2022-01-01

## 2022-01-01 RX ORDER — HYDRALAZINE HYDROCHLORIDE 25 MG/1
25 TABLET, FILM COATED ORAL 2 TIMES DAILY
COMMUNITY

## 2022-01-01 RX ORDER — SPIRONOLACTONE 25 MG/1
25 TABLET ORAL DAILY
Status: DISCONTINUED | OUTPATIENT
Start: 2022-01-01 | End: 2022-01-01

## 2022-01-01 RX ORDER — ALBUMIN HUMAN 50 G/1000ML
25 SOLUTION INTRAVENOUS EVERY 6 HOURS
Status: DISCONTINUED | OUTPATIENT
Start: 2022-01-01 | End: 2022-01-01 | Stop reason: CLARIF

## 2022-01-01 RX ORDER — METFORMIN HYDROCHLORIDE 500 MG/1
500 TABLET ORAL 2 TIMES DAILY WITH MEALS
COMMUNITY

## 2022-01-01 RX ORDER — BUDESONIDE 0.5 MG/2ML
500 INHALANT ORAL
Status: DISCONTINUED | OUTPATIENT
Start: 2022-01-01 | End: 2022-01-01 | Stop reason: HOSPADM

## 2022-01-01 RX ORDER — LORAZEPAM 2 MG/ML
2 CONCENTRATE ORAL
Status: DISCONTINUED | OUTPATIENT
Start: 2022-01-01 | End: 2022-01-01 | Stop reason: HOSPADM

## 2022-01-01 RX ORDER — OXYCODONE HYDROCHLORIDE 5 MG/1
10 TABLET ORAL
Status: DISCONTINUED | OUTPATIENT
Start: 2022-01-01 | End: 2022-01-01

## 2022-01-01 RX ORDER — OXYCODONE HYDROCHLORIDE 5 MG/1
10 TABLET ORAL
COMMUNITY

## 2022-01-01 RX ORDER — SODIUM CHLORIDE 9 MG/ML
250 INJECTION, SOLUTION INTRAVENOUS AS NEEDED
Status: DISCONTINUED | OUTPATIENT
Start: 2022-01-01 | End: 2022-01-01 | Stop reason: HOSPADM

## 2022-01-01 RX ORDER — PRAVASTATIN SODIUM 20 MG/1
40 TABLET ORAL
Status: DISCONTINUED | OUTPATIENT
Start: 2022-01-01 | End: 2022-01-01 | Stop reason: ALTCHOICE

## 2022-01-01 RX ORDER — HYOSCYAMINE SULFATE 0.12 MG/1
0.12 TABLET SUBLINGUAL
Status: DISCONTINUED | OUTPATIENT
Start: 2022-01-01 | End: 2022-01-01 | Stop reason: HOSPADM

## 2022-01-01 RX ORDER — ACETAMINOPHEN 325 MG/1
650 TABLET ORAL
COMMUNITY

## 2022-01-01 RX ORDER — MORPHINE SULFATE 2 MG/ML
INJECTION, SOLUTION INTRAMUSCULAR; INTRAVENOUS
Status: COMPLETED
Start: 2022-01-01 | End: 2022-01-01

## 2022-01-01 RX ORDER — INSULIN LISPRO 100 [IU]/ML
INJECTION, SOLUTION INTRAVENOUS; SUBCUTANEOUS
Status: DISCONTINUED | OUTPATIENT
Start: 2022-01-01 | End: 2022-01-01 | Stop reason: HOSPADM

## 2022-01-01 RX ORDER — IXAZOMIB 4 MG/1
4 CAPSULE ORAL DAILY
COMMUNITY

## 2022-01-01 RX ORDER — FACIAL-BODY WIPES
10 EACH TOPICAL DAILY PRN
Status: DISCONTINUED | OUTPATIENT
Start: 2022-01-01 | End: 2022-01-01 | Stop reason: HOSPADM

## 2022-01-01 RX ORDER — MAGNESIUM SULFATE HEPTAHYDRATE 40 MG/ML
4 INJECTION, SOLUTION INTRAVENOUS ONCE
Status: DISCONTINUED | OUTPATIENT
Start: 2022-01-01 | End: 2022-01-01 | Stop reason: SDUPTHER

## 2022-01-01 RX ORDER — MAGNESIUM SULFATE 100 %
4 CRYSTALS MISCELLANEOUS AS NEEDED
Status: DISCONTINUED | OUTPATIENT
Start: 2022-01-01 | End: 2022-01-01 | Stop reason: HOSPADM

## 2022-01-01 RX ORDER — SODIUM CHLORIDE 0.9 % (FLUSH) 0.9 %
5-10 SYRINGE (ML) INJECTION AS NEEDED
Status: DISCONTINUED | OUTPATIENT
Start: 2022-01-01 | End: 2022-01-01 | Stop reason: HOSPADM

## 2022-01-01 RX ORDER — MORPHINE SULFATE 20 MG/ML
5 SOLUTION ORAL
Status: DISCONTINUED | OUTPATIENT
Start: 2022-01-01 | End: 2022-01-01 | Stop reason: HOSPADM

## 2022-01-01 RX ORDER — ATORVASTATIN CALCIUM 10 MG/1
10 TABLET, FILM COATED ORAL DAILY
COMMUNITY

## 2022-01-01 RX ORDER — ALBUMIN HUMAN 250 G/1000ML
25 SOLUTION INTRAVENOUS EVERY 6 HOURS
Status: DISCONTINUED | OUTPATIENT
Start: 2022-01-01 | End: 2022-01-01

## 2022-01-01 RX ORDER — ALBUMIN HUMAN 250 G/1000ML
12.5 SOLUTION INTRAVENOUS EVERY 6 HOURS
Status: DISCONTINUED | OUTPATIENT
Start: 2022-01-01 | End: 2022-01-01

## 2022-01-01 RX ORDER — SODIUM CHLORIDE 9 MG/ML
75 INJECTION, SOLUTION INTRAVENOUS CONTINUOUS
Status: DISCONTINUED | OUTPATIENT
Start: 2022-01-01 | End: 2022-01-01

## 2022-01-01 RX ORDER — FELODIPINE 10 MG/1
10 TABLET, EXTENDED RELEASE ORAL DAILY
Status: DISCONTINUED | OUTPATIENT
Start: 2022-01-01 | End: 2022-01-01

## 2022-01-01 RX ORDER — FUROSEMIDE 10 MG/ML
40 INJECTION INTRAMUSCULAR; INTRAVENOUS ONCE
Status: COMPLETED | OUTPATIENT
Start: 2022-01-01 | End: 2022-01-01

## 2022-01-01 RX ORDER — DEXTROSE MONOHYDRATE 100 MG/ML
0-250 INJECTION, SOLUTION INTRAVENOUS AS NEEDED
Status: DISCONTINUED | OUTPATIENT
Start: 2022-01-01 | End: 2022-01-01 | Stop reason: HOSPADM

## 2022-01-01 RX ORDER — METOPROLOL SUCCINATE 25 MG/1
25 TABLET, EXTENDED RELEASE ORAL DAILY
Status: DISCONTINUED | OUTPATIENT
Start: 2022-01-01 | End: 2022-01-01 | Stop reason: HOSPADM

## 2022-01-01 RX ORDER — MORPHINE SULFATE 2 MG/ML
2 INJECTION, SOLUTION INTRAMUSCULAR; INTRAVENOUS ONCE
Status: COMPLETED | OUTPATIENT
Start: 2022-01-01 | End: 2022-01-01

## 2022-01-01 RX ORDER — ATORVASTATIN CALCIUM 10 MG/1
10 TABLET, FILM COATED ORAL DAILY
Status: DISCONTINUED | OUTPATIENT
Start: 2022-01-01 | End: 2022-01-01

## 2022-01-01 RX ADMIN — ALBUMIN (HUMAN) 12.5 G: 0.25 INJECTION, SOLUTION INTRAVENOUS at 05:07

## 2022-01-01 RX ADMIN — ALBUMIN (HUMAN) 25 G: 0.25 INJECTION, SOLUTION INTRAVENOUS at 05:43

## 2022-01-01 RX ADMIN — VASOPRESSIN: 20 INJECTION, SOLUTION INTRAVENOUS at 10:36

## 2022-01-01 RX ADMIN — OXYCODONE 10 MG: 5 TABLET ORAL at 16:33

## 2022-01-01 RX ADMIN — BARIUM SULFATE 10 G: 960 POWDER, FOR SUSPENSION ORAL at 10:26

## 2022-01-01 RX ADMIN — MORPHINE SULFATE 2 MG: 2 INJECTION, SOLUTION INTRAMUSCULAR; INTRAVENOUS at 01:05

## 2022-01-01 RX ADMIN — OXYCODONE 10 MG: 5 TABLET ORAL at 15:29

## 2022-01-01 RX ADMIN — FUROSEMIDE 40 MG: 10 INJECTION, SOLUTION INTRAMUSCULAR; INTRAVENOUS at 01:51

## 2022-01-01 RX ADMIN — CEFTRIAXONE 1 G: 1 INJECTION, POWDER, FOR SOLUTION INTRAMUSCULAR; INTRAVENOUS at 15:30

## 2022-01-01 RX ADMIN — IPRATROPIUM BROMIDE AND ALBUTEROL SULFATE 3 ML: .5; 3 SOLUTION RESPIRATORY (INHALATION) at 20:20

## 2022-01-01 RX ADMIN — ATORVASTATIN CALCIUM 10 MG: 10 TABLET, FILM COATED ORAL at 08:51

## 2022-01-01 RX ADMIN — MORPHINE SULFATE 2 MG: 2 INJECTION, SOLUTION INTRAMUSCULAR; INTRAVENOUS at 10:20

## 2022-01-01 RX ADMIN — ALBUMIN (HUMAN) 25 G: 0.25 INJECTION, SOLUTION INTRAVENOUS at 05:41

## 2022-01-01 RX ADMIN — GLUCAGON HYDROCHLORIDE 1 MG: KIT at 08:17

## 2022-01-01 RX ADMIN — AZITHROMYCIN MONOHYDRATE 500 MG: 500 INJECTION, POWDER, LYOPHILIZED, FOR SOLUTION INTRAVENOUS at 16:23

## 2022-01-01 RX ADMIN — IPRATROPIUM BROMIDE AND ALBUTEROL SULFATE 3 ML: .5; 3 SOLUTION RESPIRATORY (INHALATION) at 13:53

## 2022-01-01 RX ADMIN — ALBUMIN (HUMAN) 25 G: 0.25 INJECTION, SOLUTION INTRAVENOUS at 12:14

## 2022-01-01 RX ADMIN — MORPHINE SULFATE 2 MG: 2 INJECTION, SOLUTION INTRAMUSCULAR; INTRAVENOUS at 06:17

## 2022-01-01 RX ADMIN — LORAZEPAM 2 MG: 2 SOLUTION, CONCENTRATE ORAL at 13:17

## 2022-01-01 RX ADMIN — BUDESONIDE 500 MCG: 0.5 INHALANT RESPIRATORY (INHALATION) at 02:11

## 2022-01-01 RX ADMIN — AZITHROMYCIN MONOHYDRATE 500 MG: 500 INJECTION, POWDER, LYOPHILIZED, FOR SOLUTION INTRAVENOUS at 16:34

## 2022-01-01 RX ADMIN — IPRATROPIUM BROMIDE AND ALBUTEROL SULFATE 3 ML: .5; 3 SOLUTION RESPIRATORY (INHALATION) at 02:10

## 2022-01-01 RX ADMIN — ALBUMIN (HUMAN) 25 G: 0.25 INJECTION, SOLUTION INTRAVENOUS at 00:12

## 2022-01-01 RX ADMIN — SODIUM CHLORIDE, PRESERVATIVE FREE 10 ML: 5 INJECTION INTRAVENOUS at 00:16

## 2022-01-01 RX ADMIN — BUDESONIDE 500 MCG: 0.5 INHALANT RESPIRATORY (INHALATION) at 07:50

## 2022-01-01 RX ADMIN — IPRATROPIUM BROMIDE AND ALBUTEROL SULFATE 3 ML: .5; 3 SOLUTION RESPIRATORY (INHALATION) at 07:47

## 2022-01-01 RX ADMIN — SODIUM CHLORIDE 2121 ML: 9 INJECTION, SOLUTION INTRAVENOUS at 17:06

## 2022-01-01 RX ADMIN — SODIUM CHLORIDE, PRESERVATIVE FREE 40 MG: 5 INJECTION INTRAVENOUS at 23:08

## 2022-01-01 RX ADMIN — METOPROLOL TARTRATE 5 MG: 5 INJECTION INTRAVENOUS at 13:58

## 2022-01-01 RX ADMIN — IPRATROPIUM BROMIDE AND ALBUTEROL SULFATE 3 ML: .5; 3 SOLUTION RESPIRATORY (INHALATION) at 22:37

## 2022-01-01 RX ADMIN — INSULIN LISPRO 2 UNITS: 100 INJECTION, SOLUTION INTRAVENOUS; SUBCUTANEOUS at 16:36

## 2022-01-01 RX ADMIN — ALBUMIN (HUMAN) 12.5 G: 0.25 INJECTION, SOLUTION INTRAVENOUS at 23:41

## 2022-01-01 RX ADMIN — OXYCODONE 10 MG: 5 TABLET ORAL at 10:28

## 2022-01-01 RX ADMIN — SODIUM CHLORIDE, PRESERVATIVE FREE 40 MG: 5 INJECTION INTRAVENOUS at 23:41

## 2022-01-01 RX ADMIN — IPRATROPIUM BROMIDE AND ALBUTEROL SULFATE 3 ML: .5; 3 SOLUTION RESPIRATORY (INHALATION) at 07:50

## 2022-01-01 RX ADMIN — CEFTRIAXONE 1 G: 1 INJECTION, POWDER, FOR SOLUTION INTRAMUSCULAR; INTRAVENOUS at 16:33

## 2022-01-01 RX ADMIN — SODIUM CHLORIDE, PRESERVATIVE FREE 10 ML: 5 INJECTION INTRAVENOUS at 23:42

## 2022-01-01 RX ADMIN — GLUCAGON HYDROCHLORIDE 1 MG: KIT at 04:42

## 2022-01-01 RX ADMIN — ATORVASTATIN CALCIUM 10 MG: 10 TABLET, FILM COATED ORAL at 10:17

## 2022-01-01 RX ADMIN — ALBUMIN (HUMAN) 25 G: 0.25 INJECTION, SOLUTION INTRAVENOUS at 12:52

## 2022-01-01 RX ADMIN — MAGNESIUM SULFATE HEPTAHYDRATE 2 G: 40 INJECTION, SOLUTION INTRAVENOUS at 23:45

## 2022-01-01 RX ADMIN — AZITHROMYCIN MONOHYDRATE 500 MG: 500 INJECTION, POWDER, LYOPHILIZED, FOR SOLUTION INTRAVENOUS at 17:07

## 2022-01-01 RX ADMIN — BUDESONIDE 500 MCG: 0.5 INHALANT RESPIRATORY (INHALATION) at 07:47

## 2022-01-01 RX ADMIN — SODIUM CHLORIDE, PRESERVATIVE FREE 40 MG: 5 INJECTION INTRAVENOUS at 23:36

## 2022-01-01 RX ADMIN — ALBUMIN (HUMAN) 25 G: 0.25 INJECTION, SOLUTION INTRAVENOUS at 23:37

## 2022-01-01 RX ADMIN — MAGNESIUM SULFATE HEPTAHYDRATE 2 G: 40 INJECTION, SOLUTION INTRAVENOUS at 00:58

## 2022-01-01 RX ADMIN — ALBUMIN (HUMAN) 25 G: 0.25 INJECTION, SOLUTION INTRAVENOUS at 12:51

## 2022-01-01 RX ADMIN — DEXAMETHASONE 4 MG: 4 TABLET ORAL at 10:36

## 2022-01-01 RX ADMIN — BUDESONIDE 500 MCG: 0.5 INHALANT RESPIRATORY (INHALATION) at 20:20

## 2022-01-01 RX ADMIN — ALBUMIN (HUMAN) 25 G: 0.25 INJECTION, SOLUTION INTRAVENOUS at 17:53

## 2022-01-01 RX ADMIN — IPRATROPIUM BROMIDE AND ALBUTEROL SULFATE 3 ML: .5; 3 SOLUTION RESPIRATORY (INHALATION) at 14:31

## 2022-01-01 RX ADMIN — OXYCODONE 10 MG: 5 TABLET ORAL at 23:36

## 2022-01-01 RX ADMIN — DEXAMETHASONE 4 MG: 4 TABLET ORAL at 10:16

## 2022-01-01 RX ADMIN — ALBUMIN (HUMAN) 25 G: 0.25 INJECTION, SOLUTION INTRAVENOUS at 18:41

## 2022-01-01 RX ADMIN — BARIUM SULFATE 15 ML: 400 SUSPENSION ORAL at 10:28

## 2022-01-01 RX ADMIN — IPRATROPIUM BROMIDE AND ALBUTEROL SULFATE 3 ML: .5; 3 SOLUTION RESPIRATORY (INHALATION) at 00:51

## 2022-01-01 RX ADMIN — BARIUM SULFATE 5 ML: 400 PASTE ORAL at 10:28

## 2022-01-01 RX ADMIN — SODIUM CHLORIDE 75 ML/HR: 9 INJECTION, SOLUTION INTRAVENOUS at 23:41

## 2022-01-01 RX ADMIN — LORAZEPAM 2 MG: 2 SOLUTION, CONCENTRATE ORAL at 16:12

## 2022-01-01 RX ADMIN — SODIUM CHLORIDE 250 ML/HR: 9 INJECTION, SOLUTION INTRAVENOUS at 03:24

## 2022-01-01 RX ADMIN — ACETAMINOPHEN 650 MG: 325 TABLET ORAL at 20:50

## 2022-01-01 RX ADMIN — INSULIN LISPRO 4 UNITS: 100 INJECTION, SOLUTION INTRAVENOUS; SUBCUTANEOUS at 23:08

## 2022-01-01 RX ADMIN — LORAZEPAM 2 MG: 2 SOLUTION, CONCENTRATE ORAL at 20:12

## 2022-01-01 RX ADMIN — CEFTRIAXONE 1 G: 1 INJECTION, POWDER, FOR SOLUTION INTRAMUSCULAR; INTRAVENOUS at 15:52

## 2022-01-01 RX ADMIN — MORPHINE SULFATE 2 MG: 2 INJECTION, SOLUTION INTRAMUSCULAR; INTRAVENOUS at 13:43

## 2022-05-09 NOTE — ED NOTES
Dear Zully Brown,    We are sorry you are not feeling well. Based on the responses you provided, it is recommended that you be seen in-person in urgent care so we can better evaluate your symptoms. Please click here to find the nearest urgent care location to you.   You will not be charged for this Visit. Thank you for trusting us with your care.    Camille Valenzuela     This nurse in chart for critical lab documentation review.

## 2022-07-02 NOTE — ANESTHESIA PROCEDURE NOTES
Peripheral Block    Start time: 2/17/2020 4:04 PM  End time: 2/17/2020 4:06 PM  Performed by: Brandi Moreno MD  Authorized by: Brandi Moreno MD       Pre-procedure: Indications: at surgeon's request and primary anesthetic    Preanesthetic Checklist: patient identified, risks and benefits discussed, site marked, timeout performed, anesthesia consent given and patient being monitored    Timeout Time: 16:04          Block Type:   Block Type:   Interscalene  Laterality:  Right  Monitoring:  Standard ASA monitoring, responsive to questions, oxygen, frequent vital sign checks and continuous pulse ox  Injection Technique:  Single shot  Procedures: ultrasound guided    Patient Position: seated  Prep: chlorhexidine    Location:  Interscalene  Needle Type:  [place]  Needle Gauge:  21 G  Needle Localization:  Ultrasound guidance    Assessment:  Number of attempts:  1  Injection Assessment:  Incremental injection every 5 mL, no paresthesia, ultrasound image on chart, local visualized surrounding nerve on ultrasound, negative aspiration for blood and no intravascular symptoms  Patient tolerance:  Patient tolerated the procedure well with no immediate complications Yes

## 2022-08-01 PROBLEM — S22.009A CLOSED FRACTURE OF THORACIC VERTEBRA (HCC): Status: ACTIVE | Noted: 2022-01-01

## 2022-08-01 PROBLEM — Q66.50 CONGENITAL PES PLANUS: Status: ACTIVE | Noted: 2022-01-01

## 2022-08-01 PROBLEM — N17.9 ACUTE KIDNEY FAILURE, UNSPECIFIED (HCC): Status: ACTIVE | Noted: 2021-07-27

## 2022-08-01 PROBLEM — N39.0 UTI (URINARY TRACT INFECTION): Status: ACTIVE | Noted: 2022-01-01

## 2022-08-01 PROBLEM — R94.31 ABNORMAL ELECTROCARDIOGRAM: Status: ACTIVE | Noted: 2022-01-01

## 2022-08-01 NOTE — ED PROVIDER NOTES
EMERGENCY DEPARTMENT HISTORY AND PHYSICAL EXAM    Date: 8/1/2022  Patient Name: Porsche Ashby    History of Presenting Illness     Chief Complaint   Patient presents with    Fatigue         History Provided By: Patient and EMS    3:20 PM  Porsche Ashby is a 80 y.o. male with PMHX of multiple myeloma, currently on home hospice who presents to the emergency department C/O generalized weakness. Per EMS they were called by patient's hospice nurse who was concerned that he seemed more weak than normal and that his urine was darker than normal.  The nurse told EMS that he was recently treated for UTI. On arrival here patient denies any complaints. 3:28 PM  In speaking to patient's home health nurse at bedside and his daughter on the phone patient is not on hospice. He is still on oral chemo for his multiple myeloma but was told if he does not improve that hospice would be the next step. Daughter does confirm that he is a DNR/DNI.   Bedside nurse states that his speech seems more slurred than normal and he seems more confused and weak than normal.    PCP: Cynthia Rizzo MD    Current Facility-Administered Medications   Medication Dose Route Frequency Provider Last Rate Last Admin    sodium chloride (NS) flush 5-10 mL  5-10 mL IntraVENous PRN Robb Mott MD        azithromycin (ZITHROMAX) 500 mg in 0.9% sodium chloride 250 mL (VIAL-MATE)  500 mg IntraVENous Q24H Feli Mott  mL/hr at 08/01/22 1707 500 mg at 08/01/22 1707    [START ON 8/2/2022] cefTRIAXone (ROCEPHIN) 1 g in 0.9% sodium chloride (MBP/ADV) 50 mL MBP  1 g IntraVENous Q24H Feli Mott MD        insulin lispro (HUMALOG) injection   SubCUTAneous AC&HS Celestino Meadows MD        glucose chewable tablet 16 g  4 Tablet Oral PRN Celestino Meadows MD        glucagon (GLUCAGEN) injection 1 mg  1 mg IntraMUSCular PRN Celestino Meadows MD        dextrose 10% infusion 0-250 mL  0-250 mL IntraVENous PRN Celestino Meadows MD        acetaminophen (TYLENOL) tablet 650 mg  650 mg Oral ONCE Reilly Mott MD         Current Outpatient Medications   Medication Sig Dispense Refill    gabapentin (NEURONTIN) 300 mg capsule Take 1 Capsule by mouth two (2) times a day. Max Daily Amount: 600 mg. 60 Capsule 0    lidocaine (Lidoderm) 5 % Apply patch to the affected area for 12 hours a day and remove for 12 hours a day. 15 Each 0    furosemide (Lasix) 20 mg tablet Take  by mouth daily. felodipine (PLENDIL SR) 10 mg 24 hr tablet Take 10 mg by mouth daily. spironolactone (ALDACTONE) 25 mg tablet Take  by mouth daily. pyridoxine, vitamin B6, (VITAMIN B-6) 50 mg tablet Take 50 mg by mouth daily. rivaroxaban (XARELTO PO) Take 10 mg by mouth. POTASSIUM CHLORIDE Take 20 mEq by mouth daily. omeprazole (PRILOSEC) 20 mg capsule Take 20 mg by mouth daily. SITagliptin-metFORMIN (JANUMET) 50-1,000 mg per tablet Take 1 Tab by mouth two (2) times daily (with meals). cyanocobalamin (VITAMIN B12) 500 mcg tablet Take 1,000 mcg by mouth daily. lisinopril (PRINIVIL, ZESTRIL) 5 mg tablet Take 1 Tab by mouth daily. 30 Tab 0    isosorbide mononitrate ER (IMDUR) 30 mg tablet Take 1 Tab by mouth daily. 30 Tab 0    metoprolol succinate (TOPROL-XL) 25 mg XL tablet Take 1 Tab by mouth daily. 30 Tab 0    pravastatin (PRAVACHOL) 40 mg tablet Take 1 Tab by mouth nightly. 30 Tab 0    calcium-cholecalciferol, d3, (CALCIUM 600 + D) 600-125 mg-unit tab Take 1 Tablet by mouth two (2) times a day. pioglitazone (ACTOS) 30 mg tablet Take 30 mg by mouth daily.          Past History       Past Medical History:  Past Medical History:   Diagnosis Date    [facility]     Diabetes ([facility]     FH: chemotherapy     H/O stem cell transplant ([facility]     Hypertension     Multiple myeloma ([facility]     Neuropathy     Obesity (BMI 30-39.9) 6/12/2021       Past Surgical History:  Past Surgical History:   Procedure Laterality Date    HX CHOLECYSTECTOMY HX KYPHOPLASTY         Family History:  Family History   Problem Relation Age of Onset    Diabetes Father     Heart Disease Father     Hypertension Mother     Diabetes Mother     Diabetes Sister     Diabetes Brother        Social History:  Social History     Tobacco Use    Smoking status: Former    Smokeless tobacco: Never   Substance Use Topics    Alcohol use: Never     Comment: rare    Drug use: No       Allergies: Allergies   Allergen Reactions    Iodine Hives         Review of Systems   Review of Systems   Constitutional:  Negative for fever. Respiratory:  Negative for shortness of breath. Cardiovascular:  Negative for chest pain. Genitourinary:         Positive dark urine   Neurological:  Positive for speech difficulty and weakness. Psychiatric/Behavioral:  Positive for confusion. All other systems reviewed and are negative.       Physical Exam     Vitals:    08/01/22 1520 08/01/22 1529 08/01/22 1546 08/01/22 1717   BP: (!) 96/43  (!) 94/48 (!) 107/54   Pulse: 66  64 63   Resp: 20  19 17   Temp: 98.3 °F (36.8 °C)      SpO2: 97% 98% 94% 94%   Weight: 89.4 kg (197 lb)      Height: 5' 9\" (1.753 m)        Physical Exam    Nursing notes and vital signs reviewed    Constitutional: Elderly and chronically ill appearing, moderate distress  Head: Normocephalic, Atraumatic  Eyes: EOMI  Neck: Supple  Cardiovascular: Regular rate and rhythm, no murmurs, rubs, or gallops  Chest: Normal work of breathing and chest excursion bilaterally  Lungs: Diminished to ausculation bilaterally  Abdomen: Soft, non tender, non distended  Skin: Warm and dry, normal cap refill  Neuro: Alert and appropriate, face symmetric, normal speech  Psychiatric: Normal mood and affect      Diagnostic Study Results     Labs -     Recent Results (from the past 12 hour(s))   EKG, 12 LEAD, INITIAL    Collection Time: 08/01/22  3:37 PM   Result Value Ref Range    Ventricular Rate 66 BPM    Atrial Rate 66 BPM    P-R Interval 180 ms    QRS Duration 128 ms    Q-T Interval 400 ms    QTC Calculation (Bezet) 419 ms    Calculated P Axis 47 degrees    Calculated R Axis 44 degrees    Calculated T Axis 85 degrees    Diagnosis       Normal sinus rhythm  Nonspecific intraventricular block  Nonspecific T wave abnormality  Abnormal ECG  When compared with ECG of 11-JUN-2021 17:24,  premature supraventricular complexes are no longer present  T wave inversion no longer evident in Lateral leads     GLUCOSE, POC    Collection Time: 08/01/22  3:50 PM   Result Value Ref Range    Glucose (POC) 81 70 - 188 mg/dL   METABOLIC PANEL, COMPREHENSIVE    Collection Time: 08/01/22  3:56 PM   Result Value Ref Range    Sodium 135 (L) 136 - 145 mmol/L    Potassium 4.5 3.5 - 5.5 mmol/L    Chloride 103 100 - 111 mmol/L    CO2 25 21 - 32 mmol/L    Anion gap 7 3.0 - 18 mmol/L    Glucose 68 (L) 74 - 99 mg/dL    BUN 29 (H) 7.0 - 18 MG/DL    Creatinine 0.63 0.6 - 1.3 MG/DL    BUN/Creatinine ratio 46 (H) 12 - 20      GFR est AA >60 >60 ml/min/1.73m2    GFR est non-AA >60 >60 ml/min/1.73m2    Calcium 9.7 8.5 - 10.1 MG/DL    Bilirubin, total 0.4 0.2 - 1.0 MG/DL    ALT (SGPT) 15 (L) 16 - 61 U/L    AST (SGOT) 24 10 - 38 U/L    Alk.  phosphatase 46 45 - 117 U/L    Protein, total 6.1 (L) 6.4 - 8.2 g/dL    Albumin 2.8 (L) 3.4 - 5.0 g/dL    Globulin 3.3 2.0 - 4.0 g/dL    A-G Ratio 0.8 0.8 - 1.7     CBC WITH AUTOMATED DIFF    Collection Time: 08/01/22  3:56 PM   Result Value Ref Range    WBC 8.2 4.6 - 13.2 K/uL    RBC 2.52 (L) 4.35 - 5.65 M/uL    HGB 7.1 (L) 13.0 - 16.0 g/dL    HCT 22.0 (L) 36.0 - 48.0 %    MCV 87.3 78.0 - 100.0 FL    MCH 28.2 24.0 - 34.0 PG    MCHC 32.3 31.0 - 37.0 g/dL    RDW 17.2 (H) 11.6 - 14.5 %    PLATELET 339 113 - 323 K/uL    MPV 11.3 9.2 - 11.8 FL    NRBC 0.0 0  WBC    ABSOLUTE NRBC 0.00 0.00 - 0.01 K/uL    NEUTROPHILS 67 40 - 73 %    LYMPHOCYTES 14 (L) 21 - 52 %    MONOCYTES 14 (H) 3 - 10 %    EOSINOPHILS 2 0 - 5 %    BASOPHILS 0 0 - 2 %    IMMATURE GRANULOCYTES 2 (H) 0.0 - 0.5 %    ABS. NEUTROPHILS 5.5 1.8 - 8.0 K/UL    ABS. LYMPHOCYTES 1.1 0.9 - 3.6 K/UL    ABS. MONOCYTES 1.1 0.05 - 1.2 K/UL    ABS. EOSINOPHILS 0.2 0.0 - 0.4 K/UL    ABS. BASOPHILS 0.0 0.0 - 0.1 K/UL    ABS. IMM.  GRANS. 0.2 (H) 0.00 - 0.04 K/UL    DF AUTOMATED     MAGNESIUM    Collection Time: 08/01/22  3:56 PM   Result Value Ref Range    Magnesium 1.4 (L) 1.6 - 2.6 mg/dL   PROTHROMBIN TIME + INR    Collection Time: 08/01/22  3:56 PM   Result Value Ref Range    Prothrombin time 20.1 (H) 11.5 - 15.2 sec    INR 1.7 (H) 0.8 - 1.2     PTT    Collection Time: 08/01/22  3:56 PM   Result Value Ref Range    aPTT 29.1 23.0 - 36.4 SEC   NT-PRO BNP    Collection Time: 08/01/22  3:56 PM   Result Value Ref Range    NT pro- 0 - 1,800 PG/ML   TROPONIN-HIGH SENSITIVITY    Collection Time: 08/01/22  3:57 PM   Result Value Ref Range    Troponin-High Sensitivity 9 0 - 78 ng/L   POC LACTIC ACID    Collection Time: 08/01/22  4:00 PM   Result Value Ref Range    Lactic Acid (POC) 2.90 (HH) 0.40 - 2.00 mmol/L   URINALYSIS W/ RFLX MICROSCOPIC    Collection Time: 08/01/22  4:43 PM   Result Value Ref Range    Color YELLOW      Appearance CLOUDY      Specific gravity 1.013 1.005 - 1.030      pH (UA) 5.0 5.0 - 8.0      Protein TRACE (A) NEG mg/dL    Glucose Negative NEG mg/dL    Ketone Negative NEG mg/dL    Bilirubin Negative NEG      Blood Negative NEG      Urobilinogen 0.2 0.2 - 1.0 EU/dL    Nitrites Positive (A) NEG      Leukocyte Esterase LARGE (A) NEG     URINE MICROSCOPIC ONLY    Collection Time: 08/01/22  4:43 PM   Result Value Ref Range    WBC TOO NUMEROUS TO COUNT 0 - 5 /hpf    RBC 0 to 3 0 - 5 /hpf    Epithelial cells FEW 0 - 5 /lpf    Bacteria 4+ (A) NEG /hpf   GLUCOSE, POC    Collection Time: 08/01/22  6:05 PM   Result Value Ref Range    Glucose (POC) 72 70 - 110 mg/dL   POC LACTIC ACID    Collection Time: 08/01/22  6:08 PM   Result Value Ref Range    Lactic Acid (POC) 2.57 (HH) 0.40 - 2.00 mmol/L       Radiologic Studies - CT HEAD WO CONT   Final Result      1. No evidence of acute intracranial pathology. 2. Extensive multifocal osseous lucency, present previously and suspect reflects   myeloma. XR CHEST PORT   Final Result         1. Patchy patchy areas of multifocal consolidation concerning for pneumonia. 2. Advanced myelomatous involvement of the imaged axial and appendicular   skeleton. CT Results  (Last 48 hours)                 08/01/22 1730  CT HEAD WO CONT Final result    Impression:      1. No evidence of acute intracranial pathology. 2. Extensive multifocal osseous lucency, present previously and suspect reflects   myeloma. Narrative:  EXAM: CT of the head       INDICATION: Altered mental status. COMPARISON: CT 12/7/2020       TECHNIQUE: Axial CT imaging of the head was performed without nonionic   intravenous contrast. Multiplanar reformats were generated. One or more dose reduction techniques were used on this CT: automated exposure   control, adjustment of the mAs and/or kVp according to patient size, and   iterative reconstruction techniques. The specific techniques used on this CT   exam have been documented in the patient's electronic medical record.       _______________       FINDINGS:       BRAIN: No evidence of intra-cranial hemorrhage or mass. CALVARIA: Extensive multifocal patchy lucency throughout the calvaria and upper   cervical spine, present previously though likely slightly progressed. OTHER: Unremarkable.       _______________                 CXR Results  (Last 48 hours)                 08/01/22 1600  XR CHEST PORT Final result    Impression:          1. Patchy patchy areas of multifocal consolidation concerning for pneumonia. 2. Advanced myelomatous involvement of the imaged axial and appendicular   skeleton.        Narrative:  EXAM: XR CHEST PORT       CLINICAL INDICATION/HISTORY: meets SIRS criteria   -Additional: Fatigue, lethargy       COMPARISON: June 11, 2021       TECHNIQUE: Portable frontal view of the chest       _______________       FINDINGS:       SUPPORT DEVICES: Right chest wall Mediport catheter tip projecting at the   superior cavoatrial junction. HEART AND MEDIASTINUM: Stable appearing cardiac size and mediastinal contours. LUNGS AND PLEURAL SPACES: Consolidation within the right middle and lower lung   zones. No evidence of pneumothorax or definite pleural effusion. Patchy left   suprahilar opacity also new from prior study. BONY THORAX AND SOFT TISSUES: Innumerable lytic lesions throughout the imaged   axial and appendicular skeleton with stigmata of prior vertebral body   augmentation. _______________                   Medications given in the ED-  Medications   sodium chloride (NS) flush 5-10 mL (has no administration in time range)   azithromycin (ZITHROMAX) 500 mg in 0.9% sodium chloride 250 mL (VIAL-MATE) (500 mg IntraVENous New Bag 8/1/22 1707)   cefTRIAXone (ROCEPHIN) 1 g in 0.9% sodium chloride (MBP/ADV) 50 mL MBP (has no administration in time range)   insulin lispro (HUMALOG) injection (has no administration in time range)   glucose chewable tablet 16 g (has no administration in time range)   glucagon (GLUCAGEN) injection 1 mg (has no administration in time range)   dextrose 10% infusion 0-250 mL (has no administration in time range)   acetaminophen (TYLENOL) tablet 650 mg (has no administration in time range)   cefTRIAXone (ROCEPHIN) 1 g in 0.9% sodium chloride (MBP/ADV) 50 mL MBP (0 g IntraVENous IV Completed 8/1/22 1633)   sodium chloride 0.9 % bolus infusion 2,121 mL (2,121 mL IntraVENous New Bag 8/1/22 1706)         Medical Decision Making   I am the first provider for this patient. I reviewed the vital signs, available nursing notes, past medical history, past surgical history, family history and social history. Vital Signs-Reviewed the patient's vital signs.     Pulse Oximetry Analysis - 94% on room air, not hypoxic     Cardiac Monitor:  Rate: 61 bpm  Rhythm: Normal sinus    EKG interpretation: (Preliminary)  EKG read by Dr. Lurdes Shen at 4:30 PM  Normal sinus rhythm at a rate of 66 bpm, MN interval 180 ms, QRS duration 128 ms, similar when compared to prior from June 2021    Records Reviewed: Nursing Notes, Old Medical Records, and Previous electrocardiograms    Provider Notes (Medical Decision Making): Camron Mccoy is a 80 y.o. male brought from home for darkened urine and increased confusion. No focal neurodeficits on exam.  Patient has end-stage multiple myeloma and is on a final round of oral chemo but has been told by his oncologist that if this does not work hospice will be the next option. Home health nurse was concerned about patient's decline and brought him into the ED. He is recently completed a course of oral antibiotics for UTI but the UTIs continue to recur. Met SIRS criteria and sepsis bundle initiated with ideal body weight based fluids administered. Urine is the source and has been sent for culture and IV antibiotics initiated. Discussed with hospitalist for further in-hospital evaluation and management. Patient and his daughter understand and agree with this plan. Procedures:  Procedures    ED Course:   6:44 PM  Updated patient on all results and plan. All questions answered. CONSULT NOTE:   8:14 PM  Dr. Lurdes Shen spoke with Dr. Edenilson Bryant   Specialty: Hospitalist  Discussed pt's hx, disposition, and available diagnostic and imaging results over the telephone. Reviewed care plans. Accepts to remote telemetry. Diagnosis and Disposition     Critical Care Time: None    Core Measures:  For Hospitalized Patients:    1. Hospitalization Decision Time:  The decision to hospitalize the patient was made by Dr. Lurdes Shen at 6:45 PM on 8/1/2022    2.  Aspirin: Aspirin was not given because the patient did not present with a stroke at the time of their Emergency Department evaluation    8:15 PM  Patient is being admitted to the hospital by Dr. Jahaira Godwin. The results of their tests and reasons for their admission have been discussed with them and/or available family. They convey agreement and understanding for the need to be admitted and for their admission diagnosis. CONDITIONS ON ADMISSION:  Sepsis is present at the time of admission. Deep Vein Thrombosis is not present at the time of admission. Thrombosis is not present at the time of admission. Urinary Tract Infection is present at the time of admission. Pneumonia maybe present at the time of admission. MRSA is not present at the time of admission. CLINICAL IMPRESSION:    1. Acute cystitis without hematuria    2. Dehydration    3. Multiple myeloma in relapse [facility])         _______________________________      Please note that this dictation was completed with [employer], the computer voice recognition software. Quite often unanticipated grammatical, syntax, homophones, and other interpretive errors are inadvertently transcribed by the computer software. Please disregard these errors. Please excuse any errors that have escaped final proofreading.

## 2022-08-01 NOTE — ED TRIAGE NOTES
Pt brought in by EMS for increased weakness and darken urine. Pt is on Hospice and has just gotten over a UTI but his home nurse is concerned as he has darkening urine and acting weaker. Pt is unable to walk due to his multiple myeloma and spinal cancer.

## 2022-08-01 NOTE — H&P
History & Physical    Patient: Dennis Stein MRN: [id number]  CSN: [id number]    YOB: 1940  Age: 80 y.o. Sex: male      DOA: 8/1/2022    Chief Complaint:   Chief Complaint   Patient presents with    Fatigue          HPI:     Dennis Stein is a 80 y.o.  male who has history of multiple myeloma, spinal stenosis bedbound, diabetes, history of stem cell transplant, bedbound presents to the emergency room with 3-week recurrent weakness decreased appetite and recurrent urinary tract infections. Patient recently transitioning his care to the [address] [place] [address] due to transportation being difficult for family to afford he was offered palliative/comfort if last round of chemo was unsuccessful  Patient has visiting nurse and daughter and son-in-law who has been instrumental helping patient patient's main complaint is that of being hungry and choking when he eats there is been no fevers chills or night sweats he is got some several lesions that have popped up on his thigh and left arm  No COVID exposures vaccinated and  In the emergency room patient was hypotensive he was given sepsis fluids and antibiotics in the form of Rocephin administered asked with improvement of blood pressure  Past Medical History:   Diagnosis Date    Cancer ([facility] [address]     Diabetes ([facility] [address]     FH: chemotherapy     H/O stem cell transplant ([facility] [address]     Hypertension     Multiple myeloma ([facility] [address]     Neuropathy     Obesity (BMI 30-39.9) 6/12/2021       Past Surgical History:   Procedure Laterality Date    HX CHOLECYSTECTOMY      HX KYPHOPLASTY         Family History   Problem Relation Age of Onset    Diabetes Father     Heart Disease Father     Hypertension Mother     Diabetes Mother     Diabetes Sister     Diabetes Brother        Social History     Socioeconomic History    Marital status:    Tobacco Use    Smoking status: Former    Smokeless tobacco: Never   Substance and Sexual Activity    Alcohol use: Never     Comment: rare    Drug use:  No Prior to Admission medications    Medication Sig Start Date End Date Taking? Authorizing Provider   gabapentin (NEURONTIN) 300 mg capsule Take 1 Capsule by mouth two (2) times a day. Max Daily Amount: 600 mg. 7/27/21   Shayan Barrios MD   lidocaine (Lidoderm) 5 % Apply patch to the affected area for 12 hours a day and remove for 12 hours a day. 7/1/21   Ilsa Shen MD   furosemide (Lasix) 20 mg tablet Take  by mouth daily. Provider, Historical   felodipine (PLENDIL SR) 10 mg 24 hr tablet Take 10 mg by mouth daily. Annie Vazquez MD   spironolactone (ALDACTONE) 25 mg tablet Take  by mouth daily. Annie Vazquez MD   pyridoxine, vitamin B6, (VITAMIN B-6) 50 mg tablet Take 50 mg by mouth daily. Annie Vazquez MD   rivaroxaban (XARELTO PO) Take 10 mg by mouth. Annie Vazquez MD   POTASSIUM CHLORIDE Take 20 mEq by mouth daily. Annie Vzaquez MD   omeprazole (PRILOSEC) 20 mg capsule Take 20 mg by mouth daily. Annie Vazquez MD   SITagliptin-metFORMIN (JANUMET) 50-1,000 mg per tablet Take 1 Tab by mouth two (2) times daily (with meals). Provider, Historical   cyanocobalamin (VITAMIN B12) 500 mcg tablet Take 1,000 mcg by mouth daily. Provider, Historical   lisinopril (PRINIVIL, ZESTRIL) 5 mg tablet Take 1 Tab by mouth daily. 2/14/20   Que Bai MD   isosorbide mononitrate ER (IMDUR) 30 mg tablet Take 1 Tab by mouth daily. 2/15/20   Ethyl Sever, MD   metoprolol succinate (TOPROL-XL) 25 mg XL tablet Take 1 Tab by mouth daily. 2/15/20   Ethyl Sever, MD   pravastatin (PRAVACHOL) 40 mg tablet Take 1 Tab by mouth nightly. 2/14/20   Ethyl Sever, MD   calcium-cholecalciferol, d3, (CALCIUM 600 + D) 600-125 mg-unit tab Take 1 Tablet by mouth two (2) times a day. Annie Vazquez MD   pioglitazone (ACTOS) 30 mg tablet Take 30 mg by mouth daily.     Annie Vazquez MD       Allergies   Allergen Reactions    Iodine Hives         Review of Systems  GENERAL: Patient alert, awake and oriented times self and place able to communicate full sentences and not in distress. Low pitched tone  HEENT: No change in vision, no earache, tinnitus, sore throat or sinus congestion. NECK: No pain or stiffness. PULMONARY: No shortness of breath, cough or wheeze. Cardiovascular: no pnd or orthopnea, no CP  GASTROINTESTINAL: No abdominal pain, nausea, vomiting or diarrhea, melena or bright red blood per rectum. GENITOURINARY: No patient does not use urinary Roberts at home urgency, hesitancy or dysuria. Does not use a Roberts catheter at home is in depends  MUSCULOSKELETAL: +joint or muscle pain, no back pain, no recent trauma. Chronic spinal stenosis nonambulatory and bedbound  DERMATOLOGIC: No rash, no itching, no lesions. ENDOCRINE: No polyuria, polydipsia, no heat or cold intolerance. No recent change in weight. HEMATOLOGICAL: +nemia or easy bruising or bleeding. NEUROLOGIC: No headache, seizures, numbness, tingling+ weakness. Physical Exam:     Physical Exam:  Visit Vitals  BP (!) 107/54   Pulse 63   Temp 98.3 °F (36.8 °C)   Resp 17   Ht 5' 9\" (1.753 m)   Wt 89.4 kg (197 lb)   SpO2 94%   BMI 29.09 kg/m²      O2 Device: None (Room air)    Temp (24hrs), Av.3 °F (36.8 °C), Min:98.3 °F (36.8 °C), Max:98.3 °F (36.8 °C)    No intake/output data recorded.  0701 -  1900  In: 50 [I.V.:50]  Out: 200     General:  Alert, cooperative, no distress, appears stated age. Appears chronically ill and cachectic              Head: Normocephalic, without obvious abnormality, atraumatic. Eyes:  Conjunctivae/corneas clear. PERRL, E   Nose: Nares normal. No drainage or sinus tenderness. Neck: Supple, symmetrical, trachea midline, no adenopathy, thyroid: no enlargement, no carotid bruit and no JVD. Lungs:   Clear to auscultation bilaterally. Diminished breath sounds throughout   Heart:  Regular rate and rhythm, S1, S2 normal.  Regular sort of S1 and S2     Abdomen: Soft, non-tender.  Bowel sounds normal.    Extremities: Extremities normal, atraumatic, no +1 edema cyanosis or edema. Pulses: 2+ and symmetric all extremities. Skin:  No rashes or lesions   Neurologic: AAOx3, No focal motor or sensory deficit. Labs Reviewed: All lab results for the last 24 hours reviewed. and EKG    Procedures/imaging: see electronic medical records for all procedures/Xrays and details which were not copied into this note but were reviewed prior to creation of Plan      Assessment/Plan     Active Problems:    1.  UTI and sepsis  Urine cultures obtained give albumin and fluids  Started on Rocephin 2. LEANDRA we will start on low-dose fluids hold antihypertensive agents for now  3. Hypotension we will give albumin  4. Possible pneumonia with aspiration monitor for worsening fevers and leukocytosis on current regimen as anaerobes are not being covered we will put in for swallow eval  5. History of DVT we will resume his Xarelto check Hemoccults for bleeding over his right hip 7 obtain permission for blood transfusion if needed  6. Multiple myeloma on antichemo meds we will obtain a consult: VOA Drs.  6.  Overall poor prognosis I have added a palliative care consult  7.   Diabetes with hypoglycemia placed on protocol Lantus will be held last A1c was under 6  DVT/GI Prophylaxis:  Xarelto and Protonix        Rocael Walker MD  8/1/2022 7:49 PM

## 2022-08-02 PROBLEM — E16.2 HYPOGLYCEMIA: Status: ACTIVE | Noted: 2022-01-01

## 2022-08-02 PROBLEM — A41.9 SEPSIS (HCC): Status: ACTIVE | Noted: 2022-01-01

## 2022-08-02 PROBLEM — D64.9 ANEMIA: Status: ACTIVE | Noted: 2022-01-01

## 2022-08-02 NOTE — DIABETES MGMT
Diabetes/ Glycemic Control Plan of Care  Recommendations:   Continue frequent POC glucose monitoring    Assessment: Chart reviewed for blood sugar outside of target range of  mg/dL. 80 y.o. male with known h/o diabetes, admitted for UTI and sepsis. Patient hypoglycemic - BG ranging  mg/dL since admission. Glucagon administered x 2, IV dextrose 10% infusing. DX:   1. Acute cystitis without hematuria        2. Dehydration        3. Multiple myeloma in relapse [facility])           Recent Glucose Results:   Lab Results   Component Value Date/Time    GLU 49 (LL) 08/02/2022 03:31 AM    GLU 68 (L) 08/01/2022 03:56 PM    GLUCPOC 83 08/02/2022 11:41 AM    GLUCPOC 100 08/02/2022 08:44 AM    GLUCPOC 50 (LL) 08/02/2022 08:08 AM        IV Fluids containing dextrose:    sodium chloride (23.4%) 0.9 % in dextrose 10% 1,040 mL infusion @ 50 mL/hr    Steroids:   Rx Glucocorticoids (24h ago, onward)       Start     Dose Route Frequency Ordered Stop    08/02/22 1000  dexAMETHasone (DECADRON) tablet 4 mg         4 mg PO DAILY 08/02/22 0815 --                  Blood glucose values: Within target range (70-180mg/dL):  NO  Current insulin orders:    Corrective Humalog ACHS - normal sensitivity scale  Total Daily Dose previous 24 hours = none    Current A1c:   Lab Results   Component Value Date/Time    Hemoglobin A1c 5.7 (H) 08/01/2022 03:56 PM      equivalent  to ave Blood Glucose of 117 mg/dl for 2-3 months prior to admission  Adequate glycemic control PTA: YES    Nutrition/Diet:   Active Orders   Diet    ADULT DIET Dysphagia - Soft & Bite Sized; Mildly Thick ([place])      Home diabetes medications:   Key Antihyperglycemic Medications               metFORMIN (GLUCOPHAGE) 500 mg tablet (Taking) Take 500 mg by mouth two (2) times daily (with meals). Indications: type 2 diabetes mellitus    SITagliptin-metFORMIN (JANUMET) 50-1,000 mg per tablet Take 1 Tab by mouth two (2) times daily (with meals).     pioglitazone (ACTOS) 30 mg tablet Take 30 mg by mouth daily.           Plan/Goals:   Blood glucose will be within target of 70 - 180 mg/dl with no further hypoglycemia    Education:  [] Refer to Diabetes Education Record                       [x] Education not indicated at this time       Caden Virgen RN, BSN, [facility]  Professional   Glycemic Control Team   Phone:  [phone]  Tues - Thurs 8:30 - 4:30

## 2022-08-02 NOTE — PROGRESS NOTES
Problem: Falls - Risk of  Goal: *Absence of Falls  Description: Document Bard  Fall Risk and appropriate interventions in the flowsheet.   Outcome: Progressing Towards Goal  Note: Fall Risk Interventions:       Mentation Interventions: Adequate sleep, hydration, pain control    Medication Interventions: Assess postural VS orthostatic hypotension    Elimination Interventions: Call light in reach, Toileting schedule/hourly rounds              Problem: Pain  Goal: *Control of Pain  Outcome: Progressing Towards Goal     Problem: General Medical Care Plan  Goal: *Vital signs within specified parameters  Outcome: Progressing Towards Goal  Goal: *Labs within defined limits  Outcome: Progressing Towards Goal  Goal: *Absence of infection signs and symptoms  Outcome: Progressing Towards Goal  Goal: *Optimal pain control at patient's stated goal  Outcome: Progressing Towards Goal  Goal: *Skin integrity maintained  Outcome: Progressing Towards Goal  Goal: *Fluid volume balance  Outcome: Progressing Towards Goal  Goal: *Optimize nutritional status  Outcome: Progressing Towards Goal  Goal: *Anxiety reduced or absent  Outcome: Progressing Towards Goal     Problem: Hypotension  Goal: *Blood pressure within specified parameters  Outcome: Progressing Towards Goal  Goal: *Fluid volume balance  Outcome: Progressing Towards Goal  Goal: *Labs within defined limits  Outcome: Progressing Towards Goal

## 2022-08-02 NOTE — PROGRESS NOTES
Problem: Patient Education: Go to Patient Education Activity  Description: Patient will:  1. Tolerate PO trials with 0 s/s overt distress in 4/5 trials. 2. Utilize compensatory swallow strategies/maneuvers (decrease bite/sip, size/rate, alt. liq/sol) with min cues in 4/5 trials. 3. Perform oral-motor/laryngeal exercises to increase oropharyngeal swallow function with min cues. 4. Complete an objective swallow study (i.e., MBSS) to assess swallow integrity, r/o aspiration, and determine of safest LRD, min A. (Goal met 8/2/22)    Rec:     Soft and bite-size diet with mildly thick (nectar) liquids  Pt may require assistance with meal set up  Aspiration precautions  HOB >45 during po intake, remain >30 for 30-45 minutes after po   Small bites/sips; alternate liquid/solid with slow feeding rate   Oral care TID  Meds crushed in puree  8/2/2022 1120 by Magui Edwards, SLP  Outcome: Progressing Towards Goal  SPEECH PATHOLOGY MODIFIED BARIUM SWALLOW STUDY & TREATMENT    Patient: Sergey Scott (89 y.o. male)  Date: 8/2/2022  Primary Diagnosis: UTI (urinary tract infection) [N39.0]       Precautions: Aspiration     PLOF: Regular, thin (per patient report)    ASSESSMENT :  Based on the objective data described below, the patient presents with mild-moderate oropharyngeal dysphagia. Study somewhat limited due to poor patient positioning. Patient unable to sit upright due to pain associated with spinal stenosis. Moderate penetration visualized with thin liquids. Pharyngeal residue visualized in pyriforms and patient observed to regurgitate 1 of 2 trials of thin liquids. Thus, patient at risk for aspiration during and after swallow. Patient tolerated mildly thick (nectar) liquids using 3-second prep and effortful swallow. OM examination significant for reduced labial seal, reduced lingual strength, and limited dentition. Oral phase c/b increased oral transit time and tongue pumping across consistencies.  Pharyngeal phase c/b decreased hyolaryngeal excursion. Suspect decreased relaxation of UES; however, unable to visualize due to patient positioning. Continue current diet: soft and bite-size with mildly thick (nectar) liquids with aspiration precautions in place. Educated patient/family on swallowing precautions. Will d/w nurse and MD. Patient may benefit from ENT and/or GI consult due to regurgitation of thin liquids and associated risks. Treatment:  Skilled therapy initiated: Educated pt on MBS results, aspiration precautions, and importance of compensatory swallow techniques to decrease aspiration risk (decrease rate of intake & sip/bite size, upright @HOB for all po intake and ~30 minutes after po); verbalized comprehension. SLP to follow as indicated. Patient will benefit from skilled intervention to address the above impairments. Patient's rehabilitation potential is considered to be Fair  Factors which may influence rehabilitation potential include:   []              None noted  []              Mental ability/status  [x]              Medical condition  []              Home/family situation and support systems  []              Safety awareness  []              Pain tolerance/management  []              Other:      PLAN :  Recommendations and Planned Interventions:  See above  Frequency/Duration: Patient will be followed by speech-language pathology 3 times a week to address goals. Discharge Recommendations: To Be Determined     SUBJECTIVE:   Patient stated My back.     OBJECTIVE:     Past Medical History:   Diagnosis Date    Cancer ([facility] [address]     Diabetes ([facility] [address]     FH: chemotherapy     H/O stem cell transplant ([facility] [address]     Hypertension     Multiple myeloma ([facility] [address]     Neuropathy     Obesity (BMI 30-39.9) 6/12/2021     Past Surgical History:   Procedure Laterality Date    HX CHOLECYSTECTOMY      HX KYPHOPLASTY       Home Situation:   Home Situation  Home Environment: Private residence  # Steps to Enter: 0  One/Two Story Residence: One story  Living Alone: No  Support Systems: Child(desiree), Other Family Member(s), Caregiver/Home Care Staff  Patient Expects to be Discharged to[de-identified] Home with home health  Current DME Used/Available at Home: Hospital bed, Wheelchair  Diet prior to admission: Regular, thin  Current Diet:  NPO     Film Views: Lateral  Patient Position:  (upright in MBS chair)    Trial 1:   Consistency Presented: Thin liquid; Solid;Puree;Nectar thick liquid   How Presented: SLP-fed/presented;Spoon;Straw       Bolus Acceptance: No impairment   Bolus Formation/Control: Impaired: Mastication;Piecemeal   Propulsion: Discoordination;Delayed (# of seconds); Tongue pumping   Oral Residue: Lingual   Initiation of Swallow: No impairment;Triggered at base of tongue   Timing: No impairment   Penetration: During swallow;From initial swallow; To laryngeal vestibule   Aspiration/Timing: No evidence of aspiration   Pharyngeal Clearance: Pyriform residue ;Greater than 50%   Attempted Modifications: Effortful swallow;Straw; Other (comment) (3 second prep; effortful swallow)   Effective Modifications: Alternate liquids/solids;Straw;Small sips and bites; Other (comment) (3 second prep; effortful swallow)   Cues for Modifications: Minimal-moderate         Decreased Tongue Base Retraction?: No  Laryngeal Elevation: Reduced excursion with laryngeal vestibule gap  Aspiration/Penetration Score: 3 (Penetration/Visible residue-Contrast remains above the folds/cords, but is not cleared)  Pharyngeal Symmetry: Not assessed  Pharyngeal-Esophageal Segment: Pharyngeal reflux; Suspected esophageal dysphagia  Pharyngeal Dysfunction: Decreased strength;Decreased elevation/closure    Oral Phase Severity: Mild-moderate  Pharyngeal Phase Severity: Mild moderate    8-point Penetration-Aspiration Scale: Score 3    PAIN:  Pain level pre-treatment: 0/10   Pain level post-treatment: 0/10     COMMUNICATION/EDUCATION:   [x]  Patient educated regarding MBS results and diet recommendations. []  Patient/family have participated as able in goal setting and plan of care. []  Patient/family agree to work toward stated goals and plan of care. []  Patient understands intent and goals of therapy, but is neutral about his/her participation. [x]  Patient is unable to participate in goal setting and plan of care.     Thank you for this referral.  KULWINDER Briseno  Time Calculation: 25 mins  MBS Time: 20 minutes  Treatment Time: 5 minutes

## 2022-08-02 NOTE — PROGRESS NOTES
Speech Therapy Note:  Acknowledging ST order. Bedside swallow evaluation complete. Suspect aspiration of thin liquids. Recommend MBS to assess swallow function and soft bite-size diet with mildly thick (nectar) liquids. Full report to follow.

## 2022-08-02 NOTE — ACP (ADVANCE CARE PLANNING)
Advance Care Planning     General Advance Care Planning (ACP) Conversation      Date of Conversation: 8/2/2022    Conducted with: Patient, patient's daughter Cindy Cavanaugh), Veronica Walter NP (Palliative) and this writer. Healthcare Decision Maker:     Patient does not have a formal healthcare decision maker document, on file. Patient's legal next-of-kin and default healthcare decision maker is his daughter Cindy Cavanaugh, SAUNDERS#[phone]). Content/Action Overview: This writer, along with Veronica Walter NP, with the Palliative Care team; visited with patient today to offer support. Patient was laying in bed and alert. Patient appeared tired and he was hard to understand, when he spoke. He did verbalize that he lives with his daughter Michel Rodriguez. The Palliative Care team was not able to fully assess cognition. Patient had no family at the bedside. The Palliative Care team then phoned patient's daughter, to offer support and arrange a family meeting. Patient's daughter verified that she, her  and her daughter; all reside with patient. Daughter stated that patient is completely dependent with his ADLs and IADLs. Daughter stated that she assists patient with his ADLs and IADLs and patient also has personal care aides that also assist. Daughter stated that she has been working on getting patient connected with the oncology department, at the [facility]. Patient is currently under the [facility] program. Patient does not have a formal healthcare decision maker document, on file. His daughter is his default healthcare decision maker. Daughter was then asked about goals of care. Patient currently has a verbal DNR order, on file.  The Palliative Care team then asked daughter about completing a POST form, to protect patient once he discharges from the hospital. Daughter stated that patient has a signed DDNR, that was completed at the [address] [place] [address] team introduced the POST form to daughter, via phone conversation. Daughter is interested in completing a POST form. Daughter scheduled a day and time to meet with the Palliative Care team. The Palliative Care team will meet with patient's daughter tomorrow (8/3/2022 at 9:30AM), to complete a POST form. At this time, patient will remain a DNR (Verbal Order). Length of Voluntary ACP Conversation in minutes:  21        Bronwyn Whaley., MSW  Palliative Care   #[phone]

## 2022-08-02 NOTE — PROGRESS NOTES
Comprehensive Nutrition Assessment    Type and Reason for Visit: Initial, Positive nutrition screen    Nutrition Recommendations/Plan:   Modify Glucerna to Ensure TID. Pt needs thicken liquids (Nectar thick). Monitor intake of meals and supplement. Attempted to visit pt - asleep. Will re-attempt at a better time. Malnutrition Assessment:  Malnutrition Status: At risk for malnutrition (Poor intake PTA) (08/02/22 1225)      Nutrition Assessment:    H/o: multiple myeloma, spinal stenosis, bedbound, diabetes, Stem cell transplate. Presented with 3wk of recurrent weakness, decreased appetite, recurrent UTI. Per H&P, choking when eating. Admitted for UTI, hypoglycemia. Noted per SLP 8/2- soft and bite sized with nectar thick liquids. Attempted to speak with pt however pt was asleep and was not able to answer questions. Will re-attempt at a better time. Weight hx per chart review: 234lb (7/1/21), 229lb (7/24/21), 238lb (12/1/21). Nutrition Related Findings:    Glucose 49. Sodium chloride in dextrose 10% @ 50ml/hr, protonix, toprol xl, humalog, decadron, lipitor. No BM noted. Wound Type: Pressure injury, Stage II    Current Nutrition Intake & Therapies:  Average Meal Intake: Unable to assess  Average Supplement Intake: Unable to assess  ADULT ORAL NUTRITION SUPPLEMENT Breakfast, Lunch, Dinner; Diabetic Supplement  ADULT DIET Dysphagia - Soft & Bite Sized; Mildly Thick (Nectar)    Anthropometric Measures:  Height: 5' 9\" (175.3 cm)  Ideal Body Weight (IBW): 160 lbs (73 kg)  Admission Body Weight: 197 lb (per H&P)  Current Body Wt:  99.2 kg (218 lb 11.1 oz), 136.7 % IBW. Not specified  Current BMI (kg/m2): 32.3  Usual Body Weight: 108 kg (238 lb) (12/1/2021)  % Weight Change (Calculated): -8.1  Weight Adjustment: No adjustment                 BMI Category: Obese class 1 (BMI 30.0-34. 9)    Estimated Daily Nutrient Needs:  Energy Requirements Based On: Formula  Weight Used for Energy Requirements: Current  Energy (kcal/day): 8712-3014  Weight Used for Protein Requirements: Current (0.8-1.2g)  Protein (g/day):   Method Used for Fluid Requirements: 1 ml/kcal  Fluid (ml/day): 5438-6333    Nutrition Diagnosis:   Predicted inadequate energy intake related to acute injury/trauma, swallowing difficulty as evidenced by poor intake prior to admission, weight loss    Nutrition Interventions:   Food and/or Nutrient Delivery: Continue current diet, Modify oral nutrition supplement  Nutrition Education/Counseling: No recommendations at this time  Coordination of Nutrition Care: Continue to monitor while inpatient       Goals:     Goals: PO intake 50% or greater, by next RD assessment       Nutrition Monitoring and Evaluation:   Behavioral-Environmental Outcomes: None identified  Food/Nutrient Intake Outcomes: Food and nutrient intake, Supplement intake, Diet advancement/tolerance  Physical Signs/Symptoms Outcomes: Biochemical data, Meal time behavior, Chewing or swallowing, Nutrition focused physical findings, Skin, Weight    Discharge Planning:    Continue current diet, Continue oral nutrition supplement    George Kumar RD

## 2022-08-02 NOTE — PROGRESS NOTES
Hospitalist Progress Note-critical care note     Patient: Atha Bumpers MRN: [id number]  CSN: [id number]    YOB: 1940  Age: 80 y.o. Sex: male    DOA: 8/1/2022 LOS:  LOS: 1 day            Chief complaint: hypoglycemia , uti sepsis     Assessment/Plan         Hospital Problems  Date Reviewed: 8/1/2022            Codes Class Noted POA    Hypoglycemia ICD-10-CM: E16.2  ICD-9-CM: 251.2  8/2/2022 Unknown        Anemia ICD-10-CM: D64.9  ICD-9-CM: 285.9  8/2/2022 Unknown        Sepsis ([facility] [address] ICD-10-CM: A41.9  ICD-9-CM: 038.9, 995.91  8/2/2022 Unknown        * (Principal) UTI (urinary tract infection) ICD-10-CM: N39.0  ICD-9-CM: 599.0  8/1/2022 Unknown        Multiple myel[facility] ([facility] ICD-10-CM: C90.00  ICD-9-CM: 203.00  2/16/2020 Yes            UTI and sepsis  Urine cultures   Continue iv abx and follow up with the culture   Lactic acid wnl now     Hypotension we will give albumin, ns infusion  Hold anti-htn medication      Possible pneumonia with aspiration   Aspiration precaution, speech on board  Barrium test     History of DVT   resume his Xarelto after  gi bleeding r/o ,will hold for now      Multiple myeloma hold  antichemo  Continue steroid per oncologist recommedation       Hypoglycemia  Diabetes with hypoglycemia   Received glucogan am   Start d10     Anemia   Will have blood transfusion today     Family was at the bedside    Subjective: feel better     Prognosis is poor, palliative care on board   Current dnr/ I   Rn glucose was low   Disposition :tbd,   Review of systems:    General: No fevers or chills. + tired   Cardiovascular: No chest pain or pressure. No palpitations. Pulmonary: No shortness of breath. Gastrointestinal: No nausea, vomiting.      Vital signs/Intake and Output:  Visit Vitals  BP (!) 106/44   Pulse 71   Temp 97.8 °F (36.6 °C)   Resp 20   Ht 5' 9\" (1.753 m)   Wt 99.2 kg (218 lb 9.6 oz)   SpO2 100%   BMI 32.28 kg/m²     Current Shift:  08/02 0701 - 08/02 1900  In: 150 [P.O.:50; I.V.:100]  Out: 600 [Urine:600]  Last three shifts:  07/31 1901 - 08/02 0700  In: 50 [I.V.:50]  Out: 200     Physical Exam:  General: WD, WN. Alert, cooperative, no acute distress  but sick looking   HEENT: NC, Atraumatic. PERRLA, anicteric sclerae. Lungs: CTA Bilaterally. No Wheezing/Rhonchi/Rales. Heart:  Regular  rhythm,  No murmur, No Rubs, No Gallops  Abdomen: Soft, Non distended, Non tender. +Bowel sounds,   Extremities: No c/c/e  Psych:   Not anxious or agitated. Neurologic:  No acute neurological deficit. Labs: Results:       Chemistry Recent Labs     08/02/22  0331 08/01/22  1556   GLU 49* 68*    135*   K 4.3 4.5    103   CO2 23 25   BUN 28* 29*   CREA 0.44* 0.63   CA 9.1 9.7   AGAP 7 7   BUCR 64* 46*   AP 39* 46   TP 5.8* 6.1*   ALB 2.7* 2.8*   GLOB 3.1 3.3   AGRAT 0.9 0.8      CBC w/Diff Recent Labs     08/02/22  0331 08/01/22  1556   WBC 7.0 8.2   RBC 2.35* 2.52*   HGB 6.5* 7.1*   HCT 20.6* 22.0*    190   GRANS 67 67   LYMPH 14* 14*   EOS 3 2      Cardiac Enzymes No results for input(s): CPK, CKND1, YEFRI in the last 72 hours. No lab exists for component: CKRMB, TROIP   Coagulation Recent Labs     08/01/22  1556   PTP 20.1*   INR 1.7*   APTT 29.1       Lipid Panel Lab Results   Component Value Date/Time    Cholesterol, total 89 12/08/2020 01:12 AM    HDL Cholesterol 46 12/08/2020 01:12 AM    LDL, calculated 28.6 12/08/2020 01:12 AM    VLDL, calculated 14.4 12/08/2020 01:12 AM    Triglyceride 72 12/08/2020 01:12 AM    CHOL/HDL Ratio 1.9 12/08/2020 01:12 AM      BNP No results for input(s): BNPP in the last 72 hours.    Liver Enzymes Recent Labs     08/02/22  0331   TP 5.8*   ALB 2.7*   AP 39*      Thyroid Studies Lab Results   Component Value Date/Time    TSH 1.16 12/07/2020 12:25 PM    TSH 0.74 12/07/2020 12:25 PM        Procedures/imaging: see electronic medical records for all procedures/Xrays and details which were not copied into this note but were reviewed prior to creation of Plan    XR SWALLOW FUNC VIDEO    Result Date: 8/2/2022  Modified Barium swallow History: Dysphagia, feeding difficulties Technique: The patient orally ingested various barium materials under the direction of a speech pathologist with direct fluoroscopic observation. Findings: The patient ingested thin, nectar, pudding consistency, and barium coated cookie consistencies. Moderate penetration with thin liquids. No aspiration. Oropharyngeal phase was unremarkable. Fluoroscopic dose (reference air kerma): 7.8 mGy     No evidence of aspiration. Penetration with thin liquids. Please refer to the separate report and recommendations from the speech pathologist.    CT HEAD WO CONT    Result Date: 8/1/2022  EXAM: CT of the head INDICATION: Altered mental status. COMPARISON: CT 12/7/2020 TECHNIQUE: Axial CT imaging of the head was performed without nonionic intravenous contrast. Multiplanar reformats were generated. One or more dose reduction techniques were used on this CT: automated exposure control, adjustment of the mAs and/or kVp according to patient size, and iterative reconstruction techniques. The specific techniques used on this CT exam have been documented in the patient's electronic medical record. _______________ FINDINGS: BRAIN: No evidence of intra-cranial hemorrhage or mass. CALVARIA: Extensive multifocal patchy lucency throughout the calvaria and upper cervical spine, present previously though likely slightly progressed. OTHER: Unremarkable. _______________     1. No evidence of acute intracranial pathology. 2. Extensive multifocal osseous lucency, present previously and suspect reflects myeloma.      XR CHEST PORT    Result Date: 8/1/2022  EXAM: XR CHEST PORT CLINICAL INDICATION/HISTORY: meets SIRS criteria -Additional: Fatigue, lethargy COMPARISON: June 11, 2021 TECHNIQUE: Portable frontal view of the chest _______________ FINDINGS: SUPPORT DEVICES: Right chest wall Mediport catheter tip projecting at the superior cavoatrial junction. HEART AND MEDIASTINUM: Stable appearing cardiac size and mediastinal contours. LUNGS AND PLEURAL SPACES: Consolidation within the right middle and lower lung zones. No evidence of pneumothorax or definite pleural effusion. Patchy left suprahilar opacity also new from prior study. BONY THORAX AND SOFT TISSUES: Innumerable lytic lesions throughout the imaged axial and appendicular skeleton with stigmata of prior vertebral body augmentation. _______________     1. Patchy patchy areas of multifocal consolidation concerning for pneumonia. 2. Advanced myelomatous involvement of the imaged axial and appendicular skeleton.       Collette Rg MD

## 2022-08-02 NOTE — PROGRESS NOTES
Pt laying comfortably in bed during shift assessment. He is alert and oriented but presents with intermittent confusion on time due to lethargy. IV intact to right wrist and midline to RUE. Pt started on D5 0.9% solution due to low BS, tolerating well at this time. Oral intake has been fair. Speech eval completed and pt on aspiration precautions with nectar thick liquids and soft, bite-sized diet. Pt O2 in place, he is noted with shallow breaths, O2 sats at 100%. Repeat lactic acid completed and trending down. Current unit of blood infusing well without concern. Roberts intact and draining well. Family and caregiver have been present intermittently at bedisde. No further concerns at this time.

## 2022-08-02 NOTE — PROGRESS NOTES
Verbal consent obtained from pt daughter for administration of blood products, as pt is unable to sign for himself. 2nd nurse witness present at the time. No further concerns at this time.

## 2022-08-02 NOTE — PROGRESS NOTES
Problem: Patient Education: Go to Patient Education Activity  Description: Patient will:  1. Tolerate PO trials with 0 s/s overt distress in 4/5 trials. 2. Utilize compensatory swallow strategies/maneuvers (decrease bite/sip, size/rate, alt. liq/sol) with min cues in 4/5 trials. 3. Perform oral-motor/laryngeal exercises to increase oropharyngeal swallow function with min cues. 4. Complete an objective swallow study (i.e., MBSS) to assess swallow integrity, r/o aspiration, and determine of safest LRD, min A.    Rec:     Soft and bite-size diet with mildly thick (nectar) liquids  Pt may require assistance with meal set up  Aspiration precautions  HOB >45 during po intake, remain >30 for 30-45 minutes after po   Small bites/sips; alternate liquid/solid with slow feeding rate   Oral care TID  Meds crushed in puree  Outcome: Progressing Towards Goal      SPEECH LANGUAGE PATHOLOGY BEDSIDE SWALLOW   EVALUATION & TREATMENT     Patient: Bay Johnston (89 y.o. male)  Date: 8/2/2022  Primary Diagnosis: UTI (urinary tract infection) [N39.0]       Precautions: Aspiration     PLOF: Regular, thin (per patient report)    ASSESSMENT :  Based on the objective data described below, the patient presents with mild-moderate oral phase dysphagia and suspected pharyngeal dysphagia. Patient seen for bedside swallow evaluation due to patient complaints of \"choking\" when he eats. Chest XR completed 8/1/22 which showed: \"patchy areas of multifocal consolidation concerning for pneumonia. \"  Oriented x4. Previously seen by ST in 2021 and d/c on easy to chew diet with thin liquids. Oral mechanism exam significant for reduced labial seal, reduced lingual strength, and limited/poor dentition. Vocal quality c/b low volume and hoarseness. Patient appears SOB. Currently on 2L O2 via NC. Nurse present in room during eval; administered oral meds crushed in puree. Patient tolerated with no overt difficulty.   Trials of thin, mildly thick, puree, and solid textures given. Patient with audible swallow, appearance of effortful swallow, and wet gurgly vocal quality following trials of thin liquid. Patient reports \"liquids are the most difficult. \" With puree and solid textures, patient demo prolonged mastication 2/dentition and appearance of delayed oral transit time. Significant lingual residuals present following initial swallow. Patient given mildly thick liquids and appeared to tolerate better. Laryngeal elevation noted to palpation and appears reduced. Recommend soft and bite-size diet with mildly thick (nectar) liquids; meds crushed in puree. Rec MBS to assess swallow function. D/w nurse and MD.      TREATMENT :  Skilled therapy initiated; Educated pt on aspiration precautions and importance of compensatory swallow techniques to decrease aspiration risk (decrease rate of intake & sip/bite size, upright @HOB for all po intake and ~30 minutes after po); verbalized comprehension. SLP to follow as indicated. Patient will benefit from skilled intervention to address the above impairments. Patient's rehabilitation potential is considered to be Fair  Factors which may influence rehabilitation potential include:   []            None noted  []            Mental ability/status  [x]            Medical condition  []            Home/family situation and support systems  []            Safety awareness  []            Pain tolerance/management  []            Other:      PLAN :  Recommendations and Planned Interventions:  See above  Frequency/Duration: Patient will be followed by speech-language pathology 3 times a week to address goals. Discharge Recommendations: To Be Determined     SUBJECTIVE:   Patient stated Liquids are the most difficult.     OBJECTIVE:     Past Medical History:   Diagnosis Date    Cancer ([facility] [address]     Diabetes ([facility] [address]     FH: chemotherapy     H/O stem cell transplant ([facility] [address]     Hypertension     Multiple myeloma ([facility] [address]     Neuropathy     Obesity (BMI 30-39.9) 6/12/2021     Past Surgical History:   Procedure Laterality Date    HX CHOLECYSTECTOMY      HX KYPHOPLASTY       Home Situation:   Home Situation  Home Environment: Private residence  # Steps to Enter: 0  One/Two Story Residence: One story  Living Alone: No  Support Systems: Child(desiree), Other Family Member(s), Caregiver/Home Care Staff  Patient Expects to be Discharged to[de-identified] Home with home health  Current DME Used/Available at Home: Hospital bed, Wheelchair    Diet prior to admission: Regular, thin (per patient report)  Current Diet:  NPO     Cognitive and Communication Status:  Neurologic State: Drowsy  Orientation Level: Oriented X4  Cognition: Follows commands, Decreased attention/concentration  Perception: Appears intact  Perseveration: No perseveration noted  Safety/Judgement: Awareness of environment  Oral Assessment:  Oral Assessment  Labial: Decreased seal  Dentition: Natural;Poor  Oral Hygiene:  (fair)  Lingual: Decreased strength  Velum: No impairment  Mandible: No impairment  Gag Reflex: Other (comment) (did not test)  P.O. Trials:  Patient Position:  (HOB 45 degrees)  Vocal quality prior to P.O.: Fatigue;Hoarse;Low volume  Consistency Presented: Solid;Puree; Thin liquid  How Presented: SLP-fed/presented;Straw;Spoon; Successive swallows     Bolus Acceptance: No impairment  Bolus Formation/Control: Impaired  Type of Impairment: Mastication;Piecemeal  Propulsion: Delayed (# of seconds)  Oral Residue: 10-50% of bolus; Lingual  Initiation of Swallow: No impairment  Laryngeal Elevation: Decreased;Weak  Aspiration Signs/Symptoms: Change vocal quality  Pharyngeal Phase Characteristics: Altered vocal quality; Audible swallow;Double swallow;Easily fatigued ;Effortful swallow;Multiple swallows; Poor endurance; Suspected pharyngeal residue  Effective Modifications: Alternate liquids/solids; Double swallow;Straw;Small sips and bites; Other (comment) (3 second prep, effortful swallow)  Cues for Modifications: Minimal-moderate       Oral Phase Severity: Mild-moderate  Pharyngeal Phase Severity : Other (comment) (suspect)    PAIN:  Pain level pre-treatment: 0/10   Pain level post-treatment: 0/10     After treatment:   []            Patient left in no apparent distress sitting up in chair  [x]            Patient left in no apparent distress in bed  [x]            Call bell left within reach  [x]            Nursing notified  []            Family present  []            Caregiver present  []            Bed alarm activated    COMMUNICATION/EDUCATION:   [x]            Aspiration precautions; swallow safety; compensatory techniques. [x]            Patient/family have participated as able in goal setting and plan of care. []            Patient/family agree to work toward stated goals and plan of care. []            Patient understands intent and goals of therapy; neutral about participation. []            Patient unable to participate in goal setting/plan of care; educ ongoing with interdisciplinary staff  []         Posted safety precautions in patient's room.     Thank you for this referral.  KULWINDER Cloud  Time Calculation: 23 mins  Evaluation Time: 18 minutes   Treatment Time: 5 minutes

## 2022-08-02 NOTE — PROGRESS NOTES
I have discussed with the caregiver the rationale for blood component transfusion; its benefits in treating or preventing fatigue, organ damage, or death; and its risk which includes mild transfusion reactions, rare risk of blood borne infection, or more serious but rare reactions. I have discussed the alternatives to transfusion, including the risk and consequences of not receiving transfusion. The caregiver had an opportunity to ask questions and had agreed to proceed with transfusion of blood components.        Spoke with daughter patient given fluid bolus and albumin with persistent low bp  May need transfer to icu if no improvement  She is aware and wants to do all possible to keep dad alive  She also noted black stools but though it was from meds no active bleeding

## 2022-08-02 NOTE — CONSULTS
[facility]: [phone]  [facility]: [phone]     Patient Name: Marcelo Keller  YOB: 1940    Date of consult : 8/2/22  Reason for Consult: establish goals of care  Requesting Provider:  Merle Telles MD   Primary Care Physician: Ashley Padilla MD      SUMMARY:   Marcelo Keller is a 80 y.o. male with a past history of progressive multiple myeloma, who was admitted on 8/1/2022 from home with a diagnosis of Sepsis due to UTI/PNA. Current medical issues leading to Palliative Medicine involvement include: Pt with advanced cancer on palliative treatments through oncology having recent decline in overall health. CHIEF COMPLAINT: dyspnea on any exertion    HPI/SUBJECTIVE:    Pt is a 80year old AAM that lives at home with his daughter who is his primary caretaker. He also has medicaid aids that assist him while she is at work. Pt has been a patient of [facility] Dr Floretta Hammans. Being treated with Ninloar and Dex. He has been been getting progressively weaker with spinal stenosis and osseous mets. Pt is at a low functional level and not a candidate for chemotherapy. He came in through the ED yesterday with increased weakness and dark urine. Note that patient recently transferred his care to the [address] [place] [address]. The patient is:   [x] Verbal and participatory: very breathy with conversation asked that we speak with his daughter Sawyer Fox. [] Non-participatory due to:     GOALS OF CARE:  Patient/Health Care Proxy Stated Goals: Prolong life      TREATMENT PREFERENCES:   Code Status: DNR         PALLIATIVE DIAGNOSES:   Goals of care/ACP  Multiple Myeloma  UTI  PNA  Encounter for palliative Med       PLAN:   Goals of care/ACP  This NP along with Pinky Lopez MSW in to see the patient at the bedside. He is alert and oriented X 3 but gets very SOB with speaking or any exertion. Is currently supported on oxygen.  Asked that we contact his daughter Nam Vale  Talked with Nam Vale by phone who states that she does have MPOA and patient has made out a living will. She will bring it in tomorrow. She also says that patient has been very debilitated requiring full care and assistance. She has been resistant to any discussion of hospice because she wants her father to continue with the oral treatments for his cancer. Have set up a meeting with Nam Vale tomorrow at 9:30 to review a POST. She confirmed that patient is a DNR and has a DDNR at home. Goals of care; DNR/DNI continued treatment  2. Multiple myeloma  With palliative treatments. Pt also with related spinal stenosis with and osseous mets. 3.  UTI  + 4 bacteria. Being treated with broad spectrum antibiotic. Awaiting cultures  4. Pneumonia  Possible caused by Ninlaro per the oncologist carries up to a 36% risk of PNA and 43% for infection. Note Cxr Consolidation within the right middle and lower lung zones  5. Encounter for palliative Med  Pt with a likely very poor prognosis despite all medical interventions. Thought that the treatments may be increasing his risk and incidence of infections. Will discuss the Discussed the benefits and burdens of ongoing treatment vs moving to a more comfort approach at end of life. 5.  Initial consult note routed to primary continuity provider  6. Communicated plan of care with: Palliative IDT      Advance Care Planning:  [] The [facility] Interdisciplinary Team has updated the ACP Navigator with [address] and Patient Capacity    Primary Decision [facility] ([address]):   Primary Decision Maker: Tyesha Hughes - Daughter - [phone]    Medical Interventions: Limited additional interventions   Other Instructions:         As far as possible, the palliative care team has discussed with patient / health care proxy about goals of care / treatment preferences for patient.          HISTORY:     History obtained from: chart review and family interview   Principal Problem:    UTI (urinary tract infection) (8/1/2022)    Active Problems:    Hypoglycemia (8/2/2022)    Past Medical History:   Diagnosis Date    Cancer ([facility]     Diabetes ([facility]     FH: chemotherapy     H/O stem cell transplant ([facility]     Hypertension     Multiple myeloma ([facility]     Neuropathy     Obesity (BMI 30-39.9) 6/12/2021      Past Surgical History:   Procedure Laterality Date    HX CHOLECYSTECTOMY      HX KYPHOPLASTY        Family History   Problem Relation Age of Onset    Diabetes Father     Heart Disease Father     Hypertension Mother     Diabetes Mother     Diabetes Sister     Diabetes Brother      History reviewed, no pertinent family history.   Social History     Tobacco Use    Smoking status: Former    Smokeless tobacco: Never   Substance Use Topics    Alcohol use: Never     Comment: rare     Allergies   Allergen Reactions    Iodine Hives      Current Facility-Administered Medications   Medication Dose Route Frequency    [Held by provider] amLODIPine (NORVASC) tablet 10 mg  10 mg Oral DAILY    atorvastatin (LIPITOR) tablet 10 mg  10 mg Oral DAILY    [Held by provider] hydrALAZINE (APRESOLINE) tablet 25 mg  25 mg Oral BID    [Held by provider] ixazomib cap 4 mg (Patient Supplied)  4 mg Oral DAILY    [Held by provider] metoprolol succinate (TOPROL-XL) XL tablet 25 mg  25 mg Oral DAILY    oxyCODONE IR (ROXICODONE) tablet 10 mg  10 mg Oral Q4H PRN    [Held by provider] rivaroxaban (XARELTO) tablet 10 mg  10 mg Oral DAILY WITH DINNER    [Held by provider] spironolactone (ALDACTONE) tablet 25 mg  25 mg Oral DAILY    0.9% sodium chloride infusion 250 mL  250 mL IntraVENous PRN    dexAMETHasone (DECADRON) tablet 4 mg  4 mg Oral DAILY    sodium chloride (23.4%) 0.9 % in dextrose 10% 1,040 mL infusion   IntraVENous CONTINUOUS    albumin human 25% (BUMINATE) solution 25 g  25 g IntraVENous Q6H    sodium chloride (NS) flush 5-10 mL  5-10 mL IntraVENous PRN    azithromycin (ZITHROMAX) 500 mg in 0.9% sodium chloride 250 mL (VIAL-MATE)  500 mg IntraVENous Q24H    cefTRIAXone (ROCEPHIN) 1 g in 0.9% sodium chloride (MBP/ADV) 50 mL MBP  1 g IntraVENous Q24H    insulin lispro (HUMALOG) injection   SubCUTAneous AC&HS    glucose chewable tablet 16 g  4 Tablet Oral PRN    glucagon (GLUCAGEN) injection 1 mg  1 mg IntraMUSCular PRN    dextrose 10% infusion 0-250 mL  0-250 mL IntraVENous PRN    pantoprazole (PROTONIX) 40 mg in 0.9% sodium chloride 10 mL injection  40 mg IntraVENous Q24H          Clinical Pain Assessment (nonverbal scale for nonverbal patients): Clinical Pain Assessment  Severity: 0          Duration: for how long has pt been experiencing pain (e.g., 2 days, 1 month, years)  Frequency: how often pain is an issue (e.g., several times per day, once every few days, constant)     FUNCTIONAL ASSESSMENT:     Palliative Performance Scale (PPS):  PPS: 40    ECOG  ECOG Status : Completely disabled     PSYCHOSOCIAL/SPIRITUAL SCREENING:      Any spiritual / Hindu concerns:  [] Yes /  [x] No    Caregiver Burnout:  [] Yes /  [] No /  [x] No Caregiver Present      Anticipatory grief assessment:   [x] Normal  / [] Maladaptive        REVIEW OF SYSTEMS:     Systems: constitutional, ears/nose/mouth/throat, respiratory, gastrointestinal, genitourinary, musculoskeletal, integumentary, neurologic, psychiatric, endocrine. Positive findings noted below. Modified ESAS Completed by: provider           Pain: 0           Dyspnea: 6               Positive and pertinent negative findings in ROS are noted above in HPI. The following systems were [x] reviewed / [] unable to be reviewed as noted in HPI  Other findings are noted below.      PHYSICAL EXAM:     Constitutional: alert and interactive   Eyes: pupils equal, anicteric  ENMT: no nasal discharge, moist mucous membranes  Cardiovascular: regular rhythm, distal pulses intact  Respiratory: breathing labored with talking   Gastrointestinal: soft non-tender, +bowel sounds  Last bowel movement: Musculoskeletal: no deformity, no tenderness to palpation  Skin: warm, dry  Neurologic: AA and oriented X 3 following commands, moving all extremities  Psychiatric: full affect, no hallucinations    Other: Wt Readings from Last 3 Encounters:   08/01/22 99.2 kg (218 lb 9.6 oz)   07/27/21 103.2 kg (227 lb 8 oz)   07/01/21 106.1 kg (234 lb)     Blood pressure (!) 101/42, pulse 62, temperature 97.7 °F (36.5 °C), resp. rate 20, height 5' 9\" (1.753 m), weight 99.2 kg (218 lb 9.6 oz), SpO2 98 %. Pain:  Pain Scale 1: Numeric (0 - 10)  Pain Intensity 1: 3  Pain Onset 1: chronic  Pain Location 1: Back  Pain Orientation 1: Mid, Upper  Pain Description 1: Aching, Burning, Pressure  Pain Intervention(s) 1: MD notified (comment)       LAB AND IMAGING FINDINGS:     Lab Results   Component Value Date/Time    WBC 7.0 08/02/2022 03:31 AM    HGB 6.5 (L) 08/02/2022 03:31 AM    PLATELET 797 67/67/7969 03:31 AM     Lab Results   Component Value Date/Time    Sodium 136 08/02/2022 03:31 AM    Potassium 4.3 08/02/2022 03:31 AM    Chloride 106 08/02/2022 03:31 AM    CO2 23 08/02/2022 03:31 AM    BUN 28 (H) 08/02/2022 03:31 AM    Creatinine 0.44 (L) 08/02/2022 03:31 AM    Calcium 9.1 08/02/2022 03:31 AM    Magnesium 2.3 08/02/2022 03:31 AM      Lab Results   Component Value Date/Time    Alk.  phosphatase 39 (L) 08/02/2022 03:31 AM    Protein, total 5.8 (L) 08/02/2022 03:31 AM    Albumin 2.7 (L) 08/02/2022 03:31 AM    Globulin 3.1 08/02/2022 03:31 AM     Lab Results   Component Value Date/Time    INR 1.7 (H) 08/01/2022 03:56 PM    Prothrombin time 20.1 (H) 08/01/2022 03:56 PM    aPTT 29.1 08/01/2022 03:56 PM      No results found for: IRON, FE, TIBC, IBCT, PSAT, FERR   No results found for: PH, PCO2, PO2  No components found for: [place]   Lab Results   Component Value Date/Time    CK 64 06/12/2021 07:50 AM    CK - MB 2.3 06/12/2021 07:50 AM              Total time: 50 minutes  Counseling / coordination time, spent as noted above:   > 50% counseling / coordination:  Time spent in direct consultation with the patient, medical team, and family     Prolonged service was provided for  []30 min   []75 min in face to face time in the presence of the patient, spent as noted above. Time Start:   Time End:     Disclaimer: Sections of this note are dictated using utilizing voice recognition software, which may have resulted in some phonetic based errors in grammar and contents. Even though attempts were made to correct all the mistakes, some may have been missed, and remained in the body of the document. If questions arise, please contact our department.

## 2022-08-02 NOTE — PROGRESS NOTES
Reason for Admission:   UTI (urinary tract infection) [N39.0]    PCP: Aleena Olguin MD    There are currently no Active Isolations  No active infections                RUR Score:     mod; 18%             Resources/supports,as identified by patient/family:         Barriers to discharge:             Plan for utilizing home health:                              Likelihood of readmission:            Transition of Care Plan:      Home with hospice pending clinical progression               Initial assessment completed with chart review. Prior to admission pt was receiving  and personal care services arranged by the [facility]. Pt/family has a meeting scheduled with Palliative care tomorrow morning. CM to await outcome of this meeting to identify goals of care to assist with determining care transition . CM to continue to follow and assist.    The patient designates his daughter, Salima Garnett ([phone]),   to participate in his discharge plan and to receive any needed information. This patient lives in a single family home with patient and daughter. Patient is not able to navigate steps as needed. Prior to hospitalization, patient was considered to be independent with ADLs/IADLS : no . If not independent,  patient needs assist with : All ADLs      Medical transport will be available to transport patient home upon discharge. The patient states that he can obtain his medications from the pharmacy, and take his medications as directed.     Patient's current insurance is Payor: [place] [employer] / Plan: [place] MEDICARE PART A & B / Product Type: Medicare /        Care Management Interventions  Mode of Transport at Discharge: BLS  Transition of Care Consult (CM Consult): Discharge Planning  Health Maintenance Reviewed: Yes  Support Systems: Child(desiree)  The Plan for Transition of Care is Related to the Following Treatment Goals : Home with hospice pending clinical progression  Discharge Location  Patient Expects to be Discharged to[de-identified] Home with hospice

## 2022-08-02 NOTE — ED NOTES
TRANSFER - OUT REPORT:    Verbal report given to [address] Fletcher RN (name) on Hal Sanders  being transferred to 330 remote telemetry (unit) for routine progression of care       Report consisted of patients Situation, Background, Assessment and   Recommendations(SBAR). Information from the following report(s) SBAR, ED Summary, [facility] and Recent Results was reviewed with the receiving nurse. Lines:   Peripheral IV 08/01/22 Posterior;Right Forearm (Active)        Opportunity for questions and clarification was provided.       Patient transported with:   Monitor  Registered Nurse

## 2022-08-02 NOTE — CONSULTS
Hematology / Oncology Initial Consult Note    Admit Date: 8/1/2022    Reason for Consult: Multiple Myeloma    Requesting Physician:Dr. Kristen Moffett    Assessment:     Dennis Stein is a 80 y.o., BLACK/, male, who I have been asked to see for MM. Principal Problem:    UTI (urinary tract infection) (8/1/2022)      Sepsis due to UTI/PNA: UA with +nitrites and leuks. CXR with multifocal PNA. On treatment with Azithro/Rocephin and IVF. Blood and urine cultures pending. Progressive Multiple Myeloma: followed by Dr. Lore Mondragon at [facility]. Most recently on treatment with Ninlaro and Dex. Per his last note on 7/12/22, worsened weakness is due to ongoing spinal stenosis and osseous mets. Surgery is unlikely to be beneficial given immobility for over 4 months and he has already received maximal XRT to the spine. Chemotherapy was not an option due to performance status. There was also thought that he could have cutaneous manifestations of MM with plan for bx. They discussed that treatment with Ninlaro and Dex is palliative, but if not effective, hospice would be reasonable. He is not currently neutropenic, but Bonelisa Andrae carries a risk of PNA up to 36% and infection of 43%. Anemia: due to MM. Transfuse to maintain Hg>7  GOC: DNR.      Plan:   - Hold Ninlaro while inhouse  - Continue dex 4mg daily to help with hypoglycemia/appetite  - Will send MM labs to determine if he has stable/worsening disease  - Agree with Antibiotics for UTI/PNA  - Will continue [address] discussions pending labs/clinical course  - Will continue to follow    Elysa Prader, MD  [facility]  Cell [phone]      History of Present Illness: Dennis Stein is a 80 y.o. male with Progressed Multiple Myeloma with diffuse osseous mets, s/p palliative XRT to the spine on 12/2021, not seen in clinic at [facility] from 1/2022 to 7/2022 due to worsened immobility and transportation issues, seen by Dr. Lore Mondragon on 7/12/22 while on a stretcher and was continued on treatment with Ninlaro and Dexamethasone. He transitioned his care to the [facility] with plan to send monthly labs to [facility] for comanagement. He presented to the [facility] ED with decreased appetite, recurrent UTI, and ongoing weakness. In the ED, he was hypotensive. Labs were notable for Hg 7.1 and UA with positive nitrites and large leukocytes. CXR with patchy areas of consolidation suggestive of multifocal PNA. Blood and urine cultures drawn. He was started on Rocephin and admitted for sepsis. He was given 1u pRBC. This am, he feels slightly improved, but with ongoing weakness. Denies any cough or phlegm. ROS:    Constitutional: No fever, chills, diaphoresis, activity change, appetite change, fatigue or unexpected weight change. HEENT: No sore throat, rhinorrhea,or visual disturbance. Respiratory: No cough, chest tightness, shortness of breath. Cardiovascular:No chest pain, palpitations. Gastrointestinal:No nausea, vomiting, diarrhea, constipation, early satiety, abdominal distention, abdominal pain. Genitourinary: No dysuria, urgency, frequency, hematuria, flank pain, difficulty urinating. Neurological: No headache, dizziness, weakness, tingling and numbness or fall. Musculoskeletal: + arthalgia    No bruise/bleed easily. No extremity swelling.       Past Medical History:   Diagnosis Date    Cancer ([facility]     Diabetes ([facility]     FH: chemotherapy     H/O stem cell transplant ([facility]     Hypertension     Multiple myeloma ([facility]     Neuropathy     Obesity (BMI 30-39.9) 6/12/2021       Past Surgical History:   Procedure Laterality Date    HX CHOLECYSTECTOMY      HX KYPHOPLASTY         Family History   Problem Relation Age of Onset    Diabetes Father     Heart Disease Father     Hypertension Mother     Diabetes Mother     Diabetes Sister     Diabetes Brother        Social History     Socioeconomic History    Marital status:    Tobacco Use    Smoking status: Former Smokeless tobacco: Never   Substance and Sexual Activity    Alcohol use: Never     Comment: rare    Drug use: No       Current Facility-Administered Medications   Medication Dose Route Frequency    [Held by provider] amLODIPine (NORVASC) tablet 10 mg  10 mg Oral DAILY    atorvastatin (LIPITOR) tablet 10 mg  10 mg Oral DAILY    [Held by provider] hydrALAZINE (APRESOLINE) tablet 25 mg  25 mg Oral BID    [Held by provider] ixazomib cap 4 mg (Patient Supplied)  4 mg Oral DAILY    [Held by provider] metoprolol succinate (TOPROL-XL) XL tablet 25 mg  25 mg Oral DAILY    oxyCODONE IR (ROXICODONE) tablet 10 mg  10 mg Oral Q4H PRN    [Held by provider] rivaroxaban (XARELTO) tablet 10 mg  10 mg Oral DAILY WITH DINNER    [Held by provider] spironolactone (ALDACTONE) tablet 25 mg  25 mg Oral DAILY    0.9% sodium chloride infusion 250 mL  250 mL IntraVENous PRN    albumin human 5% (BUMINATE) solution 25 g  25 g IntraVENous Q6H    sodium chloride (NS) flush 5-10 mL  5-10 mL IntraVENous PRN    azithromycin (ZITHROMAX) 500 mg in 0.9% sodium chloride 250 mL (VIAL-MATE)  500 mg IntraVENous Q24H    cefTRIAXone (ROCEPHIN) 1 g in 0.9% sodium chloride (MBP/ADV) 50 mL MBP  1 g IntraVENous Q24H    insulin lispro (HUMALOG) injection   SubCUTAneous AC&HS    glucose chewable tablet 16 g  4 Tablet Oral PRN    glucagon (GLUCAGEN) injection 1 mg  1 mg IntraMUSCular PRN    dextrose 10% infusion 0-250 mL  0-250 mL IntraVENous PRN    pantoprazole (PROTONIX) 40 mg in 0.9% sodium chloride 10 mL injection  40 mg IntraVENous Q24H    0.9% sodium chloride infusion  75 mL/hr IntraVENous CONTINUOUS       Prior to Admission medications    Medication Sig Start Date End Date Taking? Authorizing Provider   amLODIPine (NORVASC) 10 mg tablet Take 10 mg by mouth in the morning. Indications: high blood pressure   Yes Provider, Historical   ascorbic acid, vitamin C, (VITAMIN C) 500 mg tablet Take 500 mg by mouth in the morning.  Indications: inadequate vitamin C Yes Provider, Historical   atorvastatin (LIPITOR) 10 mg tablet Take 10 mg by mouth in the morning. Yes Provider, Historical   ferrous sulfate 325 mg (65 mg iron) tablet Take 325 mg by mouth in the morning. Indications: anemia from inadequate iron   Yes Provider, Historical   hydrALAZINE (APRESOLINE) 25 mg tablet Take 25 mg by mouth two (2) times a day. Yes Provider, Historical   mineral oil-isopropyl myristat (EUCERIN) lotion Apply  to affected area as needed for Dry Skin. Yes Provider, Historical   lansoprazole (PREVACID) 30 mg capsule Take  by mouth Daily (before breakfast). Indications: gastroesophageal reflux disease   Yes Provider, Historical   metFORMIN (GLUCOPHAGE) 500 mg tablet Take 500 mg by mouth two (2) times daily (with meals). Indications: type 2 diabetes mellitus   Yes Provider, Historical   polyethylene glycol (Miralax) 17 gram packet Take 17 g by mouth as needed for Constipation. Indications: constipation   Yes Provider, Historical   oxyCODONE IR (ROXICODONE) 5 mg immediate release tablet Take 10 mg by mouth every four (4) hours as needed for Pain. Indications: pain   Yes Provider, Historical   dexAMETHasone (DECADRON) 4 mg tablet Take 40 mg by mouth every seven (7) days. Indications: multiple myeloma   Yes Provider, Historical   ixazomib (Ninlaro) 4 mg cap Take 4 mg by mouth daily. 1 tab daily x 21 days, skip 7 days, repeat  Indications: multiple myeloma   Yes Provider, Historical   acetaminophen (TylenoL) 325 mg tablet Take 650 mg by mouth every four (4) hours as needed for Pain or Fever. Indications: fever, pain   Yes Provider, Historical   gabapentin (NEURONTIN) 300 mg capsule Take 1 Capsule by mouth two (2) times a day. Max Daily Amount: 600 mg. 7/27/21  Yes Luis Carlos Bauer MD   furosemide (LASIX) 20 mg tablet Take  by mouth daily. Yes Provider, Historical   spironolactone (ALDACTONE) 25 mg tablet Take  by mouth daily. Yes George, MD Annie   rivaroxaban (XARELTO PO) Take 10 mg by mouth. Yes George, MD Annie   omeprazole (PRILOSEC) 20 mg capsule Take 20 mg by mouth two (2) times a day. Indications: gastroesophageal reflux disease   Yes Annie Vazquez MD   isosorbide mononitrate ER (IMDUR) 30 mg tablet Take 1 Tab by mouth daily. Patient taking differently: Take 60 mg by mouth in the morning. Indications: prevention of anginal chest pain associated with coronary artery disease 2/15/20  Yes Katelyn Medley MD   metoprolol succinate (TOPROL-XL) 25 mg XL tablet Take 1 Tab by mouth daily. Patient taking differently: Take 100 mg by mouth in the morning. Indications: chronic heart failure 2/15/20  Yes Katelyn Medley MD   lidocaine (Lidoderm) 5 % Apply patch to the affected area for 12 hours a day and remove for 12 hours a day. Patient not taking: Reported on 8/1/2022 7/1/21   Jaja Hurley MD   felodipine (PLENDIL SR) 10 mg 24 hr tablet Take 10 mg by mouth daily. Patient not taking: Reported on 8/1/2022    George, MD Annie   pyridoxine, vitamin B6, (VITAMIN B-6) 50 mg tablet Take 50 mg by mouth daily. Patient not taking: Reported on 8/1/2022    Annie Vazquez MD   POTASSIUM CHLORIDE Take 20 mEq by mouth daily. Patient not taking: Reported on 8/1/2022    Annie Vazquez MD   SITagliptin-metFORMIN (JANUMET) 50-1,000 mg per tablet Take 1 Tab by mouth two (2) times daily (with meals). Patient not taking: Reported on 8/1/2022    Provider, Historical   cyanocobalamin (VITAMIN B12) 500 mcg tablet Take 1,000 mcg by mouth daily. Patient not taking: Reported on 8/1/2022    Provider, Historical   lisinopril (PRINIVIL, ZESTRIL) 5 mg tablet Take 1 Tab by mouth daily. Patient not taking: Reported on 8/1/2022 2/14/20   Noemi Sharma MD   pravastatin (PRAVACHOL) 40 mg tablet Take 1 Tab by mouth nightly. Patient not taking: Reported on 8/1/2022 2/14/20   Katelyn Medley MD   calcium-cholecalciferol, d3, 243-165 mg-unit tab Take 1 Tablet by mouth two (2) times a day.   Patient not taking: Reported on 8/1/2022    Other, MD Annie pioglitazone (ACTOS) 30 mg tablet Take 30 mg by mouth daily. Patient not taking: Reported on 2022    Other, Annie, MD       Allergies   Allergen Reactions    Iodine Hives       ECOG PS:    Physical Exam:    Temp (24hrs), Av.6 °F (36.4 °C), Min:97.2 °F (36.2 °C), Max:98.3 °F (36.8 °C)  [email or web address]      General: Alert , Oriented, in no distress  HEENT: no pallor, anicteric sclera, oral pharynx without lesion   No cervical, supraclavicular, axillary and inguinal lymphadenopathy palpated  Heart: regular rate, and rhythm, without murmur, gallop or rubbing  Lungs:breathing comfortably on room air, clear to auscultation and percussion bilaterally  ABD: bowel sound present, soft, nondistended, nontender, no hepatosplenomegaly or mass  Extremities: warm, well perfused, no edema  MSK: no tenderness along the spine or long bones  Skin: No rash  Neuro: non-focal    Imaging:   Ct Head  1. No evidence of acute intracranial pathology. 2. Extensive multifocal osseous lucency, present previously and suspect reflects  myeloma. CXR:     1. Patchy patchy areas of multifocal consolidation concerning for pneumonia. 2. Advanced myelomatous involvement of the imaged axial and appendicular  skeleton.

## 2022-08-03 NOTE — PROGRESS NOTES
Hematology / Oncology Initial Consult Note    Admit Date: 8/1/2022    Reason for Consult: Multiple Myeloma    Requesting Physician:Dr. Candy Mcfadden    Assessment:     Garcia Rivera is a 80 y.o., BLACK/, male, who I have been asked to see for MM. Principal Problem:    UTI (urinary tract infection) (8/1/2022)    Active Problems:    Multiple myeloma ([facility] [address] (2/16/2020)      Hypoglycemia (8/2/2022)      Anemia (8/2/2022)      Sepsis ([address] (8/2/2022)    Sepsis due to UTI/PNA: UA with +nitrites and leuks. CXR with multifocal PNA. On treatment with Azithro/Rocephin and IVF. Blood and urine cultures pending. Progressive Multiple Myeloma: followed by Dr. Tye Santo at [facility]. Most recently on treatment with Ninlaro and Dex. Per his last note on 7/12/22, worsened weakness is due to ongoing spinal stenosis and osseous mets. Surgery is unlikely to be beneficial given immobility for over 4 months and he has already received maximal XRT to the spine. Chemotherapy was not an option due to performance status. There was also thought that he could have cutaneous manifestations of MM with plan for bx. They discussed that treatment with Ninlaro and Dex is palliative, but if not effective, hospice would be reasonable. He is not currently neutropenic, but [employer] carries a risk of PNA up to 36% and infection of 43%. Anemia: due to MM. Transfuse to maintain Hg>7  GOC: DNR. He notes being tired of going back and forth to the hospital and wishes to focus on comfort care. I agree with this decision.      Plan:   - Plan for comfort care  - Recommend continuing steroids to decrease inflammation/help with pain  - Will inform Dr. Tye Santo and [facility] team  - Will remain available as needed    Vic Álvarez MD  [facility]  Cell [phone]      Subjective:     No acute overnight events  This am, he continues to note weakness  He was seen in the room with Palliative Care  Had a discussion regarding [address], palliative nature of his treatment, and ongoing pain. Discussed that focusing on comfort is reasonable and would allow us to maximize our treatments toward making him feel better. He was in agreement with this plan      ROS:    Constitutional: No fever, chills, diaphoresis, activity change, appetite change, fatigue or unexpected weight change. HEENT: No sore throat, rhinorrhea,or visual disturbance. Respiratory: No cough, chest tightness, shortness of breath. Cardiovascular:No chest pain, palpitations. Gastrointestinal:No nausea, vomiting, diarrhea, constipation, early satiety, abdominal distention, abdominal pain. Genitourinary: No dysuria, urgency, frequency, hematuria, flank pain, difficulty urinating. Neurological: No headache, dizziness, weakness, tingling and numbness or fall. Musculoskeletal: + arthalgia    No bruise/bleed easily. No extremity swelling.       Past Medical History:   Diagnosis Date    Cancer ([facility]     Diabetes ([facility]     FH: chemotherapy     H/O stem cell transplant ([facility]     Hypertension     Multiple myeloma ([facility]     Neuropathy     Obesity (BMI 30-39.9) 6/12/2021       Past Surgical History:   Procedure Laterality Date    HX CHOLECYSTECTOMY      HX KYPHOPLASTY         Family History   Problem Relation Age of Onset    Diabetes Father     Heart Disease Father     Hypertension Mother     Diabetes Mother     Diabetes Sister     Diabetes Brother        Social History     Socioeconomic History    Marital status:    Tobacco Use    Smoking status: Former    Smokeless tobacco: Never   Substance and Sexual Activity    Alcohol use: Never     Comment: rare    Drug use: No       Current Facility-Administered Medications   Medication Dose Route Frequency    budesonide (PULMICORT) 500 mcg/2 ml nebulizer suspension  500 mcg Nebulization BID RT    [Held by provider] amLODIPine (NORVASC) tablet 10 mg  10 mg Oral DAILY    atorvastatin (LIPITOR) tablet 10 mg  10 mg Oral DAILY [Held by provider] hydrALAZINE (APRESOLINE) tablet 25 mg  25 mg Oral BID    [Held by provider] ixazomib cap 4 mg (Patient Supplied)  4 mg Oral DAILY    [Held by provider] metoprolol succinate (TOPROL-XL) XL tablet 25 mg  25 mg Oral DAILY    oxyCODONE IR (ROXICODONE) tablet 10 mg  10 mg Oral Q4H PRN    [Held by provider] rivaroxaban (XARELTO) tablet 10 mg  10 mg Oral DAILY WITH DINNER    [Held by provider] spironolactone (ALDACTONE) tablet 25 mg  25 mg Oral DAILY    0.9% sodium chloride infusion 250 mL  250 mL IntraVENous PRN    dexAMETHasone (DECADRON) tablet 4 mg  4 mg Oral DAILY    sodium chloride (23.4%) 0.9 % in dextrose 10% 1,040 mL infusion   IntraVENous CONTINUOUS    albumin human 25% (BUMINATE) solution 25 g  25 g IntraVENous Q6H    albuterol-ipratropium (DUO-NEB) 2.5 MG-0.5 MG/3 ML  3 mL Nebulization Q4H PRN    albuterol-ipratropium (DUO-NEB) 2.5 MG-0.5 MG/3 ML  3 mL Nebulization TID RT    sodium chloride (NS) flush 5-10 mL  5-10 mL IntraVENous PRN    azithromycin (ZITHROMAX) 500 mg in 0.9% sodium chloride 250 mL (VIAL-MATE)  500 mg IntraVENous Q24H    cefTRIAXone (ROCEPHIN) 1 g in 0.9% sodium chloride (MBP/ADV) 50 mL MBP  1 g IntraVENous Q24H    insulin lispro (HUMALOG) injection   SubCUTAneous AC&HS    glucose chewable tablet 16 g  4 Tablet Oral PRN    glucagon (GLUCAGEN) injection 1 mg  1 mg IntraMUSCular PRN    dextrose 10% infusion 0-250 mL  0-250 mL IntraVENous PRN    pantoprazole (PROTONIX) 40 mg in 0.9% sodium chloride 10 mL injection  40 mg IntraVENous Q24H       Prior to Admission medications    Medication Sig Start Date End Date Taking? Authorizing Provider   amLODIPine (NORVASC) 10 mg tablet Take 10 mg by mouth in the morning. Indications: high blood pressure   Yes Provider, Historical   ascorbic acid, vitamin C, (VITAMIN C) 500 mg tablet Take 500 mg by mouth in the morning.  Indications: inadequate vitamin C   Yes Provider, Historical   atorvastatin (LIPITOR) 10 mg tablet Take 10 mg by mouth in the morning. Yes Provider, Historical   ferrous sulfate 325 mg (65 mg iron) tablet Take 325 mg by mouth in the morning. Indications: anemia from inadequate iron   Yes Provider, Historical   hydrALAZINE (APRESOLINE) 25 mg tablet Take 25 mg by mouth two (2) times a day. Yes Provider, Historical   mineral oil-isopropyl myristat (EUCERIN) lotion Apply  to affected area as needed for Dry Skin. Yes Provider, Historical   lansoprazole (PREVACID) 30 mg capsule Take  by mouth Daily (before breakfast). Indications: gastroesophageal reflux disease   Yes Provider, Historical   metFORMIN (GLUCOPHAGE) 500 mg tablet Take 500 mg by mouth two (2) times daily (with meals). Indications: type 2 diabetes mellitus   Yes Provider, Historical   polyethylene glycol (Miralax) 17 gram packet Take 17 g by mouth as needed for Constipation. Indications: constipation   Yes Provider, Historical   oxyCODONE IR (ROXICODONE) 5 mg immediate release tablet Take 10 mg by mouth every four (4) hours as needed for Pain. Indications: pain   Yes Provider, Historical   dexAMETHasone (DECADRON) 4 mg tablet Take 40 mg by mouth every seven (7) days. Indications: multiple myeloma   Yes Provider, Historical   ixazomib (Ninlaro) 4 mg cap Take 4 mg by mouth daily. 1 tab daily x 21 days, skip 7 days, repeat  Indications: multiple myeloma   Yes Provider, Historical   acetaminophen (TylenoL) 325 mg tablet Take 650 mg by mouth every four (4) hours as needed for Pain or Fever. Indications: fever, pain   Yes Provider, Historical   gabapentin (NEURONTIN) 300 mg capsule Take 1 Capsule by mouth two (2) times a day. Max Daily Amount: 600 mg. 7/27/21  Yes John Polk MD   furosemide (LASIX) 20 mg tablet Take  by mouth daily. Yes Provider, Historical   spironolactone (ALDACTONE) 25 mg tablet Take  by mouth daily. Yes George, MD Annie   rivaroxaban (XARELTO PO) Take 10 mg by mouth.    Yes George, MD Annie   omeprazole (PRILOSEC) 20 mg capsule Take 20 mg by mouth two (2) times a day. Indications: gastroesophageal reflux disease   Yes George, MD Annie   isosorbide mononitrate ER (IMDUR) 30 mg tablet Take 1 Tab by mouth daily. Patient taking differently: Take 60 mg by mouth in the morning. Indications: prevention of anginal chest pain associated with coronary artery disease 2/15/20  Yes Boone Westbrook MD   metoprolol succinate (TOPROL-XL) 25 mg XL tablet Take 1 Tab by mouth daily. Patient taking differently: Take 100 mg by mouth in the morning. Indications: chronic heart failure 2/15/20  Yes Boone Westbrook MD   lidocaine (Lidoderm) 5 % Apply patch to the affected area for 12 hours a day and remove for 12 hours a day. Patient not taking: Reported on 8/1/2022 7/1/21   Henry Sanchez MD   felodipine (PLENDIL SR) 10 mg 24 hr tablet Take 10 mg by mouth daily. Patient not taking: Reported on 8/1/2022    George, MD Annie   pyridoxine, vitamin B6, (VITAMIN B-6) 50 mg tablet Take 50 mg by mouth daily. Patient not taking: Reported on 8/1/2022    Annie Vazquez MD   POTASSIUM CHLORIDE Take 20 mEq by mouth daily. Patient not taking: Reported on 8/1/2022    George, MD Annie   SITagliptin-metFORMIN (JANUMET) 50-1,000 mg per tablet Take 1 Tab by mouth two (2) times daily (with meals). Patient not taking: Reported on 8/1/2022    Provider, Historical   cyanocobalamin (VITAMIN B12) 500 mcg tablet Take 1,000 mcg by mouth daily. Patient not taking: Reported on 8/1/2022    Provider, Historical   lisinopril (PRINIVIL, ZESTRIL) 5 mg tablet Take 1 Tab by mouth daily. Patient not taking: Reported on 8/1/2022 2/14/20   Blanca Sanchez MD   pravastatin (PRAVACHOL) 40 mg tablet Take 1 Tab by mouth nightly. Patient not taking: Reported on 8/1/2022 2/14/20   Boone Westbrook MD   calcium-cholecalciferol, d3, 212-379 mg-unit tab Take 1 Tablet by mouth two (2) times a day. Patient not taking: Reported on 8/1/2022    Annie Vazquez MD   pioglitazone (ACTOS) 30 mg tablet Take 30 mg by mouth daily.   Patient not taking: Reported on 2022    Other, MD Annie       Allergies   Allergen Reactions    Iodine Hives       ECOG PS:    Physical Exam:    Temp (24hrs), Av.9 °F (36.6 °C), Min:97.5 °F (36.4 °C), Max:98.5 °F (36.9 °C)  [email or web address]      General: Alert , Oriented, in no distress  HEENT: no pallor, anicteric sclera, oral pharynx without lesion   No cervical, supraclavicular, axillary and inguinal lymphadenopathy palpated  Heart: regular rate, and rhythm, without murmur, gallop or rubbing  Lungs:breathing comfortably on room air, clear to auscultation and percussion bilaterally  ABD: bowel sound present, soft, nondistended, nontender, no hepatosplenomegaly or mass  Extremities: warm, well perfused, no edema  MSK: no tenderness along the spine or long bones  Skin: No rash  Neuro: non-focal    Imaging:   Ct Head  1. No evidence of acute intracranial pathology. 2. Extensive multifocal osseous lucency, present previously and suspect reflects  myeloma. CXR:     1. Patchy patchy areas of multifocal consolidation concerning for pneumonia. 2. Advanced myelomatous involvement of the imaged axial and appendicular  skeleton.

## 2022-08-03 NOTE — WOUND CARE
Spoke with patient and family member at the bedside. Patient is on comfort measures and so to keep him as comfortable as possible wound care nurse will assess sacral wound at time of next brenda care. 3:05 PM bed side nurse called wound care and said that patient could be assessed at 3 PM.  However at 3 patient was too short of breath to be turned. Wound care recommended protective border be applied when patient can be turned and I will attempt to assess at a later time.

## 2022-08-03 NOTE — PROGRESS NOTES
Problem: Falls - Risk of  Goal: *Absence of Falls  Description: Document Farhat Sol Fall Risk and appropriate interventions in the flowsheet.   Outcome: Progressing Towards Goal  Note: Fall Risk Interventions:       Mentation Interventions: Adequate sleep, hydration, pain control    Medication Interventions: Patient to call before getting OOB    Elimination Interventions: Call light in reach              Problem: Patient Education: Go to Patient Education Activity  Goal: Patient/Family Education  Outcome: Progressing Towards Goal

## 2022-08-03 NOTE — RT PROTOCOL NOTE
RT Nebulizer Bronchodilator Protocol Note    There is a bronchodilator order in the chart from a provider indicating to follow the RT Bronchodilator Protocol and there is an Initiate RT Bronchodilator Protocol order as well (see protocol at bottom of note). CXR Findings:  Recent Results (from the past 2 days)    XR SWALLOW [facility] VIDEO 08/02/2022    Impression  No evidence of aspiration. Penetration with thin liquids. Please refer to the separate report and recommendations from the speech  pathologist.      The findings from the last RT Protocol Assessment were as follows:  History of Pulmonary Disease: None or smoker <15 pack years  Respiratory Pattern: Dyspnea on exertion or RR 21-25 bpm  Breath Sounds: Inspiratory and expiratory or bilateral wheezing and/or rhonchi  Cough: Weak, productive  Indication for Bronchodilator Therapy: Unable to speak in sentences, Decreased or absent breath sounds  Bronchodilator Assessment Score: 10     Aerosolized bronchodilator medication orders have been revised according to the RT Nebulizer Bronchodilator Protocol below. Respiratory Therapist to perform RT Therapy Protocol Assessment initially then follow the protocol. Repeat RT Therapy Protocol Assessment PRN for score 0-3 or on second treatment, BID, and PRN for scores above 3. No Indications - adjust the frequency to every 6 hours PRN wheezing or bronchospasm, if no treatments needed after 48 hours then discontinue using Per Protocol order mode. If indication present, adjust the RT bronchodilator orders based on the Bronchodilator Assessment Score as indicated below. If a patient is on this medication at home then do not decrease Frequency below that used at home. 0-3 - enter or revise RT bronchodilator order(s) to equivalent RT Bronchodilator order with Frequency of every 4 hours PRN for wheezing or increased work of breathing using Per Protocol order mode.         4-6 - enter or revise RT Bronchodilator order(s) to two equivalent RT bronchodilator orders with one order with BID Frequency and one order with Frequency of every 4 hours PRN wheezing or increased work of breathing using Per Protocol order mode. 7-10 - enter or revise RT Bronchodilator order(s) to two equivalent RT bronchodilator orders with one order with TID Frequency and one order with Frequency of every 4 hours PRN wheezing or increased work of breathing using Per Protocol order mode. 11-13 - enter or revise RT Bronchodilator order(s) to one equivalent RT bronchodilator order with QID Frequency and an Albuterol order with Frequency of every 4 hours PRN wheezing or increased work of breathing using Per Protocol order mode. Greater than 13 - enter or revise RT Bronchodilator order(s) to one equivalent RT bronchodilator order with every 4 hours Frequency and an Albuterol order with Frequency of every 2 hours PRN wheezing or increased work of breathing using Per Protocol order mode. RT to enter RT Home Evaluation for COPD & MDI Assessment order using Per Protocol order mode. Electronically signed by Dena Littlejohn on 8/2/2022 at 10:25 PMRT Nebulizer Bronchodilator Protocol Note    There is a bronchodilator order in the chart from a provider indicating to follow the RT Bronchodilator Protocol and there is an Initiate RT Bronchodilator Protocol order as well (see protocol at bottom of note). CXR Findings:  Recent Results (from the past 2 days)    XR SWALLOW [facility] VIDEO 08/02/2022    Impression  No evidence of aspiration. Penetration with thin liquids. Please refer to the separate report and recommendations from the speech  pathologist.      The findings from the last RT Protocol Assessment were as follows:  History of Pulmonary Disease:    Respiratory Pattern:    Breath Sounds:    Cough:     Indication for Bronchodilator Therapy:    Bronchodilator Assessment Score:       Aerosolized bronchodilator medication orders have been revised according to the RT Nebulizer Bronchodilator Protocol below. Respiratory Therapist to perform RT Therapy Protocol Assessment initially then follow the protocol. Repeat RT Therapy Protocol Assessment PRN for score 0-3 or on second treatment, BID, and PRN for scores above 3. No Indications - adjust the frequency to every 6 hours PRN wheezing or bronchospasm, if no treatments needed after 48 hours then discontinue using Per Protocol order mode. If indication present, adjust the RT bronchodilator orders based on the Bronchodilator Assessment Score as indicated below. If a patient is on this medication at home then do not decrease Frequency below that used at home. 0-3 - enter or revise RT bronchodilator order(s) to equivalent RT Bronchodilator order with Frequency of every 4 hours PRN for wheezing or increased work of breathing using Per Protocol order mode. 4-6 - enter or revise RT Bronchodilator order(s) to two equivalent RT bronchodilator orders with one order with BID Frequency and one order with Frequency of every 4 hours PRN wheezing or increased work of breathing using Per Protocol order mode. 7-10 - enter or revise RT Bronchodilator order(s) to two equivalent RT bronchodilator orders with one order with TID Frequency and one order with Frequency of every 4 hours PRN wheezing or increased work of breathing using Per Protocol order mode. 11-13 - enter or revise RT Bronchodilator order(s) to one equivalent RT bronchodilator order with QID Frequency and an Albuterol order with Frequency of every 4 hours PRN wheezing or increased work of breathing using Per Protocol order mode. Greater than 13 - enter or revise RT Bronchodilator order(s) to one equivalent RT bronchodilator order with every 4 hours Frequency and an Albuterol order with Frequency of every 2 hours PRN wheezing or increased work of breathing using Per Protocol order mode.      RT to enter RT Home Evaluation for COPD & MDI Assessment order using Per Protocol order mode.     Electronically signed by Anna Scanlon on 8/2/2022 at 10:24 PM

## 2022-08-03 NOTE — PROGRESS NOTES
Hospitalist Progress Note    Patient: Luna Domínguez MRN: [id number]  CSN: [id number]    YOB: 1940  Age: 80 y.o. Sex: male    DOA: 8/1/2022 LOS:  LOS: 2 days                Assessment/Plan     Patient Active Problem List   Diagnosis Code    Open wound of right index finger without damage to nail S61.200A    Chest pain R07.9    Lower extremity edema R60.0    Type 2 diabetes mellitus ([facility]) P75.4    Systolic CHF, chronic ([facility]) S14.02    Metabolic encephalopathy W17.74    Cellulitis L03.90    LEANDRA (acute kidney injury) ([facility] [address] N17.9    Olecranon fracture S52.023A    Multiple myeloma ([facility]) C90.00    Morbid obesity ([facility]) E66.01    Morbid obesity with BMI of 40.0-44.9, adult ([facility]) E66.01, Z68.41    Hypokalemia E87.6    Neuropathy G62.9    Hypomagnesemia E83.42    Elevated troponin R77.8    Debility R53.81    Left hemiparesis ([facility]) G81.94    Spinal stenosis M48.00    HTN (hypertension) I10    DM (diabetes mellitus) ([facility]) E11.9    Anticoagulated Z79.01    Left shoulder pain M25.512    Obesity (BMI 30-39. 9) E66.9    Pneumonia due to COVID-19 virus U07.1, J12.82    COVID-19 vaccine series completed Z92.29    Unable to ambulate R26.2    UTI (urinary tract infection) N39.0    Abnormal electrocardiogram R94.31    Acute kidney failure, unspecified ([facility]) N17.9    Congenital pes planus Q66.50    Closed fracture of thoracic vertebra ([facility]) S22.009A    Hypoglycemia E16.2    Anemia D64.9    Sepsis ([facility]) A41.9        Chief complaint :  Fatigue  80 y.o.  male who has history of multiple myeloma, spinal stenosis bedbound, diabetes, history of stem cell transplant, bedbound presents to the emergency room with 3-week recurrent weakness decreased appetite and recurrent urinary tract infections. Complains of back pain.     UTI and sepsis  Urine cultures   Continue iv abx and follow up with the culture       Hypotension -  resolved  Hold anti-htn medication       Possible pneumonia with aspiration      History of DVT -  Xarelto on hold for concern of GI bleed     Multiple myeloma -   hold  achemo  Continue steroid per oncologist recommedation    Hypoglycemia  Diabetes with hypoglycemia   Received glucogan am  Start d10     Anemia  S/p blood transfusion     Discussed with daughter at bedside. Seen by palliative care, now comfort measures,   Plan to discharge home with hospice. Disposition : 1-2 days    Review of systems  General: No fevers or chills. Cardiovascular: No chest pain or pressure. No palpitations. Pulmonary: No shortness of breath. Gastrointestinal: No nausea, vomiting. Physical Exam:  General: Awake, cooperative, no acute distress    HEENT: NC, Atraumatic. PERRLA, anicteric sclerae. Lungs: CTA Bilaterally. No Wheezing/Rhonchi/Rales. Heart:  S1 S2,  No murmur, No Rubs, No Gallops  Abdomen: Soft, Non distended, Non tender. +Bowel sounds,   Extremities: No c/c/e  Psych:   Not anxious or agitated. Neurologic:  No acute neurological deficit. Vital signs/Intake and Output:  Visit Vitals  BP (!) 124/51 (BP 1 Location: Left upper arm, BP Patient Position: At rest)   Pulse 85   Temp 97.7 °F (36.5 °C)   Resp 18   Ht 5' 9\" (1.753 m)   Wt 104.6 kg (230 lb 8 oz)   SpO2 95%   BMI 34.04 kg/m²     Current Shift:  08/03 0701 - 08/03 1900  In: -   Out: 700 [Urine:700]  Last three shifts:  08/01 1901 - 08/03 0700  In: 770 [P.O.:170;  I.V.:100]  Out: 3100 [Urine:3100]            Labs: Results:       Chemistry Recent Labs     08/03/22  0159 08/02/22  0331 08/01/22  1556   * 49* 68*    136 135*   K 4.1 4.3 4.5    106 103   CO2 21 23 25   BUN 25* 28* 29*   CREA 0.58* 0.44* 0.63   CA 9.8 9.1 9.7   AGAP 11 7 7   BUCR 43* 64* 46*   AP 44* 39* 46   TP 7.1 5.8* 6.1*   ALB 4.0 2.7* 2.8*   GLOB 3.1 3.1 3.3   AGRAT 1.3 0.9 0.8      CBC w/Diff Recent Labs     08/03/22  0159 08/02/22  1801 08/02/22  0331 08/01/22  1556   WBC 7.8  --  7.0 8.2   RBC 2.62*  --  2.35* 2.52*   HGB 7.4* 7.6* 6.5* 7.1*   HCT 22.9* 23.8* 20.6* 22.0*     --  168 190   GRANS 83*  --  67 67   LYMPH 8*  --  14* 14*   EOS 0  --  3 2      Cardiac Enzymes No results for input(s): CPK, CKND1, YEFRI in the last 72 hours. No lab exists for component: CKRMB, TROIP   Coagulation Recent Labs     08/01/22  1556   PTP 20.1*   INR 1.7*   APTT 29.1       Lipid Panel Lab Results   Component Value Date/Time    Cholesterol, total 89 12/08/2020 01:12 AM    HDL Cholesterol 46 12/08/2020 01:12 AM    LDL, calculated 28.6 12/08/2020 01:12 AM    VLDL, calculated 14.4 12/08/2020 01:12 AM    Triglyceride 72 12/08/2020 01:12 AM    CHOL/HDL Ratio 1.9 12/08/2020 01:12 AM      BNP No results for input(s): BNPP in the last 72 hours.    Liver Enzymes Recent Labs     08/03/22  0159   TP 7.1   ALB 4.0   AP 44*      Thyroid Studies Lab Results   Component Value Date/Time    TSH 1.16 12/07/2020 12:25 PM    TSH 0.74 12/07/2020 12:25 PM        Procedures/imaging: see electronic medical records for all procedures/Xrays and details which were not copied into this note but were reviewed prior to creation of Plan

## 2022-08-03 NOTE — PROGRESS NOTES
D/C Plan: Home with Hospice (to be coordinated through the [facility] [place])      Noted pt is now comfort care. CM met with pt and his daughter, Lars Parmar ([phone]), at bedside to discuss care transition. Pt's daughter has confirmed plan to return home with hospice. Pt is currently receiving services through the [facility] for personal care aids on Monday, Tuesday, Thursday and Friday. Pt's daughter pays privately to assist with the days the [employer]  does not cover. CM will contact the [facility] to assit with care transition. 1345:  CM contacted Miguel Rivas with the [facility] to discuss hospice services/care. Ms. Cesilia Spence will reach out to the family to coordinate hospice care. Ms. Cesilia Spence will contact a hospice agency and have DME delivered to the home. Anticipate pt will transition home with hospice services with in the next 24-48 hours. CM spoke with pt's daughter and she is aware and in agreement with this plan.   CM to continue to follow and assist.    Care Management Interventions  Mode of Transport at Discharge: S  Transition of Care Consult (CM Consult): Discharge Planning, [place]: No  Reason Outside [place]: Managed care specific requirement  Health Maintenance Reviewed: Yes  Support Systems: Child(desiree), Other Family Member(s)  The Plan for Transition of Care is Related to the Following Treatment Goals : Home with Hospice (to be coordinated through the [facility])  Discharge Location  Patient Expects to be Discharged to[de-identified] Home with hospice

## 2022-08-03 NOTE — PROGRESS NOTES
08/02/22 2240   Procedures   $$ Initial Procedures Aerosol   Delivery Source Mask   Aerosolized Medications DuoNeb   Prn given will give a 2nd Prn in 4 hours. Patient would benefit from Pulmicort in addition to Duo neb.

## 2022-08-03 NOTE — ROUTINE PROCESS
Bedside shift change report given to JOHN Gordillo (oncoming nurse) by Jeyson Bustamante RN   (offgoing nurse). Report included the following information SBAR, Kardex, Intake/Output, and Recent Results.

## 2022-08-03 NOTE — ACP (ADVANCE CARE PLANNING)
Palliative Medicine    CODE STATUS: DNR/DNI; comfort measures to start at discharge; no feeding tube    AMD Status: on file naming  his daughter and brother as his MPOAs     8/3/2022 0945 Seen today in room 330 along with Valorie Solis NP. Lying in bed with head of bed elevated. Awake, alert. Quite drowsy. Does answer questions but speech can be difficult to understand. Daughter, Belkis Tidwell at bedside. Dr Sonny Lagos also came into the meeting to see Mr Mic Pretty. Dr Sonny Lagos was able to give an excellent recap of Mr Angélica Rivas oncologic history and discussed available treatment options. Based on previous treatment failures, current disease state, and performance status barriers to treatment, Dr Sonny Lagos sated that she believed transitioning to a comfort based treatment plan is appropriate at this time. When asked what he wanted to focus on, Mr Mic Pretty said \"be comfortable\". Belkis Tidwell supports whatever her father wants. Hospice consult placed. Care manager, hospitalist, and clinical nurse updated on plan. Belkis Tidwell voiced multiple stressors in her life including personal health issues, work issues, and family concerns on top of being the primary care giver for her family. There is no other family in the local area. Everyone else is in [place]    Disposition plan: anticipate home with hospice    Palliative care will continue to follow Amita Lieberman  and his family during his hospitalization and support them as they make healthcare decisions and define goals of care.     Advanced Steps [facility] (Physician Orders for Scope of Treatment)  Participants:   [x] Patient    [x] Healthcare agent (already designated in existing ACP document)    Name: Suzi Kaminski    Relationship to Patient: daughter       Advanced Steps® ACP Facilitator: Cecilia De La Cruz, RN, MSN; Valorie Solis, BENOIT    Conversation Topics   Understanding of Medical Condition/s AND Potential Complications: patient and family understand the terminal nature of his illness and want to focus on comfort at this time. Needs to discuss with spiritual/Adventism advisor: [] Yes  [x] No    Needs more information about illness and complications:  [] Yes  [x] No    Cardiopulmonary Resuscitation      \"What do you understand about CPR? \" Response:  There was a previous DDNR when he decided he did not want COPR    Order Elected for CPR:  []  Attempt Resuscitation [x]  Do Not Attempt Resuscitation  When NOT in Cardiopulmonary Arrest, Order Elected:    [x] Comfort Measures TO [address]  [] Limited Additional Interventions  [] Full Interventions  Artificially Administered Nutrition, Order Elected:  [x] No Feeding Tube   [] Feeding Tube for a defined trial period  [] Feeding Tube long-term if indicated    The following was provided (check all that apply):  [x] Review of existing Advance Directive  [] Assistance with Completion of New Advance Directive   [x] Review of [place] Form       Meeting Outcomes:   [] ACP discussion completed   [x] [place] form completed  [x] [place] prepared for Provider review and signature   [x] Original placed on Chart, if in facility (form to be sent with patient at discharge)  [x] Copy given to healthcare agent    [x] Copy scanned to electronic medical record      Sukhdeep Mckeon RN, MSN  Palliative Medicine  [phone]

## 2022-08-03 NOTE — PROGRESS NOTES
SLP Note:       Follow up attempted. Could not progress with ST intervention because patient:       []  Lethargic, unable to be alerted enough for safe PO intake  []  Refused participation  []  Off the unit  []  NPO for procedure  [x]  Other: Patient with increased WOB, unable to verbalized due to respiration rate, and unsafe for PO at this time. D/w RN Arti who is aware of patient status. Will continue to follow per POC.       Zunilda Uribe M.S., CFY-SLP  Speech-Language Pathologist

## 2022-08-03 NOTE — HOSPICE
1402: Notified by CM that the pts daughter Dafne Martinez has chosen to go with the [place] for hospice services. [facility] will continue to follow for a safe discharge plan. 1221:Spoke with pts daughter, Dafne Martinez, on the phone. Discussed [facility] philosophy, services, criteria, and IDT. Discussed caregiver need for round the clock care with Dafne Martinez . Primary caregiver identified as Doreen    Caregiver concerns identified as Dafne Martinez. Answered all questions. Gave 24/7 contact information. Plan is for pt to return home with Hospice upon discharge. Pt is a disabled  and uses the [place] for home health at this time. [facility] what the [place] will cover for the pt and will decide if she will use [facility] or the [place] for hospice services. DME needs identified as hospital bed, rails, and bed side table. 1137:Hospice referral received. Chart review in process. Thank you for the referral to [facility]. If we can be of further assistance please contact [phone].      Viktoriya LOVE,  Inpatient Clinical Liaison  [facility] [address] [facility], [address]  [phone]  Email: [email or web address]

## 2022-08-03 NOTE — PROGRESS NOTES
[facility]: [phone]  [facility]: [phone]     Patient Name: Nguyễn Chang  YOB: 1940    Date of progress note: 8/3/22  Reason for Consult: establish goals of care  Requesting Provider:  Natalie Church MD   Primary Care Physician: Malinda Painting MD      SUMMARY:   Nguyễn Chang is a 80 y.o. male with a past history of progressive multiple myeloma, who was admitted on 8/1/2022 from home with a diagnosis of Sepsis due to UTI/PNA. Current medical issues leading to Palliative Medicine involvement include: Pt with advanced cancer on palliative treatments through oncology having recent decline in overall health. CHIEF COMPLAINT: dyspnea on any exertion    HPI/SUBJECTIVE:    Pt is a 80year old AAM that lives at home with his daughter who is his primary caretaker. He also has medicaid aids that assist him while she is at work. Pt has been a patient of [facility]. Being treated with Ninloar and Dex. He has been been getting progressively weaker with spinal stenosis and osseous mets. Pt is at a low functional level and not a candidate for chemotherapy. He came in through the ED yesterday with increased weakness and dark urine. Note that patient recently transferred his care to the [place]. The patient is:   [x] Verbal and participatory: very breathy with conversation asked that we speak with his daughter Cecilia Lam. [] Non-participatory due to:     GOALS OF CARE:  Patient/Health Care Proxy Stated Goals: Prolong life      TREATMENT PREFERENCES:   Code Status: DNR         PALLIATIVE DIAGNOSES:   Goals of care/ACP  Multiple Myeloma  UTI  PNA  Encounter for palliative Med       PLAN:   8/3/22: This NP along with Isaias Myrick RN in to meet with the patient and his daughter Cecilia Lam who is his medical decision maker. Pt remains alert but very symptomatic for dyspnea.  Long discussion of the burdens and benefits of ongoing aggressive care in light of his overall poor prognosis and current symptoms. Pt was able to state that he wants to be kept comfortable. Fortunately Dr Vasquez Praromain from Oncology in during our visit and mimics the sentiments that his current treatment may not be benefiting him at this time. She encouraged the daughter and patient towards a more conservative and comfort approach. We completed a POST for discharge for comfort care and put in a hospice consult for home. Goals of care: DNR/DNI continue treatments until discharge. Goals of care/ACP  This NP along with Timmy Dexter MSW in to see the patient at the bedside. He is alert and oriented X 3 but gets very SOB with speaking or any exertion. Is currently supported on oxygen. Asked that we contact his daughter Grazyna Yancey  Talked with Grazyna Yancey by phone who states that she does have MPOA and patient has made out a living will. She will bring it in tomorrow. She also says that patient has been very debilitated requiring full care and assistance. She has been resistant to any discussion of hospice because she wants her father to continue with the oral treatments for his cancer. Have set up a meeting with Grazyna Yancey tomorrow at 9:30 to review a POST. She confirmed that patient is a DNR and has a DDNR at home. Goals of care; DNR/DNI continued treatment  2. Multiple myeloma  With palliative treatments. Pt also with related spinal stenosis with and osseous mets. 3.  UTI  + 4 bacteria. Being treated with broad spectrum antibiotic. Awaiting cultures  4. Pneumonia  Possible caused by Ninlaro per the oncologist carries up to a 36% risk of PNA and 43% for infection. Note Cxr Consolidation within the right middle and lower lung zones  5. Encounter for palliative Med  Pt with a likely very poor prognosis despite all medical interventions. Thought that the treatments may be increasing his risk and incidence of infections.  Will discuss the Discussed the benefits and burdens of ongoing treatment vs moving to a more comfort approach at end of life. 5.  Initial consult note routed to primary continuity provider  6. Communicated plan of care with: Palliative IDT      Advance Care Planning:  [] The [facility] Interdisciplinary Team has updated the ACP Navigator with [address] and Patient Capacity    Primary Decision [facility] ([address]):   Primary Decision Maker: Akash Manley - Daughter - [phone]    Medical Interventions: Limited additional interventions   Other Instructions:   Artificially Administered Nutrition: No feeding tube     As far as possible, the palliative care team has discussed with patient / health care proxy about goals of care / treatment preferences for patient. HISTORY:     History obtained from: chart review and family interview   Principal Problem:    UTI (urinary tract infection) (8/1/2022)    Active Problems:    Multiple myeloma ([address] (2/16/2020)      Hypoglycemia (8/2/2022)      Anemia (8/2/2022)      Sepsis ([address] (8/2/2022)    Past Medical History:   Diagnosis Date    Cancer ([address]     Diabetes ([address]     FH: chemotherapy     H/O stem cell transplant ([address]     Hypertension     Multiple myeloma ([address]     Neuropathy     Obesity (BMI 30-39.9) 6/12/2021      Past Surgical History:   Procedure Laterality Date    HX CHOLECYSTECTOMY      HX KYPHOPLASTY        Family History   Problem Relation Age of Onset    Diabetes Father     Heart Disease Father     Hypertension Mother     Diabetes Mother     Diabetes Sister     Diabetes Brother      History reviewed, no pertinent family history.   Social History     Tobacco Use    Smoking status: Former    Smokeless tobacco: Never   Substance Use Topics    Alcohol use: Never     Comment: rare     Allergies   Allergen Reactions    Iodine Hives      Current Facility-Administered Medications   Medication Dose Route Frequency    budesonide (PULMICORT) 500 mcg/2 ml nebulizer suspension  500 mcg Nebulization BID RT    [Held by provider] amLODIPine (NORVASC) tablet 10 mg  10 mg Oral DAILY    atorvastatin (LIPITOR) tablet 10 mg  10 mg Oral DAILY    [Held by provider] hydrALAZINE (APRESOLINE) tablet 25 mg  25 mg Oral BID    [Held by provider] ixazomib cap 4 mg (Patient Supplied)  4 mg Oral DAILY    [Held by provider] metoprolol succinate (TOPROL-XL) XL tablet 25 mg  25 mg Oral DAILY    oxyCODONE IR (ROXICODONE) tablet 10 mg  10 mg Oral Q4H PRN    [Held by provider] rivaroxaban (XARELTO) tablet 10 mg  10 mg Oral DAILY WITH DINNER    [Held by provider] spironolactone (ALDACTONE) tablet 25 mg  25 mg Oral DAILY    0.9% sodium chloride infusion 250 mL  250 mL IntraVENous PRN    dexAMETHasone (DECADRON) tablet 4 mg  4 mg Oral DAILY    sodium chloride (23.4%) 0.9 % in dextrose 10% 1,040 mL infusion   IntraVENous CONTINUOUS    albumin human 25% (BUMINATE) solution 25 g  25 g IntraVENous Q6H    albuterol-ipratropium (DUO-NEB) 2.5 MG-0.5 MG/3 ML  3 mL Nebulization Q4H PRN    albuterol-ipratropium (DUO-NEB) 2.5 MG-0.5 MG/3 ML  3 mL Nebulization TID RT    sodium chloride (NS) flush 5-10 mL  5-10 mL IntraVENous PRN    azithromycin (ZITHROMAX) 500 mg in 0.9% sodium chloride 250 mL (VIAL-MATE)  500 mg IntraVENous Q24H    cefTRIAXone (ROCEPHIN) 1 g in 0.9% sodium chloride (MBP/ADV) 50 mL MBP  1 g IntraVENous Q24H    insulin lispro (HUMALOG) injection   SubCUTAneous AC&HS    glucose chewable tablet 16 g  4 Tablet Oral PRN    glucagon (GLUCAGEN) injection 1 mg  1 mg IntraMUSCular PRN    dextrose 10% infusion 0-250 mL  0-250 mL IntraVENous PRN    pantoprazole (PROTONIX) 40 mg in 0.9% sodium chloride 10 mL injection  40 mg IntraVENous Q24H          Clinical Pain Assessment (nonverbal scale for nonverbal patients): Clinical Pain Assessment  Severity: 4          Duration: for how long has pt been experiencing pain (e.g., 2 days, 1 month, years)  Frequency: how often pain is an issue (e.g., several times per day, once every few days, constant)     FUNCTIONAL ASSESSMENT:     Palliative Performance Scale (PPS):  PPS: 40    ECOG  ECOG Status : Completely disabled     PSYCHOSOCIAL/SPIRITUAL SCREENING:      Any spiritual / Adventism concerns:  [] Yes /  [x] No    Caregiver Burnout:  [] Yes /  [] No /  [x] No Caregiver Present      Anticipatory grief assessment:   [x] Normal  / [] Maladaptive        REVIEW OF SYSTEMS:     Systems: constitutional, ears/nose/mouth/throat, respiratory, gastrointestinal, genitourinary, musculoskeletal, integumentary, neurologic, psychiatric, endocrine. Positive findings noted below. Modified ESAS Completed by: provider           Pain: 4           Dyspnea: 7           Stool Occurrence(s): 1   Positive and pertinent negative findings in ROS are noted above in HPI. The following systems were [x] reviewed / [] unable to be reviewed as noted in HPI  Other findings are noted below. PHYSICAL EXAM:     Constitutional: alert and interactive   Eyes: pupils equal, anicteric  ENMT: no nasal discharge, moist mucous membranes  Cardiovascular: regular rhythm, distal pulses intact  Respiratory: breathing labored with talking   Gastrointestinal: soft non-tender, +bowel sounds  Last bowel movement:   Musculoskeletal: no deformity, no tenderness to palpation  Skin: warm, dry  Neurologic: AA and oriented X 3 following commands, moving all extremities  Psychiatric: full affect, no hallucinations    Other: Wt Readings from Last 3 Encounters:   08/02/22 104.6 kg (230 lb 8 oz)   07/27/21 103.2 kg (227 lb 8 oz)   07/01/21 106.1 kg (234 lb)     Blood pressure 117/62, pulse 74, temperature 98.3 °F (36.8 °C), resp. rate 16, height 5' 9\" (1.753 m), weight 104.6 kg (230 lb 8 oz), SpO2 98 %.   Pain:  Pain Scale 1: Numeric (0 - 10)  Pain Intensity 1: 8  Pain Onset 1: chronic  Pain Location 1: Back  Pain Orientation 1: Mid, Upper  Pain Description 1: Aching, Burning, Pressure  Pain Intervention(s) 1: Medication (see MAR)       LAB AND IMAGING FINDINGS:     Lab Results   Component Value Date/Time    WBC 7.8 08/03/2022 01:59 AM    HGB 7.4 (L) 08/03/2022 01:59 AM    PLATELET 607 58/95/9100 01:59 AM     Lab Results   Component Value Date/Time    Sodium 140 08/03/2022 01:59 AM    Potassium 4.1 08/03/2022 01:59 AM    Chloride 108 08/03/2022 01:59 AM    CO2 21 08/03/2022 01:59 AM    BUN 25 (H) 08/03/2022 01:59 AM    Creatinine 0.58 (L) 08/03/2022 01:59 AM    Calcium 9.8 08/03/2022 01:59 AM    Magnesium 2.1 08/03/2022 01:59 AM      Lab Results   Component Value Date/Time    Alk. phosphatase 44 (L) 08/03/2022 01:59 AM    Protein, total 7.1 08/03/2022 01:59 AM    Albumin 4.0 08/03/2022 01:59 AM    Globulin 3.1 08/03/2022 01:59 AM     Lab Results   Component Value Date/Time    INR 1.7 (H) 08/01/2022 03:56 PM    Prothrombin time 20.1 (H) 08/01/2022 03:56 PM    aPTT 29.1 08/01/2022 03:56 PM      No results found for: IRON, FE, TIBC, IBCT, PSAT, FERR   No results found for: PH, PCO2, PO2  No components found for: [place]   Lab Results   Component Value Date/Time    CK 64 06/12/2021 07:50 AM    CK - MB 2.3 06/12/2021 07:50 AM              Total time: 70 minutes  Counseling / coordination time, spent as noted above:   > 50% counseling / coordination:  Time spent in direct consultation with the patient, medical team, and family     Prolonged service was provided for  [x]30 min   []75 min in face to face time in the presence of the patient, spent as noted above. Time Start:   Time End:     Disclaimer: Sections of this note are dictated using utilizing voice recognition software, which may have resulted in some phonetic based errors in grammar and contents. Even though attempts were made to correct all the mistakes, some may have been missed, and remained in the body of the document. If questions arise, please contact our department.

## 2022-08-04 NOTE — WOUND CARE
Wound care nurse attempted to see patient. Bedside nurse stated it was not a good time to try and assess. Patient is comfort care patient. Wound care will be available if condition improves but at this time wound care will close consult.

## 2022-08-04 NOTE — PROGRESS NOTES
Patient appears anxious, respirations 24 O2 saturation 94% on 4L NC.  Nurse administered Intensol 2mg po as needed for anxiety/SOB

## 2022-08-04 NOTE — PROGRESS NOTES
Palliative Medicine    Received a call from clinical nurse, Arti. Mr Lynn Quinn is extremely tachycardic in 170s. Hypotensive 97. Called Saúl Alba and discussed current situation. She is in agreement to initiate comfort measures now in preparation for admission to hospice once plans have been finalized. Saúl Alba will be coming in to see her father. Orders placed. Clinical nurse and hospitalist updated.     Lindy Al RN, MSN  Palliative Medicine  [phone]

## 2022-08-04 NOTE — PROGRESS NOTES
Speech Therapy Note:  Chart reviewed. Patient is now comfort care and will be d/c from [address] [place] [address].

## 2022-08-04 NOTE — ROUTINE PROCESS
Patient is agitated, and having some difficulty breathing. He is using accessory muscles now. Ativan 2mg po ordered and given. Good relief noted.

## 2022-08-04 NOTE — PROGRESS NOTES
Palliative Medicine    CODE STATUS: DNR/DNI    AMD Status: on file naming Brenda Botello, daughter as his primary MPOA     8/4/2022 0840 Seen today in room 330 along with Deny Gonzalez NP. Lying in bed with head of bed elevated. Eyes closed. Did not attempt to awaken . Respirations unlabored with oxygen on at 4 lpm. Had respiratory difficulties overnight necessitation IV lasix and morphine. Appears comfortable currently. Family is coordinating with the [place] to arrange for hospice support at home. He will be discharged once the logistics are settled. Disposition plan: anticipate home with hospice support    Palliative care will continue to follow Rony Jimenez  and his family during his hospitalization and support them as they make healthcare decisions and define goals of care.       Omar Lu RN, MSN  Palliative Medicine  P: [phone]

## 2022-08-04 NOTE — ROUTINE PROCESS
[facility] employee here to retrieve the body. Copy of 's license obtained. Nursing assistant's   assisted with removing patient's IV's and Roberts catheter.

## 2022-08-04 NOTE — ROUTINE PROCESS
Listened to patient's heart for a full minute, and another RN listened for a minute as well. Unable to detect an apical pulse. Akash Castellanos, the clinical care lead, is in agreement that the patient is . The patient's daughter Jamila Brown notified, and she will be coming in. The nursing supervisor, and Dr. Mel Contreras notified as well. [facility] called and notified of patient's death. They will be coming within the hour.

## 2022-08-04 NOTE — PROGRESS NOTES
D/C Plan: Home with [address] (to be coordinated through the [facility])    CM has been contacted by Lory Hanley, with the [facility], and have been notified pt will be seen by [facility] with plan discharge tomorrow. CM spoke with pt's daughter, Dylon Hunt ([phone]), and provided an update. Anticipate pt will transition home tomorrow with the above services. CM to continue to follow and assist with care transition.     Care Management Interventions  Mode of Transport at Discharge: [facility]  Transition of Care Consult (CM Consult): Discharge Planning, [place]: No  Reason Outside [place]: Managed care specific requirement  Health Maintenance Reviewed: Yes  Support Systems: Child(desiree), Other Family Member(s)  The Plan for Transition of Care is Related to the Following Treatment Goals : Home with Hospice (to be coordinated through the [facility])  Discharge Location  Patient Expects to be Discharged to[de-identified] Home with hospice

## 2022-08-04 NOTE — PROGRESS NOTES
Problem: Falls - Risk of  Goal: *Absence of Falls  Description: Document Osbaldo Mcdonald Fall Risk and appropriate interventions in the flowsheet.   Outcome: Progressing Towards Goal  Note: Fall Risk Interventions:       Mentation Interventions: Bed/chair exit alarm    Medication Interventions: Patient to call before getting OOB    Elimination Interventions: Call light in reach

## 2022-08-04 NOTE — ROUTINE PROCESS
Lopressor 5mg IV given with some effect, and Telemetry monitor will be discontinued. Patient repositioned for comfort.

## 2022-08-04 NOTE — PROGRESS NOTES
Patient lethargic, nonverbal, opens eyes to name, dyspneic, and tachypenic. Lung sounds diminished, wheezing bilaterally, and rales. O2 sats on 4 liters NC 93-94%. Respiratory therapist paged for neb treatment. Dr. Faustino Puckett made aware of patient's Dorthea Bills - Dr. Faustino Puckett here to assess patient. As per Dr. Faustino Puckett, IVF stopped, Morphine 2 mg IV given, and Lasix 40 mg IV to be given. CXR to be done at bedside and routine ECHO. Will continue to monitor. 0230 - Patient sleeping,continues to have bilateral wheezing, respirations 22.    0419 - Patient sleeping without distress. Lungs fields with wheezing bilaterally. O2 sats 94% on 4 liters, continues to have labored breathing but less tachypenic at 20 resp. Will continue to monitor. 0725  - Bedside and Verbal shift change report given to MARITZA Scott RN (oncoming nurse) by Justina Brantley (offgoing nurse). Report included the following information SBAR, Kardex, Intake/Output, and MAR.

## 2022-08-04 NOTE — PROGRESS NOTES
[facility]: [phone]  [facility]: [phone]     Patient Name: Negro Lynn  YOB: 1940    Date of progress note: 8/4/22  Reason for Consult: establish goals of care  Requesting Provider:  Breana Liu MD   Primary Care Physician: Kayce Gamez MD      SUMMARY:   Negro Lynn is a 80 y.o. male with a past history of progressive multiple myeloma, who was admitted on 8/1/2022 from home with a diagnosis of Sepsis due to UTI/PNA. Current medical issues leading to Palliative Medicine involvement include: Pt with advanced cancer on palliative treatments through oncology having recent decline in overall health. CHIEF COMPLAINT: dyspnea on any exertion    HPI/SUBJECTIVE:    Pt is a 80year old AAM that lives at home with his daughter who is his primary caretaker. He also has medicaid aids that assist him while she is at work. Pt has been a patient of [facility]. Being treated with Ninloar and Dex. He has been been getting progressively weaker with spinal stenosis and osseous mets. Pt is at a low functional level and not a candidate for chemotherapy. He came in through the ED yesterday with increased weakness and dark urine. Note that patient recently transferred his care to the [place].     8/4/22: Pt remains on active care in order to keep him as robust as possible prior to discharge to home. The patient is:   [x] Verbal and participatory: very breathy with conversation asked that we speak with his daughter Daina Orona. [] Non-participatory due to:     GOALS OF CARE:  Patient/Health Care Proxy Stated Goals: Prolong life      TREATMENT PREFERENCES:   Code Status: DNR         PALLIATIVE DIAGNOSES:   Goals of care/ACP  Multiple Myeloma  UTI  PNA  Encounter for palliative Med       PLAN:   8/4/22: This NP along with Stephanie Madsen Rn in to see the patient at the bedside.  He was resting with eyes closed and did not appear in any distress. Pt has had 3 doses of morphine IV today and one of Lorazepam for symptom management. The plan according to the patient's  is to go home tomorrow with the [place] contracted Hosp[facility]. Daughter wishes to keep patient on the current treatments to give him the best possibility of being alert and interactive when he goes home. Goals of care:  DNR/DNI COMFORT POST is on file    UPDATE:  At [phone] received a phone call from the patient's [facility] stating that his HR is in the 150-180's. Contacted his daughter Bhumika Rosa who is in agreement to start the comfort care here at the hospital now. Have updated his orders and let the [facility] and attending know. 8/3/22: This NP along with Gopi Mancia RN in to meet with the patient and his daughter Bhumika Rosa who is his medical decision maker. Pt remains alert but very symptomatic for dyspnea. Long discussion of the burdens and benefits of ongoing aggressive care in light of his overall poor prognosis and current symptoms. Pt was able to state that he wants to be kept comfortable. Fortunately Dr Charles Watson from Oncology in during our visit and mimics the sentiments that his current treatment may not be benefiting him at this time. She encouraged the daughter and patient towards a more conservative and comfort approach. We completed a POST for discharge for comfort care and put in a hospice consult for home. Goals of care: DNR/DNI continue treatments until discharge. Goals of care/ACP  This NP along with Zulay MUNOZ in to see the patient at the bedside. He is alert and oriented X 3 but gets very SOB with speaking or any exertion. Is currently supported on oxygen. Asked that we contact his daughter Bhumika Rosa  Talked with Bhumika Rosa by phone who states that she does have MPOA and patient has made out a living will. She will bring it in tomorrow. She also says that patient has been very debilitated requiring full care and assistance.  She has been resistant to any discussion of hospice because she wants her father to continue with the oral treatments for his cancer. Have set up a meeting with Lilian Ibrahim tomorrow at 9:30 to review a POST. She confirmed that patient is a DNR and has a DDNR at home. Goals of care; DNR/DNI continued treatment  2. Multiple myeloma  With palliative treatments. Pt also with related spinal stenosis with and osseous mets. 3.  UTI  + 4 bacteria. Being treated with broad spectrum antibiotic. Awaiting cultures  4. Pneumonia  Possible caused by Ninlaro per the oncologist carries up to a 36% risk of PNA and 43% for infection. Note Cxr Consolidation within the right middle and lower lung zones  5. Encounter for palliative Med  Pt with a likely very poor prognosis despite all medical interventions. Thought that the treatments may be increasing his risk and incidence of infections. Will discuss the Discussed the benefits and burdens of ongoing treatment vs moving to a more comfort approach at end of life. 5.  Initial consult note routed to primary continuity provider  6. Communicated plan of care with: Palliative IDT      Advance Care Planning:  [] The [facility] Interdisciplinary Team has updated the ACP Navigator with [address] and Patient Capacity    Primary Decision [facility] ([address]):   Primary Decision MakerCleticia Freeman - Daughter - [phone]    Secondary Decision Maker: Maykel Bacon - Brother - [phone]    Medical Interventions: Limited additional interventions   Other Instructions:   Artificially Administered Nutrition: No feeding tube     As far as possible, the palliative care team has discussed with patient / health care proxy about goals of care / treatment preferences for patient.          HISTORY:     History obtained from: chart review and family interview   Principal Problem:    UTI (urinary tract infection) (8/1/2022)    Active Problems:    Multiple myeloma ([facility] (2/16/2020)      Hypoglycemia (8/2/2022)      Anemia (8/2/2022)      Sepsis ([facility] (8/2/2022)    Past Medical History:   Diagnosis Date    Cancer ([facility]     Diabetes ([facility]     FH: chemotherapy     H/O stem cell transplant ([facility]     Hypertension     Multiple myeloma ([facility]     Neuropathy     Obesity (BMI 30-39.9) 6/12/2021      Past Surgical History:   Procedure Laterality Date    HX CHOLECYSTECTOMY      HX KYPHOPLASTY        Family History   Problem Relation Age of Onset    Diabetes Father     Heart Disease Father     Hypertension Mother     Diabetes Mother     Diabetes Sister     Diabetes Brother      History reviewed, no pertinent family history.   Social History     Tobacco Use    Smoking status: Former    Smokeless tobacco: Never   Substance Use Topics    Alcohol use: Never     Comment: rare     Allergies   Allergen Reactions    Iodine Hives      Current Facility-Administered Medications   Medication Dose Route Frequency    morphine injection 2 mg  2 mg IntraVENous ONCE    morphine injection 2 mg  2 mg IntraVENous Q4H PRN    budesonide (PULMICORT) 500 mcg/2 ml nebulizer suspension  500 mcg Nebulization BID RT    LORazepam (INTENSOL) 2 mg/mL oral concentrate 2 mg  2 mg Oral Q4H PRN    [Held by provider] amLODIPine (NORVASC) tablet 10 mg  10 mg Oral DAILY    atorvastatin (LIPITOR) tablet 10 mg  10 mg Oral DAILY    [Held by provider] hydrALAZINE (APRESOLINE) tablet 25 mg  25 mg Oral BID    [Held by provider] ixazomib cap 4 mg (Patient Supplied)  4 mg Oral DAILY    [Held by provider] metoprolol succinate (TOPROL-XL) XL tablet 25 mg  25 mg Oral DAILY    oxyCODONE IR (ROXICODONE) tablet 10 mg  10 mg Oral Q4H PRN    [Held by provider] rivaroxaban (XARELTO) tablet 10 mg  10 mg Oral DAILY WITH DINNER    [Held by provider] spironolactone (ALDACTONE) tablet 25 mg  25 mg Oral DAILY    0.9% sodium chloride infusion 250 mL  250 mL IntraVENous PRN    dexAMETHasone (DECADRON) tablet 4 mg  4 mg Oral DAILY    [Held by provider] sodium chloride (23.4%) 0.9 % in dextrose 10% 1,040 mL infusion   IntraVENous CONTINUOUS    albumin human 25% (BUMINATE) solution 25 g  25 g IntraVENous Q6H    albuterol-ipratropium (DUO-NEB) 2.5 MG-0.5 MG/3 ML  3 mL Nebulization Q4H PRN    albuterol-ipratropium (DUO-NEB) 2.5 MG-0.5 MG/3 ML  3 mL Nebulization TID RT    sodium chloride (NS) flush 5-10 mL  5-10 mL IntraVENous PRN    azithromycin (ZITHROMAX) 500 mg in 0.9% sodium chloride 250 mL (VIAL-MATE)  500 mg IntraVENous Q24H    cefTRIAXone (ROCEPHIN) 1 g in 0.9% sodium chloride (MBP/ADV) 50 mL MBP  1 g IntraVENous Q24H    insulin lispro (HUMALOG) injection   SubCUTAneous AC&HS    glucose chewable tablet 16 g  4 Tablet Oral PRN    glucagon (GLUCAGEN) injection 1 mg  1 mg IntraMUSCular PRN    dextrose 10% infusion 0-250 mL  0-250 mL IntraVENous PRN    pantoprazole (PROTONIX) 40 mg in 0.9% sodium chloride 10 mL injection  40 mg IntraVENous Q24H          Clinical Pain Assessment (nonverbal scale for nonverbal patients): Clinical Pain Assessment  Severity: 4          Duration: for how long has pt been experiencing pain (e.g., 2 days, 1 month, years)  Frequency: how often pain is an issue (e.g., several times per day, once every few days, constant)     FUNCTIONAL ASSESSMENT:     Palliative Performance Scale (PPS):  PPS: 40    ECOG  ECOG Status : Completely disabled     PSYCHOSOCIAL/SPIRITUAL SCREENING:      Any spiritual / Mormon concerns:  [] Yes /  [x] No    Caregiver Burnout:  [] Yes /  [] No /  [x] No Caregiver Present      Anticipatory grief assessment:   [x] Normal  / [] Maladaptive        REVIEW OF SYSTEMS:     Systems: constitutional, ears/nose/mouth/throat, respiratory, gastrointestinal, genitourinary, musculoskeletal, integumentary, neurologic, psychiatric, endocrine. Positive findings noted below. Modified ESAS Completed by: provider           Pain: 4           Dyspnea: 7           Stool Occurrence(s): 0   Positive and pertinent negative findings in ROS are noted above in HPI.   The following systems were [x] reviewed / [] unable to be reviewed as noted in HPI  Other findings are noted below. PHYSICAL EXAM:     Constitutional: alert and interactive   Eyes: pupils equal, anicteric  ENMT: no nasal discharge, moist mucous membranes  Cardiovascular: regular rhythm, distal pulses intact  Respiratory: breathing labored with talking   Gastrointestinal: soft non-tender, +bowel sounds  Last bowel movement:   Musculoskeletal: no deformity, no tenderness to palpation  Skin: warm, dry  Neurologic: AA and oriented X 3 following commands, moving all extremities  Psychiatric: full affect, no hallucinations    Other: Wt Readings from Last 3 Encounters:   08/04/22 101.6 kg (224 lb)   07/27/21 103.2 kg (227 lb 8 oz)   07/01/21 106.1 kg (234 lb)     Blood pressure (!) 97/52, pulse 83, temperature 98.1 °F (36.7 °C), resp. rate 20, height 5' 9\" (1.753 m), weight 101.6 kg (224 lb), SpO2 98 %. Pain:  Pain Scale 1: FLACC  Pain Intensity 1: 0  Pain Onset 1: chronic  Pain Location 1: Back  Pain Orientation 1: Lower  Pain Description 1: Aching  Pain Intervention(s) 1: Medication (see MAR)       LAB AND IMAGING FINDINGS:     Lab Results   Component Value Date/Time    WBC 11.2 08/04/2022 03:50 AM    HGB 7.6 (L) 08/04/2022 03:50 AM    PLATELET 434 85/03/6886 03:50 AM     Lab Results   Component Value Date/Time    Sodium 140 08/04/2022 03:50 AM    Potassium 3.9 08/04/2022 03:50 AM    Chloride 108 08/04/2022 03:50 AM    CO2 23 08/04/2022 03:50 AM    BUN 20 (H) 08/04/2022 03:50 AM    Creatinine 0.48 (L) 08/04/2022 03:50 AM    Calcium 9.7 08/04/2022 03:50 AM    Magnesium 1.9 08/04/2022 03:50 AM      Lab Results   Component Value Date/Time    Alk.  phosphatase 41 (L) 08/04/2022 03:50 AM    Protein, total 7.6 08/04/2022 03:50 AM    Albumin 4.5 08/04/2022 03:50 AM    Globulin 3.1 08/04/2022 03:50 AM     Lab Results   Component Value Date/Time    INR 1.7 (H) 08/01/2022 03:56 PM    Prothrombin time 20.1 (H) 08/01/2022 03:56 PM aPTT 29.1 08/01/2022 03:56 PM      No results found for: IRON, FE, TIBC, IBCT, PSAT, FERR   No results found for: PH, PCO2, PO2  No components found for: [place]   Lab Results   Component Value Date/Time    CK 64 06/12/2021 07:50 AM    CK - MB 2.3 06/12/2021 07:50 AM              Total time: 25 minutes  Counseling / coordination time, spent as noted above:   > 50% counseling / coordination:  Time spent in direct consultation with the patient, medical team, and family     Prolonged service was provided for  []30 min   []75 min in face to face time in the presence of the patient, spent as noted above. Time Start:   Time End:     Disclaimer: Sections of this note are dictated using utilizing voice recognition software, which may have resulted in some phonetic based errors in grammar and contents. Even though attempts were made to correct all the mistakes, some may have been missed, and remained in the body of the document. If questions arise, please contact our department.

## 2022-08-04 NOTE — ROUTINE PROCESS
Patient given Morphine 2mg IV x 1 per Dr. Nohemi Polk order. Heart rate remains high between 120's and 160's. Patient repositioned for comfort. Patient is dyspneic and using accessory muscles to breathe.

## 2022-08-04 NOTE — PROGRESS NOTES
Paged for a Presumed death but the  came and said the patient was still alive. He is a DNR and had stopped breathing during a breathing session and so was thought dead. He is really having a hard time to breathe. The daughter is there by the bedside and friends had also arrived when  left the room after praying for the patient and his daughter.         Sister Glory De La Cruz, [place], [address]  [phone]

## 2022-08-04 NOTE — PROGRESS NOTES
RESPIRATORY CARE    Decreased BS's with forced expiratory wheezing prior to NEB TX. Aeration increased post [address] [place] [address], now with audible exp wheezing. Half way through mask TX, converted to blow-by, as pt with known severe claustrophobia, began getting agitated and shaking off mask. Of note, pt. Being seen by Palliative care with plans to transition to Hospice care. Meds for anxiety have been given, however, they are oral.  Would benefit from IV meds. [phone]- Called for PRN TX. No change in BS's or WOB from earlier. Neb given with mask and blow-by, again with agitation. Physician to bedside, lasix and Mso4 to be given.

## 2022-08-05 NOTE — DISCHARGE SUMMARY
Admit Date: 8/1/2022   Date of Death: 8/4/2022     Patient ID:   Cherry Griffiths   80 y.o.   1940   Time of Death: 1803   Cause of Death: Multiple Myeloma, DM, CHF,    Diagnosis   Patient Active Problem List    Diagnosis Date Noted    Hypoglycemia 08/02/2022    Anemia 08/02/2022    Sepsis ([address] 08/02/2022    UTI (urinary tract infection) 08/01/2022    Abnormal electrocardiogram 08/01/2022    Congenital pes planus 08/01/2022    Closed fracture of thoracic vertebra ([address] 08/01/2022    Acute kidney failure, unspecified ([address] 07/27/2021    Unable to ambulate 07/24/2021    Anticoagulated 06/12/2021    Left shoulder pain 06/12/2021    Obesity (BMI 30-39.9) 06/12/2021    Pneumonia due to COVID-19 virus 06/12/2021    COVID-19 vaccine series completed 06/12/2021    HTN (hypertension) 06/11/2021    DM (diabetes mellitus) ([address] 06/11/2021    Spinal stenosis 12/11/2020    Left hemiparesis ([address] 12/08/2020    Hypokalemia 12/07/2020    Hypomagnesemia 12/07/2020    Elevated troponin 12/07/2020    Debility 12/07/2020    Neuropathy     Morbid obesity with BMI of 40.0-44.9, adult ([address] 02/18/2020    Morbid obesity ([address] 65/89/4405    Metabolic encephalopathy 53/63/5104    Cellulitis 02/16/2020    LEANDRA (acute kidney injury) ([address] 02/16/2020    Olecranon fracture 02/16/2020    Multiple myeloma ([address] 90/75/9411    Systolic CHF, chronic ([address] 02/14/2020    Chest pain 02/12/2020    Lower extremity edema 02/12/2020    Type 2 diabetes mellitus ([address] 02/12/2020    Open wound of right index finger without damage to nail 01/09/2019        Hospital Course:   Cherry Griffiths is a 80 y.o.  male who has history of multiple myeloma, spinal stenosis bedbound, diabetes, history of stem cell transplant, bedbound presents to the emergency room with 3-week recurrent weakness decreased appetite and recurrent urinary tract infections.   Patient recently transitioning his care to the [place] due to transportation being difficult for family to afford he was offered palliative/comfort if last round of chemo was unsuccessful  Patient has visiting nurse and daughter and son-in-law who has been instrumental helping patient patient's main complaint is that of being hungry and choking when he eats there is been no fevers chills or night sweats he is got some several lesions that have popped up on his thigh and left arm  No COVID exposures vaccinated and  In the emergency room patient was hypotensive he was given sepsis fluids and antibiotics in the form of Rocephin administered asked with improvement of blood pressure. Mr. Lynn Quinn was admitted to monitored floor, he was seen and followed by heme/onc for progressive multiple myeloma. He was getting treatment until last month. He has developed progressive weakness due to spinal stenosis and bone metastasis. Given his progressive weakness, heme/onc recommended hospice. Palliative care consulted and discussed with patient's daughter, who agreed with comfort measures.    PCP: Joyce James MD

## 2022-08-05 NOTE — PROGRESS NOTES
Bereavement Note:     responded to the death of Marcelo Keller, who is a 80 y.o., male, offering Spiritual Care to patient and family. Family had just left when  arrived.  called daughter to offer support and left a vm.  provided staff support. Date of Death: 22    Extended Emergency Contact Information  Primary Emergency Contact: [address] Phone: [phone]  Mobile Phone: [phone]  Relation: Daughter  Secondary Emergency Contact: Tee Parsons  Mobile Phone: [phone]  Relation: Brother                 YES      NO  UNKNOWN  Life [employer]   []        [x]    []   [employer]   [] [x] []   Medical Examiner  []        [x]  []   Going to [employer]  []        [x] []      Autopsy   []        []         []   Sympathy Card  []        [x]  Bereavement Materials  []        [x]           Business Card Provided  []        [x]              Home: [address] [facility] [phone]    Chaplains will continue to follow family and will provide spiritual care as needed. Rev. Ana Maria Mccullough.  Jovita Shook, [address] [facility] [place] [phone]  Pager :[phone]

## 2022-08-07 LAB
BACTERIA SPEC CULT: NORMAL
BACTERIA SPEC CULT: NORMAL
SERVICE CMNT-IMP: NORMAL
SERVICE CMNT-IMP: NORMAL

## 2022-08-09 NOTE — PROGRESS NOTES
Physician Progress Note      PATIENT:               Lacy Webster  CSN #:                  [id number]  :                       1940  ADMIT DATE:       2022 3:08 PM  [address] DATE:        2022 5:50 PM  RESPONDING  PROVIDER #:        Sue Casiano MD          QUERY TEXT:    Patient admitted with weakness and UTI Noted documentation of Acute Kidney Injury in H&P and progress notes. In order to support the diagnosis of LEANDRA, please include additional clinical indicators in your documentation. ? Or please document if the diagnosis of LEANDRA has been ruled out after further study. The medical record reflects the following:  Risk Factors: multiple myeloma, decreased appetite, bedbound  Clinical Indicators: Patient presented from home with weakness and report of dark urine per home health nurse. Labs :  Bun/creat  29/.63, GFR >60   Bun/creat  28/.44 GFR >60  8/3 Bun/creat   5/.58   Bun/creat  20/.48  H&P states: LEANDRA we will start on low-dose fluids hold antihypertensive agents for now  Treatment: IV NS bolus, IV fluids, monitor labs and I&O    Defined by Kidney Disease Improving Global Outcomes (KDIGO) clinical practice guideline for acute kidney injury:  -Increase in SCr by greater than or equal to 0.3 mg/dl within 48 hours; or  -Increase or decrease in SCr to greater than or equal to 1.5 times baseline, which is known or presumed to have occurred within the prior 7 days; or  -Urine volume < 0.5ml/kg/h for 6 hours. Kiel Andrew RN, BSN, [address] [place] [address]  [phone]  Options provided:  -- Acute kidney injury evidenced by (please document clinical support), Please document evidence as well as a numerical baseline creatinine, if known.   -- Acute kidney injury ruled out after study  -- Other - I will add my own diagnosis  -- Disagree - Not applicable / Not valid  -- Disagree - Clinically unable to determine / Unknown  -- Refer to Clinical Documentation Reviewer    PROVIDER RESPONSE TEXT:    Acute kidney injury was ruled out after study.     Query created by: Bette Rivas on 8/9/2022 11:14 AM      Electronically signed by:  Sima Marie MD 8/9/2022 5:01 PM

## 2022-08-09 NOTE — PROGRESS NOTES
Physician Progress Note      PATIENT:               Jennifer Tovar  CSN #:                  [id number]  :                       1940  ADMIT DATE:       2022 3:08 PM  [address] [place] DATE:        2022 5:50 PM  RESPONDING  PROVIDER #:        Juve Pak MD          QUERY TEXT:    Patient admitted with increased weakness, recurrent UTI. Noted documentation of sepsis in H&P and progress notes. In order to support the diagnosis of sepsis, please include additional clinical indicators in your documentation. Or please document if the diagnosis of sepsis has been ruled out after further study. The medical record reflects the following:  Risk Factors: Multiple Myeloma, on oral chemo, recent UTI  Clinical Indicators: Patient presented with increased weakness and found to have a UTI. VS on admission  T 97.2-98.3 P 60-66 RR 16-20 BP 96/43  WBC  8.2, 67% neuts,  lactic acid 2.90, 2.57, 1.6  UC + >100k E. coli  CXR:  Patchy patchy areas of multifocal consolidation concerning for pneumonia  Patient remained afebrile with WBC's wnl throughout hospitalization. Treatment: IV NS bolus 2L, IV Rocephin,  IV Zithromax, UC, CXR    Kirk Green RN, BSN, CCDS  [facility]  [phone]  Options provided:  -- Sepsis present as evidenced by, Please document evidence. -- Sepsis was ruled out after study  -- Other - I will add my own diagnosis  -- Disagree - Not applicable / Not valid  -- Disagree - Clinically unable to determine / Unknown  -- Refer to Clinical Documentation Reviewer    PROVIDER RESPONSE TEXT:    Sepsis was ruled out after study. Query created by: Alyssa Patel on 2022 10:00 AM      QUERY TEXT:    Pt admitted with UTI and pneumonia and has CHF documented.  If possible, please document in progress notes and discharge summary further specificity regarding the type and acuity of CHF:    The medical record reflects the following:  Risk Factors: history of chronic systolic CHF, HTN, Home meds include lasix, imdur, Toprol, lisinopril      Clinical Indicators: 8/4 nurse's note: Patient lethargic, nonverbal, opens eyes to name, dyspneic, and tachypenic. Lung sounds diminished,  As per Dr. Lisa Del Valle, IVF stopped, Morphine 2 mg IV given, and Lasix 40 mg IV to be given. 8/4 @ 0101 Hospitalist note: Called for dyspnea And labored breathing given morphine and lasix IV  8/4 CXR: Large right pleural effusion with diffuse bilateral interstitial/parenchymal opacities  Comfort measures initiated  Discharge summary indicates: Cause of Death: Multiple Myeloma, DM, CHF. Treatment: IV Lasix, CXR, IV Lopressor Comfort measures  Options provided:  -- Acute on Chronic Systolic CHF/HFrEF  -- Chronic Systolic CHF/HFrEF  -- Other - I will add my own diagnosis  -- Disagree - Not applicable / Not valid  -- Disagree - Clinically unable to determine / Unknown  -- Refer to Clinical Documentation Reviewer    PROVIDER RESPONSE TEXT:    This patient has chronic systolic CHF/HFrEF. Query created by: Mateo Lind on 8/9/2022 10:13 AM      QUERY TEXT:    Pt admitted with weakness and found to have a UTI. H&P and Progress notes state Pneumonia with aspiration. If possible, please document in the progress notes and discharge summary if you are evaluating and/or treating any of the following: The medical record reflects the following:  Risk Factors: Multiple myeloma with bone mets, bedbound  Clinical Indicators:  8/1 CXR: . Patchy areas of multifocal consolidation concerning for pneumonia. H&P: possible pneumonia with aspiration  8/2 SLP evaluation:  Patient unable to sit upright due to pain associated with spinal stenosis. Moderate penetration visualized with thin liquids. Pharyngeal residue visualized in pyriforms and patient observed to regurgitate 1 of 2 trials of thin liquids. Thus, patient at risk for aspiration during and after swallow  8/2 swallow study: No evidence of aspiration.  Penetration with thin liquids  8/3 Progress note: Possible pneumonia with aspiration  Pneumonia is not mentioned in DC summary    Treatment: SLP evaluation IV rocephin, IV Zithromax, thickened liquids,    Altamease Prim RN, BSN, CCDS  [facility]  [phone]  Options provided:  -- Possible Aspiration pneumonia POA  -- MRSA pneumonia POA  -- MSSA pneumonia POA  -- Hypostatic pneumonia POA  -- Other - I will add my own diagnosis  -- Disagree - Not applicable / Not valid  -- Disagree - Clinically unable to determine / Unknown  -- Refer to Clinical Documentation Reviewer    PROVIDER RESPONSE TEXT:    This patient has possible aspiration pneumonia POA.     Query created by: Rakan Moreno on 8/9/2022 10:26 AM      Electronically signed by:  Reyna Reid MD 8/9/2022 2:17 PM

## 2023-06-24 NOTE — PROGRESS NOTES
DC Plan:  Recommendations per PT: inpatient rehab to continue therapy    Met with patient at bedside;cervical collar in place, CHUCHO drain charged with serosanguinous drainage; per patient, shoulder and arm have much greater sensation than prior to surgery; just is c/o neck pain at this time. Discussed patient's case with PT/OT and after eval they felt that patient would be able to perform and benefit from inpatient rehab; [address] offered and patient would like info sent to [facility] and [facility]; referrals placed. Care Management Interventions  PCP Verified by CM: Yes  Palliative Care Criteria Met (RRAT>21 & CHF Dx)?: No  Mode of Transport at Discharge:  Other (see comment)  Transition of Care Consult (CM Consult): Discharge Planning, Other(acute rehab)  Physical Therapy Consult: Yes  Occupational Therapy Consult: Yes  Speech Therapy Consult: Yes  Current Support Network: Own Home  Confirm Follow Up Transport: Family  The Plan for Transition of Care is Related to the Following Treatment Goals : acute rehab to continue therapy  The Patient and/or Patient Representative was Provided with a Choice of Provider and Agrees with the Discharge Plan?: Yes  Name of the Patient Representative Who was Provided with a Choice of Provider and Agrees with the Discharge Plan: laura diaz  Freedom of Choice List was Provided with Basic Dialogue that Supports the Patient's Individualized Plan of Care/Goals, Treatment Preferences and Shares the Quality Data Associated with the Providers?: Yes  Discharge Location  Discharge Placement: Rehab hospital/unit acute Presents with hemoglobin of 8.3. previous baseline near 9. 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

## 2025-06-13 NOTE — PROGRESS NOTES
0710 Received bedside shift report from off going nurse Chasidy Otero RN. Report included the following information SBAR, Kardex, Intake/Output, MAR and Recent Results. 1113 Pt up on bedpan. 1130 Lotion applied to arms and legs. [address] report to [facility] and spoke with Javi Simpson RN. [facility] as transport was later than expected. No

## (undated) DEVICE — PACK PROCEDURE SURG EXTREMITY CUST

## (undated) DEVICE — DRAPE TWL SURG 16X26IN BLU ORB04] ALLCARE INC]

## (undated) DEVICE — 1010 S-DRAPE TOWEL DRAPE 10/BX: Brand: STERI-DRAPE™

## (undated) DEVICE — Device

## (undated) DEVICE — BIPOLAR SEALER 23-314-1 AQM MINI EVS 3.4: Brand: AQUAMANTYS™

## (undated) DEVICE — STRAP,POSITIONING,KNEE/BODY,FOAM,4X60": Brand: MEDLINE

## (undated) DEVICE — PACK PROCEDURE SURG LAMINECTOMY SPINE CUST

## (undated) DEVICE — SUT VCRL + 2-0 36IN CT1 UD --

## (undated) DEVICE — 3.0MM PRECISION NEURO (MATCH HEAD)

## (undated) DEVICE — DRILL 2.3

## (undated) DEVICE — (D)PREP SKN CHLRAPRP APPL 26ML -- CONVERT TO ITEM 371833

## (undated) DEVICE — Z DISCONTINUED USE (MFG CAT 7984-37) SOLUTION IV SODIUM CHL 0.9% 100 ML INJ

## (undated) DEVICE — 3M™ STERI-DRAPE™ INCISE DRAPE 1050 (60CM X 45CM): Brand: STERI-DRAPE™

## (undated) DEVICE — STERILE POLYISOPRENE POWDER-FREE SURGICAL GLOVES WITH EMOLLIENT COATING: Brand: PROTEXIS

## (undated) DEVICE — BANDAGE COMPR W4INXL15FT BGE E SGL LAYERED CLP CLSR

## (undated) DEVICE — INTENDED FOR TISSUE SEPARATION, AND OTHER PROCEDURES THAT REQUIRE A SHARP SURGICAL BLADE TO PUNCTURE OR CUT.: Brand: BARD-PARKER ® CARBON RIB-BACK BLADES

## (undated) DEVICE — SOLUTION LACTATED RINGERS INJECTION USP

## (undated) DEVICE — BANDAGE COMPR W4INXL5YD ELAS CLP CLSR

## (undated) DEVICE — 4-PORT MANIFOLD: Brand: NEPTUNE 2

## (undated) DEVICE — SUT VCRL + 2-0 27IN CT2 UD -- 36/BX

## (undated) DEVICE — GAUZE,SPONGE,8"X4",12PLY,XRAY,STRL,LF: Brand: MEDLINE

## (undated) DEVICE — SPONGE LAP 18X18IN STRL -- 5/PK

## (undated) DEVICE — TOTAL TRAY, DB, 100% SILI FOLEY, 16FR 10: Brand: MEDLINE

## (undated) DEVICE — TUBING, SUCTION, 1/4" X 12', STRAIGHT: Brand: MEDLINE

## (undated) DEVICE — SUTURE ETHLN SZ 4-0 L18IN NONABSORBABLE BLK L19MM PS-2 3/8 1667H

## (undated) DEVICE — REM POLYHESIVE ADULT PATIENT RETURN ELECTRODE: Brand: VALLEYLAB

## (undated) DEVICE — PREP SKN CHLRAPRP 26ML TNT -- CONVERT TO ITEM 373320

## (undated) DEVICE — HANDPIECE SET WITH FAN SPRAY TIP: Brand: INTERPULSE

## (undated) DEVICE — CUFF TRNQT AD W4IN 18IN CIRC SURG GEL SGL PRT BLDR PNEUMAT

## (undated) DEVICE — DRESSING,GAUZE,XEROFORM,CURAD,1"X8",ST: Brand: CURAD

## (undated) DEVICE — SUTURE VCRL + SZ 2-0 L18IN ABSRB VLT CT-2 1/2 CIR TAPERCUT VCP726D

## (undated) DEVICE — PADDING CAST W4INXL4YD ST COT COHESIVE HND TEARABLE SPEC

## (undated) DEVICE — SUT VCRL + 0 36IN CT1 UD --

## (undated) DEVICE — GARMENT,MEDLINE,DVT,INT,CALF,MED, GEN2: Brand: MEDLINE

## (undated) DEVICE — STERILE POLYISOPRENE POWDER-FREE SURGICAL GLOVES: Brand: PROTEXIS

## (undated) DEVICE — KIT EVAC 0.13IN RECT TB DIA10FR 400CC PVC 3 SPR Y CONN DRN

## (undated) DEVICE — SPONGE GZ W4XL4IN COT 12 PLY TYP VII WVN C FLD DSGN

## (undated) DEVICE — HANDPIECE SET WITH HIGH FLOW TIP AND SUCTION TUBE: Brand: INTERPULSE

## (undated) DEVICE — SOL IRRIGATION INJ NACL 0.9% 500ML BTL

## (undated) DEVICE — 3M™ IOBAN™ 2 ANTIMICROBIAL INCISE DRAPE 6650EZ: Brand: IOBAN™ 2

## (undated) DEVICE — MEDI-VAC SUCTION HIGH CAPACITY: Brand: CARDINAL HEALTH

## (undated) DEVICE — DRAPE XR C ARM 41X74IN LF --

## (undated) DEVICE — BIT DRL L65MM DIA2.4MM STP QUIK CPL REUSE

## (undated) DEVICE — SUTURE ABSORBABLE BRAIDED 1-0 OS-8 CR 3X18 IN UD VICRYL J757T

## (undated) DEVICE — GOWN,AURORA,NONRNF,XL,30/CS: Brand: MEDLINE